# Patient Record
Sex: FEMALE | Race: WHITE | Employment: OTHER | URBAN - METROPOLITAN AREA
[De-identification: names, ages, dates, MRNs, and addresses within clinical notes are randomized per-mention and may not be internally consistent; named-entity substitution may affect disease eponyms.]

---

## 2019-03-09 ENCOUNTER — APPOINTMENT (OUTPATIENT)
Dept: GENERAL RADIOLOGY | Facility: CLINIC | Age: 84
DRG: 544 | End: 2019-03-09
Attending: EMERGENCY MEDICINE
Payer: MEDICARE

## 2019-03-09 ENCOUNTER — HOSPITAL ENCOUNTER (INPATIENT)
Facility: CLINIC | Age: 84
LOS: 4 days | Discharge: NURSING, REHAB OR LONG-TERM CARE | DRG: 544 | End: 2019-03-13
Attending: EMERGENCY MEDICINE | Admitting: INTERNAL MEDICINE
Payer: MEDICARE

## 2019-03-09 DIAGNOSIS — M54.9 INTRACTABLE BACK PAIN: ICD-10-CM

## 2019-03-09 DIAGNOSIS — S32.000A LUMBAR COMPRESSION FRACTURE, CLOSED, INITIAL ENCOUNTER (H): ICD-10-CM

## 2019-03-09 DIAGNOSIS — S32.020D CLOSED COMPRESSION FRACTURE OF L2 LUMBAR VERTEBRA WITH ROUTINE HEALING, SUBSEQUENT ENCOUNTER: Primary | ICD-10-CM

## 2019-03-09 LAB
ALBUMIN UR-MCNC: 10 MG/DL
ANION GAP SERPL CALCULATED.3IONS-SCNC: 7 MMOL/L (ref 3–14)
APPEARANCE UR: CLEAR
BASOPHILS # BLD AUTO: 0 10E9/L (ref 0–0.2)
BASOPHILS NFR BLD AUTO: 0.1 %
BILIRUB UR QL STRIP: NEGATIVE
BUN SERPL-MCNC: 13 MG/DL (ref 7–30)
CALCIUM SERPL-MCNC: 8.4 MG/DL (ref 8.5–10.1)
CHLORIDE SERPL-SCNC: 103 MMOL/L (ref 94–109)
CO2 SERPL-SCNC: 26 MMOL/L (ref 20–32)
COLOR UR AUTO: YELLOW
CREAT SERPL-MCNC: 0.61 MG/DL (ref 0.52–1.04)
DIFFERENTIAL METHOD BLD: ABNORMAL
EOSINOPHIL # BLD AUTO: 0.1 10E9/L (ref 0–0.7)
EOSINOPHIL NFR BLD AUTO: 1.8 %
ERYTHROCYTE [DISTWIDTH] IN BLOOD BY AUTOMATED COUNT: 13.3 % (ref 10–15)
GFR SERPL CREATININE-BSD FRML MDRD: 77 ML/MIN/{1.73_M2}
GLUCOSE SERPL-MCNC: 96 MG/DL (ref 70–99)
GLUCOSE UR STRIP-MCNC: NEGATIVE MG/DL
HCT VFR BLD AUTO: 39 % (ref 35–47)
HGB BLD-MCNC: 13.4 G/DL (ref 11.7–15.7)
HGB UR QL STRIP: NEGATIVE
IMM GRANULOCYTES # BLD: 0 10E9/L (ref 0–0.4)
IMM GRANULOCYTES NFR BLD: 0.3 %
KETONES UR STRIP-MCNC: 5 MG/DL
LEUKOCYTE ESTERASE UR QL STRIP: NEGATIVE
LYMPHOCYTES # BLD AUTO: 0.6 10E9/L (ref 0.8–5.3)
LYMPHOCYTES NFR BLD AUTO: 9.5 %
MCH RBC QN AUTO: 31.8 PG (ref 26.5–33)
MCHC RBC AUTO-ENTMCNC: 34.4 G/DL (ref 31.5–36.5)
MCV RBC AUTO: 92 FL (ref 78–100)
MONOCYTES # BLD AUTO: 0.5 10E9/L (ref 0–1.3)
MONOCYTES NFR BLD AUTO: 7.7 %
MUCOUS THREADS #/AREA URNS LPF: PRESENT /LPF
NEUTROPHILS # BLD AUTO: 5.4 10E9/L (ref 1.6–8.3)
NEUTROPHILS NFR BLD AUTO: 80.6 %
NITRATE UR QL: NEGATIVE
NRBC # BLD AUTO: 0 10*3/UL
NRBC BLD AUTO-RTO: 0 /100
PH UR STRIP: 6.5 PH (ref 5–7)
PLATELET # BLD AUTO: 209 10E9/L (ref 150–450)
POTASSIUM SERPL-SCNC: 3.7 MMOL/L (ref 3.4–5.3)
RBC # BLD AUTO: 4.22 10E12/L (ref 3.8–5.2)
RBC #/AREA URNS AUTO: <1 /HPF (ref 0–2)
SODIUM SERPL-SCNC: 136 MMOL/L (ref 133–144)
SOURCE: ABNORMAL
SP GR UR STRIP: 1.01 (ref 1–1.03)
SQUAMOUS #/AREA URNS AUTO: <1 /HPF (ref 0–1)
UROBILINOGEN UR STRIP-MCNC: NORMAL MG/DL (ref 0–2)
WBC # BLD AUTO: 6.7 10E9/L (ref 4–11)
WBC #/AREA URNS AUTO: 1 /HPF (ref 0–5)

## 2019-03-09 PROCEDURE — 99223 1ST HOSP IP/OBS HIGH 75: CPT | Mod: AI | Performed by: INTERNAL MEDICINE

## 2019-03-09 PROCEDURE — 85025 COMPLETE CBC W/AUTO DIFF WBC: CPT | Performed by: EMERGENCY MEDICINE

## 2019-03-09 PROCEDURE — 25000128 H RX IP 250 OP 636

## 2019-03-09 PROCEDURE — 96374 THER/PROPH/DIAG INJ IV PUSH: CPT

## 2019-03-09 PROCEDURE — 72100 X-RAY EXAM L-S SPINE 2/3 VWS: CPT

## 2019-03-09 PROCEDURE — 81001 URINALYSIS AUTO W/SCOPE: CPT | Performed by: EMERGENCY MEDICINE

## 2019-03-09 PROCEDURE — 80048 BASIC METABOLIC PNL TOTAL CA: CPT | Performed by: EMERGENCY MEDICINE

## 2019-03-09 PROCEDURE — 12000000 ZZH R&B MED SURG/OB

## 2019-03-09 PROCEDURE — 25000128 H RX IP 250 OP 636: Performed by: EMERGENCY MEDICINE

## 2019-03-09 PROCEDURE — 99285 EMERGENCY DEPT VISIT HI MDM: CPT

## 2019-03-09 RX ORDER — CHOLECALCIFEROL (VITAMIN D3) 50 MCG
1 TABLET ORAL DAILY
COMMUNITY

## 2019-03-09 RX ORDER — HYDROMORPHONE HYDROCHLORIDE 1 MG/ML
0.25 INJECTION, SOLUTION INTRAMUSCULAR; INTRAVENOUS; SUBCUTANEOUS EVERY 10 MIN PRN
Status: DISCONTINUED | OUTPATIENT
Start: 2019-03-09 | End: 2019-03-09

## 2019-03-09 RX ORDER — ACETAMINOPHEN 325 MG/1
975 TABLET ORAL 3 TIMES DAILY
Status: DISCONTINUED | OUTPATIENT
Start: 2019-03-10 | End: 2019-03-13 | Stop reason: HOSPADM

## 2019-03-09 RX ORDER — HYDRALAZINE HYDROCHLORIDE 20 MG/ML
10 INJECTION INTRAMUSCULAR; INTRAVENOUS EVERY 4 HOURS PRN
Status: DISCONTINUED | OUTPATIENT
Start: 2019-03-09 | End: 2019-03-09

## 2019-03-09 RX ORDER — LEVOTHYROXINE SODIUM 88 UG/1
88 TABLET ORAL DAILY
Status: DISCONTINUED | OUTPATIENT
Start: 2019-03-10 | End: 2019-03-13 | Stop reason: HOSPADM

## 2019-03-09 RX ORDER — HYDRALAZINE HYDROCHLORIDE 20 MG/ML
10 INJECTION INTRAMUSCULAR; INTRAVENOUS EVERY 4 HOURS PRN
Status: DISCONTINUED | OUTPATIENT
Start: 2019-03-09 | End: 2019-03-13 | Stop reason: HOSPADM

## 2019-03-09 RX ORDER — LIDOCAINE 4 G/G
1-2 PATCH TOPICAL
Status: DISCONTINUED | OUTPATIENT
Start: 2019-03-10 | End: 2019-03-13 | Stop reason: HOSPADM

## 2019-03-09 RX ORDER — NALOXONE HYDROCHLORIDE 0.4 MG/ML
.1-.4 INJECTION, SOLUTION INTRAMUSCULAR; INTRAVENOUS; SUBCUTANEOUS
Status: DISCONTINUED | OUTPATIENT
Start: 2019-03-09 | End: 2019-03-13 | Stop reason: HOSPADM

## 2019-03-09 RX ORDER — LIDOCAINE 40 MG/G
CREAM TOPICAL
Status: DISCONTINUED | OUTPATIENT
Start: 2019-03-09 | End: 2019-03-13 | Stop reason: HOSPADM

## 2019-03-09 RX ORDER — LEVOTHYROXINE SODIUM 88 UG/1
88 TABLET ORAL DAILY
COMMUNITY

## 2019-03-09 RX ORDER — LOSARTAN POTASSIUM 50 MG/1
100 TABLET ORAL DAILY
COMMUNITY

## 2019-03-09 RX ORDER — HYDROMORPHONE HYDROCHLORIDE 1 MG/ML
INJECTION, SOLUTION INTRAMUSCULAR; INTRAVENOUS; SUBCUTANEOUS
Status: COMPLETED
Start: 2019-03-09 | End: 2019-03-09

## 2019-03-09 RX ORDER — AMOXICILLIN 250 MG
2 CAPSULE ORAL 2 TIMES DAILY
Status: DISCONTINUED | OUTPATIENT
Start: 2019-03-09 | End: 2019-03-13 | Stop reason: HOSPADM

## 2019-03-09 RX ORDER — LOSARTAN POTASSIUM 50 MG/1
50 TABLET ORAL DAILY
Status: DISCONTINUED | OUTPATIENT
Start: 2019-03-10 | End: 2019-03-13 | Stop reason: HOSPADM

## 2019-03-09 RX ORDER — ACETAMINOPHEN 650 MG/1
650 SUPPOSITORY RECTAL EVERY 4 HOURS PRN
Status: DISCONTINUED | OUTPATIENT
Start: 2019-03-09 | End: 2019-03-13 | Stop reason: HOSPADM

## 2019-03-09 RX ORDER — OXYCODONE HYDROCHLORIDE 5 MG/1
5-10 TABLET ORAL
Status: DISCONTINUED | OUTPATIENT
Start: 2019-03-09 | End: 2019-03-13 | Stop reason: HOSPADM

## 2019-03-09 RX ORDER — HYDROMORPHONE HYDROCHLORIDE 1 MG/ML
0.2 INJECTION, SOLUTION INTRAMUSCULAR; INTRAVENOUS; SUBCUTANEOUS
Status: DISCONTINUED | OUTPATIENT
Start: 2019-03-09 | End: 2019-03-13 | Stop reason: HOSPADM

## 2019-03-09 RX ORDER — ASPIRIN 81 MG/1
81 TABLET ORAL DAILY
Status: ON HOLD | COMMUNITY
End: 2019-07-29

## 2019-03-09 RX ORDER — AMOXICILLIN 250 MG
1 CAPSULE ORAL 2 TIMES DAILY
Status: DISCONTINUED | OUTPATIENT
Start: 2019-03-09 | End: 2019-03-13 | Stop reason: HOSPADM

## 2019-03-09 RX ADMIN — HYDROMORPHONE HYDROCHLORIDE 0.2 MG: 1 INJECTION, SOLUTION INTRAMUSCULAR; INTRAVENOUS; SUBCUTANEOUS at 23:38

## 2019-03-09 RX ADMIN — Medication 0.2 MG: at 23:38

## 2019-03-09 RX ADMIN — Medication 0.25 MG: at 20:45

## 2019-03-09 SDOH — HEALTH STABILITY: MENTAL HEALTH: HOW OFTEN DO YOU HAVE A DRINK CONTAINING ALCOHOL?: NEVER

## 2019-03-09 ASSESSMENT — ENCOUNTER SYMPTOMS
BACK PAIN: 1
ARTHRALGIAS: 1
MYALGIAS: 1

## 2019-03-09 ASSESSMENT — MIFFLIN-ST. JEOR: SCORE: 729.28

## 2019-03-10 ENCOUNTER — APPOINTMENT (OUTPATIENT)
Dept: PHYSICAL THERAPY | Facility: CLINIC | Age: 84
DRG: 544 | End: 2019-03-10
Attending: INTERNAL MEDICINE
Payer: MEDICARE

## 2019-03-10 ENCOUNTER — APPOINTMENT (OUTPATIENT)
Dept: MRI IMAGING | Facility: CLINIC | Age: 84
DRG: 544 | End: 2019-03-10
Attending: INTERNAL MEDICINE
Payer: MEDICARE

## 2019-03-10 LAB
ERYTHROCYTE [DISTWIDTH] IN BLOOD BY AUTOMATED COUNT: 13.1 % (ref 10–15)
HCT VFR BLD AUTO: 38.1 % (ref 35–47)
HGB BLD-MCNC: 12.9 G/DL (ref 11.7–15.7)
MCH RBC QN AUTO: 31.4 PG (ref 26.5–33)
MCHC RBC AUTO-ENTMCNC: 33.9 G/DL (ref 31.5–36.5)
MCV RBC AUTO: 93 FL (ref 78–100)
PLATELET # BLD AUTO: 180 10E9/L (ref 150–450)
RBC # BLD AUTO: 4.11 10E12/L (ref 3.8–5.2)
WBC # BLD AUTO: 6.2 10E9/L (ref 4–11)

## 2019-03-10 PROCEDURE — 72148 MRI LUMBAR SPINE W/O DYE: CPT

## 2019-03-10 PROCEDURE — 97161 PT EVAL LOW COMPLEX 20 MIN: CPT | Mod: GP | Performed by: PHYSICAL THERAPIST

## 2019-03-10 PROCEDURE — 99232 SBSQ HOSP IP/OBS MODERATE 35: CPT | Performed by: INTERNAL MEDICINE

## 2019-03-10 PROCEDURE — 99207 ZZC CDG-MDM COMPONENT: MEETS LOW - DOWN CODED: CPT | Performed by: INTERNAL MEDICINE

## 2019-03-10 PROCEDURE — 12000000 ZZH R&B MED SURG/OB

## 2019-03-10 PROCEDURE — 36415 COLL VENOUS BLD VENIPUNCTURE: CPT | Performed by: INTERNAL MEDICINE

## 2019-03-10 PROCEDURE — A9270 NON-COVERED ITEM OR SERVICE: HCPCS | Mod: GY | Performed by: INTERNAL MEDICINE

## 2019-03-10 PROCEDURE — 85027 COMPLETE CBC AUTOMATED: CPT | Performed by: INTERNAL MEDICINE

## 2019-03-10 PROCEDURE — 25000132 ZZH RX MED GY IP 250 OP 250 PS 637: Mod: GY | Performed by: INTERNAL MEDICINE

## 2019-03-10 PROCEDURE — 97530 THERAPEUTIC ACTIVITIES: CPT | Mod: GP | Performed by: PHYSICAL THERAPIST

## 2019-03-10 PROCEDURE — 82306 VITAMIN D 25 HYDROXY: CPT | Performed by: INTERNAL MEDICINE

## 2019-03-10 RX ORDER — DIPHENHYDRAMINE HCL 25 MG
25 CAPSULE ORAL EVERY 6 HOURS PRN
Status: DISCONTINUED | OUTPATIENT
Start: 2019-03-10 | End: 2019-03-13 | Stop reason: HOSPADM

## 2019-03-10 RX ADMIN — VITAMIN D, TAB 1000IU (100/BT) 2000 UNITS: 25 TAB at 09:44

## 2019-03-10 RX ADMIN — SENNOSIDES AND DOCUSATE SODIUM 1 TABLET: 8.6; 5 TABLET ORAL at 00:53

## 2019-03-10 RX ADMIN — ACETAMINOPHEN 975 MG: 325 TABLET, FILM COATED ORAL at 20:56

## 2019-03-10 RX ADMIN — LEVOTHYROXINE SODIUM 88 MCG: 88 TABLET ORAL at 09:44

## 2019-03-10 RX ADMIN — ACETAMINOPHEN 975 MG: 325 TABLET, FILM COATED ORAL at 16:45

## 2019-03-10 RX ADMIN — OXYCODONE HYDROCHLORIDE 5 MG: 5 TABLET ORAL at 09:44

## 2019-03-10 RX ADMIN — OXYCODONE HYDROCHLORIDE 10 MG: 5 TABLET ORAL at 20:55

## 2019-03-10 RX ADMIN — SENNOSIDES AND DOCUSATE SODIUM 1 TABLET: 8.6; 5 TABLET ORAL at 09:45

## 2019-03-10 RX ADMIN — LIDOCAINE 1 PATCH: 560 PATCH PERCUTANEOUS; TOPICAL; TRANSDERMAL at 09:46

## 2019-03-10 RX ADMIN — OXYCODONE HYDROCHLORIDE 5 MG: 5 TABLET ORAL at 16:45

## 2019-03-10 RX ADMIN — OXYCODONE HYDROCHLORIDE 5 MG: 5 TABLET ORAL at 04:29

## 2019-03-10 RX ADMIN — SENNOSIDES AND DOCUSATE SODIUM 1 TABLET: 8.6; 5 TABLET ORAL at 20:56

## 2019-03-10 RX ADMIN — LOSARTAN POTASSIUM 50 MG: 50 TABLET ORAL at 09:45

## 2019-03-10 RX ADMIN — ACETAMINOPHEN 975 MG: 325 TABLET, FILM COATED ORAL at 09:44

## 2019-03-10 ASSESSMENT — ACTIVITIES OF DAILY LIVING (ADL)
ADLS_ACUITY_SCORE: 16
ADLS_ACUITY_SCORE: 15
ADLS_ACUITY_SCORE: 14
ADLS_ACUITY_SCORE: 15
ADLS_ACUITY_SCORE: 13
ADLS_ACUITY_SCORE: 14

## 2019-03-10 ASSESSMENT — MIFFLIN-ST. JEOR: SCORE: 789.75

## 2019-03-10 NOTE — CONSULTS
Reviewed films. Stable L2 compression fracture. Old L3, L4 fractures. May mobilize as tolerated.    Will see her in the morning.

## 2019-03-10 NOTE — PLAN OF CARE
Discharge Planner PT   Patient plan for discharge: Home  Current status: Pt is a very pleasant 89-year-old female with past medical history of hypertension, hypothyroidism, osteoporosis and breast cancer who came in for evaluation of intractable back pain. She was found to have stable L2 compression fracture. Old L3, L4 fractures.        PT orders received, initial eval complete, treatment initiated. Pt's mobility is significantly limited by back pain requiring increased time for all movements. Pt required increased time to move bed flat in very small increments. Pt rolled L and R with min A and increased time and was dependent for anushka cares and donning/doffing briefs. Pt attempted supine bridge for repositioning and was unable to lift pelvis, and pt was unable to attempt supine to sit due to pain.   Barriers to return to prior living situation: All mobility requiring assist and limited by pain, confusion  Recommendations for discharge: at this time TCU: will update when able to further assess pt mobility  Rationale for recommendations: Pt will require continued skilled PT to address above limitations before returning home.       Entered by: Tonja Ordonez 03/10/2019 4:45 PM

## 2019-03-10 NOTE — PLAN OF CARE
A&O x 4. [place]. Hearing aides at bedside. VSS on 2 L O2 via nasal canulla. Was given IV Dilaudid and Oxycodone for back pain. Strict bed rest maintained. Slept well. Turned and repositioned. Pure wick in place. IV SL. For MRI this morning. Will continue to monitor

## 2019-03-10 NOTE — PLAN OF CARE
OT: order received, chart reviewed. Pt currently strict bedrest, awaiting ortho consult and PT consult to determine OT needs.

## 2019-03-10 NOTE — ED PROVIDER NOTES
"  History     Chief Complaint:  Back Pain     HPI   Gennaro Walton is a 89 year old female with a history of hypertension and osteoporosis who presents to the emergency department today from [facility] with back pain. The patient reports back pain that started yesterday yet she denies any recent trauma or abnormalities. She states that she would often try to do something and then would be suddenly hit with sharp back pain immediately causing her to have to sit down. Today, she went to [facility] where they found a fractured vertebrae. The patient was given pain meds and discharged although her pain has not improved or been under control.    Allergies:  No Known Drug Allergies    Medications:    The patient is not currently taking any prescribed medications.    Past Medical History:    Hypertension   Osteoporosis     Past Surgical History:    History reviewed. No pertinent past surgical history.      Family History:    History reviewed. No pertinent family history.     Social History:  The patient was accompanied to the ED by her daughter.  Smoking Status: Never smoker   Smokeless Tobacco: Never used   Alcohol Use: No   Drug use: No   Marital Status:       Review of Systems   Musculoskeletal: Positive for arthralgias, back pain and myalgias.   All other systems reviewed and are negative.    Physical Exam   First Vitals:  Patient Vitals for the past 24 hrs:   BP Temp Temp src Pulse Resp SpO2 Height Weight   03/09/19 2030 188/88 -- -- 69 -- 91 % -- --   03/09/19 2008 (!) 194/94 -- -- 71 -- 91 % -- --   03/09/19 1843 (!) 205/87 97.9  F (36.6  C) Oral 79 20 92 % 1.473 m (4' 10\") 44.5 kg (98 lb)       Physical Exam  General: Alert and Interactive.   Head: No signs of trauma.   Mouth/Throat: Oropharynx is clear and moist.   Eyes: Conjunctivae are normal. Pupils are equal, round, and reactive to light.   Neck: Normal range of motion. No nuchal rigidity.   CV: Normal rate and regular rhythm.    Resp: Effort " normal and breath sounds normal. No respiratory distress.   GI: Soft. There is no tenderness or guarding.   MSK: Normal range of motion. no edema. Pain with palpation over the lumbar spine, no deformity   Neuro: The patient is alert and oriented to person, place, and time.  PERRLA, EOMI, strength in upper/lower extremities normal and symmetrical.   Sensation normal. Speech normal.  GCS eye subscore is 4. GCS verbal subscore is 5. GCS motor subscore is 6.   Skin: Skin is warm and dry. No rash noted.   Psych: normal mood and affect. behavior is normal.   Emergency Department Course   Imaging:  Radiology findings were communicated with the patient and family who voiced understanding of the findings.    Lumbar Spine XR:   IMPRESSION: Compression fractures of the L2, L3 and L4 vertebrae are  age indeterminate. MRI could evaluate for marrow edema associated with  recent fracture if there is need for further imaging.  Report per radiology     Laboratory:  Laboratory findings were communicated with the patient and family who voiced understanding of the findings.    UA: yellow clear urine, urineketon 5, protein albumin urine 10, mucous urine present o/w WNL    BMP: Calcium 8.4 (L), creatinine 0.61    CBC: WBC 6.7, HGB 13.4,      Interventions:  2045: Dilaudid 0.25 mg IV     Emergency Department Course:  Nursing notes and vitals reviewed.  The patient provided a urine sample here in the emergency department. This was sent for laboratory testing, findings above.  IV was inserted and blood was drawn for laboratory testing, results above.  2016: I performed an exam of the patient as documented above.     Findings and plan explained to the Patient and daughter who consents to admission. Discussed the patient with Dr. Kohler, who will admit the patient to a medical bed for further monitoring, evaluation, and treatment.    I personally reviewed the laboratory and imaging results with the Patient and daughter and answered all  related questions prior to admission    Impression & Plan    Medical Decision Making:  Gennaro Walton is a 89 year old female who presents for evaluation of back pain that started spontaneously without trauma.  She has no history of similar back pain in the past.  Pain has improved with interventions in the emergency department.     The patient was seen in clinic by [facility] and had an x-ray that reportedly showed a compression fracture of what sounds like the lumbar spine. Due to the results being inaccessible, I performed a Lumbar Spine XR.     Findings show multiple age-indeterminate lumbar compression fractures  The patient will be admitted with intractable pain unresponsive to outpatient medications . She will be placed in a medical bed under the care of Dr. Kohler.     Diagnosis:    ICD-10-CM    1. Intractable back pain M54.9    2. Lumbar compression fracture, closed, initial encounter (H) S32.000A        Disposition:  Admitted to a medical bed.     Angel Mckeon  3/9/2019    EMERGENCY DEPARTMENT  Scribe Disclosure:  I, Angel Mckeon, am serving as a scribe at 7:51 PM on 3/9/2019 to document services personally performed by Olegario Machuca MD based on my observations and the provider's statements to me.        Olegario Machuca MD  03/10/19 0031

## 2019-03-10 NOTE — H&P
Admitted:     03/09/2019      DATE OF SERVICE: 03/09/2019    PRIMARY CARE PHYSICIAN:  Mady Hagen MD      CHIEF COMPLAINT:  Back pain.      HISTORY OF PRESENT ILLNESS:  History obtained from the patient who is a good historian.  I also discussed with the ER attending, Dr. Machuca, and I discussed with her daughter who was present at the time of my examination.      Mrs. Gennaro Walton is a very pleasant 89-year-old female with past medical history of hypertension, hypothyroidism, osteoporosis and breast cancer who was brought in for evaluation of severe back pain.  Despite her advanced age, the patient is very independent.  She is awake, alert, oriented to self, place and time.  She lives in a senior living facility independently.  She is able to walk without any help, but she does keep a cane handy in case she may need it.  She reports that on Friday around noontime while she was sitting, she started having severe back pain that occurred suddenly.  She never had similar back pain before.  Initially, the back pain was located around the waistline, but later on it went up and down her spine.  She reported the pain as being 8/10 in intensity.  Her daughter continued with her on Friday evening.  At that time, patient was still able to ambulate, going to the bathroom.  She did not sleep well on Friday night.  Then on Saturday morning, she continued to have severe back pain.  She states that she took a total of 6 pills of aspirin, which did not help with her back pain.  Her daughter took her to [facility] clinic today.  Apparently, she had an x-ray which showed a compression fracture.  She was prescribed some pain medication and advised to rest.  Her daughter took her back home.  Apparently, patient continued to have severe back pain.  The patient was not able to get out of the car by herself, was not able to walk to the bathroom once she got into her apartment, the reason for which her daughter brought her to the ER.  Upon  further questioning, the patient reported that earlier today she had some nausea but that has resolved.  She denies any vomiting.  She denies any fevers, no chest pain or shortness of breath, no headaches, no abdominal pain, no dysuria, no diarrhea.  She actually reports being constipated in the last couple of weeks.  She denies any leg swelling.      She denies any local trauma.  She denies any recent falls.      In the ER, she was seen by Dr. Machuca.  Her initial vitals showed blood pressure of 205/87.  Later on, blood pressure improved to 188/68.  Her heart rate was 76, respiratory rate 18, oxygen saturation 91-96% on room air and temperature 97.9.  She did have some basic blood work, which showed unremarkable BMP and CBC.  Glucose was 96.  UA was negative.  She had an x-ray of the L-spine, which showed compression fractures of the L2, L3 and L4 vertebrae that are of indeterminate age.  The bones are severely osteopenic.      In the ER, she received 1 dose of Dilaudid 0.25 mg IV, which temporarily relieved her pain.      PAST MEDICAL HISTORY:   1.  Hypertension.   2.  Osteoporosis.   3.  Hypothyroidism.   4.  Breast cancer.      PAST SURGICAL HISTORY:   1.  Left-sided mastectomy.   2.  Cataract surgery.      SOCIAL HISTORY:  The patient never smoked.  She denies alcohol drinking.  She denies illicit drug abuse.      FAMILY HISTORY:  Reviewed and is noncontributory to the current admission.      PRIOR TO ADMISSION MEDICATIONS:   1.  Aspirin 81 mg p.o. daily.   2.  Levothyroxine 88 mcg p.o. daily.   3.  Losartan 50 mg p.o. daily.   4.  Vitamin D 2000 units p.o. daily.      ALLERGIES:  No known drug allergies.      REVIEW OF SYSTEMS:  A 10-point review of systems was conducted and it was negative except for pertinent positives mentioned in history of present illness.      PHYSICAL EXAMINATION:   VITAL SIGNS:  Blood pressure 188/88, heart rate 70, respiratory rate 18, oxygen saturation 91-96% on room air, temperature  97.9.   GENERAL:  The patient is awake, alert, in no acute distress.  She is lying still but she does have significant back pain if she tries to change her position.   HEENT:  Normocephalic, atraumatic.  Pupils are equally round and reactive to light.  Oral mucosa is moist.   NECK:  Supple.  No cervical lymphadenopathy, no thyromegaly.   CHEST:  There is bilateral air entry, no wheezing, no rales, no crackles.   CARDIOVASCULAR:  There is normal S1, S2, regular rate, no murmurs, no rubs.   ABDOMEN:  Soft, nontender, nondistended.  Bowel sounds are present.   EXTREMITIES:  There is no leg swelling, no calf tenderness, 2+ peripheral pulses are palpable.   SKIN:  Intact, no rash, no cyanosis.   NEUROLOGIC:  The patient is awake, alert, oriented to self, place and time.  There are no focal neurological deficits.  Motor strength seems to be somatic both upper and lower extremities.  Sensory is grossly intact.   PSYCHIATRIC:  Normal mood, normal affect.   MUSCULOSKELETAL:  She has severe back pain with any change of position.  No obvious joint deformities.      LABORATORY DATA:  Reviewed.  Her BMP with a sodium 136, potassium 3.7, chloride 103, bicarbonate 26, BUN 13, creatinine 0.61, calcium is 8.4, anion gap of 7, glucose is 96.  White blood cells 6.7, hemoglobin 13.4, hematocrit 39, platelet count 209.  UA was negative.      IMAGING:  X-ray of the L-spine with findings as above.      ASSESSMENT:  Mrs. Gennaro Walton is a very pleasant 89-year-old female with past medical history of hypertension, hypothyroidism, osteoporosis and breast cancer who came in for evaluation of intractable back pain.      PLAN:   1.  Intractable back pain:  Compression fractures of L2, L3, L4 vertebra.  She started having severe back pain on Friday.  She denies any trauma or falls.  The pain became debilitating and constant.  She denies any pain in her legs.  No urinary incontinence.  She did have an x-ray of the L-spine earlier today at [facility] and later on in the ER, which showed the compression fracture of L2, L3 and L4 vertebrae that are age indeterminate and an MRI of the L-spine was recommended.  The patient was in significant distress whenever she was trying to change her position in the bed.  Otherwise, at baseline, patient is a very independent.  She is awake, alert, oriented to self, place and time.  We will try to control her pain tonight with scheduled Tylenol 975 mg p.o. 3 times daily.  Will order a Lidoderm patch, also oxycodone and low-dose Dilaudid IV available p.r.n.  Will order an MRI of the L-spine for better visualization, as well as an ortho consult.  Eventually, she will need to be seen by PT and OT.  Will hold her prior to admission aspirin in case that she will have a vertebroplasty.   2.  Hypertension.  Her blood pressures are extremely elevated, likely due to her uncontrolled pain.  We are going to resume her prior to admission Cozaar 50 mg p.o. daily and hydralazine IV p.r.n. has been ordered for elevated blood pressures.  Will monitor her blood pressure.   3.  Hypothyroidism.  Will continue her prior to admission levothyroxine.   4.  Osteopenia, likely contributing to her compression fractures.  We will check a vitamin D level.  We will continue her vitamin D3 supplementation.   5.  Deep venous thrombosis prophylaxis.  Pneumatic compression device.   6.  Code status was discussed with the patient and her daughter.  The patient was in severe pain at the time of this discussion.  She stated that she would like to live longer and she would like to be resuscitated if there are some reversible conditions that can be fixed quickly.  Will put a full code at this time.  This issue can be readdressed in the morning.   7.  Disposition:  Admit inpatient.  Anticipate at least 2 days of hospitalization given the severity of her pain.         MAK MONTOYA MD             D: 03/10/2019   T: 03/10/2019   MT: TAMERA      Name:      RAMON MEME   MRN:      [id number]        Account:      [id number]   :      1923        Admitted:     2019                   Document: [id number]       cc: Mady Hagen MD

## 2019-03-10 NOTE — ED NOTES
"[facility]  ED Nurse Handoff Report    ED Chief complaint: Back Pain (Pt reports back pain starting yesterday. Pt went to [facility] today, who found a fractured vertebrae. Pt was given pain meds and pain has not improved or been under control. )      ED Diagnosis:   Final diagnoses:   Intractable back pain   Lumbar compression fracture, closed, initial encounter (H)       Code Status: Full Code    Allergies: No Known Allergies    Activity level - Baseline/Home:  Independent with cane (occasionally walker)    Activity Level - Current:   Stand with Assist of 2     Needed?: No    Isolation: No  Infection: Not Applicable  Bariatric?: No    Vital Signs:   Vitals:    03/09/19 1843 03/09/19 2008 03/09/19 2030   BP: (!) 205/87 (!) 194/94 188/88   Pulse: 79 71 69   Resp: 20     Temp: 97.9  F (36.6  C)     TempSrc: Oral     SpO2: 92% 91% 91%   Weight: 44.5 kg (98 lb)     Height: 1.473 m (4' 10\")         Cardiac Rhythm: ,        Pain level: 0-10 Pain Scale: 10    Is this patient confused?: No   Does this patient have a guardian?  No         If yes, is there guardianship documents in the Epic \"Code/ACP\" activity?  N/A         Guardian Notified?  N/A  [place] - Suicide Severity Rating Scale Completed?  Yes  If yes, what color did the patient score?  White    Patient Report: Initial Complaint: Pt with hx of osteoporosis. Sudden onset of low back pain today. Went to [facility] and x-ray showed lumbar compression fx and sent home with [place]. Pt unable to manage pain at home.  Focused Assessment: Pt with low back pain that makes it difficult for pt to breath and for pt to move.   Tests Performed: lumbar x-ray, labs, and UA  Abnormal Results: xray shows compression fx of L2-L4.  Treatments provided: dilaudid 0.25.    Family Comments: Daughter at bedside and very supportive.    OBS brochure/video discussed/provided to patient/family: No              Name of person given brochure if not patient: No   "            Relationship to patient: No    ED Medications:   Medications   HYDROmorphone (PF) (DILAUDID) injection 0.25 mg (0.25 mg Intravenous Given 3/9/19 2045)       Drips infusing?:  No    For the majority of the shift this patient was [place].   Interventions performed were reassurance and information.    Severe Sepsis OR Septic Shock Diagnosis Present: No    To be done/followed up on inpatient unit:  none pending    ED NURSE PHONE NUMBER: [phone]

## 2019-03-10 NOTE — PROGRESS NOTES
[facility]    Hospitalist Progress Note    Assessment & Plan   Mrs. Gennaro Walton is a very pleasant 89-year-old female with past medical history of hypertension, hypothyroidism, osteoporosis and breast cancer who came in for evaluation of intractable back pain.      PLAN:    Intractable back pain:    -Secondary to Compression fractures of L2, L3, L4 vertebra.  -  x-ray of the L-spine earlier today at [facility] and later on in the ER, which showed the compression fracture of L2, L3 and L4 vertebrae .  -patient is a very independent.  -pain controlled with scheduled Tylenol 975 mg p.o. 3 times daily,  Lidoderm patch, also oxycodone and low-dose Dilaudid IV available p.r.n.    -continue  PT and OT.    -Will hold her prior to admission aspirin in case that she will have a vertebroplasty.  -orthopedics  Spine consulted -pending input   -mri done of her lumbar area indicating acute  L2 superior end plate #    Hypertension.   -blood pressure elevated on admission possibly from pain    -continue PTA Cozaar 50 mg p.o. daily and hydralazine IV p.r.n.  -blood pressure continue to be in 150 systolic range   -will adjust meds possibly in am when pain well controlled .   Hypothyroidism.  -   continue her prior to admission levothyroxine.     Osteopenia  -likely contributing to her compression fractures.   -  vitamin D level pending .   continue her vitamin D3 supplementation.     DVT Prophylaxis: Pneumatic Compression Devices  Code Status: Full Code     Disposition: Expected discharge 1-2 days to tcu vs home.    Tiffany Batista MD  Text Page   (7am to 6pm)    Interval History   Visited with patient  When she came  back from [facility], very pleasant, pain controlled.     -Data reviewed today: I reviewed all new labs and imaging results over the last 24 hours.  Physical Exam   Temp: 98  F (36.7  C) Temp src: Oral BP: 157/66 Pulse: 59 Heart Rate: 70 Resp: 18 SpO2: 92 % O2 Device: Nasal cannula Oxygen  Delivery: 2 LPM  Vitals:    03/09/19 1843 03/10/19 0004   Weight: 44.5 kg (98 lb) 50.5 kg (111 lb 5.3 oz)     Vital Signs with Ranges  Temp:  [97.5  F (36.4  C)-98  F (36.7  C)] 98  F (36.7  C)  Pulse:  [59-79] 59  Heart Rate:  [61-70] 70  Resp:  [17-20] 18  BP: (113-205)/(66-94) 157/66  SpO2:  [87 %-96 %] 92 %  No intake/output data recorded.    Constitutional: Awake, alert, cooperative, no apparent distress  Respiratory: Clear to auscultation bilaterally, no crackles or wheezing  Cardiovascular: Regular rate and rhythm, normal S1 and S2, and no murmur noted  GI: Normal bowel sounds, soft, non-distended, non-tender  tenderness noted in lower spine   Skin/Integumen: No rashes, no cyanosis, no edema  Neuro : moving all 4 extremities, no focal deficit noted     Medications       acetaminophen  975 mg Oral TID     levothyroxine  88 mcg Oral Daily     lidocaine  1-2 patch Transdermal Q24H     lidocaine   Transdermal Q8H     lidocaine   Transdermal Q24h     losartan  50 mg Oral Daily     senna-docusate  1 tablet Oral BID    Or     senna-docusate  2 tablet Oral BID     sodium chloride (PF)  3 mL Intracatheter Q8H     vitamin D3  2,000 Units Oral Daily       Data   Recent Labs   Lab 03/10/19  1031 03/09/19  2040   WBC 6.2 6.7   HGB 12.9 13.4   MCV 93 92    209   NA  --  136   POTASSIUM  --  3.7   CHLORIDE  --  103   CO2  --  26   BUN  --  13   CR  --  0.61   ANIONGAP  --  7   LORETA  --  8.4*   GLC  --  96     Recent Labs   Lab 03/09/19  2040   GLC 96       Imaging:   Recent Results (from the past 24 hour(s))   Lumbar spine XR, 2-3 views    Narrative    LUMBAR SPINE TWO - THREE VIEWS 3/9/2019 9:12 PM     COMPARISON: None.    HISTORY: Pain.    FINDINGS: There are moderate compression fractures of the L2, L3 and  L4 vertebrae. These are age-indeterminate. No comparison exam is  available. The bones are severely osteopenic. Alignment is normal.  Disc heights are relatively well maintained. There are osteophytes  and  degenerative endplate changes at L3-4.      Impression    IMPRESSION: Compression fractures of the L2, L3 and L4 vertebrae are  age indeterminate. MRI could evaluate for marrow edema associated with  recent fracture if there is need for further imaging.    HAMLET SWEET MD   MR Lumbar Spine w/o Contrast    Narrative    MRI OF THE LUMBAR SPINE WITHOUT CONTRAST  3/10/2019 9:10 AM     COMPARISON: Lumbar spine plain films 3/9/2019.    HISTORY: Severe back pain, compression fractures.    TECHNIQUE: Multiplanar, multisequence MRI images of the lumbar spine  were acquired without IV contrast.    FINDINGS: There are five lumbar-type vertebrae for the purposes of  this dictation.     10% acute/early subacute L2 superior endplate compression. Chronic L3  compression measuring 60% centrally. Chronic L4 compression measuring  30% centrally. Partially visualized mild lumbar dextroscoliosis.  Slightly straightened lordosis. Edema in the right L5-S1 facet  consistent with degenerative change. The conus tip is identified at  L1. Visualized extraspinal soft tissues are within normal limits.    T12-L1: Normal disc height. Moderate disc T2 signal loss. No  herniation. No facet arthropathy. No spinal canal stenosis. No right  neural foraminal stenosis. No left neural foraminal stenosis.    L1-L2: Normal disc height. Moderate disc T2 signal loss. No  herniation. No facet arthropathy. No spinal canal stenosis. No right  neural foraminal stenosis. No left neural foraminal stenosis.    L2-L3: Exaggerated disc height. Moderate disc T2 signal loss. Minimal  posterior annular bulge. Mild bilateral facet arthropathy. No spinal  canal stenosis. No right neural foraminal stenosis. No left neural  foraminal stenosis.    L3-L4: Exaggerated disc height. Moderate disc T2 signal loss. Mild  circumferential disc osteophyte complexes. Mild bilateral facet  arthropathy. No spinal canal stenosis. Mild right neural foraminal  stenosis. Mild left neural  foraminal stenosis.    L4-L5: Exaggerated disc height. Moderate disc T2 signal loss. Mild  posterior annular bulge. Mild bilateral facet arthropathy. No spinal  canal stenosis. No right neural foraminal stenosis. No left neural  foraminal stenosis.    L5-S1: Mild to moderate disc height loss. Moderate disc T2 signal  loss. Mild right foraminal disc aspect complexes. Moderate to severe  right and mild-to-moderate left facet arthropathy. No spinal canal  stenosis. Mild to moderate right neural foraminal stenosis. Mild left  neural foraminal stenosis.      Impression    IMPRESSION:  1.  10% acute/early subacute L2 superior endplate compression.  2.  Chronic L3 and L4 compressions.  3.  No high-grade spinal canal or neural foraminal stenosis.    CARLA HASSAN MD

## 2019-03-10 NOTE — PROGRESS NOTES
RECEIVING UNIT ED HANDOFF REVIEW    ED Nurse Handoff Report was reviewed by: Sheila Paula on March 9, 2019 at 10:00 PM

## 2019-03-10 NOTE — PROGRESS NOTES
03/10/19 1500   Quick Adds   Type of Visit Initial PT Evaluation   Living Environment   Lives With alone   Living Arrangements independent living facility   Home Accessibility no concerns   Transportation Anticipated family or friend will provide  [facility]   Living Environment Comment All subjective per pt and daughter   Self-Care   Usual Activity Tolerance moderate   Current Activity Tolerance poor   Regular Exercise No   Equipment Currently Used at Home grab bar, toilet;grab bar, tub/shower;cane, straight   Activity/Exercise/Self-Care Comment Pt walks to dining room for meals, and attends activities at [place] apt.   Functional Level Prior   Ambulation 0-->independent   Transferring 0-->independent   Toileting 0-->independent   Bathing 0-->independent  (tub shower)   Fall history within last six months yes  ( reaching to place something in shower when moving into the )   Number of times patient has fallen within last six months 1   Which of the above functional risks had a recent onset or change? ambulation;transferring;toileting;bathing;dressing;eating   Prior Functional Level Comment Pt IND but brings SEC in case she feels like she needs it; 3 meals per day at facility, cleaning 1 per week   General Information   Onset of Illness/Injury or Date of Surgery - Date 03/09/19   Referring Physician Tiffany Batista MD   Patient/Family Goals Statement return home   Pertinent History of Current Problem (include personal factors and/or comorbidities that impact the POC) Pt is a very pleasant 89-year-old female with past medical history of hypertension, hypothyroidism, osteoporosis and breast cancer who came in for evaluation of intractable back pain.  Found to have Stable L2 compression fracture. Old L3, L4 fractures.   Precautions/Limitations spinal precautions   Cognitive Status Examination   Orientation person  (knows [facility] hospital, not oriented to year)   Level of Consciousness  "alert;lethargic/somnolent   Follows Commands and Answers Questions 100% of the time   Personal Safety and Judgment intact   Memory impaired   Cognitive Comment Struggles remembering/ organizing thoughts; confusion   Pain Assessment   Patient Currently in Pain No   Posture    Posture Forward head position;Protracted shoulders;Kyphosis   Range of Motion (ROM)   ROM Comment significantly limited trunk ROM d/t pain   Strength   Strength Comments unable to assess due to pain   Bed Mobility   Bed Mobility Comments min A and increased time for rolling L and R   Transfer Skills   Transfer Comments unable to assess due to pain   Gait   Gait Comments unable to assess due to pain   Sensory Examination   Sensory Perception Comments unable to assess due to pain   General Therapy Interventions   Planned Therapy Interventions ADL retraining;bed mobility training;gait training;strengthening;ROM;transfer training;progressive activity/exercise;home program guidelines   Clinical Impression   Criteria for Skilled Therapeutic Intervention yes, treatment indicated   PT Diagnosis impaired transfers and gait   Influenced by the following impairments significant pain   Functional limitations due to impairments impaired bed mobility, transfers, and gait   Clinical Presentation Stable/Uncomplicated   Therapy Frequency` daily   Predicted Duration of Therapy Intervention (days/wks) 3 days   Anticipated Equipment Needs at Discharge front wheeled walker   Anticipated Discharge Disposition Transitional Care Facility   Risk & Benefits of therapy have been explained Yes   Patient, Family & other staff in agreement with plan of care Yes   [facility] [employer]-PAC TM \"6 Clicks\"   2016, Trustees of [facility], under license to [employer].  All rights reserved.   6 Clicks Short Forms Basic Mobility Inpatient Short Form   [facility] AM-PAC  \"6 Clicks\" V.2 Basic Mobility Inpatient Short Form   1. Turning from your back to your side while " in a flat bed without using bedrails? 3 - A Little   2. Moving from lying on your back to sitting on the side of a flat bed without using bedrails? 2 - A Lot   3. Moving to and from a bed to a chair (including a wheelchair)? 2 - A Lot   4. Standing up from a chair using your arms (e.g., wheelchair, or bedside chair)? 2 - A Lot   5. To walk in hospital room? 2 - A Lot   6. Climbing 3-5 steps with a railing? 1 - Total   Basic Mobility Raw Score (Score out of 24.Lower scores equate to lower levels of function) 12   Total Evaluation Time   Total Evaluation Time (Minutes) 30  (Increased time required due to cognition and pain)

## 2019-03-10 NOTE — PROGRESS NOTES
Admission    Patient arrives to room 604/2 via cart from ED.  Care plan note: Initiated    Inpatient nursing criteria listed below were met:    PCD's Documented: YeS   Skin issues/needs documented NA  Isolation education started/completed NA Patient allergies verified with patient: YES  Verified completion of [facility] Risk Assessment Tool:  YES  Verified completion of Guardianship screening tool: YES  Fall Prevention: Care plan updated, Education given and documented YES  Care Plan initiated: YES   Home medications documented in belongings flowsheet: NA   Patient belongings documented in belongings flowsheet: YES  Reminder note (belongings/ medications) placed in discharge instructions:NA  Admission profile/ required documentation complete: NO

## 2019-03-10 NOTE — PHARMACY-ADMISSION MEDICATION HISTORY
"Admission medication history interview status for the 3/9/2019  admission is complete. See EPIC admission navigator for prior to admission medications     Medication history source reliability:Moderate    Actions taken by pharmacist (provider contacted, etc): called [facility] to verify patient's high blood pressure medication (losartan) and thyroid medication (levothyroxine). Pt's family had trouble remembering the exact name of the medication.    Additional medication history information not noted on PTA med list :None    Medications added: all (initial PTA med list was blank)    Medication reconciliation/reorder completed by provider prior to medication history? No    Time spent in this activity: 20 minutes    Prior to Admission medications    Medication Sig Last Dose Taking? Auth Provider   aspirin 81 MG EC tablet Take 81 mg by mouth daily 3/9/2019 at Unknown time Yes Unknown, Entered By History   levothyroxine (SYNTHROID/LEVOTHROID) 88 MCG tablet Take 88 mcg by mouth daily 3/9/2019 at Unknown time Yes Unknown, Entered By History   losartan (COZAAR) 50 MG tablet Take 50 mg by mouth daily 3/9/2019 at Unknown time Yes Unknown, Entered By History   UNKNOWN TO PATIENT Take 1 tablet by mouth daily \"Vitamin B\" according to patient; they did not know which vitamin B it was. 3/9/2019 at Unknown time Yes Unknown, Entered By History   vitamin D3 (CHOLECALCIFEROL) 2000 units tablet Take 1 tablet by mouth daily 3/9/2019 at Unknown time Yes Unknown, Entered By History       "

## 2019-03-11 ENCOUNTER — APPOINTMENT (OUTPATIENT)
Dept: PHYSICAL THERAPY | Facility: CLINIC | Age: 84
DRG: 544 | End: 2019-03-11
Payer: MEDICARE

## 2019-03-11 LAB — DEPRECATED CALCIDIOL+CALCIFEROL SERPL-MC: 21 UG/L (ref 20–75)

## 2019-03-11 PROCEDURE — 99232 SBSQ HOSP IP/OBS MODERATE 35: CPT | Performed by: INTERNAL MEDICINE

## 2019-03-11 PROCEDURE — A9270 NON-COVERED ITEM OR SERVICE: HCPCS | Mod: GY | Performed by: INTERNAL MEDICINE

## 2019-03-11 PROCEDURE — 12000000 ZZH R&B MED SURG/OB

## 2019-03-11 PROCEDURE — 25000132 ZZH RX MED GY IP 250 OP 250 PS 637: Mod: GY | Performed by: INTERNAL MEDICINE

## 2019-03-11 PROCEDURE — 97530 THERAPEUTIC ACTIVITIES: CPT | Mod: GP | Performed by: PHYSICAL THERAPIST

## 2019-03-11 RX ORDER — ASPIRIN 81 MG/1
81 TABLET ORAL DAILY
Status: DISCONTINUED | OUTPATIENT
Start: 2019-03-11 | End: 2019-03-13 | Stop reason: HOSPADM

## 2019-03-11 RX ADMIN — LOSARTAN POTASSIUM 50 MG: 50 TABLET ORAL at 08:04

## 2019-03-11 RX ADMIN — ACETAMINOPHEN 975 MG: 325 TABLET, FILM COATED ORAL at 15:44

## 2019-03-11 RX ADMIN — SENNOSIDES AND DOCUSATE SODIUM 2 TABLET: 8.6; 5 TABLET ORAL at 20:46

## 2019-03-11 RX ADMIN — LEVOTHYROXINE SODIUM 88 MCG: 88 TABLET ORAL at 08:04

## 2019-03-11 RX ADMIN — OXYCODONE HYDROCHLORIDE 10 MG: 5 TABLET ORAL at 04:16

## 2019-03-11 RX ADMIN — ACETAMINOPHEN 975 MG: 325 TABLET, FILM COATED ORAL at 08:04

## 2019-03-11 RX ADMIN — LIDOCAINE 2 PATCH: 560 PATCH PERCUTANEOUS; TOPICAL; TRANSDERMAL at 08:06

## 2019-03-11 RX ADMIN — VITAMIN D, TAB 1000IU (100/BT) 2000 UNITS: 25 TAB at 08:05

## 2019-03-11 RX ADMIN — ACETAMINOPHEN 975 MG: 325 TABLET, FILM COATED ORAL at 21:43

## 2019-03-11 RX ADMIN — SENNOSIDES AND DOCUSATE SODIUM 2 TABLET: 8.6; 5 TABLET ORAL at 08:05

## 2019-03-11 RX ADMIN — OXYCODONE HYDROCHLORIDE 10 MG: 5 TABLET ORAL at 00:30

## 2019-03-11 RX ADMIN — OXYCODONE HYDROCHLORIDE 10 MG: 5 TABLET ORAL at 16:53

## 2019-03-11 RX ADMIN — ASPIRIN 81 MG: 81 TABLET, COATED ORAL at 17:38

## 2019-03-11 ASSESSMENT — ACTIVITIES OF DAILY LIVING (ADL)
ADLS_ACUITY_SCORE: 14
ADLS_ACUITY_SCORE: 15
ADLS_ACUITY_SCORE: 14
ADLS_ACUITY_SCORE: 20

## 2019-03-11 NOTE — PLAN OF CARE
A/O x4; forgetful. AVSS on 2L NC. Up w/ 2. Pain managed w/ lidocaine patches and PRN oxycodone and scheduled Tylenol.Tolerating regular diet. Purewick catheter in place, skin intact. PT to work on mobility today. Will continue to monitor.

## 2019-03-11 NOTE — PLAN OF CARE
AOx4, forgetful. Up with 2 + walker/GB. C/o 9/10 pain in lower back controlled with scheduled Tylenol, PRN Oxycodone x1 and 2 lidocaine patches on lower back. Tolerating regular diet, fair appetite.  Purewick in place, good amount of UO. LS diminished, denies SOB. 2L NC, unable to wean. Pt saw PT/OT today- recommending TCU. Discharge date pending.

## 2019-03-11 NOTE — PROGRESS NOTES
[facility]    Hospitalist Progress Note    Assessment & Plan   Mrs. Gennaro Walton is a very pleasant 89-year-old female with past medical history of hypertension, hypothyroidism, osteoporosis and breast cancer who came in for evaluation of intractable back pain.      PLAN:    Intractable back pain:    -Secondary to Compression fractures of L2, L3, L4 vertebra.  -  x-ray of the L-spine earlier today at [facility] and later on in the ER, which showed the compression fracture of L2, L3 and L4 vertebrae .  -patient is a very independent.  -pain controlled with scheduled Tylenol 975 mg p.o. 3 times daily,  Lidoderm patch, also oxycodone and low-dose Dilaudid IV available p.r.n.    -continue  PT and OT.    -appreciate orthopedics  Spine input   -mri done of her lumbar area indicating acute  L2 superior end plate #   -continue physical therapy  And pain control, discharge to tcu possibly 3/12  -will restart aspirin    Hypertension.   -blood pressure elevated on admission possibly from pain    -continue PTA Cozaar 50 mg p.o. daily and hydralazine IV p.r.n.  -blood pressure now well controlled    Hypothyroidism.  -   continue her prior to admission levothyroxine.     Osteopenia  -likely contributing to her compression fractures.   -  vitamin D level pending .   continue her vitamin D3 supplementation.     DVT Prophylaxis: Pneumatic Compression Devices  Code Status: Full Code     Disposition: Expected discharge 1-2 days to tcu vs home.    Tiffany Batista MD  Text Page   (7am to 6pm)    Interval History   Patient  Resting, alert oriented x 2 , pain present with activity, she just moved into an assisted living facility recently, discussed possible tcu discharge     -Data reviewed today: I reviewed all new labs and imaging results over the last 24 hours.  Physical Exam   Temp: 97.6  F (36.4  C) Temp src: Oral BP: 137/75 Pulse: 57 Heart Rate: 57 Resp: 16 SpO2: 97 % O2 Device: Nasal cannula Oxygen  Delivery: 2 LPM  Vitals:    03/09/19 1843 03/10/19 0004   Weight: 44.5 kg (98 lb) 50.5 kg (111 lb 5.3 oz)     Vital Signs with Ranges  Temp:  [97.4  F (36.3  C)-97.7  F (36.5  C)] 97.6  F (36.4  C)  Pulse:  [57] 57  Heart Rate:  [52-59] 57  Resp:  [16-18] 16  BP: (130-157)/(68-81) 137/75  SpO2:  [94 %-97 %] 97 %  I/O last 3 completed shifts:  In: 240 [P.O.:240]  Out: 275 [Urine:275]    Constitutional: Awake, alert, cooperative, no apparent distress  Respiratory: Clear to auscultation bilaterally, no crackles or wheezing  Cardiovascular: Regular rate and rhythm, normal S1 and S2, and no murmur noted  GI: Normal bowel sounds, soft, non-distended, non-tender  tenderness noted in lower spine   Skin/Integumen: No rashes, no cyanosis, no edema  Neuro : moving all 4 extremities, no focal deficit noted     Medications       acetaminophen  975 mg Oral TID     levothyroxine  88 mcg Oral Daily     lidocaine  1-2 patch Transdermal Q24H     lidocaine   Transdermal Q8H     lidocaine   Transdermal Q24h     losartan  50 mg Oral Daily     senna-docusate  1 tablet Oral BID    Or     senna-docusate  2 tablet Oral BID     sodium chloride (PF)  3 mL Intracatheter Q8H     vitamin D3  2,000 Units Oral Daily       Data   Recent Labs   Lab 03/10/19  1031 03/09/19 2040   WBC 6.2 6.7   HGB 12.9 13.4   MCV 93 92    209   NA  --  136   POTASSIUM  --  3.7   CHLORIDE  --  103   CO2  --  26   BUN  --  13   CR  --  0.61   ANIONGAP  --  7   LORETA  --  8.4*   GLC  --  96     Recent Labs   Lab 03/09/19 2040   GLC 96       Imaging:   No results found for this or any previous visit (from the past 24 hour(s)).

## 2019-03-11 NOTE — PLAN OF CARE
Pt is A+OX4 but forgetful. Continues on 2L via NC, VSS. Pain fairly controlled on PO oxycodone q3h, PRN Dilaudid available for breakthrough. MRI yesterday showed L2-L4 compression fracture is stable, bedrest discontinued. Has not gotten out of bed due to extreme pain with movement. Regular diet. Purewick in place due to incontinence, changed at 2100 and 0600. Adequate urine output overnight. Ate courtesy meal for dinner. Plan for orthopedic surgery consult today.

## 2019-03-11 NOTE — CONSULTS
Consult Date:  03/11/2019      HISTORY:  Gennaro Walton is a 89-year-old woman who I was asked to see in conslutation by the hospitalist for evluation of back pain.  She was admitted on 03/09/2019 with spontaneous onset of severe back pain.  The pain is quite intense and makes it difficult for her to move.  It is focused in her back and it does not radiate distally into her legs.  She was admitted because of the marked increase in her pain.  Of note, she has just recently moved into a senior living setting.  She denies numbness or weakness distally into her legs.  X-rays did demonstrate fractures of L2-3-4.  She has a past history of breast cancer.  She has undergone an MRI scan which I have had the opportunity to review.        PAST MEDICAL HISTORY:  Significant for left mastectomy and cataracts.  She has a past medical history of hypertension, osteoporosis, hypothyroidism and her breast cancer.        SOCIAL HISTORY:  She is recently  and does not smoke, does not drink alcohol and indicates to me that she has recently moved into a senior living facility.        MEDICATIONS ON ADMISSION:  Include aspirin, levothyroxine, losartan and vitamin D.        ALLERGIES:  SHE HAD NO KNOWN DRUG ALLERGIES.        PHYSICAL EXAMINATION:  She was an elderly woman lying quietly in bed.  I did not have her attempt to get up and about as even a bit of motion caused severe pain in her back.  She does appear to have a diffuse increase in her thoracic kyphosis.  Her sensation was intact to light touch in her lower extremities.  She had no focal motor weakness.        Again, review of the x-rays demonstrate multiple compression fractures.  She has a mild degenerative scoliosis.  The films do suggest that she has advanced osteoporosis.  The MRI scan demonstrates that the L2 fracture involving the superior endplate is probably acute.  The other ones are old.  There is no evidence of metastatic disease on her scan.        It is  likely that Gennaro' symptoms are due to the insufficiency fracture at L2.  It really is a matter of just symptomatic relief as best as possible.  I would try to mobilize her with physical therapy if possible.  I would utilize analgesics sparingly so as to avoid causing increasing confusion and/or constipation.  This fracture is stable and ultimately will heal but likely the pain will continue for at least a couple of months if not longer.         LUPILLO CUENCA MD             D: 2019   T: 2019   MT: FARHAT      Name:     GENNARO NAVARRO   MRN:      [id number]        Account:       [id number]   :      1923           Consult Date:  2019      Document: [id number]       cc: Mady Hagen MD

## 2019-03-11 NOTE — PLAN OF CARE
Discharge Planner PT   Patient plan for discharge: None stated.  Daughter inquiring.  Patient recently moved to senior apartment.  Current status: Patient lying in bed; agreeable to participate.  Patient tolerated very slow and intermitent lowering into flat supine position allowing time between movements for pain to subside.  Patient required Min A of 2 with increased time to log roll to left with use of bedrail.  Left sidelying to sitting with increased time; stopping; and Min A of 2.  Assisted to EOB.  Increased time spent in sitting to get accustomed to upright posture regarding back pain. Sit to stand with patient asking for mild support under her armpits; Min A of 2 to come to standing inside [facility].  Took 3 steps forward and 4 sidesteps inside [facility] with Min A and CGA; moved to HOB.  Stand to sit with patient requesting therapist and aide place their arms under her armpits to assist with lowering; provided with Min A of 2.  Patient instructed in left sidelying and logrolling for returning to bed but prefered to sit back into bed and have therapist assist with lowering.  Mod A plus Min A for this as she also needed assist with L/E's.  Increased time to position comfortably in sidelying. Patient had felt dizzy with standing so BP taken in supine which was 152/63, HR 59-60, O2 sats 94% on RA.  Dizziness resolved when back to bed.  Daughter there throughout session.    Barriers to return to prior living situation: Requiring assist of 2, level of function, pain  Recommendations for discharge: TCU  Rationale for recommendations: To increase strength and functional mobility prior to return to independent living facility.        Entered by: Haley Tan 03/11/2019 12:46 PM

## 2019-03-11 NOTE — PLAN OF CARE
Discharge Planner OT   Patient plan for discharge: TCU  Current status: OT orders received, chart reviewed. PT evaluated patient and is recommending a TCU stay, as patient is requiring assist of 2 for mobility. OT to defer to next level of care, as patient currently needs assist with all ADL and mobility.  Barriers to return to prior living situation: Requires assist of 2 for ADL and mobility, pain  Recommendations for discharge: TCU  Rationale for recommendations: Patient is below baseline and is not able to care for self at her apartment alone at this time.       Entered by: Ryann Zamudio 03/11/2019 2:27 PM

## 2019-03-11 NOTE — PLAN OF CARE
A&OX4, VSS on 2L NC, unable to wean this shift. Off bedrest this shift. MRI of the Spine completed. Ortho will see pt tomorrow. Pain treated with Oxy and scheduled tylenol.  Lidoderm patch on the back. Purewick on. On regular diet. PT recommending TCU at discharge.Continue to monitor.

## 2019-03-12 PROCEDURE — A9270 NON-COVERED ITEM OR SERVICE: HCPCS | Mod: GY | Performed by: INTERNAL MEDICINE

## 2019-03-12 PROCEDURE — 99239 HOSP IP/OBS DSCHRG MGMT >30: CPT | Performed by: INTERNAL MEDICINE

## 2019-03-12 PROCEDURE — 12000000 ZZH R&B MED SURG/OB

## 2019-03-12 PROCEDURE — 25000132 ZZH RX MED GY IP 250 OP 250 PS 637: Mod: GY | Performed by: INTERNAL MEDICINE

## 2019-03-12 RX ORDER — ACETAMINOPHEN 325 MG/1
975 TABLET ORAL 3 TIMES DAILY
DISCHARGE
Start: 2019-03-12 | End: 2019-07-25

## 2019-03-12 RX ORDER — OXYCODONE HYDROCHLORIDE 5 MG/1
5 TABLET ORAL EVERY 6 HOURS PRN
Qty: 20 TABLET | Refills: 0 | Status: SHIPPED | DISCHARGE
Start: 2019-03-12 | End: 2019-07-25

## 2019-03-12 RX ORDER — LIDOCAINE 4 G/G
1-2 PATCH TOPICAL EVERY 24 HOURS
DISCHARGE
Start: 2019-03-12 | End: 2019-07-25

## 2019-03-12 RX ORDER — AMOXICILLIN 250 MG
1 CAPSULE ORAL 2 TIMES DAILY
DISCHARGE
Start: 2019-03-12 | End: 2019-07-25

## 2019-03-12 RX ADMIN — ASPIRIN 81 MG: 81 TABLET, COATED ORAL at 08:43

## 2019-03-12 RX ADMIN — OXYCODONE HYDROCHLORIDE 10 MG: 5 TABLET ORAL at 09:56

## 2019-03-12 RX ADMIN — LEVOTHYROXINE SODIUM 88 MCG: 88 TABLET ORAL at 08:44

## 2019-03-12 RX ADMIN — OXYCODONE HYDROCHLORIDE 10 MG: 5 TABLET ORAL at 03:42

## 2019-03-12 RX ADMIN — SENNOSIDES AND DOCUSATE SODIUM 2 TABLET: 8.6; 5 TABLET ORAL at 21:20

## 2019-03-12 RX ADMIN — SENNOSIDES AND DOCUSATE SODIUM 1 TABLET: 8.6; 5 TABLET ORAL at 08:44

## 2019-03-12 RX ADMIN — LOSARTAN POTASSIUM 50 MG: 50 TABLET ORAL at 08:44

## 2019-03-12 RX ADMIN — ACETAMINOPHEN 975 MG: 325 TABLET, FILM COATED ORAL at 15:10

## 2019-03-12 RX ADMIN — VITAMIN D, TAB 1000IU (100/BT) 2000 UNITS: 25 TAB at 08:44

## 2019-03-12 RX ADMIN — ACETAMINOPHEN 975 MG: 325 TABLET, FILM COATED ORAL at 21:20

## 2019-03-12 RX ADMIN — ACETAMINOPHEN 975 MG: 325 TABLET, FILM COATED ORAL at 08:44

## 2019-03-12 RX ADMIN — LIDOCAINE 1 PATCH: 560 PATCH PERCUTANEOUS; TOPICAL; TRANSDERMAL at 08:44

## 2019-03-12 ASSESSMENT — ACTIVITIES OF DAILY LIVING (ADL)
ADLS_ACUITY_SCORE: 14
ADLS_ACUITY_SCORE: 14
ADLS_ACUITY_SCORE: 18
ADLS_ACUITY_SCORE: 14

## 2019-03-12 ASSESSMENT — MIFFLIN-ST. JEOR: SCORE: 745.15

## 2019-03-12 NOTE — DISCHARGE SUMMARY
[facility]    Discharge Summary  Hospitalist    Date of Admission:  3/9/2019  Date of Discharge:  3/12/2019  Discharging Provider: Tiffany Batista MD    Discharge Diagnoses   Compression fracture of L2 vetebra   History of Present Illness   Mrs. Gennaro Walton is a very pleasant 89-year-old female with past medical history of hypertension, hypothyroidism, osteoporosis and breast cancer who was brought in for evaluation of severe back pain.  Despite her advanced age, the patient is very independent.  She is awake, alert, oriented to self, place and time.  She lives in a senior living facility independently.  She is able to walk without any help, but she does keep a cane handy in case she may need it.  She reports that on Friday around noontime while she was sitting, she started having severe back pain that occurred suddenly.  She never had similar back pain before.  Initially, the back pain was located around the waistline, but later on it went up and down her spine.  She reported the pain as being 8/10 in intensity.  Her daughter continued with her on Friday evening.  Then on Saturday morning, she continued to have severe back pain.  She states that she took a total of 6 pills of aspirin, which did not help with her back pain.  Her daughter took her to TCU clinic today.  Apparently, she had an x-ray which showed a compression fracture.  She was prescribed some pain medication and advised to rest.  Her daughter took her back home.  Apparently, patient continued to have severe back pain.The patient was not able to get out of the car by herself, was not able to walk to the bathroom once she got into her apartment, the reason for which her daughter brought her to the ER.        Hospital Course   Gennaro Walton was admitted on 3/9/2019.  The following problems were addressed during her hospitalization:    Active Problems:    Compression fracture of lumbar vertebra (H)    Intractable back pain:     -Secondary to Compression fractures of L2, L3, L4 vertebra. x-ray of the L-spine earlier on the day of admission  at [facility] and later on in the ER, which showed the compression fracture of L2, L3 and L4 vertebrae .patient was  very independent prior to this admission ,currently pain controlled with scheduled Tylenol 975 mg p.o. 3 times daily,  Lidoderm patch, also oxycodone and low-dose Dilaudid IV available p.r.n. Seen by orthopedics  Spine here  ,mri done of her lumbar area indicating acute  L2 superior end plate #, plan is to continue physical therapy  And pain control, discharge to tcu for further rehab , orthopedics  Spine thought a brace might help but it would be restrictive for the patient as the fracture is stable.  Tiffany Batista MD    Significant Results and Procedures       Pending Results     Unresulted Labs Ordered in the Past 30 Days of this Admission     No orders found from 1/8/2019 to 3/10/2019.          Code Status   Full Code       Primary Care Physician   Mady Hagen    Physical Exam   Temp: 97.9  F (36.6  C) Temp src: Oral BP: 151/74   Heart Rate: 62 Resp: 16 SpO2: 96 % O2 Device: Nasal cannula Oxygen Delivery: 2 LPM  Vitals:    03/09/19 1843 03/10/19 0004 03/12/19 0535   Weight: 44.5 kg (98 lb) 50.5 kg (111 lb 5.3 oz) 46 kg (101 lb 8 oz)     Vital Signs with Ranges  Temp:  [97.5  F (36.4  C)-98  F (36.7  C)] 97.9  F (36.6  C)  Heart Rate:  [55-67] 62  Resp:  [16-18] 16  BP: (134-178)/(67-79) 151/74  SpO2:  [93 %-97 %] 96 %  I/O last 3 completed shifts:  In: 568 [P.O.:565; I.V.:3]  Out: 1200 [Urine:1200]    The patient was examined on the day of discharge.    Discharge Disposition   Discharged to nursing home  Condition at discharge: Stable    Consultations This Hospital Stay   PHYSICAL THERAPY ADULT IP CONSULT  OCCUPATIONAL THERAPY ADULT IP CONSULT  ORTHOPEDICS IP CONSULT  PHYSICAL THERAPY ADULT IP CONSULT  OCCUPATIONAL THERAPY ADULT IP CONSULT  PHYSICAL THERAPY ADULT  IP CONSULT  OCCUPATIONAL THERAPY ADULT IP CONSULT    Time Spent on this Encounter   I, Tiffany Batista, personally saw the patient today and spent greater than 30 minutes discharging this patient.    Discharge Orders      General info for SNF    Length of Stay Estimate: Short Term Care: Estimated # of Days <30  Condition at Discharge: Improving  Level of care:skilled   Rehabilitation Potential: Good  Admission H&P remains valid and up-to-date: Yes  Recent Chemotherapy: N/A  Use Nursing Home Standing Orders: Yes     Mantoux instructions    Give two-step Mantoux (PPD) Per Facility Policy Yes     Reason for your hospital stay    Fall with spine fracture     Intake and output    Every shift     Daily weights    Call Provider for weight gain of more than 2 pounds per day or 5 pounds per week.     Follow Up and recommended labs and tests    Follow up with [facility] physician.  The following labs/tests are recommended: basic metabolic panel in 4-5 days ,cbc in 5 days.     Activity - Up with nursing assistance     Physical Therapy Adult Consult    Evaluate and treat as clinically indicated.    Reason:  Fall, spine fracture     Occupational Therapy Adult Consult    Evaluate and treat as clinically indicated.    Reason:  Fall spine fracture     Fall precautions     Advance Diet as Tolerated    Follow this diet upon discharge: Orders Placed This Encounter      Combination Diet Regular Diet Adult     Discharge Medications   Current Discharge Medication List      START taking these medications    Details   acetaminophen (TYLENOL) 325 MG tablet Take 3 tablets (975 mg) by mouth 3 times daily    Associated Diagnoses: Closed compression fracture of L2 lumbar vertebra with routine healing, subsequent encounter      Lidocaine (LIDOCARE) 4 % Patch Place 1-2 patches onto the skin every 24 hours    Associated Diagnoses: Closed compression fracture of L2 lumbar vertebra with routine healing, subsequent encounter      magnesium hydroxide  (MILK OF MAGNESIA) 400 MG/5ML suspension Take 30 mLs by mouth daily as needed for constipation    Associated Diagnoses: Closed compression fracture of L2 lumbar vertebra with routine healing, subsequent encounter      oxyCODONE (ROXICODONE) 5 MG tablet Take 1 tablet (5 mg) by mouth every 6 hours as needed for moderate to severe pain  Qty: 20 tablet, Refills: 0    Associated Diagnoses: Closed compression fracture of L2 lumbar vertebra with routine healing, subsequent encounter      senna-docusate (SENOKOT-S/PERICOLACE) 8.6-50 MG tablet Take 1 tablet by mouth 2 times daily    Associated Diagnoses: Closed compression fracture of L2 lumbar vertebra with routine healing, subsequent encounter         CONTINUE these medications which have NOT CHANGED    Details   aspirin 81 MG EC tablet Take 81 mg by mouth daily      levothyroxine (SYNTHROID/LEVOTHROID) 88 MCG tablet Take 88 mcg by mouth daily      losartan (COZAAR) 50 MG tablet Take 50 mg by mouth daily      vitamin D3 (CHOLECALCIFEROL) 2000 units tablet Take 1 tablet by mouth daily         STOP taking these medications       UNKNOWN TO PATIENT Comments:   Reason for Stopping:             Allergies   No Known Allergies  Data   Most Recent 3 CBC's:  Recent Labs   Lab Test 03/10/19  1031 03/09/19  2040   WBC 6.2 6.7   HGB 12.9 13.4   MCV 93 92    209      Most Recent 3 BMP's:  Recent Labs   Lab Test 03/09/19  2040      POTASSIUM 3.7   CHLORIDE 103   CO2 26   BUN 13   CR 0.61   ANIONGAP 7   LORETA 8.4*   GLC 96     Most Recent 2 LFT's:No lab results found.  Most Recent INR's and Anticoagulation Dosing History:  Anticoagulation Dose History     There is no flowsheet data to display.        Most Recent 3 Troponin's:No lab results found.  Most Recent Cholesterol Panel:No lab results found.  Most Recent 6 Bacteria Isolates From Any Culture (See EPIC Reports for Culture Details):No lab results found.  Most Recent TSH, T4 and A1c Labs:No lab results found.  Results for  orders placed or performed during the hospital encounter of 03/09/19   Lumbar spine XR, 2-3 views    Narrative    LUMBAR SPINE TWO - THREE VIEWS 3/9/2019 9:12 PM     COMPARISON: None.    HISTORY: Pain.    FINDINGS: There are moderate compression fractures of the L2, L3 and  L4 vertebrae. These are age-indeterminate. No comparison exam is  available. The bones are severely osteopenic. Alignment is normal.  Disc heights are relatively well maintained. There are osteophytes and  degenerative endplate changes at L3-4.      Impression    IMPRESSION: Compression fractures of the L2, L3 and L4 vertebrae are  age indeterminate. MRI could evaluate for marrow edema associated with  recent fracture if there is need for further imaging.    HAMLTE SWEET MD   MR Lumbar Spine w/o Contrast    Narrative    MRI OF THE LUMBAR SPINE WITHOUT CONTRAST  3/10/2019 9:10 AM     COMPARISON: Lumbar spine plain films 3/9/2019.    HISTORY: Severe back pain, compression fractures.    TECHNIQUE: Multiplanar, multisequence MRI images of the lumbar spine  were acquired without IV contrast.    FINDINGS: There are five lumbar-type vertebrae for the purposes of  this dictation.     10% acute/early subacute L2 superior endplate compression. Chronic L3  compression measuring 60% centrally. Chronic L4 compression measuring  30% centrally. Partially visualized mild lumbar dextroscoliosis.  Slightly straightened lordosis. Edema in the right L5-S1 facet  consistent with degenerative change. The conus tip is identified at  L1. Visualized extraspinal soft tissues are within normal limits.    T12-L1: Normal disc height. Moderate disc T2 signal loss. No  herniation. No facet arthropathy. No spinal canal stenosis. No right  neural foraminal stenosis. No left neural foraminal stenosis.    L1-L2: Normal disc height. Moderate disc T2 signal loss. No  herniation. No facet arthropathy. No spinal canal stenosis. No right  neural foraminal stenosis. No left neural  foraminal stenosis.    L2-L3: Exaggerated disc height. Moderate disc T2 signal loss. Minimal  posterior annular bulge. Mild bilateral facet arthropathy. No spinal  canal stenosis. No right neural foraminal stenosis. No left neural  foraminal stenosis.    L3-L4: Exaggerated disc height. Moderate disc T2 signal loss. Mild  circumferential disc osteophyte complexes. Mild bilateral facet  arthropathy. No spinal canal stenosis. Mild right neural foraminal  stenosis. Mild left neural foraminal stenosis.    L4-L5: Exaggerated disc height. Moderate disc T2 signal loss. Mild  posterior annular bulge. Mild bilateral facet arthropathy. No spinal  canal stenosis. No right neural foraminal stenosis. No left neural  foraminal stenosis.    L5-S1: Mild to moderate disc height loss. Moderate disc T2 signal  loss. Mild right foraminal disc aspect complexes. Moderate to severe  right and mild-to-moderate left facet arthropathy. No spinal canal  stenosis. Mild to moderate right neural foraminal stenosis. Mild left  neural foraminal stenosis.      Impression    IMPRESSION:  1.  10% acute/early subacute L2 superior endplate compression.  2.  Chronic L3 and L4 compressions.  3.  No high-grade spinal canal or neural foraminal stenosis.    CARLA HASSAN MD

## 2019-03-12 NOTE — PLAN OF CARE
PT:  Attempted to see patient for PT this AM. Patient sleeping upon arrival of therapist and awakens to therapist voice, pleasantly states she rather rest at this time. RN aware, discussed possible benefit of soft lumbar support brace to add additional support to aid in comfort with movement.

## 2019-03-12 NOTE — PLAN OF CARE
A&Ox4. VSS. RA. IV saline locked. Intermittently confused. Weaned to 1L O2, desat to below 90s on RA. Assistx2 with walker and gait belt. Weight shift assistance provided while in bed. Tolerating regular diet. C/O pain in back managed with scheduled tylenol, scheduled lidocaine patch and prn oxy. Incontinent, Purewick in place. Lungs clear. Bowel sounds active.

## 2019-03-12 NOTE — PROGRESS NOTES
[id number]  AOx4, forgetful. Up with 2 + walker/GB. Pain in lower back controlled with scheduled Tylenol and 2 lidocaine patches on lower back, declined oxy. Tolerating regular diet, fair appetite.  Purewick in place, good amount of UO on days-none this shift. LS diminished, denies SOB. 2L NC, unable to wean. Pt saw PT/OT today- recommending TCU. Discharge date pending.

## 2019-03-12 NOTE — PROGRESS NOTES
[facility]    Hospitalist Progress Note    Assessment & Plan   Mrs. Gennaro Walton is a very pleasant 89-year-old female with past medical history of hypertension, hypothyroidism, osteoporosis and breast cancer who came in for evaluation of intractable back pain.      PLAN:    Intractable back pain:    -Secondary to Compression fractures of L2, L3, L4 vertebra.  -  x-ray of the L-spine earlier today at [facility] and later on in the ER, which showed the compression fracture of L2, L3 and L4 vertebrae .  -patient was  very independent prior to this admission   -pain controlled with scheduled Tylenol 975 mg p.o. 3 times daily,  Lidoderm patch, also oxycodone and low-dose Dilaudid IV available p.r.n.    -continue  PT and OT.    -appreciate orthopedics  Spine input   -mri done of her lumbar area indicating acute  L2 superior end plate #   -continue physical therapy  And pain control, discharge to tcu possibly 3/12  -on  aspirin    Hypertension.   -blood pressure elevated on admission possibly from pain    -continue PTA Cozaar 50 mg p.o. daily and hydralazine IV p.r.n.  -blood pressure now well controlled    Hypothyroidism.  -   continue her prior to admission levothyroxine.     Osteopenia  -likely contributing to her compression fractures.   -  vitamin D level pending .   continue her vitamin D3 supplementation.     DVT Prophylaxis: Pneumatic Compression Devices  Code Status: Full Code     Disposition: Expected discharge 1-2 days to tcu vs home.    Tiffany Batista MD  Text Page   (7am to 6pm)    Interval History   Patient  Resting, pain worse with movement , she is pain free at rest, she [facility] to [facility] back [facility],now plan for tcu patient  Is very forgetful.    -Data reviewed today: I reviewed all new labs and imaging results over the last 24 hours.  Physical Exam   Temp: 97.9  F (36.6  C) Temp src: Oral BP: 147/71   Heart Rate: 61 Resp: 16 SpO2: 93 % O2 Device: Nasal cannula Oxygen Delivery:  1 LPM  Vitals:    03/09/19 1843 03/10/19 0004 03/12/19 0535   Weight: 44.5 kg (98 lb) 50.5 kg (111 lb 5.3 oz) 46 kg (101 lb 8 oz)     Vital Signs with Ranges  Temp:  [97.5  F (36.4  C)-98  F (36.7  C)] 97.9  F (36.6  C)  Heart Rate:  [55-67] 61  Resp:  [16-18] 16  BP: (134-178)/(67-79) 147/71  SpO2:  [93 %-96 %] 93 %  I/O last 3 completed shifts:  In: 568 [P.O.:565; I.V.:3]  Out: 1200 [Urine:1200]    Constitutional: Awake, alert, cooperative, no apparent distress  Respiratory: Clear to auscultation bilaterally, no crackles or wheezing  Cardiovascular: Regular rate and rhythm, normal S1 and S2, and no murmur noted  GI: Normal bowel sounds, soft, non-distended, non-tender  tenderness noted in lower spine   Skin/Integumen: No rashes, no cyanosis, no edema  Neuro : moving all 4 extremities, no focal deficit noted     Medications       acetaminophen  975 mg Oral TID     aspirin  81 mg Oral Daily     levothyroxine  88 mcg Oral Daily     lidocaine  1-2 patch Transdermal Q24H     lidocaine   Transdermal Q8H     lidocaine   Transdermal Q24h     losartan  50 mg Oral Daily     senna-docusate  1 tablet Oral BID    Or     senna-docusate  2 tablet Oral BID     sodium chloride (PF)  3 mL Intracatheter Q8H     vitamin D3  2,000 Units Oral Daily       Data   Recent Labs   Lab 03/10/19  1031 03/09/19 2040   WBC 6.2 6.7   HGB 12.9 13.4   MCV 93 92    209   NA  --  136   POTASSIUM  --  3.7   CHLORIDE  --  103   CO2  --  26   BUN  --  13   CR  --  0.61   ANIONGAP  --  7   LORETA  --  8.4*   GLC  --  96     Recent Labs   Lab 03/09/19 2040   GLC 96       Imaging:   No results found for this or any previous visit (from the past 24 hour(s)).

## 2019-03-12 NOTE — PLAN OF CARE
A&Ox4, forgetful. VSS on 2L NC. C/o back pain managed by PRN oxycodone. Up Ax2 with walker, gait belt. Reg diet. Purewick in place, good amount of UO throughout shift. Lungs diminished. IV SL. SW following for TCU placement. Will continue to monitor.

## 2019-03-12 NOTE — PROGRESS NOTES
SW:  D:  Met initially with patient and then called daughter to discuss TCU arrangements.  MD reports patient is ready for discharge today. Writer unable to arrange this however will arrange for a Wednesday discharge.

## 2019-03-13 ENCOUNTER — APPOINTMENT (OUTPATIENT)
Dept: PHYSICAL THERAPY | Facility: CLINIC | Age: 84
DRG: 544 | End: 2019-03-13
Payer: MEDICARE

## 2019-03-13 VITALS
SYSTOLIC BLOOD PRESSURE: 125 MMHG | RESPIRATION RATE: 16 BRPM | OXYGEN SATURATION: 94 % | WEIGHT: 100.2 LBS | DIASTOLIC BLOOD PRESSURE: 72 MMHG | TEMPERATURE: 97.7 F | BODY MASS INDEX: 21.03 KG/M2 | HEIGHT: 58 IN | HEART RATE: 57 BPM

## 2019-03-13 PROCEDURE — 99232 SBSQ HOSP IP/OBS MODERATE 35: CPT | Performed by: INTERNAL MEDICINE

## 2019-03-13 PROCEDURE — A9270 NON-COVERED ITEM OR SERVICE: HCPCS | Mod: GY | Performed by: INTERNAL MEDICINE

## 2019-03-13 PROCEDURE — 25000132 ZZH RX MED GY IP 250 OP 250 PS 637: Mod: GY | Performed by: INTERNAL MEDICINE

## 2019-03-13 PROCEDURE — 97116 GAIT TRAINING THERAPY: CPT | Mod: GP

## 2019-03-13 PROCEDURE — 97530 THERAPEUTIC ACTIVITIES: CPT | Mod: GP

## 2019-03-13 RX ADMIN — LOSARTAN POTASSIUM 50 MG: 50 TABLET ORAL at 09:02

## 2019-03-13 RX ADMIN — ACETAMINOPHEN 975 MG: 325 TABLET, FILM COATED ORAL at 16:47

## 2019-03-13 RX ADMIN — VITAMIN D, TAB 1000IU (100/BT) 2000 UNITS: 25 TAB at 09:02

## 2019-03-13 RX ADMIN — ASPIRIN 81 MG: 81 TABLET, COATED ORAL at 09:02

## 2019-03-13 RX ADMIN — OXYCODONE HYDROCHLORIDE 5 MG: 5 TABLET ORAL at 17:25

## 2019-03-13 RX ADMIN — ACETAMINOPHEN 975 MG: 325 TABLET, FILM COATED ORAL at 09:02

## 2019-03-13 RX ADMIN — LIDOCAINE 1 PATCH: 560 PATCH PERCUTANEOUS; TOPICAL; TRANSDERMAL at 09:02

## 2019-03-13 RX ADMIN — LEVOTHYROXINE SODIUM 88 MCG: 88 TABLET ORAL at 09:02

## 2019-03-13 ASSESSMENT — ACTIVITIES OF DAILY LIVING (ADL)
ADLS_ACUITY_SCORE: 14
ADLS_ACUITY_SCORE: 18
ADLS_ACUITY_SCORE: 22
ADLS_ACUITY_SCORE: 18
ADLS_ACUITY_SCORE: 22

## 2019-03-13 ASSESSMENT — MIFFLIN-ST. JEOR: SCORE: 739.25

## 2019-03-13 NOTE — PLAN OF CARE
Discharge Planner PT   Patient plan for discharge: none stated  Current status: Supine to sit with cues for log rolling with railing-min A, seated scooting SBA with cues. Sit to stand with cues and FWW min A, stand step transfer with min A and FWW from EOB to chair. Able to initiate amb in room 15ft with min A and FWW, slow pace due to reports of pain, kyphotic, no LOB noted, however reports slight lightheadedness. Amb on RA with vitals post amb: SPO2=95%, HR=73bpm, LN=426/79mmHg. Pt left sitting up in chair with needs in reach and chair alarm on. NA aware. C/o back pain with all movement during session.   Barriers to return to prior living situation: requires assist with all mobility, pain, not tolerating household distance amb currently  Recommendations for discharge: TCU  Rationale for recommendations: to increase strength and functional mobility prior to return to independent living facility.        Entered by: Ana Cruz 03/13/2019 10:25 AM

## 2019-03-13 NOTE — PROGRESS NOTES
"SW:  Patient accepted by [facility].  [facility] will transport at 1730.  Daughter Caron will meet patient there.  Orders are being sent thru In Basket.  Patient updated by daughter.  Writer also spoke with patient.  Patient accepting of plan.  Expresses disappointment that at 89 she is needing help.  \"I've always been able to care for my self\".  PA approved.  Effective date: 3/13/19  PA reference #: [id number]  Pt. notified:   ema Reardon  "

## 2019-03-13 NOTE — PLAN OF CARE
A&Ox4. VSS, 1L NC. Patient pulled IV overnight, it wasn't replaced. Intermittently confused.  Assistx2 with walker and gait belt.  Weight shifts assistance provided while in bed.  Tolerating regular diet.  C/O pain in back managed with scheduled Tylenol, scheduled lidocaine patch.  Incontinent, Purewick in place.  Lungs clear. Bowel sounds active, but hasn't had a BM in 2 days, 2 Senna given.  Discharge to TCU today.

## 2019-03-13 NOTE — PROGRESS NOTES
[facility]    Hospitalist Progress Note    Assessment & Plan   Mrs. Gennaro Walton is a very pleasant 89-year-old female with past medical history of hypertension, hypothyroidism, osteoporosis and breast cancer who came in for evaluation of intractable back pain.      PLAN:    Intractable back pain:    -Secondary to Compression fractures of L2, L3, L4 vertebra.  -  x-ray of the L-spine earlier on the day of admission  at [facility] and later on in the ER, which showed the compression fracture of L2, L3 and L4 vertebrae .  -patient was  very independent prior to this admission   -pain controlled with scheduled Tylenol 975 mg p.o. 3 times daily,  Lidoderm patch, also oxycodone and low-dose Dilaudid IV available p.r.n.    -continue  PT and OT.    -appreciate orthopedics  Spine input   -mri done of her lumbar area indicating acute  L2 superior end plate #   -continue physical therapy  And pain control, discharge to tcu when bed available, medically stable for discharge since 3/12  -on  aspirin    Hypertension.   -blood pressure elevated on admission possibly from pain    -continue PTA Cozaar 50 mg p.o. daily and hydralazine IV p.r.n.  -blood pressure now well controlled    Hypothyroidism.  -   continue her prior to admission levothyroxine.     Osteopenia  -likely contributing to her compression fractures.   -  vitamin D level pending .   continue her vitamin D3 supplementation.     DVT Prophylaxis: Pneumatic Compression Devices  Code Status: Full Code     Disposition: Expected discharge 3/13 to tcu     Tiffany Batista MD  Text Page   (7am to 6pm)    Interval History   Patient  Resting, pain better today with movement , alert and oriented x 3 , no new issues noted, occasionally forgetful.    -Data reviewed today: I reviewed all new labs and imaging results over the last 24 hours.  Physical Exam   Temp: 97.7  F (36.5  C) Temp src: Oral BP: 125/72   Heart Rate: 75 Resp: 16 SpO2: 94 % O2  Device: Nasal cannula Oxygen Delivery: 1 LPM  Vitals:    03/10/19 0004 03/12/19 0535 03/13/19 0551   Weight: 50.5 kg (111 lb 5.3 oz) 46 kg (101 lb 8 oz) 45.5 kg (100 lb 3.2 oz)     Vital Signs with Ranges  Temp:  [97.7  F (36.5  C)-98.5  F (36.9  C)] 97.7  F (36.5  C)  Heart Rate:  [63-75] 75  Resp:  [16] 16  BP: (125-175)/(72-80) 125/72  SpO2:  [88 %-94 %] 94 %  I/O last 3 completed shifts:  In: 243 [P.O.:240; I.V.:3]  Out: -     Constitutional: Awake, alert, cooperative, no apparent distress  Respiratory: Clear to auscultation bilaterally, no crackles or wheezing  Cardiovascular: Regular rate and rhythm, normal S1 and S2, and no murmur noted  GI: Normal bowel sounds, soft, non-distended, non-tender  tenderness noted in lower spine   Skin/Integumen: No rashes, no cyanosis, no edema  Neuro : moving all 4 extremities, no focal deficit noted     Medications       acetaminophen  975 mg Oral TID     aspirin  81 mg Oral Daily     levothyroxine  88 mcg Oral Daily     lidocaine  1-2 patch Transdermal Q24H     lidocaine   Transdermal Q8H     lidocaine   Transdermal Q24h     losartan  50 mg Oral Daily     senna-docusate  1 tablet Oral BID    Or     senna-docusate  2 tablet Oral BID     sodium chloride (PF)  3 mL Intracatheter Q8H     vitamin D3  2,000 Units Oral Daily       Data   Recent Labs   Lab 03/10/19  1031 03/09/19 2040   WBC 6.2 6.7   HGB 12.9 13.4   MCV 93 92    209   NA  --  136   POTASSIUM  --  3.7   CHLORIDE  --  103   CO2  --  26   BUN  --  13   CR  --  0.61   ANIONGAP  --  7   LORETA  --  8.4*   GLC  --  96     Recent Labs   Lab 03/09/19 2040   GLC 96       Imaging:   No results found for this or any previous visit (from the past 24 hour(s)).

## 2019-03-13 NOTE — CONSULTS
Care Transition Initial Assessment - SW     Met with: daughter on phone and with patient  Active Problems:    Compression fracture of lumbar vertebra (H)       DATA  Lives With: alone   Living Arrangements: independent living facility [facility], in an independent apartment.  Goes to the diningroom for meals, otherwise independent.  Moved there recently and her   and per daughter has adjusted well.    Identified issues/concerns regarding health management: Currently before baseline and in need of therapy,  Discussed options with daughter and explained SNF benefits.  Patient does not have a  Medicare supplement.   Of TCU's available, daughter prefers [facility] location.  Referral made thru DOD process.     Transportation Anticipated: medivan to TCU and daughter aware of the cost.  ASSESSMENT  Cognitive Status: patient oriented x4 but had difficulty following TCU discussion.  Allowed writer to make plans thru her daughter.   PLAN  Financial costs for the patient includes transport cost.  Patient given options and choices for discharge yes  Patient/family is agreeable to the plan?  yes  Patient anticipates discharging to:  TCU

## 2019-03-14 NOTE — PLAN OF CARE
Physical Therapy Discharge Summary    Reason for therapy discharge:    Discharged to transitional care facility.    Progress towards therapy goal(s). See goals on Care Plan in [facility] electronic health record for goal details.  Goals partially met.  Barriers to achieving goals:   discharge from facility.    Therapy recommendation(s):    Continued therapy is recommended.  Rationale/Recommendations:  To further increase independence with mobility.

## 2019-03-20 ENCOUNTER — RECORDS - HEALTHEAST (OUTPATIENT)
Dept: LAB | Facility: CLINIC | Age: 84
End: 2019-03-20

## 2019-03-20 LAB
ANION GAP SERPL CALCULATED.3IONS-SCNC: 6 MMOL/L (ref 5–18)
BUN SERPL-MCNC: 10 MG/DL (ref 8–28)
CALCIUM SERPL-MCNC: 8.7 MG/DL (ref 8.5–10.5)
CHLORIDE BLD-SCNC: 101 MMOL/L (ref 98–107)
CO2 SERPL-SCNC: 27 MMOL/L (ref 22–31)
CREAT SERPL-MCNC: 0.65 MG/DL (ref 0.6–1.1)
GFR SERPL CREATININE-BSD FRML MDRD: >60 ML/MIN/1.73M2
GLUCOSE BLD-MCNC: 73 MG/DL (ref 70–125)
POTASSIUM BLD-SCNC: 4.5 MMOL/L (ref 3.5–5)
SODIUM SERPL-SCNC: 134 MMOL/L (ref 136–145)

## 2019-07-25 ENCOUNTER — APPOINTMENT (OUTPATIENT)
Dept: GENERAL RADIOLOGY | Facility: CLINIC | Age: 84
DRG: 956 | End: 2019-07-25
Attending: EMERGENCY MEDICINE
Payer: MEDICARE

## 2019-07-25 ENCOUNTER — APPOINTMENT (OUTPATIENT)
Dept: CT IMAGING | Facility: CLINIC | Age: 84
DRG: 956 | End: 2019-07-25
Attending: EMERGENCY MEDICINE
Payer: MEDICARE

## 2019-07-25 ENCOUNTER — HOSPITAL ENCOUNTER (INPATIENT)
Facility: CLINIC | Age: 84
LOS: 6 days | Discharge: NURSING, REHAB OR LONG-TERM CARE | DRG: 956 | End: 2019-07-31
Attending: EMERGENCY MEDICINE | Admitting: INTERNAL MEDICINE
Payer: MEDICARE

## 2019-07-25 DIAGNOSIS — S42.292A OTHER CLOSED DISPLACED FRACTURE OF PROXIMAL END OF LEFT HUMERUS, INITIAL ENCOUNTER: ICD-10-CM

## 2019-07-25 DIAGNOSIS — I60.9 SUBARACHNOID HEMORRHAGE (H): ICD-10-CM

## 2019-07-25 DIAGNOSIS — S72.002A CLOSED FRACTURE OF NECK OF LEFT FEMUR, INITIAL ENCOUNTER (H): ICD-10-CM

## 2019-07-25 DIAGNOSIS — S72.002A LEFT DISPLACED FEMORAL NECK FRACTURE (H): ICD-10-CM

## 2019-07-25 DIAGNOSIS — R35.0 URINARY FREQUENCY: ICD-10-CM

## 2019-07-25 DIAGNOSIS — S06.5XAA SDH (SUBDURAL HEMATOMA) (H): Primary | ICD-10-CM

## 2019-07-25 PROBLEM — S72.92XA CLOSED LEFT FEMORAL FRACTURE (H): Status: ACTIVE | Noted: 2019-07-25

## 2019-07-25 LAB
ALBUMIN SERPL-MCNC: 3.3 G/DL (ref 3.4–5)
ALP SERPL-CCNC: 95 U/L (ref 40–150)
ALT SERPL W P-5'-P-CCNC: 16 U/L (ref 0–50)
ANION GAP SERPL CALCULATED.3IONS-SCNC: 6 MMOL/L (ref 3–14)
AST SERPL W P-5'-P-CCNC: 19 U/L (ref 0–45)
BASOPHILS # BLD AUTO: 0 10E9/L (ref 0–0.2)
BASOPHILS NFR BLD AUTO: 0.2 %
BILIRUB SERPL-MCNC: 0.3 MG/DL (ref 0.2–1.3)
BLD PROD TYP BPU: NORMAL
BLD UNIT ID BPU: 0
BLOOD PRODUCT CODE: NORMAL
BPU ID: NORMAL
BUN SERPL-MCNC: 16 MG/DL (ref 7–30)
CALCIUM SERPL-MCNC: 8.2 MG/DL (ref 8.5–10.1)
CHLORIDE SERPL-SCNC: 104 MMOL/L (ref 94–109)
CO2 SERPL-SCNC: 27 MMOL/L (ref 20–32)
CREAT SERPL-MCNC: 0.61 MG/DL (ref 0.52–1.04)
DIFFERENTIAL METHOD BLD: NORMAL
EOSINOPHIL # BLD AUTO: 0.2 10E9/L (ref 0–0.7)
EOSINOPHIL NFR BLD AUTO: 2.9 %
ERYTHROCYTE [DISTWIDTH] IN BLOOD BY AUTOMATED COUNT: 12.5 % (ref 10–15)
GFR SERPL CREATININE-BSD FRML MDRD: 77 ML/MIN/{1.73_M2}
GLUCOSE SERPL-MCNC: 136 MG/DL (ref 70–99)
HCT VFR BLD AUTO: 36.6 % (ref 35–47)
HGB BLD-MCNC: 12.5 G/DL (ref 11.7–15.7)
IMM GRANULOCYTES # BLD: 0 10E9/L (ref 0–0.4)
IMM GRANULOCYTES NFR BLD: 0.5 %
INR PPP: 1.02 (ref 0.86–1.14)
LYMPHOCYTES # BLD AUTO: 1.7 10E9/L (ref 0.8–5.3)
LYMPHOCYTES NFR BLD AUTO: 21.2 %
MCH RBC QN AUTO: 32.4 PG (ref 26.5–33)
MCHC RBC AUTO-ENTMCNC: 34.2 G/DL (ref 31.5–36.5)
MCV RBC AUTO: 95 FL (ref 78–100)
MONOCYTES # BLD AUTO: 0.9 10E9/L (ref 0–1.3)
MONOCYTES NFR BLD AUTO: 10.5 %
NEUTROPHILS # BLD AUTO: 5.3 10E9/L (ref 1.6–8.3)
NEUTROPHILS NFR BLD AUTO: 64.7 %
NRBC # BLD AUTO: 0 10*3/UL
NRBC BLD AUTO-RTO: 0 /100
PLATELET # BLD AUTO: 261 10E9/L (ref 150–450)
POTASSIUM SERPL-SCNC: 3.8 MMOL/L (ref 3.4–5.3)
PROT SERPL-MCNC: 6.5 G/DL (ref 6.8–8.8)
RADIOLOGIST FLAGS: ABNORMAL
RBC # BLD AUTO: 3.86 10E12/L (ref 3.8–5.2)
SODIUM SERPL-SCNC: 137 MMOL/L (ref 133–144)
TRANSFUSION STATUS PATIENT QL: NORMAL
TRANSFUSION STATUS PATIENT QL: NORMAL
WBC # BLD AUTO: 8.2 10E9/L (ref 4–11)

## 2019-07-25 PROCEDURE — 86850 RBC ANTIBODY SCREEN: CPT | Performed by: EMERGENCY MEDICINE

## 2019-07-25 PROCEDURE — 73030 X-RAY EXAM OF SHOULDER: CPT | Mod: LT

## 2019-07-25 PROCEDURE — 99285 EMERGENCY DEPT VISIT HI MDM: CPT | Mod: 25

## 2019-07-25 PROCEDURE — 25000128 H RX IP 250 OP 636: Performed by: INTERNAL MEDICINE

## 2019-07-25 PROCEDURE — 96375 TX/PRO/DX INJ NEW DRUG ADDON: CPT

## 2019-07-25 PROCEDURE — 72125 CT NECK SPINE W/O DYE: CPT

## 2019-07-25 PROCEDURE — 12000000 ZZH R&B MED SURG/OB

## 2019-07-25 PROCEDURE — 86901 BLOOD TYPING SEROLOGIC RH(D): CPT | Performed by: EMERGENCY MEDICINE

## 2019-07-25 PROCEDURE — 25000128 H RX IP 250 OP 636: Performed by: EMERGENCY MEDICINE

## 2019-07-25 PROCEDURE — P9037 PLATE PHERES LEUKOREDU IRRAD: HCPCS | Performed by: EMERGENCY MEDICINE

## 2019-07-25 PROCEDURE — 96374 THER/PROPH/DIAG INJ IV PUSH: CPT

## 2019-07-25 PROCEDURE — 36430 TRANSFUSION BLD/BLD COMPNT: CPT

## 2019-07-25 PROCEDURE — 80053 COMPREHEN METABOLIC PANEL: CPT | Performed by: EMERGENCY MEDICINE

## 2019-07-25 PROCEDURE — 73100 X-RAY EXAM OF WRIST: CPT | Mod: LT

## 2019-07-25 PROCEDURE — 73502 X-RAY EXAM HIP UNI 2-3 VIEWS: CPT

## 2019-07-25 PROCEDURE — 71045 X-RAY EXAM CHEST 1 VIEW: CPT

## 2019-07-25 PROCEDURE — 86900 BLOOD TYPING SEROLOGIC ABO: CPT | Performed by: EMERGENCY MEDICINE

## 2019-07-25 PROCEDURE — 85610 PROTHROMBIN TIME: CPT | Performed by: EMERGENCY MEDICINE

## 2019-07-25 PROCEDURE — 25800030 ZZH RX IP 258 OP 636: Performed by: INTERNAL MEDICINE

## 2019-07-25 PROCEDURE — 85025 COMPLETE CBC W/AUTO DIFF WBC: CPT | Performed by: EMERGENCY MEDICINE

## 2019-07-25 PROCEDURE — 70450 CT HEAD/BRAIN W/O DYE: CPT

## 2019-07-25 PROCEDURE — 25000132 ZZH RX MED GY IP 250 OP 250 PS 637: Mod: GY | Performed by: INTERNAL MEDICINE

## 2019-07-25 PROCEDURE — 86923 COMPATIBILITY TEST ELECTRIC: CPT | Performed by: EMERGENCY MEDICINE

## 2019-07-25 PROCEDURE — 96376 TX/PRO/DX INJ SAME DRUG ADON: CPT

## 2019-07-25 PROCEDURE — 99223 1ST HOSP IP/OBS HIGH 75: CPT | Mod: AI | Performed by: INTERNAL MEDICINE

## 2019-07-25 RX ORDER — SODIUM CHLORIDE 9 MG/ML
INJECTION, SOLUTION INTRAVENOUS CONTINUOUS
Status: DISCONTINUED | OUTPATIENT
Start: 2019-07-25 | End: 2019-07-28

## 2019-07-25 RX ORDER — POTASSIUM CHLORIDE 7.45 MG/ML
10 INJECTION INTRAVENOUS
Status: DISCONTINUED | OUTPATIENT
Start: 2019-07-25 | End: 2019-07-29

## 2019-07-25 RX ORDER — POTASSIUM CHLORIDE 29.8 MG/ML
20 INJECTION INTRAVENOUS
Status: DISCONTINUED | OUTPATIENT
Start: 2019-07-25 | End: 2019-07-29

## 2019-07-25 RX ORDER — HYDROMORPHONE HYDROCHLORIDE 1 MG/ML
0.5 INJECTION, SOLUTION INTRAMUSCULAR; INTRAVENOUS; SUBCUTANEOUS
Status: DISCONTINUED | OUTPATIENT
Start: 2019-07-25 | End: 2019-07-26

## 2019-07-25 RX ORDER — ACETAMINOPHEN 325 MG/1
650 TABLET ORAL EVERY 4 HOURS PRN
Status: DISCONTINUED | OUTPATIENT
Start: 2019-07-25 | End: 2019-07-26

## 2019-07-25 RX ORDER — HYDRALAZINE HYDROCHLORIDE 20 MG/ML
5 INJECTION INTRAMUSCULAR; INTRAVENOUS EVERY 4 HOURS PRN
Status: DISCONTINUED | OUTPATIENT
Start: 2019-07-25 | End: 2019-07-31 | Stop reason: HOSPADM

## 2019-07-25 RX ORDER — LIDOCAINE 40 MG/G
CREAM TOPICAL
Status: DISCONTINUED | OUTPATIENT
Start: 2019-07-25 | End: 2019-07-26

## 2019-07-25 RX ORDER — ONDANSETRON 2 MG/ML
4 INJECTION INTRAMUSCULAR; INTRAVENOUS EVERY 6 HOURS PRN
Status: DISCONTINUED | OUTPATIENT
Start: 2019-07-25 | End: 2019-07-26

## 2019-07-25 RX ORDER — MORPHINE SULFATE 2 MG/ML
1 INJECTION, SOLUTION INTRAMUSCULAR; INTRAVENOUS
Status: DISCONTINUED | OUTPATIENT
Start: 2019-07-25 | End: 2019-07-25

## 2019-07-25 RX ORDER — ONDANSETRON 2 MG/ML
4 INJECTION INTRAMUSCULAR; INTRAVENOUS EVERY 30 MIN PRN
Status: DISCONTINUED | OUTPATIENT
Start: 2019-07-25 | End: 2019-07-25

## 2019-07-25 RX ORDER — POTASSIUM CHLORIDE 1.5 G/1.58G
20-40 POWDER, FOR SOLUTION ORAL
Status: DISCONTINUED | OUTPATIENT
Start: 2019-07-25 | End: 2019-07-29

## 2019-07-25 RX ORDER — POTASSIUM CL/LIDO/0.9 % NACL 10MEQ/0.1L
10 INTRAVENOUS SOLUTION, PIGGYBACK (ML) INTRAVENOUS
Status: DISCONTINUED | OUTPATIENT
Start: 2019-07-25 | End: 2019-07-29

## 2019-07-25 RX ORDER — AMLODIPINE BESYLATE 5 MG/1
5 TABLET ORAL DAILY
COMMUNITY

## 2019-07-25 RX ORDER — LABETALOL HYDROCHLORIDE 5 MG/ML
10 INJECTION, SOLUTION INTRAVENOUS ONCE
Status: DISCONTINUED | OUTPATIENT
Start: 2019-07-25 | End: 2019-07-25

## 2019-07-25 RX ORDER — OXYBUTYNIN CHLORIDE 10 MG/1
10 TABLET, EXTENDED RELEASE ORAL AT BEDTIME
Status: ON HOLD | COMMUNITY
Start: 2019-04-09 | End: 2019-07-31

## 2019-07-25 RX ORDER — POLYETHYLENE GLYCOL 3350 17 G/17G
17 POWDER, FOR SOLUTION ORAL DAILY PRN
Status: DISCONTINUED | OUTPATIENT
Start: 2019-07-25 | End: 2019-07-31 | Stop reason: HOSPADM

## 2019-07-25 RX ORDER — ONDANSETRON 4 MG/1
4 TABLET, ORALLY DISINTEGRATING ORAL EVERY 6 HOURS PRN
Status: DISCONTINUED | OUTPATIENT
Start: 2019-07-25 | End: 2019-07-26

## 2019-07-25 RX ORDER — NALOXONE HYDROCHLORIDE 0.4 MG/ML
.1-.4 INJECTION, SOLUTION INTRAMUSCULAR; INTRAVENOUS; SUBCUTANEOUS
Status: DISCONTINUED | OUTPATIENT
Start: 2019-07-25 | End: 2019-07-26

## 2019-07-25 RX ORDER — MORPHINE SULFATE 4 MG/ML
4 INJECTION, SOLUTION INTRAMUSCULAR; INTRAVENOUS
Status: DISCONTINUED | OUTPATIENT
Start: 2019-07-25 | End: 2019-07-25

## 2019-07-25 RX ORDER — AMOXICILLIN 250 MG
2 CAPSULE ORAL 2 TIMES DAILY PRN
Status: DISCONTINUED | OUTPATIENT
Start: 2019-07-25 | End: 2019-07-26

## 2019-07-25 RX ORDER — LOSARTAN POTASSIUM 50 MG/1
50 TABLET ORAL DAILY
Status: DISCONTINUED | OUTPATIENT
Start: 2019-07-26 | End: 2019-07-31 | Stop reason: HOSPADM

## 2019-07-25 RX ORDER — POTASSIUM CHLORIDE 1500 MG/1
20-40 TABLET, EXTENDED RELEASE ORAL
Status: DISCONTINUED | OUTPATIENT
Start: 2019-07-25 | End: 2019-07-29

## 2019-07-25 RX ORDER — ACETAMINOPHEN 325 MG/1
650 TABLET ORAL EVERY 6 HOURS PRN
Status: ON HOLD | COMMUNITY
End: 2019-07-31

## 2019-07-25 RX ORDER — AMOXICILLIN 250 MG
1 CAPSULE ORAL 2 TIMES DAILY PRN
Status: DISCONTINUED | OUTPATIENT
Start: 2019-07-25 | End: 2019-07-26

## 2019-07-25 RX ADMIN — ACETAMINOPHEN 650 MG: 325 TABLET, FILM COATED ORAL at 21:20

## 2019-07-25 RX ADMIN — ONDANSETRON 4 MG: 2 INJECTION INTRAMUSCULAR; INTRAVENOUS at 14:48

## 2019-07-25 RX ADMIN — HYDROMORPHONE HYDROCHLORIDE 0.5 MG: 1 INJECTION, SOLUTION INTRAMUSCULAR; INTRAVENOUS; SUBCUTANEOUS at 23:10

## 2019-07-25 RX ADMIN — MORPHINE SULFATE 1 MG: 2 INJECTION, SOLUTION INTRAMUSCULAR; INTRAVENOUS at 19:13

## 2019-07-25 RX ADMIN — SODIUM CHLORIDE: 9 INJECTION, SOLUTION INTRAVENOUS at 19:13

## 2019-07-25 RX ADMIN — MORPHINE SULFATE 1 MG: 2 INJECTION, SOLUTION INTRAMUSCULAR; INTRAVENOUS at 21:19

## 2019-07-25 RX ADMIN — MORPHINE SULFATE 4 MG: 4 INJECTION INTRAVENOUS at 16:21

## 2019-07-25 RX ADMIN — MORPHINE SULFATE 4 MG: 4 INJECTION INTRAVENOUS at 14:47

## 2019-07-25 ASSESSMENT — ENCOUNTER SYMPTOMS
ARTHRALGIAS: 1
BACK PAIN: 0
MYALGIAS: 1
NECK PAIN: 1
ABDOMINAL PAIN: 0

## 2019-07-25 ASSESSMENT — ACTIVITIES OF DAILY LIVING (ADL): ADLS_ACUITY_SCORE: 24

## 2019-07-25 NOTE — H&P
[facility]    History and Physical - Hospitalist Service       Date of Admission:  7/25/2019    Assessment & Plan   Gennaro Walton is a 89 year old female admitted on 7/25/2019. She is brought to the emergency room after episode of fall which was unwitnessed.  It is not clear when she fell getting out of [employer] bus or outside of the property where she lives.  She does not report any loss of consciousness but she is a poor historian.  Her daughter was not present in the room when I was examining the patient.  1-status post fall: Questionable mechanical fall versus other.  The history is not that clear.  She was walker for ambulation.  She suffered left intratrochanteric femoral fracture and left humerus shaft fracture of undetermined age and small left subarachnoid hemorrhage.  She is on aspirin.  Neurosurgery has been consulted in the emergency room and they will continue to follow the patient while she is in the hospital.  We will continue to do no recheck as per protocol and most likely she will need to have the CT scan or MRI in the morning.  2-left intratrochanteric femoral fracture: I have consulted the orthopedic surgery.  The patient clearly states that she want to be full code and that discussion was done with emergency room staff with her daughter and at this time the patient is full code to get her through possible surgery.  The overall pre-and postoperative risk is high due to advanced age.  The overall quality of her life without the surgery is poor as well.  Apparently the patient use a walker in independent living space.  Tonight he managed the pain with morphine and will await orthopedic surgery input.  3-fracture of left humerus shaft.  Pain control at this time and will await orthopedic consultation.  4-subarachnoid hemorrhage: As mentioned previously neurosurgery has been consulted.  They recommended platelets infusion in the ER which is being started.  We will continue to  monitor her closely.         Diet: N.p.o. as per anesthesia and surgery protocol  DVT Prophylaxis: No DVT prophylaxis because of acute bleeding as well as acute fracture  Cortez Catheter: in place, indication:    Code Status: The patient is full code.  She clearly stated that once every measure to be done to keep her alive.  This was confirmed by emergency room with staff but talking to her daughter who was not present at time of my examination.    Disposition Plan   Expected discharge: 2 - 3 days, recommended to prior living arrangement once adequate pain management/ tolerating PO medications and Has been seen by orthopedic surgery.  Palliative surgery is done.  And neurosurgery feels that the patient is stable without needing any acute procedure.  Entered: Guillermo Hanna MD 07/25/2019, 4:55 PM     The patient's care was discussed with the Patient and Emergency room physician.    Guillermo Hanna MD  [facility] [place]    ______________________________________________________________________    Chief Complaint   Status post fall reviewing the    History is obtained from the patient  Notes and talking to emergency room staff.  Patient daughter was not present in the ER when I saw the patient    History of Present Illness   Gennaro Walton is a 89 year old female who was brought to the emergency room with EMS after suffering a fall.  Is not clear how the patient fell and where she fell.  By EMS note patient fell when trying to get out of [employer] bus but the patient's daughter told the emergency room staff that she fell outside of the property in senior housing..  The patient does not recall when and how she fell.  Surprisingly she knows the time and date and is oriented to place and time.  She is complaining of left leg pain left shoulder pain and pain in the left side of her head.  She suffered significant bruise over superolateral aspect of the left scalp as well as pain of her left shoulder and  pain over left hip and bruise over her left elbow.  She denies having any chest pain, no shortness of breath, no nausea or vomiting.  She is in significant pain and is responding to morphine.  All other review of system are negative.  Review of Systems    The 10 point Review of Systems is negative other than noted in the HPI or here.    Past Medical History    I have reviewed this patient's medical history and updated it with pertinent information if needed.   Past Medical History:   Diagnosis Date     Hypertension      Osteoporosis        Past Surgical History   I have reviewed this patient's surgical history and updated it with pertinent information if needed.  No past surgical history on file.    Social History   I have reviewed this patient's social history and updated it with pertinent information if needed.  Social History     Tobacco Use     Smoking status: Never Smoker     Smokeless tobacco: Never Used   Substance Use Topics     Alcohol use: No     Frequency: Never     Drug use: No       Family History   I have reviewed this patient's family history and updated it with pertinent information if needed.   No family history on file.    Prior to Admission Medications   Prior to Admission Medications   Prescriptions Last Dose Informant Patient Reported? Taking?   acetaminophen (TYLENOL) 325 MG tablet  at prn  Yes Yes   Sig: Take 650 mg by mouth every 6 hours as needed for mild pain   amLODIPine (NORVASC) 5 MG tablet 7/25/2019 at am  Yes Yes   Sig: Take 2.5 mg by mouth daily   aspirin 81 MG EC tablet 7/25/2019 at am Self Yes Yes   Sig: Take 81 mg by mouth daily   levothyroxine (SYNTHROID/LEVOTHROID) 88 MCG tablet 7/25/2019 at am Pharmacy Yes Yes   Sig: Take 88 mcg by mouth daily   losartan (COZAAR) 50 MG tablet 7/25/2019 at am Pharmacy Yes Yes   Sig: Take 50 mg by mouth daily   oxybutynin ER (DITROPAN-XL) 10 MG 24 hr tablet 7/24/2019 at pm  Yes Yes   Sig: Take 10 mg by mouth At Bedtime   vitamin D3  (CHOLECALCIFEROL) 2000 units tablet 7/25/2019 at am Self Yes Yes   Sig: Take 1 tablet by mouth daily      Facility-Administered Medications: None     Allergies   No Known Allergies    Physical Exam   Vital Signs: Temp: 98  F (36.7  C) Temp src: Oral BP: 150/72 Pulse: 80 Heart Rate: 80 Resp: 13 SpO2: 96 % O2 Device: None (Room air)    Weight: 100 lbs 0 oz    Constitutional: She is in pain, open her eyes, follow commands  Eyes: Lids and lashes normal, pupils equal, round and reactive to light, extra ocular muscles intact, sclera clear, conjunctiva normal  ENT: Hard of hearing  Hematologic / Lymphatic: no cervical lymphadenopathy and no supraclavicular lymphadenopathy  Respiratory: No increased work of breathing, good air exchange, clear to auscultation bilaterally, no crackles or wheezing  Cardiovascular: Normal apical impulse, regular rate and rhythm, normal S1 and S2, no S3 or S4, and no murmur noted  GI: No scars, normal bowel sounds, soft, non-distended, non-tender, no masses palpated, no hepatosplenomegally  Genitounirinary: Cortez catheter is in place  Skin: There is a bruise over left elbow as well as lateral aspect of left side of scalp whreji has a significantly bruised  Musculoskeletal: She is not able to move her left leg and has lateral deviation of the left leg and left foot.  She does have tenderness and pain of her left hip and left shoulder.  The range of motion of the left shoulder and left hip is significantly reduced.  Neurologic: She is arousable answer question appropriately she is oriented to time and place.  Her strength is 5/5 in right upper and right lower extremity and 2/5 in the left lower extremity and 2/5 in left upper extremity    Data   Data reviewed today: I reviewed all medications, new labs and imaging results over the last 24 hours. I personally reviewed     Recent Labs   Lab 07/25/19  1414   WBC 8.2   HGB 12.5   MCV 95      INR 1.02      POTASSIUM 3.8   CHLORIDE 104   CO2  27   BUN 16   CR 0.61   ANIONGAP 6   LORETA 8.2*   *   ALBUMIN 3.3*   PROTTOTAL 6.5*   BILITOTAL 0.3   ALKPHOS 95   ALT 16   AST 19     Recent Results (from the past 24 hour(s))   Head CT w/o contrast   Result Value    Radiologist flags Intracranial hemorrhage (AA)    Narrative    CT OF THE HEAD WITHOUT CONTRAST 7/25/2019 3:14 PM     COMPARISON: None.    HISTORY: Fall, head trauma.    TECHNIQUE: 5 mm thick axial CT images of the head were acquired  without IV contrast material.    FINDINGS: There is a small ovoid focus of hyperdensity in the left  sylvian fissure likely representing a small amount of subarachnoid  hemorrhage. There is no other evidence for intracranial hemorrhage.     There is moderate diffuse cerebral volume loss. There are extensive  confluent areas of decreased density in the cerebral white matter  bilaterally that are consistent with sequela of chronic small vessel  ischemic disease. The ventricles and basal cisterns are within normal  limits in configuration given the degree of cerebral volume loss.   There is no midline shift. There are no extra-axial fluid collections.    No intracranial mass or recent infarct.    The visualized paranasal sinuses are well-aerated. There is no  mastoiditis. There are no fractures of the visualized bones. There is  a large left frontal scalp hematoma.      Impression    IMPRESSION:   1. Small focus of presumed subarachnoid hemorrhage in the left sylvian  fissure.  2. Diffuse cerebral volume loss and cerebral white matter changes  consistent with chronic small vessel ischemic disease.     [Critical Result: Intracranial hemorrhage]    Finding was identified on 7/25/2019 3:16 PM.     RODOLFO JETT was contacted by Dr. Sousa on 7/25/2019 3:17 PM  and verbalized understanding of the critical result.     Radiation dose for this scan was reduced using automated exposure  control, adjustment of the mA and/or kV according to patient size, or  iterative  reconstruction technique.    CLARITA ABRAHAM MD   CT Cervical Spine w/o Contrast    Narrative    CT OF THE CERVICAL SPINE WITHOUT CONTRAST   7/25/2019 3:15 PM     COMPARISON: None.    HISTORY: Fall, evaluate fracture.     TECHNIQUE: Axial images of the cervical spine were acquired without  intravenous contrast. Multiplanar reformations were created.      FINDINGS: There is normal alignment of the cervical vertebrae.  Vertebral body heights of the cervical spine are normal.  Craniocervical alignment is normal. There are no fractures of the  cervical spine.  There is no significant degenerative spinal canal  narrowing of the cervical spine. There is no prevertebral soft tissue  swelling.      Impression    IMPRESSION: No evidence for fracture or traumatic malalignment of the  cervical spine.      Radiation dose for this scan was reduced using automated exposure  control, adjustment of the mA and/or kV according to patient size, or  iterative reconstruction technique.    CLARITA ABRAHAM MD   XR Shoulder Left 2 Views    Narrative    Examination:  XR SHOULDER 2 VIEW LEFT    Date:  7/25/2019 3:30 PM     Clinical Information: fall, pain and tenderness     Additional Information: none    Comparison: none    Findings: Impacted fracture deformity of the proximal humerus, with  the shaft of the humerus impacted superiorly into the head.  Technically a clear osseous step off is visible medially. A cortical  step-off is less evident laterally; this would be age indeterminate by  imaging, but given the clinical history and acute pain this appearance  could be due to an acute fracture. No significant displacement of  fracture fragments. Moderate glenohumeral osteoarthrosis. Complete  effacement of the acromiohumeral space consistent with chronic rotator  cuff tearing. Generalized demineralization.      Impression    Impression:  Impacted fracture deformity of the left proximal humerus, technically  age indeterminate but could be  consistent with an acute injury given  the patient's clinical history.    MARLEY LEVINE MD   XR Pelvis w Hip Left 1 View    Narrative    Examination:  XR PELVIS AND HIP LEFT 1 VIEW    Date:  7/25/2019 3:34 PM     Clinical Information: fall, pain and tenderness     Additional Information: none    Comparison: none    Findings: Acute, comminuted, intertrochanteric fracture of the left  proximal femur. No fracture involvement of the femoral head or neck.  Normal left hip joint alignment is maintained. Left hip joint spacing  is maintained. The bones are demineralized. No additional fracture in  the pelvis or right hip.    MARLEY LEVINE MD   XR Chest 1 View    Narrative    XR CHEST 1 VW   7/25/2019 3:37 PM     HISTORY: pre op eval    COMPARISON: None available      Impression    IMPRESSION: Patient is rotated towards the right. Small left sided  pleural effusion. Hypoinflated lungs with scattered patchy airspace  opacities, possibly representing subsegmental atelectasis.  Superimposed pneumonia however cannot be ruled out, correlate with  clinical findings. Cardiomediastinal silhouette is mildly enlarged.  Surgical clips overly the trachea. Degenerative changes seen in the  glenohumeral joints. Dextroscoliosis of the thoracic spine.     VIRGINIA BAH MD

## 2019-07-25 NOTE — PROGRESS NOTES
RECEIVING UNIT ED HANDOFF REVIEW    ED Nurse Handoff Report was reviewed by: Pippa Bianchi on July 25, 2019 at 6:32 PM

## 2019-07-25 NOTE — ED NOTES
Bed: ED21  Expected date: 7/25/19  Expected time: 1:38 PM  Means of arrival: Ambulance  Comments:  423 84f anxiety ETA 1348

## 2019-07-25 NOTE — PROGRESS NOTES
Paged by [facility] ED regarding 89 year old female not on known anticoagulation who fell. Head CT reveals small focus of presumed SAH in the left sylvian fissure.    Recommendations:  -  Agree to admission via hospitalist  -  Keep SBP < 160  -  Elevate HOB 30-60 degrees  -  Discontinue all blood thinners and anti-inflammatories  -  Follow up CT head in am and/or earlier if there is a mental status change or neurological change  -  Full consult to follow

## 2019-07-25 NOTE — ED NOTES
Clarified labetalol with Dr Matos. Patient BP 150s/70s, HR 80s. VORB to hold ordered dose at this time and administer is SBP >185.

## 2019-07-25 NOTE — ED PROVIDER NOTES
History     Chief Complaint:  Fall    HPI   Gennaro Walton is a 89 year old female with history of dementia who presents with a fall incident. EMS reports the patient fell off a [employer] bus, however, the patient's daughter states the patient lives in a senior house and fell outside the property. The patient reports left leg and left shoulder pain as well as slight neck pain. The patient denies chest, back pain, and abdominal pain. The daughter notes last winter the patient had a spinal compression fracture and has severe osteoporosis.    History somewhat limited given the pt's pain/AMS.    Allergies:  No known drug allergies    Medications:    Aspirin 81 mg  Levothyroxine  Lidocaine  Cozaar  Amlodipine  Docusate  Oxybutynin    Past Medical History:    HTN  Osteoporosis  Compression fracture of lumbar vertebra  Hypothyroidism  Mitral regurgitation  Aortic regurgitation  Breast cancer  Varicose veins  Urge incontinence  Pure Hypercholesterolemia  Diverticulum of esophagus  GERD    Past Surgical History:    Thyroidectomy  Appendectomy  Mastectomy - left with reconstruction  Excision of tonsil tags  Breast lumpectomy  Phacoemulsification, cataract w/ intraocular - bilateral    Family History:    Heart disease  HTN  Osteoporosis    Social History:  Smoking status: Never  Alcohol use: No  The patient presents to the emergency department with daughter.  Marital Status:   [5]    Review of Systems   Cardiovascular: Negative for chest pain.   Gastrointestinal: Negative for abdominal pain.   Musculoskeletal: Positive for arthralgias, myalgias and neck pain. Negative for back pain.   All other systems reviewed and are negative.      Physical Exam   Patient Vitals for the past 24 hrs:   BP Temp Temp src Pulse Heart Rate Resp SpO2 Weight   07/25/19 1830 160/75 -- -- 82 -- -- 97 % --   07/25/19 1825 160/77 -- -- 88 -- -- 98 % --   07/25/19 1820 165/81 -- -- 81 -- -- 97 % --   07/25/19 1815 147/75 -- -- 82 -- -- 97  % --   07/25/19 1810 152/78 98.2  F (36.8  C) Oral -- -- -- 97 % --   07/25/19 1804 124/76 97.7  F (36.5  C) Oral 82 -- 14 96 % --   07/25/19 1715 142/78 -- -- 80 80 13 96 % --   07/25/19 1700 144/71 -- -- 80 81 12 97 % --   07/25/19 1645 146/73 -- -- 81 85 14 96 % --   07/25/19 1630 150/72 -- -- 80 80 13 96 % --   07/25/19 1615 158/77 -- -- 82 81 15 97 % --   07/25/19 1600 167/81 -- -- 84 80 17 96 % --   07/25/19 1545 154/77 -- -- 81 82 23 94 % --   07/25/19 1535 150/77 -- -- -- 83 20 93 % --   07/25/19 1445 171/87 -- -- 88 89 28 -- --   07/25/19 1430 185/88 -- -- 90 89 29 -- --   07/25/19 1415 175/90 -- -- 93 97 20 -- --   07/25/19 1408 (!) 198/101 98  F (36.7  C) Oral 94 -- 18 97 % 45.4 kg (100 lb)   07/25/19 1400 (!) 198/101 -- -- 97 92 20 93 % --     Physical Exam  Constituional: Well-developed, Well-nourished.  Non-diaphoretic. Distressed, appears uncomfortable  HENT:  L temporal hematoma, left forehead abrasions.  No pain or instability to palpation of bony orbits, mandible, maxilla.  Good jaw opening.  No malocclusion.  No nasal septal hematoma.  OP clear and moist.    EYES:  PERRL.  EOMI.  NECK:  C collar in place. No Cspine tenderness.  Trachea midline.  Full ROM.  Supple. No stridor.  CARDIAC:  RRR.  Nl s1, s2.  No murmurs.    CHEST:  No external evidence of chest trauma   PULM: Effort  Normal.  Breath sounds clear - no wheezes, rales, rhonchi.    ABD:  Soft, NT/ND, normal BS.  No guarding, no rebound.  No external evidence of abdominal trauma  : No CVA T.    BACK:  No T or L spinous process tenderness.  Pelvis stable.  RECTAL: deferred  EXT:  Limited ROM of CHARMAINE/LE due to pain. Significant tenderness and deformity to proximal humerus and L hip.  No lacerations.    NEURO:  Alert, Oriented.  5/5 UE and LE distal.  Sensation intact to LT.   SKIN :  Warm.  Dry.   No erythema.  No rash  PSYCH.:  Normal judgment.  Normal affect.      Emergency Department Course   Imaging:  Radiographic findings were  communicated with the patient and family who voiced understanding of the findings.    XR Wrist Left 2 Views  Impression:    1.  No acute fracture or joint malalignment.  2.  Severe demineralization, which diminishes sensitivity to  nondisplaced fractures.  3.  No soft tissue swelling.    MARLEY LEVINE MD  As read by Radiology.    Head CT w/o Contrast  IMPRESSION:   1. Small focus of presumed subarachnoid hemorrhage in the left sylvian  fissure.  2. Diffuse cerebral volume loss and cerebral white matter changes  consistent with chronic small vessel ischemic disease.     [Critical Result: Intracranial hemorrhage]    Finding was identified on 7/25/2019 3:16 PM.     RODOLFO JETT was contacted by Dr. Abraham on 7/25/2019 3:17 PM  and verbalized understanding of the critical result.     Radiation dose for this scan was reduced using automated exposure  control, adjustment of the mA and/or kV according to patient size, or  iterative reconstruction technique.    CLARITA ABRAHAM MD  As read by Radiology.    CT Cervical spine w/o Contrast  IMPRESSION: No evidence for fracture or traumatic malalignment of the  cervical spine.    Radiation dose for this scan was reduced using automated exposure  control, adjustment of the mA and/or kV according to patient size, or  iterative reconstruction technique.    CLARITA ABRAHAM MD  As read by Radiology.    XR Pelvis w Hip Left 1 View  Examination:  XR PELVIS AND HIP LEFT 1 VIEW    Date:  7/25/2019 3:34 PM     Clinical Information: fall, pain and tenderness     Additional Information: none    Comparison: none    Findings: Acute, comminuted, intertrochanteric fracture of the left  proximal femur. No fracture involvement of the femoral head or neck.  Normal left hip joint alignment is maintained. Left hip joint spacing  is maintained. The bones are demineralized. No additional fracture in  the pelvis or right hip.    MARLEY LEVINE MD  As read by Radiology.    XR Shoulder  Left 2 Views  Impression:  Impacted fracture deformity of the left proximal humerus, technically  age indeterminate but could be consistent with an acute injury given  the patient's clinical history.    MARLEY LEVINE MD  As read by Radiology.    XR Chest 1 View  IMPRESSION: Patient is rotated towards the right. Small left sided  pleural effusion. Hypoinflated lungs with scattered patchy airspace  opacities, possibly representing subsegmental atelectasis.  Superimposed pneumonia however cannot be ruled out, correlate with  clinical findings. Cardiomediastinal silhouette is mildly enlarged.  Surgical clips overly the trachea. Degenerative changes seen in the  glenohumeral joints. Dextroscoliosis of the thoracic spine.     VIRGINIA BAH MD  As read by Radiology.    Laboratory:  Blood: WNL  CBC: WNL (WBC 8.2, HGB 12.5, )  CMP: Glucose 136 (H), Calcium 8.2 (L), Albumin 3.3 (L), Protein 6.5 (L), o/w (Creatinine 0.61)    PLT pending  ABO: B, neg, neg  INR 1.02    Interventions:  1447 Morphine 4 mg IV  1448 Zofran 4 mg IV  1621 Morphine 4 mg IV    Emergency Department Course:  The patient arrived in the emergency department via EMS.    Past medical records, nursing notes, and vitals reviewed.  1428: I performed an exam of the patient and obtained history, as documented above.    IV inserted and blood drawn.    The patient was sent for left wrist, pelvis w/ hip, left shoulder, and chest x-rays as well as Head and cervical spin CTs while in the emergency department, findings above.    1523 I discussed the patient with radiology.    1530 I discussed the patient with neurosurgery.    Findings and plan explained to the Patient and daughter who consents to admission.     1740: Discussed the patient with Dr. Hanna, who will admit the patient to a neuro bed for further monitoring, evaluation, and treatment.     Impression & Plan    Medical Decision Makin-year-old female presenting status post  fall    Differential diagnosis includes intracranial injury such as hemorrhage or skull fracture, extremity or long bone fracture, dislocation, contusion.  No evidence of open fractures on exam given the lacerations.  Pain medication given as noted above with improvement. Imaging sig for traumatic subarachnoid hemorrhage, proximal humerus fracture, intertrochanteric left femur fracture.  Screening labs ordered for preoperative management.  I discussed the patient with neurosurgery who recommended platelet administration, no surgical intervention at this point.  I discussed the patient with orthopedics who will plan operative management of the patient's femur fracture.  The patient and family were consented for a platelet transfusion.  Patient was subsequently admitted to the hospital service for further evaluation and management.  No evidence of intrathoracic or intra-abdominal traumatic injury at this point.  Patient and her daughter were counseled on the results, diagnosis and disposition.  They are understanding and agreeable to plan.  Patient was subsequently admitted in guarded condition.    Critical care time: 35 minutes    Diagnosis:    ICD-10-CM   1. Other closed displaced fracture of proximal end of left humerus, initial encounter S42.292A   2. Left displaced femoral neck fracture (H) S72.002A   3. Subarachnoid hemorrhage (H) I60.9     Disposition:  Admitted to neuro.    Jigna Abraham  7/25/2019    EMERGENCY DEPARTMENT  Scribe Disclosure:  Jigna JUNIOR, am serving as a scribe at 2:28 PM on 7/25/2019 to document services personally performed by Carmine Matos MD based on my observations and the provider's statements to me.        Carmine Matos MD  07/26/19 0956
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
## 2019-07-25 NOTE — PROGRESS NOTES
Aware of patient in ED    Left intertrochanteric femur fracture ORIF scheduled for tomorrow with Dr. Oseguera as OR time permits    May have diet today  NPO after midnight  Bed rest  Pain medication as needed, minimize narcotics  Hold any anticoagulation  Pre-op optimization per hospitalist    Formal consult tomorrow AM    Tonja Hall PAC

## 2019-07-25 NOTE — PHARMACY-ADMISSION MEDICATION HISTORY
Admission medication history interview status for the 7/25/2019  admission is complete. See EPIC admission navigator for prior to admission medications     Medication history source reliability:Moderate    Actions taken by pharmacist (provider contacted, etc): Interviewed patient and patient's daughter - they were able to help with a lot of the medication history, but a few of the medications they were unsure of - amlodipine, oxybutynin. I called [facility] to verify doses - She has not filled oxybutynin at [facility], but Elisabeth still listed this as an active medication so left it on list because patient reported she takes it at bedtime. She also splits one of her tablets in half but could not recall if it was the amlodipine or the losartan - based on Surescripts and Elisabeth it looks like she was filling the 5 mg amlodipine and taking half.      Additional medication history information not noted on PTA med list : See above    Medication reconciliation/reorder completed by provider prior to medication history? No    Time spent in this activity: 25 minutes    Prior to Admission medications    Medication Sig Last Dose Taking? Auth Provider   acetaminophen (TYLENOL) 325 MG tablet Take 650 mg by mouth every 6 hours as needed for mild pain  at prn Yes Unknown, Entered By History   amLODIPine (NORVASC) 5 MG tablet Take 2.5 mg by mouth daily 7/25/2019 at am Yes Unknown, Entered By History   aspirin 81 MG EC tablet Take 81 mg by mouth daily 7/25/2019 at am Yes Unknown, Entered By History   levothyroxine (SYNTHROID/LEVOTHROID) 88 MCG tablet Take 88 mcg by mouth daily 7/25/2019 at am Yes Unknown, Entered By History   losartan (COZAAR) 50 MG tablet Take 50 mg by mouth daily 7/25/2019 at am Yes Unknown, Entered By History   oxybutynin ER (DITROPAN-XL) 10 MG 24 hr tablet Take 10 mg by mouth At Bedtime 7/24/2019 at pm Yes Unknown, Entered By History   vitamin D3 (CHOLECALCIFEROL) 2000 units tablet Take 1 tablet by mouth daily  7/25/2019 at am Yes Unknown, Entered By History     Kim Azul,  PharmD  [phone]

## 2019-07-26 ENCOUNTER — APPOINTMENT (OUTPATIENT)
Dept: GENERAL RADIOLOGY | Facility: CLINIC | Age: 84
DRG: 956 | End: 2019-07-26
Attending: INTERNAL MEDICINE
Payer: MEDICARE

## 2019-07-26 ENCOUNTER — ANESTHESIA EVENT (OUTPATIENT)
Dept: SURGERY | Facility: CLINIC | Age: 84
DRG: 956 | End: 2019-07-26
Payer: MEDICARE

## 2019-07-26 ENCOUNTER — ANESTHESIA (OUTPATIENT)
Dept: SURGERY | Facility: CLINIC | Age: 84
DRG: 956 | End: 2019-07-26
Payer: MEDICARE

## 2019-07-26 ENCOUNTER — APPOINTMENT (OUTPATIENT)
Dept: CT IMAGING | Facility: CLINIC | Age: 84
DRG: 956 | End: 2019-07-26
Attending: INTERNAL MEDICINE
Payer: MEDICARE

## 2019-07-26 ENCOUNTER — APPOINTMENT (OUTPATIENT)
Dept: CT IMAGING | Facility: CLINIC | Age: 84
DRG: 956 | End: 2019-07-26
Attending: NURSE PRACTITIONER
Payer: MEDICARE

## 2019-07-26 PROBLEM — S42.209A PROXIMAL HUMERUS FRACTURE: Status: ACTIVE | Noted: 2019-07-26

## 2019-07-26 PROBLEM — S06.5XAA SDH (SUBDURAL HEMATOMA) (H): Status: RESOLVED | Noted: 2019-07-26 | Resolved: 2019-07-26

## 2019-07-26 PROBLEM — S06.5XAA SDH (SUBDURAL HEMATOMA) (H): Status: ACTIVE | Noted: 2019-07-26

## 2019-07-26 PROBLEM — I60.9 SAH (SUBARACHNOID HEMORRHAGE) (H): Status: ACTIVE | Noted: 2019-07-26

## 2019-07-26 PROBLEM — I10 BENIGN ESSENTIAL HYPERTENSION: Status: ACTIVE | Noted: 2019-07-26

## 2019-07-26 PROBLEM — M81.0 OSTEOPOROSIS: Status: ACTIVE | Noted: 2019-07-26

## 2019-07-26 LAB
ANION GAP SERPL CALCULATED.3IONS-SCNC: 5 MMOL/L (ref 3–14)
ANION GAP SERPL CALCULATED.3IONS-SCNC: 5 MMOL/L (ref 3–14)
BASE DEFICIT BLDA-SCNC: 1 MMOL/L
BASE DEFICIT BLDV-SCNC: 0.6 MMOL/L
BLD PROD TYP BPU: NORMAL
BLD UNIT ID BPU: 0
BLOOD PRODUCT CODE: NORMAL
BPU ID: NORMAL
BUN SERPL-MCNC: 17 MG/DL (ref 7–30)
BUN SERPL-MCNC: 20 MG/DL (ref 7–30)
CALCIUM SERPL-MCNC: 7.6 MG/DL (ref 8.5–10.1)
CALCIUM SERPL-MCNC: 7.9 MG/DL (ref 8.5–10.1)
CHLORIDE SERPL-SCNC: 105 MMOL/L (ref 94–109)
CHLORIDE SERPL-SCNC: 107 MMOL/L (ref 94–109)
CO2 SERPL-SCNC: 27 MMOL/L (ref 20–32)
CO2 SERPL-SCNC: 29 MMOL/L (ref 20–32)
CREAT SERPL-MCNC: 0.64 MG/DL (ref 0.52–1.04)
CREAT SERPL-MCNC: 0.69 MG/DL (ref 0.52–1.04)
ERYTHROCYTE [DISTWIDTH] IN BLOOD BY AUTOMATED COUNT: 12.9 % (ref 10–15)
ERYTHROCYTE [DISTWIDTH] IN BLOOD BY AUTOMATED COUNT: 12.9 % (ref 10–15)
GFR SERPL CREATININE-BSD FRML MDRD: 74 ML/MIN/{1.73_M2}
GFR SERPL CREATININE-BSD FRML MDRD: 75 ML/MIN/{1.73_M2}
GLUCOSE BLDC GLUCOMTR-MCNC: 166 MG/DL (ref 70–99)
GLUCOSE SERPL-MCNC: 148 MG/DL (ref 70–99)
GLUCOSE SERPL-MCNC: 183 MG/DL (ref 70–99)
HCO3 BLD-SCNC: 25 MMOL/L (ref 21–28)
HCO3 BLDV-SCNC: 27 MMOL/L (ref 21–28)
HCT VFR BLD AUTO: 20.7 % (ref 35–47)
HCT VFR BLD AUTO: 25.6 % (ref 35–47)
HGB BLD-MCNC: 6.9 G/DL (ref 11.7–15.7)
HGB BLD-MCNC: 8.6 G/DL (ref 11.7–15.7)
MAGNESIUM SERPL-MCNC: 2.2 MG/DL (ref 1.6–2.3)
MCH RBC QN AUTO: 31.9 PG (ref 26.5–33)
MCH RBC QN AUTO: 32.2 PG (ref 26.5–33)
MCHC RBC AUTO-ENTMCNC: 33.3 G/DL (ref 31.5–36.5)
MCHC RBC AUTO-ENTMCNC: 33.6 G/DL (ref 31.5–36.5)
MCV RBC AUTO: 95 FL (ref 78–100)
MCV RBC AUTO: 97 FL (ref 78–100)
OXYHGB MFR BLD: 95 % (ref 92–100)
OXYHGB MFR BLDV: 25 %
PCO2 BLD: 46 MM HG (ref 35–45)
PCO2 BLDV: 63 MM HG (ref 40–50)
PH BLD: 7.34 PH (ref 7.35–7.45)
PH BLDV: 7.24 PH (ref 7.32–7.43)
PLATELET # BLD AUTO: 213 10E9/L (ref 150–450)
PLATELET # BLD AUTO: 223 10E9/L (ref 150–450)
PO2 BLD: 85 MM HG (ref 80–105)
PO2 BLDV: 21 MM HG (ref 25–47)
POTASSIUM SERPL-SCNC: 4.1 MMOL/L (ref 3.4–5.3)
POTASSIUM SERPL-SCNC: 4.4 MMOL/L (ref 3.4–5.3)
RBC # BLD AUTO: 2.14 10E12/L (ref 3.8–5.2)
RBC # BLD AUTO: 2.7 10E12/L (ref 3.8–5.2)
SODIUM SERPL-SCNC: 139 MMOL/L (ref 133–144)
SODIUM SERPL-SCNC: 139 MMOL/L (ref 133–144)
TRANSFUSION STATUS PATIENT QL: NORMAL
TRANSFUSION STATUS PATIENT QL: NORMAL
TROPONIN I SERPL-MCNC: 0.04 UG/L (ref 0–0.04)
WBC # BLD AUTO: 13.6 10E9/L (ref 4–11)
WBC # BLD AUTO: 8.9 10E9/L (ref 4–11)

## 2019-07-26 PROCEDURE — 36415 COLL VENOUS BLD VENIPUNCTURE: CPT | Performed by: INTERNAL MEDICINE

## 2019-07-26 PROCEDURE — 25000566 ZZH SEVOFLURANE, EA 15 MIN: Performed by: ORTHOPAEDIC SURGERY

## 2019-07-26 PROCEDURE — 93005 ELECTROCARDIOGRAM TRACING: CPT

## 2019-07-26 PROCEDURE — 85027 COMPLETE CBC AUTOMATED: CPT | Performed by: NURSE PRACTITIONER

## 2019-07-26 PROCEDURE — 99232 SBSQ HOSP IP/OBS MODERATE 35: CPT | Performed by: INTERNAL MEDICINE

## 2019-07-26 PROCEDURE — 25000125 ZZHC RX 250: Performed by: ORTHOPAEDIC SURGERY

## 2019-07-26 PROCEDURE — 0QS706Z REPOSITION LEFT UPPER FEMUR WITH INTRAMEDULLARY INTERNAL FIXATION DEVICE, OPEN APPROACH: ICD-10-PCS | Performed by: ORTHOPAEDIC SURGERY

## 2019-07-26 PROCEDURE — 84484 ASSAY OF TROPONIN QUANT: CPT | Performed by: NURSE PRACTITIONER

## 2019-07-26 PROCEDURE — 25000125 ZZHC RX 250: Performed by: NURSE ANESTHETIST, CERTIFIED REGISTERED

## 2019-07-26 PROCEDURE — 37000008 ZZH ANESTHESIA TECHNICAL FEE, 1ST 30 MIN: Performed by: ORTHOPAEDIC SURGERY

## 2019-07-26 PROCEDURE — 36415 COLL VENOUS BLD VENIPUNCTURE: CPT | Performed by: NURSE PRACTITIONER

## 2019-07-26 PROCEDURE — C1713 ANCHOR/SCREW BN/BN,TIS/BN: HCPCS | Performed by: ORTHOPAEDIC SURGERY

## 2019-07-26 PROCEDURE — 70450 CT HEAD/BRAIN W/O DYE: CPT

## 2019-07-26 PROCEDURE — 12000000 ZZH R&B MED SURG/OB

## 2019-07-26 PROCEDURE — 40000275 ZZH STATISTIC RCP TIME EA 10 MIN

## 2019-07-26 PROCEDURE — 40000277 XR SURGERY CARM FLUORO LESS THAN 5 MIN W STILLS

## 2019-07-26 PROCEDURE — 00000146 ZZHCL STATISTIC GLUCOSE BY METER IP

## 2019-07-26 PROCEDURE — 80048 BASIC METABOLIC PNL TOTAL CA: CPT | Performed by: NURSE PRACTITIONER

## 2019-07-26 PROCEDURE — 25000128 H RX IP 250 OP 636: Performed by: PHYSICIAN ASSISTANT

## 2019-07-26 PROCEDURE — 94660 CPAP INITIATION&MGMT: CPT

## 2019-07-26 PROCEDURE — 85027 COMPLETE CBC AUTOMATED: CPT | Performed by: INTERNAL MEDICINE

## 2019-07-26 PROCEDURE — 71000013 ZZH RECOVERY PHASE 1 LEVEL 1 EA ADDTL HR: Performed by: ORTHOPAEDIC SURGERY

## 2019-07-26 PROCEDURE — P9016 RBC LEUKOCYTES REDUCED: HCPCS | Performed by: EMERGENCY MEDICINE

## 2019-07-26 PROCEDURE — 70450 CT HEAD/BRAIN W/O DYE: CPT | Mod: 77

## 2019-07-26 PROCEDURE — 36000063 ZZH SURGERY LEVEL 4 EA 15 ADDTL MIN: Performed by: ORTHOPAEDIC SURGERY

## 2019-07-26 PROCEDURE — 40000965 ZZH STATISTIC END TITIAL CO2 MONITORING

## 2019-07-26 PROCEDURE — 25800030 ZZH RX IP 258 OP 636: Performed by: SURGERY

## 2019-07-26 PROCEDURE — 25000128 H RX IP 250 OP 636: Performed by: INTERNAL MEDICINE

## 2019-07-26 PROCEDURE — 40000170 ZZH STATISTIC PRE-PROCEDURE ASSESSMENT II: Performed by: ORTHOPAEDIC SURGERY

## 2019-07-26 PROCEDURE — 80048 BASIC METABOLIC PNL TOTAL CA: CPT | Performed by: INTERNAL MEDICINE

## 2019-07-26 PROCEDURE — 27210794 ZZH OR GENERAL SUPPLY STERILE: Performed by: ORTHOPAEDIC SURGERY

## 2019-07-26 PROCEDURE — 25000128 H RX IP 250 OP 636: Performed by: NURSE ANESTHETIST, CERTIFIED REGISTERED

## 2019-07-26 PROCEDURE — 99207 ZZC CDG-MDM COMPONENT: MEETS LOW - DOWN CODED: CPT | Performed by: INTERNAL MEDICINE

## 2019-07-26 PROCEDURE — 93010 ELECTROCARDIOGRAM REPORT: CPT | Mod: 76 | Performed by: INTERNAL MEDICINE

## 2019-07-26 PROCEDURE — 36000065 ZZH SURGERY LEVEL 4 W FLUORO 1ST 30 MIN: Performed by: ORTHOPAEDIC SURGERY

## 2019-07-26 PROCEDURE — 36600 WITHDRAWAL OF ARTERIAL BLOOD: CPT

## 2019-07-26 PROCEDURE — 99291 CRITICAL CARE FIRST HOUR: CPT | Performed by: NURSE PRACTITIONER

## 2019-07-26 PROCEDURE — 25000128 H RX IP 250 OP 636: Performed by: SURGERY

## 2019-07-26 PROCEDURE — 82805 BLOOD GASES W/O2 SATURATION: CPT | Performed by: NURSE PRACTITIONER

## 2019-07-26 PROCEDURE — 37000009 ZZH ANESTHESIA TECHNICAL FEE, EACH ADDTL 15 MIN: Performed by: ORTHOPAEDIC SURGERY

## 2019-07-26 PROCEDURE — 83735 ASSAY OF MAGNESIUM: CPT | Performed by: NURSE PRACTITIONER

## 2019-07-26 PROCEDURE — 25800030 ZZH RX IP 258 OP 636: Performed by: NURSE ANESTHETIST, CERTIFIED REGISTERED

## 2019-07-26 PROCEDURE — 71000012 ZZH RECOVERY PHASE 1 LEVEL 1 FIRST HR: Performed by: ORTHOPAEDIC SURGERY

## 2019-07-26 PROCEDURE — 25000132 ZZH RX MED GY IP 250 OP 250 PS 637: Mod: GY | Performed by: INTERNAL MEDICINE

## 2019-07-26 PROCEDURE — C1769 GUIDE WIRE: HCPCS | Performed by: ORTHOPAEDIC SURGERY

## 2019-07-26 DEVICE — IMP SCR SYN TFNA FENESTRATED LAG 110MM 04.038.210S: Type: IMPLANTABLE DEVICE | Site: FEMUR | Status: FUNCTIONAL

## 2019-07-26 DEVICE — IMP SCR SYN 5.0 TI LOCK T25 STARDRIVE 36MM 04.005.526S: Type: IMPLANTABLE DEVICE | Site: FEMUR | Status: FUNCTIONAL

## 2019-07-26 DEVICE — IMPLANTABLE DEVICE: Type: IMPLANTABLE DEVICE | Site: FEMUR | Status: FUNCTIONAL

## 2019-07-26 RX ORDER — METOPROLOL TARTRATE 1 MG/ML
1-2 INJECTION, SOLUTION INTRAVENOUS EVERY 5 MIN PRN
Status: DISCONTINUED | OUTPATIENT
Start: 2019-07-26 | End: 2019-07-26 | Stop reason: HOSPADM

## 2019-07-26 RX ORDER — HYDROMORPHONE HYDROCHLORIDE 1 MG/ML
.3-.5 INJECTION, SOLUTION INTRAMUSCULAR; INTRAVENOUS; SUBCUTANEOUS EVERY 5 MIN PRN
Status: DISCONTINUED | OUTPATIENT
Start: 2019-07-26 | End: 2019-07-26 | Stop reason: HOSPADM

## 2019-07-26 RX ORDER — FENTANYL CITRATE 50 UG/ML
INJECTION, SOLUTION INTRAMUSCULAR; INTRAVENOUS PRN
Status: DISCONTINUED | OUTPATIENT
Start: 2019-07-26 | End: 2019-07-26

## 2019-07-26 RX ORDER — NALOXONE HYDROCHLORIDE 0.4 MG/ML
.1-.4 INJECTION, SOLUTION INTRAMUSCULAR; INTRAVENOUS; SUBCUTANEOUS
Status: ACTIVE | OUTPATIENT
Start: 2019-07-26 | End: 2019-07-27

## 2019-07-26 RX ORDER — AMOXICILLIN 250 MG
2 CAPSULE ORAL 2 TIMES DAILY
Status: DISCONTINUED | OUTPATIENT
Start: 2019-07-26 | End: 2019-07-31 | Stop reason: HOSPADM

## 2019-07-26 RX ORDER — FENTANYL CITRATE 50 UG/ML
25-50 INJECTION, SOLUTION INTRAMUSCULAR; INTRAVENOUS
Status: DISCONTINUED | OUTPATIENT
Start: 2019-07-26 | End: 2019-07-26 | Stop reason: HOSPADM

## 2019-07-26 RX ORDER — MAGNESIUM HYDROXIDE 1200 MG/15ML
LIQUID ORAL PRN
Status: DISCONTINUED | OUTPATIENT
Start: 2019-07-26 | End: 2019-07-26 | Stop reason: HOSPADM

## 2019-07-26 RX ORDER — ACETAMINOPHEN 325 MG/1
650 TABLET ORAL EVERY 4 HOURS PRN
Status: DISCONTINUED | OUTPATIENT
Start: 2019-07-26 | End: 2019-07-30

## 2019-07-26 RX ORDER — KETOROLAC TROMETHAMINE 15 MG/ML
15 INJECTION, SOLUTION INTRAMUSCULAR; INTRAVENOUS ONCE
Status: DISCONTINUED | OUTPATIENT
Start: 2019-07-26 | End: 2019-07-30

## 2019-07-26 RX ORDER — NEOSTIGMINE METHYLSULFATE 1 MG/ML
VIAL (ML) INJECTION PRN
Status: DISCONTINUED | OUTPATIENT
Start: 2019-07-26 | End: 2019-07-26

## 2019-07-26 RX ORDER — AMOXICILLIN 250 MG
1 CAPSULE ORAL 2 TIMES DAILY
Status: DISCONTINUED | OUTPATIENT
Start: 2019-07-26 | End: 2019-07-31 | Stop reason: HOSPADM

## 2019-07-26 RX ORDER — PROPOFOL 10 MG/ML
INJECTION, EMULSION INTRAVENOUS PRN
Status: DISCONTINUED | OUTPATIENT
Start: 2019-07-26 | End: 2019-07-26

## 2019-07-26 RX ORDER — SODIUM CHLORIDE, SODIUM LACTATE, POTASSIUM CHLORIDE, CALCIUM CHLORIDE 600; 310; 30; 20 MG/100ML; MG/100ML; MG/100ML; MG/100ML
INJECTION, SOLUTION INTRAVENOUS CONTINUOUS
Status: DISCONTINUED | OUTPATIENT
Start: 2019-07-26 | End: 2019-07-26 | Stop reason: HOSPADM

## 2019-07-26 RX ORDER — CEFAZOLIN SODIUM 1 G/3ML
1 INJECTION, POWDER, FOR SOLUTION INTRAMUSCULAR; INTRAVENOUS EVERY 8 HOURS
Status: COMPLETED | OUTPATIENT
Start: 2019-07-27 | End: 2019-07-27

## 2019-07-26 RX ORDER — PROPOFOL 10 MG/ML
INJECTION, EMULSION INTRAVENOUS CONTINUOUS PRN
Status: DISCONTINUED | OUTPATIENT
Start: 2019-07-26 | End: 2019-07-26

## 2019-07-26 RX ORDER — ACETAMINOPHEN 325 MG/1
975 TABLET ORAL EVERY 8 HOURS
Status: DISPENSED | OUTPATIENT
Start: 2019-07-26 | End: 2019-07-29

## 2019-07-26 RX ORDER — ACETAMINOPHEN 650 MG/1
650 SUPPOSITORY RECTAL EVERY 4 HOURS PRN
Status: DISCONTINUED | OUTPATIENT
Start: 2019-07-26 | End: 2019-07-30

## 2019-07-26 RX ORDER — GLYCOPYRROLATE 0.2 MG/ML
INJECTION, SOLUTION INTRAMUSCULAR; INTRAVENOUS PRN
Status: DISCONTINUED | OUTPATIENT
Start: 2019-07-26 | End: 2019-07-26

## 2019-07-26 RX ORDER — ACETAMINOPHEN 325 MG/1
650 TABLET ORAL EVERY 4 HOURS PRN
Status: DISCONTINUED | OUTPATIENT
Start: 2019-07-29 | End: 2019-07-26

## 2019-07-26 RX ORDER — HYDROMORPHONE HYDROCHLORIDE 1 MG/ML
.3-.5 INJECTION, SOLUTION INTRAMUSCULAR; INTRAVENOUS; SUBCUTANEOUS
Status: DISCONTINUED | OUTPATIENT
Start: 2019-07-26 | End: 2019-07-26

## 2019-07-26 RX ORDER — SODIUM CHLORIDE, SODIUM LACTATE, POTASSIUM CHLORIDE, CALCIUM CHLORIDE 600; 310; 30; 20 MG/100ML; MG/100ML; MG/100ML; MG/100ML
INJECTION, SOLUTION INTRAVENOUS CONTINUOUS
Status: DISCONTINUED | OUTPATIENT
Start: 2019-07-26 | End: 2019-07-28

## 2019-07-26 RX ORDER — KETOROLAC TROMETHAMINE 15 MG/ML
15 INJECTION, SOLUTION INTRAMUSCULAR; INTRAVENOUS EVERY 6 HOURS PRN
Status: DISCONTINUED | OUTPATIENT
Start: 2019-07-26 | End: 2019-07-26

## 2019-07-26 RX ORDER — CEFAZOLIN SODIUM 2 G/100ML
2 INJECTION, SOLUTION INTRAVENOUS
Status: COMPLETED | OUTPATIENT
Start: 2019-07-26 | End: 2019-07-26

## 2019-07-26 RX ORDER — TRAMADOL HYDROCHLORIDE 50 MG/1
50 TABLET ORAL EVERY 6 HOURS PRN
Status: DISCONTINUED | OUTPATIENT
Start: 2019-07-26 | End: 2019-07-30

## 2019-07-26 RX ORDER — LIDOCAINE HYDROCHLORIDE 20 MG/ML
INJECTION, SOLUTION INFILTRATION; PERINEURAL PRN
Status: DISCONTINUED | OUTPATIENT
Start: 2019-07-26 | End: 2019-07-26

## 2019-07-26 RX ORDER — FENTANYL CITRATE 50 UG/ML
50 INJECTION, SOLUTION INTRAMUSCULAR; INTRAVENOUS ONCE
Status: COMPLETED | OUTPATIENT
Start: 2019-07-26 | End: 2019-07-26

## 2019-07-26 RX ORDER — ONDANSETRON 2 MG/ML
4 INJECTION INTRAMUSCULAR; INTRAVENOUS EVERY 30 MIN PRN
Status: DISCONTINUED | OUTPATIENT
Start: 2019-07-26 | End: 2019-07-26 | Stop reason: HOSPADM

## 2019-07-26 RX ORDER — DEXAMETHASONE SODIUM PHOSPHATE 4 MG/ML
INJECTION, SOLUTION INTRA-ARTICULAR; INTRALESIONAL; INTRAMUSCULAR; INTRAVENOUS; SOFT TISSUE PRN
Status: DISCONTINUED | OUTPATIENT
Start: 2019-07-26 | End: 2019-07-26

## 2019-07-26 RX ORDER — ONDANSETRON 4 MG/1
4 TABLET, ORALLY DISINTEGRATING ORAL EVERY 6 HOURS PRN
Status: DISCONTINUED | OUTPATIENT
Start: 2019-07-26 | End: 2019-07-31 | Stop reason: HOSPADM

## 2019-07-26 RX ORDER — ONDANSETRON 2 MG/ML
4 INJECTION INTRAMUSCULAR; INTRAVENOUS EVERY 6 HOURS PRN
Status: DISCONTINUED | OUTPATIENT
Start: 2019-07-26 | End: 2019-07-31 | Stop reason: HOSPADM

## 2019-07-26 RX ORDER — ONDANSETRON 2 MG/ML
INJECTION INTRAMUSCULAR; INTRAVENOUS PRN
Status: DISCONTINUED | OUTPATIENT
Start: 2019-07-26 | End: 2019-07-26

## 2019-07-26 RX ORDER — LIDOCAINE 40 MG/G
CREAM TOPICAL
Status: DISCONTINUED | OUTPATIENT
Start: 2019-07-26 | End: 2019-07-31 | Stop reason: HOSPADM

## 2019-07-26 RX ORDER — ONDANSETRON 4 MG/1
4 TABLET, ORALLY DISINTEGRATING ORAL EVERY 30 MIN PRN
Status: DISCONTINUED | OUTPATIENT
Start: 2019-07-26 | End: 2019-07-26 | Stop reason: HOSPADM

## 2019-07-26 RX ORDER — NALOXONE HYDROCHLORIDE 0.4 MG/ML
.1-.4 INJECTION, SOLUTION INTRAMUSCULAR; INTRAVENOUS; SUBCUTANEOUS
Status: DISCONTINUED | OUTPATIENT
Start: 2019-07-26 | End: 2019-07-31 | Stop reason: HOSPADM

## 2019-07-26 RX ORDER — CEFAZOLIN SODIUM 1 G/3ML
1 INJECTION, POWDER, FOR SOLUTION INTRAMUSCULAR; INTRAVENOUS SEE ADMIN INSTRUCTIONS
Status: DISCONTINUED | OUTPATIENT
Start: 2019-07-26 | End: 2019-07-26 | Stop reason: HOSPADM

## 2019-07-26 RX ORDER — HYDROMORPHONE HYDROCHLORIDE 1 MG/ML
0.2 INJECTION, SOLUTION INTRAMUSCULAR; INTRAVENOUS; SUBCUTANEOUS
Status: DISCONTINUED | OUTPATIENT
Start: 2019-07-26 | End: 2019-07-28

## 2019-07-26 RX ADMIN — ONDANSETRON 4 MG: 2 INJECTION INTRAMUSCULAR; INTRAVENOUS at 17:24

## 2019-07-26 RX ADMIN — HYDROMORPHONE HYDROCHLORIDE 0.1 MG: 1 INJECTION, SOLUTION INTRAMUSCULAR; INTRAVENOUS; SUBCUTANEOUS at 18:13

## 2019-07-26 RX ADMIN — HYDROMORPHONE HYDROCHLORIDE 0.1 MG: 1 INJECTION, SOLUTION INTRAMUSCULAR; INTRAVENOUS; SUBCUTANEOUS at 16:49

## 2019-07-26 RX ADMIN — PHENYLEPHRINE HYDROCHLORIDE 50 MCG: 10 INJECTION INTRAVENOUS at 17:10

## 2019-07-26 RX ADMIN — PHENYLEPHRINE HYDROCHLORIDE 100 MCG: 10 INJECTION INTRAVENOUS at 16:14

## 2019-07-26 RX ADMIN — HYDROMORPHONE HYDROCHLORIDE 0.2 MG: 1 INJECTION, SOLUTION INTRAMUSCULAR; INTRAVENOUS; SUBCUTANEOUS at 18:41

## 2019-07-26 RX ADMIN — CEFAZOLIN SODIUM 2 G: 2 INJECTION, SOLUTION INTRAVENOUS at 16:17

## 2019-07-26 RX ADMIN — ACETAMINOPHEN 650 MG: 325 TABLET, FILM COATED ORAL at 09:14

## 2019-07-26 RX ADMIN — DEXAMETHASONE SODIUM PHOSPHATE 4 MG: 4 INJECTION, SOLUTION INTRA-ARTICULAR; INTRALESIONAL; INTRAMUSCULAR; INTRAVENOUS; SOFT TISSUE at 16:39

## 2019-07-26 RX ADMIN — HYDROMORPHONE HYDROCHLORIDE 0.3 MG: 1 INJECTION, SOLUTION INTRAMUSCULAR; INTRAVENOUS; SUBCUTANEOUS at 10:37

## 2019-07-26 RX ADMIN — HYDROMORPHONE HYDROCHLORIDE 0.5 MG: 1 INJECTION, SOLUTION INTRAMUSCULAR; INTRAVENOUS; SUBCUTANEOUS at 06:15

## 2019-07-26 RX ADMIN — FENTANYL CITRATE 25 MCG: 50 INJECTION, SOLUTION INTRAMUSCULAR; INTRAVENOUS at 18:13

## 2019-07-26 RX ADMIN — FENTANYL CITRATE 25 MCG: 50 INJECTION, SOLUTION INTRAMUSCULAR; INTRAVENOUS at 14:55

## 2019-07-26 RX ADMIN — PHENYLEPHRINE HYDROCHLORIDE 100 MCG: 10 INJECTION INTRAVENOUS at 17:18

## 2019-07-26 RX ADMIN — GLYCOPYRROLATE 0.4 MG: 0.2 INJECTION, SOLUTION INTRAMUSCULAR; INTRAVENOUS at 17:45

## 2019-07-26 RX ADMIN — FENTANYL CITRATE 50 MCG: 50 INJECTION, SOLUTION INTRAMUSCULAR; INTRAVENOUS at 16:13

## 2019-07-26 RX ADMIN — HYDROMORPHONE HYDROCHLORIDE 0.2 MG: 1 INJECTION, SOLUTION INTRAMUSCULAR; INTRAVENOUS; SUBCUTANEOUS at 19:40

## 2019-07-26 RX ADMIN — PHENYLEPHRINE HYDROCHLORIDE 50 MCG: 10 INJECTION INTRAVENOUS at 17:08

## 2019-07-26 RX ADMIN — LIDOCAINE HYDROCHLORIDE 80 MG: 20 INJECTION, SOLUTION INFILTRATION; PERINEURAL at 16:13

## 2019-07-26 RX ADMIN — PROPOFOL 70 MG: 10 INJECTION, EMULSION INTRAVENOUS at 16:13

## 2019-07-26 RX ADMIN — HYDROMORPHONE HYDROCHLORIDE 0.2 MG: 1 INJECTION, SOLUTION INTRAMUSCULAR; INTRAVENOUS; SUBCUTANEOUS at 19:21

## 2019-07-26 RX ADMIN — HYDROMORPHONE HYDROCHLORIDE 0.2 MG: 1 INJECTION, SOLUTION INTRAMUSCULAR; INTRAVENOUS; SUBCUTANEOUS at 19:05

## 2019-07-26 RX ADMIN — ROCURONIUM BROMIDE 10 MG: 10 INJECTION INTRAVENOUS at 16:25

## 2019-07-26 RX ADMIN — SUCCINYLCHOLINE CHLORIDE 50 MG: 20 INJECTION, SOLUTION INTRAMUSCULAR; INTRAVENOUS; PARENTERAL at 16:13

## 2019-07-26 RX ADMIN — NEOSTIGMINE METHYLSULFATE 3 MG: 1 INJECTION, SOLUTION INTRAVENOUS at 17:45

## 2019-07-26 RX ADMIN — PROPOFOL 20 MCG/KG/MIN: 10 INJECTION, EMULSION INTRAVENOUS at 16:16

## 2019-07-26 RX ADMIN — HYDROMORPHONE HYDROCHLORIDE 0.2 MG: 1 INJECTION, SOLUTION INTRAMUSCULAR; INTRAVENOUS; SUBCUTANEOUS at 18:52

## 2019-07-26 RX ADMIN — HYDROMORPHONE HYDROCHLORIDE 0.5 MG: 1 INJECTION, SOLUTION INTRAMUSCULAR; INTRAVENOUS; SUBCUTANEOUS at 22:14

## 2019-07-26 RX ADMIN — SODIUM CHLORIDE, POTASSIUM CHLORIDE, SODIUM LACTATE AND CALCIUM CHLORIDE: 600; 310; 30; 20 INJECTION, SOLUTION INTRAVENOUS at 16:07

## 2019-07-26 ASSESSMENT — ACTIVITIES OF DAILY LIVING (ADL)
ADLS_ACUITY_SCORE: 20
ADLS_ACUITY_SCORE: 24
ADLS_ACUITY_SCORE: 20
ADLS_ACUITY_SCORE: 20

## 2019-07-26 NOTE — ANESTHESIA PREPROCEDURE EVALUATION
Anesthesia Pre-Procedure Evaluation    Patient: Gennaro Walton   MRN: [id number] : 1923          Preoperative Diagnosis: LEFT INTROCANTERIC FEMUR FRACTURE    Procedure(s):  OPEN REDUCTION INTERNAL FIXATION LEFT FEMUR (SYNTHES) (IM NAILS, SHORT AND LONG NAILS - SYNTHES, FRACTURE TABLE OR KENDRICK TABLE AND C-ARM)    Past Medical History:   Diagnosis Date     Hypertension      Osteoporosis      History reviewed. No pertinent surgical history.  No Known Allergies  Social History     Tobacco Use     Smoking status: Never Smoker     Smokeless tobacco: Never Used   Substance Use Topics     Alcohol use: No     Frequency: Never     Prior to Admission medications    Medication Sig Start Date End Date Taking? Authorizing Provider   acetaminophen (TYLENOL) 325 MG tablet Take 650 mg by mouth every 6 hours as needed for mild pain   Yes Unknown, Entered By History   amLODIPine (NORVASC) 5 MG tablet Take 2.5 mg by mouth daily   Yes Unknown, Entered By History   aspirin 81 MG EC tablet Take 81 mg by mouth daily   Yes Unknown, Entered By History   levothyroxine (SYNTHROID/LEVOTHROID) 88 MCG tablet Take 88 mcg by mouth daily   Yes Unknown, Entered By History   losartan (COZAAR) 50 MG tablet Take 50 mg by mouth daily   Yes Unknown, Entered By History   oxybutynin ER (DITROPAN-XL) 10 MG 24 hr tablet Take 10 mg by mouth At Bedtime 19  Yes Unknown, Entered By History   vitamin D3 (CHOLECALCIFEROL) 2000 units tablet Take 1 tablet by mouth daily   Yes Unknown, Entered By History     Current Facility-Administered Medications Ordered in Epic   Medication Dose Route Frequency Last Rate Last Dose     [Auto Hold] acetaminophen (TYLENOL) tablet 650 mg  650 mg Oral Q4H PRN   650 mg at 19 0914     ceFAZolin (ANCEF) 1 g vial to attach to  ml bag for ADULT or 50 ml bag for PEDS  1 g Intravenous See Admin Instructions         ceFAZolin (ANCEF) intermittent infusion 2 g in 100 mL dextrose PRE-MIX  2 g Intravenous  Pre-Op/Pre-procedure x 1 dose         Contraindications to both pharmacological and mechanical prophylaxis (must document contraindications for both in this order)   Does not apply Continuous PRN         fentaNYL (PF) (SUBLIMAZE) injection 50 mcg  50 mcg Intravenous Once         [Auto Hold] hydrALAZINE (APRESOLINE) injection 5 mg  5 mg Intravenous Q4H PRN         [Auto Hold] HYDROmorphone (PF) (DILAUDID) injection 0.3-0.5 mg  0.3-0.5 mg Intravenous Q2H PRN   0.3 mg at 07/26/19 1037     [Auto Hold] ketorolac (TORADOL) injection 15 mg  15 mg Intravenous Once         lactated ringers infusion   Intravenous Continuous         [Auto Hold] lidocaine (LMX4) cream   Topical Q1H PRN         lidocaine 1 % 0.1-1 mL  0.1-1 mL Other Q1H PRN         [Auto Hold] lidocaine 1 % 0.1-1 mL  0.1-1 mL Other Q1H PRN         [Auto Hold] losartan (COZAAR) tablet 50 mg  50 mg Oral Daily   Stopped at 07/26/19 0900     [Auto Hold] naloxone (NARCAN) injection 0.1-0.4 mg  0.1-0.4 mg Intravenous Q2 Min PRN         [Auto Hold] ondansetron (ZOFRAN-ODT) ODT tab 4 mg  4 mg Oral Q6H PRN        Or     [Auto Hold] ondansetron (ZOFRAN) injection 4 mg  4 mg Intravenous Q6H PRN         [Auto Hold] polyethylene glycol (MIRALAX/GLYCOLAX) Packet 17 g  17 g Oral Daily PRN         [Auto Hold] potassium chloride (KLOR-CON) Packet 20-40 mEq  20-40 mEq Oral or Feeding Tube Q2H PRN         [Auto Hold] potassium chloride 10 mEq in 100 mL intermittent infusion with 10 mg lidocaine  10 mEq Intravenous Q1H PRN         [Auto Hold] potassium chloride 10 mEq in 100 mL sterile water intermittent infusion (premix)  10 mEq Intravenous Q1H PRN         [Auto Hold] potassium chloride 20 mEq in 50 mL intermittent infusion  20 mEq Intravenous Q1H PRN         [Auto Hold] potassium chloride ER (K-DUR/KLOR-CON M) CR tablet 20-40 mEq  20-40 mEq Oral Q2H PRN         [Auto Hold] senna-docusate (SENOKOT-S/PERICOLACE) 8.6-50 MG per tablet 1 tablet  1 tablet Oral BID PRN        Or      [Auto Hold] senna-docusate (SENOKOT-S/PERICOLACE) 8.6-50 MG per tablet 2 tablet  2 tablet Oral BID PRN         [Auto Hold] sodium chloride (PF) 0.9% PF flush 3 mL  3 mL Intracatheter q1 min prn         [Auto Hold] sodium chloride (PF) 0.9% PF flush 3 mL  3 mL Intracatheter Q8H         sodium chloride 0.9% infusion   Intravenous Continuous 50 mL/hr at 07/25/19 1913       [Auto Hold] traMADol (ULTRAM) half-tab 25-50 mg  25-50 mg Oral Q6H PRN         No current [facility]-ordered outpatient medications on file.       - MEDICATION INSTRUCTIONS -       lactated ringers       sodium chloride 50 mL/hr at 07/25/19 1913     Recent Labs   Lab Test 07/26/19  0730 07/25/19  1414    137   POTASSIUM 4.1 3.8   CHLORIDE 105 104   CO2 29 27   ANIONGAP 5 6   * 136*   BUN 17 16   CR 0.64 0.61   LORETA 7.9* 8.2*     Recent Labs   Lab Test 07/26/19  0730 07/25/19  1414   WBC 8.9 8.2   HGB 8.6* 12.5    261     Recent Labs   Lab Test 07/25/19  1414   ABO B   RH Neg     No results for input(s): TROPI in the last 45109 hours.  No results for input(s): PH, PCO2, PO2, HCO3 in the last 21277 hours.  No results for input(s): HCG in the last 69389 hours.  Recent Results (from the past 744 hour(s))   Head CT w/o contrast   Result Value    Radiologist flags Intracranial hemorrhage (AA)    Narrative    CT OF THE HEAD WITHOUT CONTRAST 7/25/2019 3:14 PM     COMPARISON: None.    HISTORY: Fall, head trauma.    TECHNIQUE: 5 mm thick axial CT images of the head were acquired  without IV contrast material.    FINDINGS: There is a small ovoid focus of hyperdensity in the left  sylvian fissure likely representing a small amount of subarachnoid  hemorrhage. There is no other evidence for intracranial hemorrhage.     There is moderate diffuse cerebral volume loss. There are extensive  confluent areas of decreased density in the cerebral white matter  bilaterally that are consistent with sequela of chronic small vessel  ischemic disease. The  ventricles and basal cisterns are within normal  limits in configuration given the degree of cerebral volume loss.   There is no midline shift. There are no extra-axial fluid collections.    No intracranial mass or recent infarct.    The visualized paranasal sinuses are well-aerated. There is no  mastoiditis. There are no fractures of the visualized bones. There is  a large left frontal scalp hematoma.      Impression    IMPRESSION:   1. Small focus of presumed subarachnoid hemorrhage in the left sylvian  fissure.  2. Diffuse cerebral volume loss and cerebral white matter changes  consistent with chronic small vessel ischemic disease.     [Critical Result: Intracranial hemorrhage]    Finding was identified on 7/25/2019 3:16 PM.     SHEFALIMAMTA LEWIS MALIHA was contacted by Dr. Sousa on 7/25/2019 3:17 PM  and verbalized understanding of the critical result.     Radiation dose for this scan was reduced using automated exposure  control, adjustment of the mA and/or kV according to patient size, or  iterative reconstruction technique.    CLARITA SOUSA MD   CT Cervical Spine w/o Contrast    Narrative    CT OF THE CERVICAL SPINE WITHOUT CONTRAST   7/25/2019 3:15 PM     COMPARISON: None.    HISTORY: Fall, evaluate fracture.     TECHNIQUE: Axial images of the cervical spine were acquired without  intravenous contrast. Multiplanar reformations were created.      FINDINGS: There is normal alignment of the cervical vertebrae.  Vertebral body heights of the cervical spine are normal.  Craniocervical alignment is normal. There are no fractures of the  cervical spine.  There is no significant degenerative spinal canal  narrowing of the cervical spine. There is no prevertebral soft tissue  swelling.      Impression    IMPRESSION: No evidence for fracture or traumatic malalignment of the  cervical spine.      Radiation dose for this scan was reduced using automated exposure  control, adjustment of the mA and/or kV according to patient  size, or  iterative reconstruction technique.    CLARITA ABRAHAM MD   XR Shoulder Left 2 Views    Narrative    Examination:  XR SHOULDER 2 VIEW LEFT    Date:  7/25/2019 3:30 PM     Clinical Information: fall, pain and tenderness     Additional Information: none    Comparison: none    Findings: Impacted fracture deformity of the proximal humerus, with  the shaft of the humerus impacted superiorly into the head.  Technically a clear osseous step off is visible medially. A cortical  step-off is less evident laterally; this would be age indeterminate by  imaging, but given the clinical history and acute pain this appearance  could be due to an acute fracture. No significant displacement of  fracture fragments. Moderate glenohumeral osteoarthrosis. Complete  effacement of the acromiohumeral space consistent with chronic rotator  cuff tearing. Generalized demineralization.      Impression    Impression:  Impacted fracture deformity of the left proximal humerus, technically  age indeterminate but could be consistent with an acute injury given  the patient's clinical history.    MARLEY LEVINE MD   XR Pelvis w Hip Left 1 View    Narrative    Examination:  XR PELVIS AND HIP LEFT 1 VIEW    Date:  7/25/2019 3:34 PM     Clinical Information: fall, pain and tenderness     Additional Information: none    Comparison: none    Findings: Acute, comminuted, intertrochanteric fracture of the left  proximal femur. No fracture involvement of the femoral head or neck.  Normal left hip joint alignment is maintained. Left hip joint spacing  is maintained. The bones are demineralized. No additional fracture in  the pelvis or right hip.    MARLEY LEVINE MD   XR Chest 1 View    Narrative    XR CHEST 1 VW   7/25/2019 3:37 PM     HISTORY: pre op eval    COMPARISON: None available      Impression    IMPRESSION: Patient is rotated towards the right. Small left sided  pleural effusion. Hypoinflated lungs with scattered patchy  airspace  opacities, possibly representing subsegmental atelectasis.  Superimposed pneumonia however cannot be ruled out, correlate with  clinical findings. Cardiomediastinal silhouette is mildly enlarged.  Surgical clips overly the trachea. Degenerative changes seen in the  glenohumeral joints. Dextroscoliosis of the thoracic spine.     VIRGINIA BAH MD   XR Wrist Left 2 Views    Narrative    Examination:  XR WRIST LEFT 2 VIEW    Date:  7/25/2019 5:37 PM     Clinical Information: Left wrist pain after fall.     Additional Information: none    Comparison: none    Findings:     Severe demineralization. No acute fracture identified, though  sensitivity to nondisplaced fractures is decreased due to the  patient's demineralization. No focal soft tissue swelling. Moderate  degenerative changes in the wrist, greatest at the base of the thumb  and the first CMC joint. Normal joint alignment. No significant soft  tissue abnormality.      Impression    Impression:    1.  No acute fracture or joint malalignment.  2.  Severe demineralization, which diminishes sensitivity to  nondisplaced fractures.  3.  No soft tissue swelling.    MARLEY LEVINE MD   CT Head w/o Contrast    Narrative    CT SCAN OF THE HEAD WITHOUT CONTRAST   7/26/2019 11:15 AM     HISTORY: Intracranial hemorrhage, known, follow up.    TECHNIQUE:  Axial images of the head and coronal reformations without  IV contrast material. Radiation dose for this scan was reduced using  automated exposure control, adjustment of the mA and/or kV according  to patient size, or iterative reconstruction technique.    COMPARISON: Head CT 7/25/2019.    FINDINGS:  The exam is severely limited due to motion artifacts. Subarachnoid  hemorrhage layering within the left sylvian fissure and along the left  anterior temporal lobe is similar in volume compared to the prior exam  and slightly redistributed inferiorly. There is probable slight  increase in subarachnoid hemorrhage  along the left inferior parietal  lobule along the supramarginal gyrus (series 3 image 20). Small volume  subarachnoid hemorrhage along the left pars orbicularis is slightly  increased compared to the prior exam. Small volume hemorrhage along  the left middle frontal gyrus is slightly more conspicuous compared to  the prior exam. There is increased intraventricular subarachnoid  hemorrhage layering within the occipital horn of the left lateral  ventricle.    Moderate volume loss is present. Extensive white matter  hypoattenuation likely represents chronic small vessel ischemic  change. The parenchyma is otherwise without appreciable abnormality.    Left frontoparietal scalp contusion with subcutaneous hematoma noted,  slightly decreased in size compared to the prior exam. No gross skull  fractures are appreciated, however evaluation is limited due to motion  artifact. There is opacification of the right tympanic and mastoid  cavities, presumably infectious/inflammatory, unchanged. There is  complete opacification of the right maxillary sinus and near complete  opacification of the right anterior ethmoid sinus and right frontal  sinus, unchanged.      Impression    IMPRESSION:   1. Severely limited exam due to motion artifact.  2. Probable slight increase in volume of subarachnoid hemorrhage along  the left cerebral hemisphere as detailed.  3. Slight increase in volume subarachnoid hemorrhage layering within  the occipital horn of the left lateral ventricle.  4. Continued follow-up is recommended.    MARIAN CLIFTON MD       RECENT LABS:       Anesthesia Evaluation     .             ROS/MED HX    ENT/Pulmonary:       Neurologic: Comment: Mild cognitive decline, currently only A/O x1 to person  SAH in the left sylvian fissure.    (+)dementia, other neuro     Cardiovascular:     (+) hypertension----. : . . . :. valvular problems/murmurs type: AI and MR .      (-) BOWER   METS/Exercise Tolerance:  1 - Eating, dressing  "  Hematologic:         Musculoskeletal:   (+) arthritis,  other musculoskeletal- osteoporosis, vertebral compression fracture      GI/Hepatic:         Renal/Genitourinary:         Endo:     (+) thyroid problem hypothyroidism, .      Psychiatric:         Infectious Disease:         Malignancy:   (+) Malignancy History of Breast          Other:                          Physical Exam  Normal systems: dental    Airway   Mallampati: I  TM distance: >3 FB  Neck ROM: full    Dental     Cardiovascular   Rhythm and rate: regular and normal      Pulmonary (+) decreased breath sounds               Lab Results   Component Value Date    WBC 8.9 07/26/2019    HGB 8.6 (L) 07/26/2019    HCT 25.6 (L) 07/26/2019     07/26/2019     07/26/2019    POTASSIUM 4.1 07/26/2019    CHLORIDE 105 07/26/2019    CO2 29 07/26/2019    BUN 17 07/26/2019    CR 0.64 07/26/2019     (H) 07/26/2019    LORETA 7.9 (L) 07/26/2019    ALBUMIN 3.3 (L) 07/25/2019    PROTTOTAL 6.5 (L) 07/25/2019    ALT 16 07/25/2019    AST 19 07/25/2019    ALKPHOS 95 07/25/2019    BILITOTAL 0.3 07/25/2019    INR 1.02 07/25/2019       Preop Vitals  BP Readings from Last 3 Encounters:   07/26/19 141/66   03/13/19 125/72    Pulse Readings from Last 3 Encounters:   07/26/19 80   03/11/19 57      Resp Readings from Last 3 Encounters:   07/26/19 16   03/13/19 16    SpO2 Readings from Last 3 Encounters:   07/26/19 93%   03/13/19 94%      Temp Readings from Last 1 Encounters:   07/26/19 37.3  C (99.2  F) (Temporal)    Ht Readings from Last 1 Encounters:   07/26/19 1.6 m (5' 3\")      Wt Readings from Last 1 Encounters:   07/26/19 44.5 kg (98 lb)    Estimated body mass index is 17.36 kg/m  as calculated from the following:    Height as of this encounter: 1.6 m (5' 3\").    Weight as of this encounter: 44.5 kg (98 lb).       Anesthesia Plan      History & Physical Review  History and physical reviewed and following examination; no interval change.    ASA Status:  4 .    NPO " Status:  > 8 hours    Plan for General, ETT and RSI with Intravenous and Propofol induction. Maintenance will be Balanced.    PONV prophylaxis:  Ondansetron (or other 5HT-3) and Dexamethasone or Solumedrol  No midazolam  Fentanyl and small hydromorphone bolus ok  Background propofol gtt      Postoperative Care  Postoperative pain management:  IV analgesics.      Consents  Anesthetic plan, risks, benefits and alternatives discussed with:  Patient..                 Diaz Carnes MD

## 2019-07-26 NOTE — BRIEF OP NOTE
[facility]    Brief Operative Note    Pre-operative diagnosis: LEFT INTROCANTERIC FEMUR FRACTURE  Post-operative diagnosis Left comminuted intertrochanteric femur fracture   Procedure: Procedure(s):  LEFT INTERTROCHANTERIC FEMUR FRACTURE OPEN REDUCTION AND INTERNAL FIXATION  Surgeon: Surgeon(s) and Role:     * Chris Oseguera MD - Primary     * Yudi Valdez PA-C - Assisting  Anesthesia: General   Estimated blood loss: 150 mL  Drains: None  Specimens: * No specimens in log *  Findings:   None.  Complications: None.  Implants:    Implant Name Type Inv. Item Serial No.  Lot No. LRB No. Used   IMP 11MM/130 DEG TI AARON TFNA 380MM/LEFT    SYNTHES [id number] Left 1   IMP SCR SYN TFNA FENESTRATED LAG 110MM 04.038.210S [employer] IMP SCR SYN TFNA FENESTRATED LAG 110MM 04.038.210S  Listen Up [id number] Left 1   IMP SCR SYN 5.0 TI LOCK T25 STARDRIVE 40MM 04.005.530S [employer] IMP SCR SYN 5.0 TI LOCK T25 STARDRIVE 40MM 04.005.530S  Listen Up [id number] Left 1   IMP SCR SYN 5.0 TI LOCK T25 STARDRIVE 36MM 04.005.526S [employer] IMP SCR SYN 5.0 TI LOCK T25 STARDRIVE 36MM 04.005.526S  Listen Up [id number] Left 1        Plan:  TTWB to LLE   DVT prophylaxis - per primary medicine team  Ancef x 24 hours  PACU XRs  PT  Keep dressing C/D/I for 2 weeks; okay to shower    F/U at clinic in 2 weeks

## 2019-07-26 NOTE — CONSULTS
[facility]    Neurosurgery Consultation     Date of Admission:  7/25/2019  Date of Consult (When I saw the patient): 07/26/19    Assessment & Plan   Gennaro Walton is a 89 year old woman who was admitted on 7/25/2019. I was asked to see the patient for traumatic SAH.    Principal Problem:    Traumatic SAH (subarachnoid hemorrhage)     Assessment: small focus SAH in the left sylvian fissure    Plan:   -  Keep SBP < 160  -  Elevate HOB 30 degrees  -  Discontinue all anticoagulants and antiplatelet agents.  -  Discussed with Dr. Shell who reviewed the images. Ok to continue with orthopedic surgery today.  -  Repeat head CT in 6 hours.    I have discussed the following assessment and plan with Dr. Shell who is in agreement with initial plan and will follow up with further consultation recommendations.    Smiley Etienne CNP  [facility]  [address] [address]    Tel [phone]  Pager [phone]        Code Status    Full Code During Procedure    Reason for Consult   Reason for consult: I was asked by Dr. Hanna to evaluate this patient for SAH.    Primary Care Physician   Mady Hagen    Chief Complaint   Fall    History is obtained from the patient and EMR.    History of Present Illness   Gennaro Walton is a 89 year old woman not on known anticoagulation with a history of dementia who presents with fall. EMS reports patient fell off a [employer] mobility bus, however the patient's daughter states she fell outside her senior living facility. She reported left leg, shoulder, and neck pain and presented to the ED. Head CT revealed a small focus SAH in the left sylvian fissure. Repeat head CT revealed slight increase in SAH. Today she was seen lying in bed. She reports hip and shoulder pain. She denies headache, vision changes, and dizziness. She is able to move all extremities.     Past Medical History   I have reviewed  this patient's medical history and updated it with pertinent information if needed.   Past Medical History:   Diagnosis Date     Hypertension      Osteoporosis        Past Surgical History   I have reviewed this patient's surgical history and updated it with pertinent information if needed.  No past surgical history on file.    Prior to Admission Medications   Prior to Admission Medications   Prescriptions Last Dose Informant Patient Reported? Taking?   acetaminophen (TYLENOL) 325 MG tablet  at prn  Yes Yes   Sig: Take 650 mg by mouth every 6 hours as needed for mild pain   amLODIPine (NORVASC) 5 MG tablet 7/25/2019 at am  Yes Yes   Sig: Take 2.5 mg by mouth daily   aspirin 81 MG EC tablet 7/25/2019 at am Self Yes Yes   Sig: Take 81 mg by mouth daily   levothyroxine (SYNTHROID/LEVOTHROID) 88 MCG tablet 7/25/2019 at am Pharmacy Yes Yes   Sig: Take 88 mcg by mouth daily   losartan (COZAAR) 50 MG tablet 7/25/2019 at am Pharmacy Yes Yes   Sig: Take 50 mg by mouth daily   oxybutynin ER (DITROPAN-XL) 10 MG 24 hr tablet 7/24/2019 at pm  Yes Yes   Sig: Take 10 mg by mouth At Bedtime   vitamin D3 (CHOLECALCIFEROL) 2000 units tablet 7/25/2019 at am Self Yes Yes   Sig: Take 1 tablet by mouth daily      Facility-Administered Medications: None     Allergies   No Known Allergies    Social History   I have reviewed this patient's social history and updated it with pertinent information if needed. Gennaro Walton  reports that she has never smoked. She has never used smokeless tobacco. She reports that she does not drink alcohol or use drugs.    Family History   I have reviewed this patient's family history and updated it with pertinent information if needed.   No family history on file.    Review of Systems   CONSTITUTIONAL: NEGATIVE for fever, chills, change in weight  INTEGUMENTARY/SKIN: NEGATIVE for worrisome rashes, moles or lesions  EYES: NEGATIVE for vision changes or irritation  ENT/MOUTH: NEGATIVE for ear, mouth and throat  problems  RESP: NEGATIVE for significant cough or SOB  BREAST: NEGATIVE for masses, tenderness or discharge  CV: NEGATIVE for chest pain, palpitations or peripheral edema  GI: NEGATIVE for nausea, abdominal pain, heartburn, or change in bowel habits  : NEGATIVE for frequency, dysuria, or hematuria  MUSCULOSKELETAL: NEGATIVE for significant arthralgias or myalgia  NEURO: NEGATIVE for weakness, dizziness or paresthesias  ENDOCRINE: NEGATIVE for temperature intolerance, skin/hair changes  HEME: NEGATIVE for bleeding problems  PSYCHIATRIC: NEGATIVE for changes in mood or affect    Physical Exam   Temp: 97.9  F (36.6  C) Temp src: Axillary BP: 158/78 Pulse: 90 Heart Rate: 85 Resp: 16 SpO2: 93 % O2 Device: None (Room air) Oxygen Delivery: 2 LPM  Vital Signs with Ranges  Temp:  [97.7  F (36.5  C)-98.4  F (36.9  C)] 97.9  F (36.6  C)  Pulse:  [80-97] 90  Heart Rate:  [74-97] 85  Resp:  [12-29] 16  BP: (114-198)/() 158/78  SpO2:  [85 %-98 %] 93 %  98 lbs 0 oz    Heart Rate: 85, Blood pressure 158/78, pulse 90, temperature 97.9  F (36.6  C), temperature source Axillary, resp. rate 16, weight 98 lb (44.5 kg), SpO2 93 %.  98 lbs 0 oz  HEENT:  Normocephalic, atraumatic.  PERRLA.  EOM s intact.   Neck:  Supple, non-tender, without lymphadenopathy.  Heart:  No peripheral edema  Lungs:  No SOB  Abdomen:  Soft, non-tender, non-distended.  Normal bowel sounds.  Skin:  Warm and dry, good capillary refill.  Extremities:  Good radial and dorsalis pedis pulses bilaterally, no edema, cyanosis or clubbing.    NEUROLOGICAL EXAMINATION:   Mental status:  Alert and Oriented x 1, speech is fluent.  Cranial nerves:  II-XII intact.   Motor:  Strength is 5/5 throughout the upper and lower extremities, LLE not assessed due to known fracture      Data     CBC RESULTS:   Recent Labs   Lab Test 07/26/19  0730   WBC 8.9   RBC 2.70*   HGB 8.6*   HCT 25.6*   MCV 95   MCH 31.9   MCHC 33.6   RDW 12.9        Basic Metabolic Panel:  Lab  Results   Component Value Date     07/26/2019      Lab Results   Component Value Date    POTASSIUM 4.1 07/26/2019     Lab Results   Component Value Date    CHLORIDE 105 07/26/2019     Lab Results   Component Value Date    LORETA 7.9 07/26/2019     Lab Results   Component Value Date    CO2 29 07/26/2019     Lab Results   Component Value Date    BUN 17 07/26/2019     Lab Results   Component Value Date    CR 0.64 07/26/2019     Lab Results   Component Value Date     07/26/2019     INR:  Lab Results   Component Value Date    INR 1.02 07/25/2019       I have reviewed the history above and agree with the above assessment and plan.  ALVIN Shell MD

## 2019-07-26 NOTE — PROVIDER NOTIFICATION
"Web text sent to Dr. Harman, \"FYI per Neurosurg note repeat head CT for this morning but not ordered. Do you want to order?\"    Orders entered by physician  "

## 2019-07-26 NOTE — PROVIDER NOTIFICATION
MD Notification    Notified Person: MD    Notified Person Name:  Lester     Notification Date/Time: 7/25 2240    Notification Interaction: Paged    Purpose of Notification: Pain not resolved with 1mg morphine q2h. Can we get something more?    Orders Received: Morphine discontinued, switched to 0.5 IV Dilaudid q1h.     Comments:

## 2019-07-26 NOTE — PROVIDER NOTIFICATION
MD Notification    Notified Person: MD    Notified Person Name: Dr. Hanna    Notification Date/Time: 7/25 2000    Notification Interaction: Paged    Purpose of Notification: Neurosurgery note says keep blood pressure under 160 systolic. No PRNs ordered. Can we get a PRN added just in case?    Orders Received: prn hydralazine entered     Comments:

## 2019-07-26 NOTE — ANESTHESIA CARE TRANSFER NOTE
Patient: Gennaro Walton    Procedure(s):  LEFT INTERTROCHANTERIC FEMUR FRACTURE OPEN REDUCTION AND INTERNAL FIXATION    Diagnosis: LEFT INTROCANTERIC FEMUR FRACTURE  Diagnosis Additional Information: No value filed.    Anesthesia Type:   General, ETT, RSI     Note:  Airway :Face Mask  Patient transferred to:PACU  Comments: Neuromuscular blockade reversed after TOF 4/4, spontaneous respirations, adequate tidal volumes, followed commands to voice, oropharynx suctioned with soft flexible catheter, extubated atraumatically, extubated with suction, airway patent after extubation.  Oxygen via facemask at 10 liters per minute to PACU. Oxygen tubing connected to wall O2 in PACU, SpO2, NiBP, and EKG monitors and alarms on and functioning, Yonis Hugger warmer connected to patient gown, report on patient's clinical status given to PACU RN, RN questions answered. Handoff Report: Identifed the Patient, Identified the Reponsible Provider, Reviewed the pertinent medical history, Discussed the surgical course, Reviewed Intra-OP anesthesia mangement and issues during anesthesia, Set expectations for post-procedure period and Allowed opportunity for questions and acknowledgement of understanding      Vitals: (Last set prior to Anesthesia Care Transfer)    CRNA VITALS  7/26/2019 1743 - 7/26/2019 1828      7/26/2019             Resp Rate (observed):  16    EKG:  Sinus rhythm                Electronically Signed By: RITA Lott CRNA  July 26, 2019  6:28 PM

## 2019-07-26 NOTE — PLAN OF CARE
Pt here with  SAH after fall with fracture L humerus and L hip. Alert to self, forgetful. Neuro's appear intact, CMS intact. Large bruise to left side of face. VSS on RA. NPO except meds. Cortez patent. Bedrest. Plan for surgery later this afternoon. Pain to L hip and L arm, arm sling, ice, tylenol and IV dilaudid given for pain. Repeat CT at 5:15 (or after ORIF surgery pending time) Daughter present at bedside.     Addendum: Patient sent down to PACU on bed, report given to Zabrina MENDOSA, updated about repeat CT. Caron (daughter) at bedside. Belongings at [facility] station with patient label. Patient will go to 55 after surgery.

## 2019-07-26 NOTE — OR NURSING
Multiple conversations with Dr. Diaz Carnes re patient history and comfort.    Daughter at bedside.  Medicated with 25 mcg of fentanyl at 1455.  States she feels 'so good' now.  Long consultation pre-op with Dr. Chris Oseguera.  All questions answered to patient and daughter's satisfaction.

## 2019-07-26 NOTE — PLAN OF CARE
Patient here with L hip fracture, L humeral fracture, and SAH. Oriented to self only. Inconsistently following commands. Restless, moving around in bed frequently. VSS, blood pressure goal less than 160 systolic, PRN hydralazine available if necessary. Neuros intact. CMS intact ex inability to move L leg (related to hip fx) and L arm (related to arm fx). Bedrest overnight. Pain managed with IV Dilaudid. Plan for repeat head CT this morning as well as L hip ORIF at 1350 with Dr. Oseguera.

## 2019-07-26 NOTE — PROVIDER NOTIFICATION
"Web text to Dr. Harman, \"Can we get a range dose ordered for IV diluadid for patient or look at change dosage? Per overnight nurse 0.5mg made patient very lethargic overnight. \"      Spoke with Surgical PA rounding on patient about pain management and report that 0.5 Diluadid made patient very lethargic overnight. Per PA she will place range dose for dilaudid and update MAR with any other pain meds she can take.   "

## 2019-07-26 NOTE — CONSULTS
[facility]    Orthopedic Consultation    Gennaro Walton MRN# [id number]   Age: 89 year old YOB: 1923     Date of Admission:  7/25/2019    Reason for consult: intertrochanteric femur fracture       Requesting physician: Dr. Hanna       Level of consult: Consult, follow and place orders           Assessment and Plan:   Assessment:   Left intertrochanteric femur fracture  Left proximal humerus fracture, non-displaced      Plan:   Non-operative treatment for the left humerus fracture  Sling  Non-WB left UE  OR today for left intertrochanteric femur fracture for IM nail with Dr. Oseguera  NPO  Bed rest  Pain medication as needed, ranged Dilaudid as well as added one dose of Toradol and Tramadol PRN  Pre-op optimization per hospitalist  Spoke with DaughterCaron at bedside and verbal consent was obtained for surgery            Chief Complaint:   Fall, left humerus and femur fracture         History of Present Illness:   This patient is a 89 year old female who presents with the following condition requiring a hospital admission:    Per chart review and discussion with daughter, patient fell outside of her senior living facility resulting in a left humerus and femur fracture. She is not chronically anticoagulated. Of note she also had lumbar compression fractures last winter.           Past Medical History:     Past Medical History:   Diagnosis Date     Hypertension      Osteoporosis              Past Surgical History:   No past surgical history on file.          Social History:     Social History     Tobacco Use     Smoking status: Never Smoker     Smokeless tobacco: Never Used   Substance Use Topics     Alcohol use: No     Frequency: Never             Family History:   No family history on file.          Immunizations:     VACCINE/DOSE   Diptheria   DPT   DTAP   HBIG   Hepatitis A   Hepatitis B   HIB   Influenza   Measles   Meningococcal   MMR   Mumps   Pneumococcal   Polio   Rubella    Small Pox   TDAP   Varicella   Zoster             Allergies:   No Known Allergies          Medications:     Current Facility-Administered Medications   Medication     acetaminophen (TYLENOL) tablet 650 mg     Contraindications to both pharmacological and mechanical prophylaxis (must document contraindications for both in this order)     hydrALAZINE (APRESOLINE) injection 5 mg     HYDROmorphone (PF) (DILAUDID) injection 0.3-0.5 mg     ketorolac (TORADOL) injection 15 mg     lidocaine (LMX4) cream     lidocaine 1 % 0.1-1 mL     losartan (COZAAR) tablet 50 mg     naloxone (NARCAN) injection 0.1-0.4 mg     ondansetron (ZOFRAN-ODT) ODT tab 4 mg    Or     ondansetron (ZOFRAN) injection 4 mg     polyethylene glycol (MIRALAX/GLYCOLAX) Packet 17 g     potassium chloride (KLOR-CON) Packet 20-40 mEq     potassium chloride 10 mEq in 100 mL intermittent infusion with 10 mg lidocaine     potassium chloride 10 mEq in 100 mL sterile water intermittent infusion (premix)     potassium chloride 20 mEq in 50 mL intermittent infusion     potassium chloride ER (K-DUR/KLOR-CON M) CR tablet 20-40 mEq     senna-docusate (SENOKOT-S/PERICOLACE) 8.6-50 MG per tablet 1 tablet    Or     senna-docusate (SENOKOT-S/PERICOLACE) 8.6-50 MG per tablet 2 tablet     sodium chloride (PF) 0.9% PF flush 3 mL     sodium chloride (PF) 0.9% PF flush 3 mL     sodium chloride 0.9% infusion     traMADol (ULTRAM) half-tab 25-50 mg             Review of Systems:   CV: NEGATIVE for chest pain, palpitations or peripheral edema  C: NEGATIVE for fever, chills, change in weight  E/M: NEGATIVE for ear, mouth and throat problems  R: NEGATIVE for significant cough or SOB          Physical Exam:   All vitals have been reviewed  Patient Vitals for the past 24 hrs:   BP Temp Temp src Pulse Heart Rate Resp SpO2 Weight   07/26/19 0914 -- -- -- -- -- 16 -- --   07/26/19 0757 129/67 98.4  F (36.9  C) Axillary -- 74 16 94 % --   07/26/19 0630 -- -- -- -- -- 16 -- --   07/26/19  0615 -- -- -- -- -- 16 -- --   07/26/19 0500 -- -- -- -- -- -- -- 44.5 kg (98 lb)   07/26/19 0415 129/53 98.3  F (36.8  C) Axillary -- 84 18 96 % --   07/25/19 2330 -- -- -- -- -- 18 -- --   07/25/19 2317 -- -- -- -- -- -- 95 % --   07/25/19 2310 114/53 98  F (36.7  C) Oral -- 86 16 (!) 85 % --   07/25/19 2206 -- -- -- -- -- 16 -- --   07/25/19 2150 -- -- -- -- -- 16 -- --   07/25/19 2135 -- -- -- -- -- 16 -- --   07/25/19 2119 -- -- -- -- -- 16 -- --   07/25/19 2004 123/69 97.8  F (36.6  C) Oral -- 88 16 97 % --   07/25/19 1915 135/62 98.2  F (36.8  C) Oral 90 -- 18 97 % --   07/25/19 1830 160/75 -- -- 82 -- -- 97 % --   07/25/19 1825 160/77 -- -- 88 -- -- 98 % --   07/25/19 1820 165/81 -- -- 81 -- -- 97 % --   07/25/19 1815 147/75 -- -- 82 -- -- 97 % --   07/25/19 1810 152/78 98.2  F (36.8  C) Oral -- -- -- 97 % --   07/25/19 1804 124/76 97.7  F (36.5  C) Oral 82 -- 14 96 % --   07/25/19 1715 142/78 -- -- 80 80 13 96 % --   07/25/19 1700 144/71 -- -- 80 81 12 97 % --   07/25/19 1645 146/73 -- -- 81 85 14 96 % --   07/25/19 1630 150/72 -- -- 80 80 13 96 % --   07/25/19 1615 158/77 -- -- 82 81 15 97 % --   07/25/19 1600 167/81 -- -- 84 80 17 96 % --   07/25/19 1545 154/77 -- -- 81 82 23 94 % --   07/25/19 1535 150/77 -- -- -- 83 20 93 % --   07/25/19 1445 171/87 -- -- 88 89 28 -- --   07/25/19 1430 185/88 -- -- 90 89 29 -- --   07/25/19 1415 175/90 -- -- 93 97 20 -- --   07/25/19 1408 (!) 198/101 98  F (36.7  C) Oral 94 -- 18 97 % 45.4 kg (100 lb)   07/25/19 1400 (!) 198/101 -- -- 97 92 20 93 % --       Intake/Output Summary (Last 24 hours) at 7/26/2019 0945  Last data filed at 7/25/2019 2303  Gross per 24 hour   Intake 0 ml   Output 725 ml   Net -725 ml     Patient laying very uncomfortably in bed  No abrasions, ecchymosis or rashes noted on LE  Minimal erythema of the surrounding skin.   Bilateral calves are soft, non-tender.  Bilateral lower extremity is NVI.  Sensation intact bilateral lower extremities  Active  dorsi and plantar flexion bilaterally  +Dp pulse            Data:   All laboratory data reviewed  Results for orders placed or performed during the hospital encounter of 07/25/19   Head CT w/o contrast   Result Value Ref Range    Radiologist flags Intracranial hemorrhage (AA)     Narrative    CT OF THE HEAD WITHOUT CONTRAST 7/25/2019 3:14 PM     COMPARISON: None.    HISTORY: Fall, head trauma.    TECHNIQUE: 5 mm thick axial CT images of the head were acquired  without IV contrast material.    FINDINGS: There is a small ovoid focus of hyperdensity in the left  sylvian fissure likely representing a small amount of subarachnoid  hemorrhage. There is no other evidence for intracranial hemorrhage.     There is moderate diffuse cerebral volume loss. There are extensive  confluent areas of decreased density in the cerebral white matter  bilaterally that are consistent with sequela of chronic small vessel  ischemic disease. The ventricles and basal cisterns are within normal  limits in configuration given the degree of cerebral volume loss.   There is no midline shift. There are no extra-axial fluid collections.    No intracranial mass or recent infarct.    The visualized paranasal sinuses are well-aerated. There is no  mastoiditis. There are no fractures of the visualized bones. There is  a large left frontal scalp hematoma.      Impression    IMPRESSION:   1. Small focus of presumed subarachnoid hemorrhage in the left sylvian  fissure.  2. Diffuse cerebral volume loss and cerebral white matter changes  consistent with chronic small vessel ischemic disease.     [Critical Result: Intracranial hemorrhage]    Finding was identified on 7/25/2019 3:16 PM.     RODOLFO JETT was contacted by Dr. Sousa on 7/25/2019 3:17 PM  and verbalized understanding of the critical result.     Radiation dose for this scan was reduced using automated exposure  control, adjustment of the mA and/or kV according to patient size,  or  iterative reconstruction technique.    CLARITA ABRAHAM MD   CT Cervical Spine w/o Contrast    Narrative    CT OF THE CERVICAL SPINE WITHOUT CONTRAST   7/25/2019 3:15 PM     COMPARISON: None.    HISTORY: Fall, evaluate fracture.     TECHNIQUE: Axial images of the cervical spine were acquired without  intravenous contrast. Multiplanar reformations were created.      FINDINGS: There is normal alignment of the cervical vertebrae.  Vertebral body heights of the cervical spine are normal.  Craniocervical alignment is normal. There are no fractures of the  cervical spine.  There is no significant degenerative spinal canal  narrowing of the cervical spine. There is no prevertebral soft tissue  swelling.      Impression    IMPRESSION: No evidence for fracture or traumatic malalignment of the  cervical spine.      Radiation dose for this scan was reduced using automated exposure  control, adjustment of the mA and/or kV according to patient size, or  iterative reconstruction technique.    CLARITA ABRAHAM MD   XR Shoulder Left 2 Views    Narrative    Examination:  XR SHOULDER 2 VIEW LEFT    Date:  7/25/2019 3:30 PM     Clinical Information: fall, pain and tenderness     Additional Information: none    Comparison: none    Findings: Impacted fracture deformity of the proximal humerus, with  the shaft of the humerus impacted superiorly into the head.  Technically a clear osseous step off is visible medially. A cortical  step-off is less evident laterally; this would be age indeterminate by  imaging, but given the clinical history and acute pain this appearance  could be due to an acute fracture. No significant displacement of  fracture fragments. Moderate glenohumeral osteoarthrosis. Complete  effacement of the acromiohumeral space consistent with chronic rotator  cuff tearing. Generalized demineralization.      Impression    Impression:  Impacted fracture deformity of the left proximal humerus, technically  age indeterminate  but could be consistent with an acute injury given  the patient's clinical history.    MARLEY LEVINE MD   XR Pelvis w Hip Left 1 View    Narrative    Examination:  XR PELVIS AND HIP LEFT 1 VIEW    Date:  7/25/2019 3:34 PM     Clinical Information: fall, pain and tenderness     Additional Information: none    Comparison: none    Findings: Acute, comminuted, intertrochanteric fracture of the left  proximal femur. No fracture involvement of the femoral head or neck.  Normal left hip joint alignment is maintained. Left hip joint spacing  is maintained. The bones are demineralized. No additional fracture in  the pelvis or right hip.    MARLEY LEVINE MD   XR Chest 1 View    Narrative    XR CHEST 1 VW   7/25/2019 3:37 PM     HISTORY: pre op eval    COMPARISON: None available      Impression    IMPRESSION: Patient is rotated towards the right. Small left sided  pleural effusion. Hypoinflated lungs with scattered patchy airspace  opacities, possibly representing subsegmental atelectasis.  Superimposed pneumonia however cannot be ruled out, correlate with  clinical findings. Cardiomediastinal silhouette is mildly enlarged.  Surgical clips overly the trachea. Degenerative changes seen in the  glenohumeral joints. Dextroscoliosis of the thoracic spine.     VIRGINIA BAH MD   XR Wrist Left 2 Views    Narrative    Examination:  XR WRIST LEFT 2 VIEW    Date:  7/25/2019 5:37 PM     Clinical Information: Left wrist pain after fall.     Additional Information: none    Comparison: none    Findings:     Severe demineralization. No acute fracture identified, though  sensitivity to nondisplaced fractures is decreased due to the  patient's demineralization. No focal soft tissue swelling. Moderate  degenerative changes in the wrist, greatest at the base of the thumb  and the first CMC joint. Normal joint alignment. No significant soft  tissue abnormality.      Impression    Impression:    1.  No acute fracture or joint  malalignment.  2.  Severe demineralization, which diminishes sensitivity to  nondisplaced fractures.  3.  No soft tissue swelling.    MARLEY LEVINE MD   CBC with platelets differential   Result Value Ref Range    WBC 8.2 4.0 - 11.0 10e9/L    RBC Count 3.86 3.8 - 5.2 10e12/L    Hemoglobin 12.5 11.7 - 15.7 g/dL    Hematocrit 36.6 35.0 - 47.0 %    MCV 95 78 - 100 fl    MCH 32.4 26.5 - 33.0 pg    MCHC 34.2 31.5 - 36.5 g/dL    RDW 12.5 10.0 - 15.0 %    Platelet Count 261 150 - 450 10e9/L    Diff Method Automated Method     % Neutrophils 64.7 %    % Lymphocytes 21.2 %    % Monocytes 10.5 %    % Eosinophils 2.9 %    % Basophils 0.2 %    % Immature Granulocytes 0.5 %    Nucleated RBCs 0 0 /100    Absolute Neutrophil 5.3 1.6 - 8.3 10e9/L    Absolute Lymphocytes 1.7 0.8 - 5.3 10e9/L    Absolute Monocytes 0.9 0.0 - 1.3 10e9/L    Absolute Eosinophils 0.2 0.0 - 0.7 10e9/L    Absolute Basophils 0.0 0.0 - 0.2 10e9/L    Abs Immature Granulocytes 0.0 0 - 0.4 10e9/L    Absolute Nucleated RBC 0.0    Comprehensive metabolic panel   Result Value Ref Range    Sodium 137 133 - 144 mmol/L    Potassium 3.8 3.4 - 5.3 mmol/L    Chloride 104 94 - 109 mmol/L    Carbon Dioxide 27 20 - 32 mmol/L    Anion Gap 6 3 - 14 mmol/L    Glucose 136 (H) 70 - 99 mg/dL    Urea Nitrogen 16 7 - 30 mg/dL    Creatinine 0.61 0.52 - 1.04 mg/dL    GFR Estimate 77 >60 mL/min/[1.73_m2]    GFR Estimate If Black 89 >60 mL/min/[1.73_m2]    Calcium 8.2 (L) 8.5 - 10.1 mg/dL    Bilirubin Total 0.3 0.2 - 1.3 mg/dL    Albumin 3.3 (L) 3.4 - 5.0 g/dL    Protein Total 6.5 (L) 6.8 - 8.8 g/dL    Alkaline Phosphatase 95 40 - 150 U/L    ALT 16 0 - 50 U/L    AST 19 0 - 45 U/L   INR   Result Value Ref Range    INR 1.02 0.86 - 1.14   CBC with platelets   Result Value Ref Range    WBC 8.9 4.0 - 11.0 10e9/L    RBC Count 2.70 (L) 3.8 - 5.2 10e12/L    Hemoglobin 8.6 (L) 11.7 - 15.7 g/dL    Hematocrit 25.6 (L) 35.0 - 47.0 %    MCV 95 78 - 100 fl    MCH 31.9 26.5 - 33.0 pg    MCHC  33.6 31.5 - 36.5 g/dL    RDW 12.9 10.0 - 15.0 %    Platelet Count 223 150 - 450 10e9/L   Basic metabolic panel   Result Value Ref Range    Sodium 139 133 - 144 mmol/L    Potassium 4.1 3.4 - 5.3 mmol/L    Chloride 105 94 - 109 mmol/L    Carbon Dioxide 29 20 - 32 mmol/L    Anion Gap 5 3 - 14 mmol/L    Glucose 148 (H) 70 - 99 mg/dL    Urea Nitrogen 17 7 - 30 mg/dL    Creatinine 0.64 0.52 - 1.04 mg/dL    GFR Estimate 75 >60 mL/min/[1.73_m2]    GFR Estimate If Black 87 >60 mL/min/[1.73_m2]    Calcium 7.9 (L) 8.5 - 10.1 mg/dL   ABO/Rh type and screen   Result Value Ref Range    ABO B     RH(D) Neg     Antibody Screen Neg     Test Valid Only At [facility]        Specimen Expires 07/28/2019    Blood component   Result Value Ref Range    Unit Number [id number]     Blood Component Type PlateletPheresis,LeukoRed Irrad (Part 3)     Division Number 00     Status of Unit Released to care unit     Blood Product Code [id number]     Unit Status ISS           Attestation:  I have reviewed today's vital signs, notes, medications, labs and imaging with Dr. Oseguera.  Amount of time performed on this consult: 25 minutes.    Tonja Hall PA-C

## 2019-07-26 NOTE — PROVIDER NOTIFICATION
"[facility] web page to Smiley Etienne, \"D.S. room 703 bed 2  FYI results up for repeat CT. Is she still ok for surgery? Repeat imaging?\"      Addendum:   Update from Smiley (PA) OK for surgery. Repeat head CT 6 hours from prior one (11:15 am) Writer will update Preop  "

## 2019-07-27 LAB
ABO + RH BLD: NORMAL
ABO + RH BLD: NORMAL
BASE EXCESS BLDV CALC-SCNC: 1.8 MMOL/L
BLD GP AB SCN SERPL QL: NORMAL
BLD PROD TYP BPU: NORMAL
BLOOD BANK CMNT PATIENT-IMP: NORMAL
HCO3 BLDV-SCNC: 28 MMOL/L (ref 21–28)
HGB BLD-MCNC: 6.9 G/DL (ref 11.7–15.7)
HGB BLD-MCNC: 7.9 G/DL (ref 11.7–15.7)
HGB BLD-MCNC: NORMAL G/DL (ref 11.7–15.7)
NUM BPU REQUESTED: 2
O2/TOTAL GAS SETTING VFR VENT: ABNORMAL %
OXYHGB MFR BLDV: 72 %
PCO2 BLDV: 54 MM HG (ref 40–50)
PH BLDV: 7.32 PH (ref 7.32–7.43)
PO2 BLDV: 39 MM HG (ref 25–47)
SPECIMEN EXP DATE BLD: NORMAL
TROPONIN I SERPL-MCNC: 0.04 UG/L (ref 0–0.04)

## 2019-07-27 PROCEDURE — 25000128 H RX IP 250 OP 636: Performed by: PHYSICIAN ASSISTANT

## 2019-07-27 PROCEDURE — 99233 SBSQ HOSP IP/OBS HIGH 50: CPT | Performed by: INTERNAL MEDICINE

## 2019-07-27 PROCEDURE — 36415 COLL VENOUS BLD VENIPUNCTURE: CPT | Performed by: PHYSICIAN ASSISTANT

## 2019-07-27 PROCEDURE — P9016 RBC LEUKOCYTES REDUCED: HCPCS | Performed by: EMERGENCY MEDICINE

## 2019-07-27 PROCEDURE — 40000275 ZZH STATISTIC RCP TIME EA 10 MIN

## 2019-07-27 PROCEDURE — 85018 HEMOGLOBIN: CPT | Performed by: PHYSICIAN ASSISTANT

## 2019-07-27 PROCEDURE — 82805 BLOOD GASES W/O2 SATURATION: CPT | Performed by: NURSE PRACTITIONER

## 2019-07-27 PROCEDURE — 94660 CPAP INITIATION&MGMT: CPT

## 2019-07-27 PROCEDURE — 36415 COLL VENOUS BLD VENIPUNCTURE: CPT | Performed by: NURSE PRACTITIONER

## 2019-07-27 PROCEDURE — 36415 COLL VENOUS BLD VENIPUNCTURE: CPT | Performed by: ORTHOPAEDIC SURGERY

## 2019-07-27 PROCEDURE — 25800030 ZZH RX IP 258 OP 636: Performed by: PHYSICIAN ASSISTANT

## 2019-07-27 PROCEDURE — 12000000 ZZH R&B MED SURG/OB

## 2019-07-27 PROCEDURE — 85018 HEMOGLOBIN: CPT | Performed by: ORTHOPAEDIC SURGERY

## 2019-07-27 PROCEDURE — 84484 ASSAY OF TROPONIN QUANT: CPT | Performed by: NURSE PRACTITIONER

## 2019-07-27 RX ORDER — FUROSEMIDE 10 MG/ML
20 INJECTION INTRAMUSCULAR; INTRAVENOUS ONCE
Status: COMPLETED | OUTPATIENT
Start: 2019-07-28 | End: 2019-07-28

## 2019-07-27 RX ADMIN — HYDROMORPHONE HYDROCHLORIDE 0.2 MG: 1 INJECTION, SOLUTION INTRAMUSCULAR; INTRAVENOUS; SUBCUTANEOUS at 16:59

## 2019-07-27 RX ADMIN — SODIUM CHLORIDE, POTASSIUM CHLORIDE, SODIUM LACTATE AND CALCIUM CHLORIDE: 600; 310; 30; 20 INJECTION, SOLUTION INTRAVENOUS at 10:20

## 2019-07-27 RX ADMIN — CEFAZOLIN 1 G: 1 INJECTION, POWDER, FOR SOLUTION INTRAMUSCULAR; INTRAVENOUS at 02:35

## 2019-07-27 RX ADMIN — HYDROMORPHONE HYDROCHLORIDE 0.2 MG: 1 INJECTION, SOLUTION INTRAMUSCULAR; INTRAVENOUS; SUBCUTANEOUS at 14:02

## 2019-07-27 RX ADMIN — HYDROMORPHONE HYDROCHLORIDE 0.2 MG: 1 INJECTION, SOLUTION INTRAMUSCULAR; INTRAVENOUS; SUBCUTANEOUS at 06:36

## 2019-07-27 RX ADMIN — CEFAZOLIN 1 G: 1 INJECTION, POWDER, FOR SOLUTION INTRAMUSCULAR; INTRAVENOUS at 10:20

## 2019-07-27 RX ADMIN — HYDROMORPHONE HYDROCHLORIDE 0.2 MG: 1 INJECTION, SOLUTION INTRAMUSCULAR; INTRAVENOUS; SUBCUTANEOUS at 11:06

## 2019-07-27 RX ADMIN — HYDROMORPHONE HYDROCHLORIDE 0.2 MG: 1 INJECTION, SOLUTION INTRAMUSCULAR; INTRAVENOUS; SUBCUTANEOUS at 19:59

## 2019-07-27 RX ADMIN — HYDROMORPHONE HYDROCHLORIDE 0.2 MG: 1 INJECTION, SOLUTION INTRAMUSCULAR; INTRAVENOUS; SUBCUTANEOUS at 08:34

## 2019-07-27 RX ADMIN — HYDROMORPHONE HYDROCHLORIDE 0.2 MG: 1 INJECTION, SOLUTION INTRAMUSCULAR; INTRAVENOUS; SUBCUTANEOUS at 01:27

## 2019-07-27 ASSESSMENT — ACTIVITIES OF DAILY LIVING (ADL)
ADLS_ACUITY_SCORE: 16
ADLS_ACUITY_SCORE: 16
ADLS_ACUITY_SCORE: 20
ADLS_ACUITY_SCORE: 16
ADLS_ACUITY_SCORE: 20
ADLS_ACUITY_SCORE: 20

## 2019-07-27 NOTE — PROGRESS NOTES
"ORTHOPEDIC LOWER EXTREMITY PROGRESS NOTE    POD#1  Patient is a 89 year old female who underwent Procedure(s):  LEFT INTERTROCHANTERIC FEMUR FRACTURE OPEN REDUCTION AND INTERNAL FIXATION on 7/26/2019. Pain is moderately controlled. Pt moaning and having difficulty communicating. Daughter present and calming pt. Still on 4L of O2. RR for apnea last night. Being cautious with IV dilaudid.     Vitals: /58 (BP Location: Right arm)   Pulse 81   Temp 98.1  F (36.7  C)   Resp 14   Ht 1.6 m (5' 3\")   Wt 44.5 kg (98 lb)   SpO2 95%   BMI 17.36 kg/m        Drains: none    EXAM   The patient is sleepy, restless, responds to her daughter.  Calves are soft and non-tender.   Sensation is intact.  Dorsiflexion and plantar flexion is intact.  Foot warm with nl cap refill.  The incision is covered.     Labs:   Recent Labs   Lab Test 07/27/19  0322 07/26/19  2122 07/26/19  0730 07/25/19  1414   HGB 7.9*  Canceled, Test credited 6.9* 8.6* 12.5   INR  --   --   --  1.02       ASSESSMENT  S/p ORIF left IT fx   PLAN  1. DVT prophylaxis: defer to primary team.   2. Weight Bearing: TTWB LLE  3. Anticipated discharge date pending medical stability  4. Cont Pain Control. Limit narcotic use.     Patito BELLO  [facility]  [phone]      "

## 2019-07-27 NOTE — PROGRESS NOTES
[facility]    Neurosurgery Progress Note    Date of Service (when I saw the patient): 07/27/2019     Assessment & Plan   Gennaro Walton is a 89 year old woman not on known anticoagulation with a history of dementia who presents with fall. EMS reports patient fell off a [employer] mobility bus, however the patient's daughter states she fell outside her senior living facility. She reported left leg, shoulder, and neck pain and presented to the ED. Head CT revealed a small focus SAH in the left sylvian fissure. Repeat head CT revealed slight increase in SAH. Most recent scan with stable SAH. She underwent ORIF of left femur yesterday. Today, she is lying in bed. She remains sleepy. She is moving all extremities.     Principal Problem:    Traumatic SAH (subarachnoid hemorrhage)     Assessment: stable on repeat scan    Plan:   -Follow up in 4-6 weeks with head CT prior  -NSG will sign off; please contact if further needed      I have discussed the following assessment and plan with Dr. Shell who is in agreement with initial plan and will follow up with further consultation recommendations.      Concepcion Hawthorne PA-C  [facility]  [place] [address]    Tel [phone]  Pager [phone]      Interval History   Repeat CT stable. ORIF left femur yesterday.    Physical Exam   Temp: 98.1  F (36.7  C) Temp src: Axillary BP: 123/58 Pulse: 81 Heart Rate: 80 Resp: 14 SpO2: 95 % O2 Device: Nasal cannula Oxygen Delivery: 4 LPM  Vitals:    07/25/19 1408 07/26/19 0500   Weight: 100 lb (45.4 kg) 98 lb (44.5 kg)     Vital Signs with Ranges  Temp:  [96.1  F (35.6  C)-99.2  F (37.3  C)] 98.1  F (36.7  C)  Pulse:  [] 81  Heart Rate:  [76-85] 80  Resp:  [6-25] 14  BP: ()/(42-78) 123/58  FiO2 (%):  [30 %-40 %] 30 %  SpO2:  [92 %-100 %] 95 %  I/O last 3 completed shifts:  In: 1300 [I.V.:1050]  Out: 550 [Urine:400; Blood:150]    Heart Rate: 80,  "Blood pressure 123/58, pulse 81, temperature 98.1  F (36.7  C), resp. rate 14, height 5' 3\" (1.6 m), weight 98 lb (44.5 kg), SpO2 95 %.  98 lbs 0 oz  HEENT:  Normocephalic, atraumatic.  PERRL.  EOM s intact.    Heart:  No peripheral edema  Lungs:  No SOB  Skin:  Warm and dry. Incision covered.  Extremities:  No edema, cyanosis or clubbing.    NEUROLOGICAL EXAMINATION:   Mental status:  Restless, oriented to self.   Cranial nerves:  II-XII intact.   Motor:  Moving all extremities.      Medications     - MEDICATION INSTRUCTIONS -       lactated ringers 75 mL/hr at 07/27/19 1020     - MEDICATION INSTRUCTIONS -       - MEDICATION INSTRUCTIONS -       sodium chloride 50 mL/hr at 07/25/19 1913       acetaminophen  975 mg Oral Q8H     ceFAZolin  1 g Intravenous Q8H     ketorolac  15 mg Intravenous Once     losartan  50 mg Oral Daily     senna-docusate  1 tablet Oral BID    Or     senna-docusate  2 tablet Oral BID     sodium chloride (PF)  3 mL Intracatheter Q8H       Data   CBC RESULTS:   Recent Labs   Lab Test 07/27/19  0322 07/26/19  2122   WBC  --  13.6*   RBC  --  2.14*   HGB 7.9*  Canceled, Test credited 6.9*   HCT  --  20.7*   MCV  --  97   MCH  --  32.2   MCHC  --  33.3   RDW  --  12.9   PLT  --  213     Basic Metabolic Panel:  Lab Results   Component Value Date     07/26/2019      Lab Results   Component Value Date    POTASSIUM 4.4 07/26/2019     Lab Results   Component Value Date    CHLORIDE 107 07/26/2019     Lab Results   Component Value Date    LORETA 7.6 07/26/2019     Lab Results   Component Value Date    CO2 27 07/26/2019     Lab Results   Component Value Date    BUN 20 07/26/2019     Lab Results   Component Value Date    CR 0.69 07/26/2019     Lab Results   Component Value Date     07/26/2019     INR:  Lab Results   Component Value Date    INR 1.02 07/25/2019         "

## 2019-07-27 NOTE — PLAN OF CARE
Pt alert to self only previously, but [facility] orientation due to pts status. VSS on Bipap. Tele NSR. 2 dressings on L thigh with serosanguinous drainage. Pt calm most of the night, except during respositioning and when having pain. Pt becomes very agitated when in pain. Has PRN IV Dilaudid available. [facility] neuros due to pts mental status. Plan to continue Bipap or possibly change to comfort cares. Continue to monitor.

## 2019-07-27 NOTE — PROVIDER NOTIFICATION
Called for ortho for hgb 6.9 VSS, on 4 Loxygen    Dr Oseguera recommends to recheck tomorrow morning but he wants nurse to consult hospitalist.     Paged hospitalist.  Dr Murphy ordered: one unit PRBCs.  20 mg IV lasix one time given after blood transfusion is done

## 2019-07-27 NOTE — OR NURSING
Patient transported from PACU by float nurse, going to CT scanner, then to Station 55, Room 505-1.

## 2019-07-27 NOTE — PLAN OF CARE
PT: Orders received and chart reviewed. Patient is POD # 1 L ORIF of L intertrochanteric femur fracture. Patient's daughter requests to allow patient to rest today and check back tomorrow.

## 2019-07-27 NOTE — OP NOTE
Procedure Date: 07/26/2019      PREOPERATIVE DIAGNOSIS:  Left displaced unstable intertrochanteric fracture.      POSTOPERATIVE DIAGNOSIS:  Left displaced unstable intertrochanteric fracture.      PROCEDURE:  Left hip open reduction and internal fixation and cephalomedullary nailing of intertrochanteric fracture.      SURGEON:  Chris Oseguera MD      FIRST ASSISTANT:  Yudi Valdez PA-C      A skilled first assistant was necessary for patient positioning, prepping and draping, help with soft tissue retraction, help with nailing, help with reduction as well as closing and patient transfer.      ANESTHESIA:  General.      ESTIMATED BLOOD LOSS:  150 mL.      COMPLICATIONS:  None apparent.      IMPLANTS:   1.  Synthes long TFN nail, size 11 x 130 x 380.   2.  Synthes cephalomedullary screw, size 110 mm.   4.  Distal interlocking screw, size 36 mm.      INDICATIONS:  The patient is a 89-year-old female who had a fall outside her senior living facility yesterday onto her left hip as well as her left humerus.  She had immediate pain and inability to bear weight.  Her pain was located in the hip as well as in her shoulder.  EMS was called and she was brought to the ER and diagnosed with a displaced comminuted left intertrochanteric hip fracture as well as a left impacted proximal humerus fracture.  She was admitted to Medicine and after discussion of treatment options with the patient as well as with her daughter, including nonoperative measures and comfort care versus open reduction internal fixation, we decided to proceed with cephalomedullary nailing in order to stabilize her hip and to help with pain control and mobilization.  Of note, the patient also suffered a subarachnoid hemorrhage that was being followed by Neurosurgery.        DESCRIPTION OF PROCEDURE:  On the date of the procedure, the patient was met in the preoperative area by the surgeon and Anesthesia team.  Informed consent was obtained and risks and  benefits of the surgery were discussed with the patient as well as with the daughter including risk of bleeding, infection, damage to neurovascular structures, stiffness, pain, leg deformity, nonunion, malunion, screw cutout and failure, need for future revision surgery, blood clots as well as risks of anesthesia, and even death given the patient's frail status and age.  They both understood the risks and agreed to proceed.  Our Anesthesia colleagues then brought the patient to the operating room.  General anesthesia was then administered.  She was then placed onto the Piru table in supine position and placed into traction.  We utilized the Piru table in order to perform a closed reduction of the left hip and double check it under AP and lateral views.  We used traction as well as internal rotation in order to get a nice reduction of the hip.  When we were satisfied with the reduction, we then prepped and draped the hip in the usual sterile fashion.  Preoperative antibiotics were given and preoperative timeout was performed.  This was a significantly comminuted fracture.  The posteromedial calcar piece was its own piece and was significantly unstable; therefore, we made the decision to use a long nail.        After preoperative timeout was performed, we began the procedure by making a standard incision proximal to the greater trochanter.  Sharp dissection was carried down through the skin and subcutaneous tissue.  The fascia was then split.  We then utilized finger dissection in order to feel the tip of the greater trochanter, which was fractured off.  We could palpate several fractured ends through the intertrochanteric area along the greater trochanter extending into the shaft.  We attempted to use a pointed reduction clamp in order to clamp the 2 pieces together, but were unsuccessful in doing that due to the fracture deformity.  We then attempted to place the guidewire at the tip of the greater trochanter and  pass it into the femoral shaft; unfortunately, it slipped laterally and fell in through the fracture site after we reamed.  We then removed the reamers as well as the pin and repositioned it again a little more medial.  We used an opening reamer once again; unfortunately, this comminuted the fracture even more.  The long guidewire was then placed all the way down to the knee and double checked under fluoroscopy.  The length was measured and a size 380 nail would be appropriate.  We then reamed sequentially up to 12.5 and deemed that 130 x 11 x 380 nail would be appropriate.  This was inserted over the guidewire carefully without impacting and making sure that it did not perforate the anterior cortex distally.  Once we got the nail into adequate position, the guidewire was removed.  We used the jig to place our cephalomedullary screw.  A second incision was made at the level of the second screw.  Sharp dissection was carried down through the skin and subcutaneous tissue.  The trocar was placed onto the bone and a pin was driven into the femoral head.  Unfortunately, the deformity of the fracture was quite severe and we actually had to reposition the jig quite posteriorly in order to get into the center of the femoral head.  After doing that, we were happy with the position of the screw, although it was a slightly more superior than we would like.  After trying to reposition it, it kept sliding superior but given it was centered on the lateral view, we accepted the screw position and the screw was inserted into place.  The jig was then compressed slightly in order to try and help reduce the fracture and then locked proximally.  We then turned our attention distally.  After the jig was removed, I attempted to place a distal interlocking screw through the distal interlock.  Unfortunately, this blew out the distal cortex.  Therefore, that screw was removed and a second screw was placed through the proximal interlock in  the distal part of the nail and this time we were able to get secure fixation for rotational stability.  The final x-rays were taken.  The wounds were copiously irrigated.  The patient did have significantly more blood loss than usual due to the comminution of the fracture and the fracture essentially being quite proximal and palpable and it was bleeding significantly.        The wounds were copiously irrigated.  The fascia was closed and then we closed the skin.  Sterile dressings were applied.  The patient was then awakened from anesthesia and brought to the PACU for recovery.        Postoperatively, we will protect her weightbearing.  Given the significant comminution of the fracture, we will keep her 30% weightbearing for at least 6 weeks.  She will remain nonweightbearing on her left upper extremity given the proximal humerus fracture as well; therefore, I do think that she will be wheelchair bound.  We discussed all of this with her daughter postoperatively and explained to her that she is certainly at an increased risk for postoperative complications due to her age, as well as the severity of her injuries.         KAMILLE GORDON MD             D: 2019   T: 2019   MT: JERSON      Name:     MEME NAVARRO   MRN:      [id number]        Account:        [id number]   :      1923           Procedure Date: 2019      Document: [id number]

## 2019-07-27 NOTE — CODE/RAPID RESPONSE
[facility]    House JOHANNE RRT Note  7/26/2019   Time Called: 2034    RRT called for: Apnea    Assessment & Plan     Apneic periods suspect 2/2 recent anesthesia, SAH.  Acute hypoxic hypercapnic respiratory failure in setting of above.   Alternatively considered PE, fat embolism, aspiration PNA  Acute blood loss anemia in setting of L femur fracture s/p ORIF.  Hypotension in setting of acute blood loss anemia, recent anesthesia.  Alternatively considered perioperative ACS.  - Upon arrival, pt lying in bed, intermittently restless with noted prolonged periods of apnea (45+ sec).  Pt pale appearing, SBP 70s-90s, L femur surgical site with noted sanguinous drainage.  Pt with nonsensical speech, moaning, pinpoint pupils, capnography 1-10 based on apneic periods.  Initially trialed pt on CPAP; however, apneic periods remained.  Pt transitioned to Bipap with noted apneic periods, only triggering Bipap 66%.        Pt's daughter and granddaughter present at pt's bedside.  After several longer conversations with pt's daughter and granddaughter, will transition pt to DNR/DNI without formally making pt comfort care.  Pt's daughter requesting; however, that pt to remain comfortable.  Family expressed understanding that treating pt's pain may further lead to pt's respiratory demise; however, are willing to accept the benefit vs risk.  Clearly discussed with pt's family that she has several factors not working in her best interest at this time including SAH, recent femur fracture requiring surgical repair, and an inoperable L humerus fracture that could likely lead to pt's death within hours to days.  Family expressed that pt has lived an amazing life and she would not want to be bed bound or stationary to one single room.           Continue with restorative, aggressive measures, within line of DNR/DNI, while maintaining pt's comfort.  If pt's comfort unable to be achieved, will revisit transitioning pt to comfort  "measures with family at that time.     INTERVENTIONS:  - Stat VBG/ABG, BMP, Mg, CBC  - Will repeat Hgb and troponin after PRBC completion   - Stat EKG  - CPAP > Bipap  - BG - 166  - CODE STATUS changed to DNR/DNI  - Hgb 6.9, will transfuse pt with 1U PRBC now with hopes of being able to provide further pain medication without worsening hypotension.  Family hopeful for restoration of pt's condition to baseline; however, reasonable with accepting that despite all efforts, pt may still die in the next hours to days.  - No anti-inflammatories in setting of SAH (initially considered toradol for pain management)   - Of note, confirmed with troy José for pt/family to determine pt's CODE STATUS immediately proceeding surgery.  Pt does not have to remain FULL CODE for 24 hours postoperatively.      At the end of the RRT pt remains on Bipap, noted soft blood pressures, PRBC pending to be transfused.      Addendum: 0100: Followed up with pt at bedside.  Noted Bipap triggering at 18%, RR set at 12, pt breathing at 12.  Discussed with daughter and granddaughter present at bedside concerned with pt's overall clinical status.  Concerned that it appears pt's respiratory drive is minimal, with minimal air movement noted with Bipap support.  Will obtain VBG with timed hemoglobin and troponin and re-evaluate pt's present plan of care with family.    Addendum: 0530: Followed up on lab results, VBG not consistent with Bipap numbers as notes pt triggering 37%, continues to \"breathe\" at set rate.  Pt' daughter requests for plan of care to continue as is.  Family would like for Bipap to remain in place until the daughter is able to get further rest prior to making any decisions.    Discussed with and defer further cares to nursing, Dr. Oseguera, orthopedic surgeon, and hospitalist.    Interval History     Gennaro Walton is a 89 year old female who was admitted on 7/25/2019 for fall found to have small SAH, L femur fracture, L humerus " fracture.    Medical history significant for: Dementia, HTN    Code Status: DNR/DNI    Allergies   No Known Allergies    Physical Exam   Vital Signs with Ranges:  Temp:  [96.1  F (35.6  C)-99.2  F (37.3  C)] 98  F (36.7  C)  Pulse:  [76-93] 93  Heart Rate:  [74-86] 80  Resp:  [6-25] 6  BP: ()/(42-78) 76/42  SpO2:  [85 %-100 %] 98 %  I/O last 3 completed shifts:  In: 0   Out: 975 [Urine:975]    Constitutional: Frail, cachetic appearing female lying in bed, pale, restless intermittently  Pulmonary: In no overt respiratory distress; however, noted apneic periods  Cardiovascular: Cool, pale  GI: Not assessed  Skin/Integumen: Cool, pale, L femur surgical site with large sanguinous drainage noted  Neuro: Nonsensical speech  Psych:  Intermittently restless  Extremities: Moves RUE/RLE strongly, LUE in sling, no peripheral edema    Data     EKG:  Interpreted by RITA Arvizu CNP  Time reviewed: 2115  Symptoms at time of EKG: Apnea, hypotension   Rhythm: sinus tachycardia  Rate: 100-110  Axis: Left Axis Deviation  Ectopy: none  Conduction: normal  ST Segments/ T Waves: ST Segment Depression V6 and T wave inversion I and aVL  Q Waves: none  Comparison to prior: Unchanged from preoperative EKG    Clinical Impression: non-specific EKG    Troponin:    Recent Labs   Lab Test 07/26/19 2122   TROPI 0.035     IMAGING: (X-ray/CT/MRI)   Recent Results (from the past 24 hour(s))   CT Head w/o Contrast    Narrative    CT SCAN OF THE HEAD WITHOUT CONTRAST   7/26/2019 11:15 AM     HISTORY: Intracranial hemorrhage, known, follow up.    TECHNIQUE:  Axial images of the head and coronal reformations without  IV contrast material. Radiation dose for this scan was reduced using  automated exposure control, adjustment of the mA and/or kV according  to patient size, or iterative reconstruction technique.    COMPARISON: Head CT 7/25/2019.    FINDINGS:  The exam is severely limited due to motion artifacts. Subarachnoid  hemorrhage  layering within the left sylvian fissure and along the left  anterior temporal lobe is similar in volume compared to the prior exam  and slightly redistributed inferiorly. There is probable slight  increase in subarachnoid hemorrhage along the left inferior parietal  lobule along the supramarginal gyrus (series 3 image 20). Small volume  subarachnoid hemorrhage along the left pars orbicularis is slightly  increased compared to the prior exam. Small volume hemorrhage along  the left middle frontal gyrus is slightly more conspicuous compared to  the prior exam. There is increased intraventricular subarachnoid  hemorrhage layering within the occipital horn of the left lateral  ventricle.    Moderate volume loss is present. Extensive white matter  hypoattenuation likely represents chronic small vessel ischemic  change. The parenchyma is otherwise without appreciable abnormality.    Left frontoparietal scalp contusion with subcutaneous hematoma noted,  slightly decreased in size compared to the prior exam. No gross skull  fractures are appreciated, however evaluation is limited due to motion  artifact. There is opacification of the right tympanic and mastoid  cavities, presumably infectious/inflammatory, unchanged. There is  complete opacification of the right maxillary sinus and near complete  opacification of the right anterior ethmoid sinus and right frontal  sinus, unchanged.      Impression    IMPRESSION:   1. Severely limited exam due to motion artifact.  2. Probable slight increase in volume of subarachnoid hemorrhage along  the left cerebral hemisphere as detailed.  3. Slight increase in volume subarachnoid hemorrhage layering within  the occipital horn of the left lateral ventricle.  4. Continued follow-up is recommended.    MARIAN CLIFTON MD   CT Head w/o Contrast    Narrative    CT OF THE HEAD WITHOUT CONTRAST 7/26/2019 8:17 PM     COMPARISON: Head CT 7/26/2019    HISTORY: Subarachnoid hemorrhage, proven,  followup.    TECHNIQUE: 5 mm thick axial CT images of the head were acquired  without IV contrast material.    FINDINGS: Subarachnoid hemorrhage in the left sylvian fissure is again  noted without change. Scattered foci of subarachnoid hemorrhage along  the posterolateral aspect of the left cerebral hemisphere again noted  without change. There is no evidence for new or increasing  intracranial hemorrhage. There is moderate diffuse cerebral volume  loss. There are extensive confluent areas of decreased density in the  cerebral white matter bilaterally that are consistent with sequela of  chronic small vessel ischemic disease.    The ventricles and basal cisterns are within normal limits in  configuration given the degree of cerebral volume loss. There is no  midline shift.    No intracranial mass or recent infarct.    The visualized paranasal sinuses are well-aerated. There is no  mastoiditis. There are no fractures of the visualized bones. A left  frontal scalp hematoma is again noted.      Impression    IMPRESSION: Stable subarachnoid hemorrhage in the left sylvian fissure  and along the posterolateral aspect of the left cerebral convexity. No  evidence for new or increasing intracranial hemorrhage. Diffuse  cerebral volume loss and cerebral white matter changes consistent with  chronic small vessel ischemic disease.      Radiation dose for this scan was reduced using automated exposure  control, adjustment of the mA and/or kV according to patient size, or  iterative reconstruction technique     CBC with Diff:  Recent Labs   Lab Test 07/26/19 2122 07/25/19  1414   WBC 13.6*   < > 8.2   HGB 6.9*   < > 12.5   MCV 97   < > 95      < > 261   INR  --   --  1.02    < > = values in this interval not displayed.      Comprehensive Metabolic Panel:  Recent Labs   Lab 07/26/19 2122 07/25/19  1414      < > 137   POTASSIUM 4.4   < > 3.8   CHLORIDE 107   < > 104   CO2 27   < > 27   ANIONGAP 5   < > 6   *    < > 136*   BUN 20   < > 16   CR 0.69   < > 0.61   GFRESTIMATED 74   < > 77   GFRESTBLACK 85   < > 89   LORETA 7.6*   < > 8.2*   MAG 2.2  --   --    PROTTOTAL  --   --  6.5*   ALBUMIN  --   --  3.3*   BILITOTAL  --   --  0.3   ALKPHOS  --   --  95   AST  --   --  19   ALT  --   --  16    < > = values in this interval not displayed.     No results for input(s): PHV, PO2V, PCO2V, HCO3V in the last 168 hours.    Time Spent on this Encounter   I spent 90 minutes of critical care time on the unit/floor managing the care of Gennaro Walton. Upon evaluation, this patient had a high probability of imminent or life-threatening deterioration due to respiratory failure, hypotension, acute blood loss anemia, which required my direct attention, intervention, and personal management. 100% of my time was spent at the bedside counseling the patient and/or coordinating care regarding services listed in this note.    RITA Arvizu [facility] JOHANNE

## 2019-07-27 NOTE — ANESTHESIA POSTPROCEDURE EVALUATION
Patient: Gennaro Walton    Procedure(s):  LEFT INTERTROCHANTERIC FEMUR FRACTURE OPEN REDUCTION AND INTERNAL FIXATION    Diagnosis:LEFT INTROCANTERIC FEMUR FRACTURE  Diagnosis Additional Information: No value filed.    Anesthesia Type:  General, ETT, RSI    Note:  Anesthesia Post Evaluation    Patient location during evaluation: PACU  Patient participation: Able to fully participate in evaluation  Level of consciousness: awake, sleepy but conscious and confused (confused prior to surgery/at baseline per report)  Pain management: adequate  Airway patency: patent  Cardiovascular status: acceptable  Respiratory status: acceptable and nasal cannula  Hydration status: acceptable  PONV: none     Anesthetic complications: None    Comments: Pain difficult to control in PACU, patient with apparent muscle spasms. Alternated between pain and sleeping. Some desaturations during this time. Seen again briefly as wheeling out of PACU, with improvement in pain/respiratory status per RN.        Last vitals:  Vitals:    07/26/19 2245 07/26/19 2315 07/26/19 2340   BP:  101/49 106/50   Pulse:  75 73   Resp: 17 18 18   Temp:  36.6  C (97.9  F) 36.6  C (97.8  F)   SpO2:   98%         Electronically Signed By: Apolinar Arnett MD  July 27, 2019  12:39 AM

## 2019-07-27 NOTE — PLAN OF CARE
Alert to self. RRT called for apneic episodes. Patient on bi pap. Family agrees to use of narcotics to keep patient comfortable.One unit blood started.   +2 pedal pulses.

## 2019-07-27 NOTE — PLAN OF CARE
Discontinued Bi-pap at 8 am. Sating 94-96%at 4.5 LNC. Other vitals stable.  agitated and restless durning repositioning and when in pain. IV dilaudid x 3 this shift with some relief. Unable to access neuro due to confusion and not following any commands. Neuro singed off. Cortez patent and draining adequate. Serosanguinous drainage left leg dressing, reinforced and updated Dr. Oseguera.  Family at bed side. Tele NSR.

## 2019-07-28 PROBLEM — R41.0 DELIRIUM: Status: ACTIVE | Noted: 2019-07-28

## 2019-07-28 PROBLEM — D62 ACUTE BLOOD LOSS ANEMIA: Status: ACTIVE | Noted: 2019-07-28

## 2019-07-28 LAB
ANION GAP SERPL CALCULATED.3IONS-SCNC: 4 MMOL/L (ref 3–14)
BLD PROD TYP BPU: NORMAL
BLD UNIT ID BPU: 0
BLOOD PRODUCT CODE: NORMAL
BPU ID: NORMAL
BUN SERPL-MCNC: 20 MG/DL (ref 7–30)
CALCIUM SERPL-MCNC: 8 MG/DL (ref 8.5–10.1)
CHLORIDE SERPL-SCNC: 108 MMOL/L (ref 94–109)
CO2 SERPL-SCNC: 30 MMOL/L (ref 20–32)
CREAT SERPL-MCNC: 0.55 MG/DL (ref 0.52–1.04)
GFR SERPL CREATININE-BSD FRML MDRD: 79 ML/MIN/{1.73_M2}
GLUCOSE SERPL-MCNC: 106 MG/DL (ref 70–99)
HGB BLD-MCNC: 8.6 G/DL (ref 11.7–15.7)
POTASSIUM SERPL-SCNC: 4.1 MMOL/L (ref 3.4–5.3)
SODIUM SERPL-SCNC: 142 MMOL/L (ref 133–144)
TRANSFUSION STATUS PATIENT QL: NORMAL
TRANSFUSION STATUS PATIENT QL: NORMAL

## 2019-07-28 PROCEDURE — 36415 COLL VENOUS BLD VENIPUNCTURE: CPT | Performed by: PHYSICIAN ASSISTANT

## 2019-07-28 PROCEDURE — 25000128 H RX IP 250 OP 636: Performed by: HOSPITALIST

## 2019-07-28 PROCEDURE — 85018 HEMOGLOBIN: CPT | Performed by: PHYSICIAN ASSISTANT

## 2019-07-28 PROCEDURE — 25000128 H RX IP 250 OP 636: Performed by: PHYSICIAN ASSISTANT

## 2019-07-28 PROCEDURE — 25000128 H RX IP 250 OP 636: Performed by: INTERNAL MEDICINE

## 2019-07-28 PROCEDURE — 80048 BASIC METABOLIC PNL TOTAL CA: CPT | Performed by: PHYSICIAN ASSISTANT

## 2019-07-28 PROCEDURE — 12000000 ZZH R&B MED SURG/OB

## 2019-07-28 PROCEDURE — 25800025 ZZH RX 258: Performed by: INTERNAL MEDICINE

## 2019-07-28 PROCEDURE — 99232 SBSQ HOSP IP/OBS MODERATE 35: CPT | Performed by: INTERNAL MEDICINE

## 2019-07-28 RX ORDER — HALOPERIDOL 5 MG/ML
0.5 INJECTION INTRAMUSCULAR
Status: COMPLETED | OUTPATIENT
Start: 2019-07-28 | End: 2019-07-28

## 2019-07-28 RX ORDER — HYDROMORPHONE HYDROCHLORIDE 1 MG/ML
0.2 INJECTION, SOLUTION INTRAMUSCULAR; INTRAVENOUS; SUBCUTANEOUS
Status: DISCONTINUED | OUTPATIENT
Start: 2019-07-28 | End: 2019-07-31 | Stop reason: HOSPADM

## 2019-07-28 RX ORDER — HYDROMORPHONE HYDROCHLORIDE 1 MG/ML
0.2 INJECTION, SOLUTION INTRAMUSCULAR; INTRAVENOUS; SUBCUTANEOUS ONCE
Status: COMPLETED | OUTPATIENT
Start: 2019-07-28 | End: 2019-07-28

## 2019-07-28 RX ADMIN — HYDROMORPHONE HYDROCHLORIDE 0.2 MG: 1 INJECTION, SOLUTION INTRAMUSCULAR; INTRAVENOUS; SUBCUTANEOUS at 05:53

## 2019-07-28 RX ADMIN — DEXTROSE AND SODIUM CHLORIDE: 5; 450 INJECTION, SOLUTION INTRAVENOUS at 14:05

## 2019-07-28 RX ADMIN — HYDROMORPHONE HYDROCHLORIDE 0.2 MG: 1 INJECTION, SOLUTION INTRAMUSCULAR; INTRAVENOUS; SUBCUTANEOUS at 03:45

## 2019-07-28 RX ADMIN — HYDROMORPHONE HYDROCHLORIDE 0.2 MG: 1 INJECTION, SOLUTION INTRAMUSCULAR; INTRAVENOUS; SUBCUTANEOUS at 00:38

## 2019-07-28 RX ADMIN — HYDROMORPHONE HYDROCHLORIDE 0.2 MG: 1 INJECTION, SOLUTION INTRAMUSCULAR; INTRAVENOUS; SUBCUTANEOUS at 17:10

## 2019-07-28 RX ADMIN — HALOPERIDOL LACTATE 0.5 MG: 5 INJECTION, SOLUTION INTRAMUSCULAR at 01:48

## 2019-07-28 RX ADMIN — HYDROMORPHONE HYDROCHLORIDE 0.2 MG: 1 INJECTION, SOLUTION INTRAMUSCULAR; INTRAVENOUS; SUBCUTANEOUS at 01:46

## 2019-07-28 RX ADMIN — DEXTROSE AND SODIUM CHLORIDE: 5; 450 INJECTION, SOLUTION INTRAVENOUS at 21:29

## 2019-07-28 RX ADMIN — HYDROMORPHONE HYDROCHLORIDE 0.2 MG: 1 INJECTION, SOLUTION INTRAMUSCULAR; INTRAVENOUS; SUBCUTANEOUS at 10:15

## 2019-07-28 RX ADMIN — FUROSEMIDE 20 MG: 10 INJECTION, SOLUTION INTRAVENOUS at 01:35

## 2019-07-28 RX ADMIN — HYDROMORPHONE HYDROCHLORIDE 0.2 MG: 1 INJECTION, SOLUTION INTRAMUSCULAR; INTRAVENOUS; SUBCUTANEOUS at 21:19

## 2019-07-28 ASSESSMENT — ACTIVITIES OF DAILY LIVING (ADL)
ADLS_ACUITY_SCORE: 16
ADLS_ACUITY_SCORE: 18
ADLS_ACUITY_SCORE: 18
ADLS_ACUITY_SCORE: 16
ADLS_ACUITY_SCORE: 16
ADLS_ACUITY_SCORE: 18

## 2019-07-28 NOTE — PROVIDER NOTIFICATION
MD Gray notified of pt increases restlessness and agitation. Will give another 0.2 mg IV dilaudid, lasix and PRN haldol

## 2019-07-28 NOTE — PROGRESS NOTES
Paged for agitation, having pulled out IV line just before transfusion was due to finish. IV line has been replaced. Will order an extra dose of 0.2 mg IV Dilaudid and plan to administer IV Lasix now; will also carefully order one time dose 0.5 mg IV Haldol prn agitation not relieved with narcotics.

## 2019-07-28 NOTE — PLAN OF CARE
Alert to self. Unable to do neuro checks, does not follow commands. IV dilaudid for pain. Cortez output 250 ml. +2 pedal pulses. On 4 L oxygen Blood started at 2100. One michaela lasix to be given when blood transfusion is done. Hgb recheck tomorrow am. Daughter at bedside

## 2019-07-28 NOTE — PROGRESS NOTES
[facility]    Hospitalist Progress Note    Assessment & Plan   Gennaro Walton is a 89 year old female who was admitted on 7/25/2019 with a fall and suffered the following injuries.:      Impression:   Principal Problem:    Comminuted left intertroch hip fract -- POD#2   Active Problems:    Fall    Proximal left humerus fracture    Benign essential hypertension    Osteoporosis    Traumatic SAH (subarachnoid hemorrhage)    -- stable on 3rd HCT    Acute blood loss anemia   -- transfused x 2 today     Delirium -- postop    -- IV Haldol PRN (used sparingly 2nd to advanced age)    Hypoxia, probably Atelectasis    -- wean O2 as able   -- IV lasix after blood to avoid fluid overload      Plan:  Discussed with daughter and Ortho -- tenuous postop course given advanced age and multiple injuries.  Will continue with frequent IV pain medication (not taking PO yet).     DVT Prophylaxis: Pneumatic Compression Devices  Code Status: DNR/DNI    Disposition: Expected discharge to TCU Monday, 7/29    Zain Tanner MD  Pager [phone]  Cell Phone [phone]  Text Page (7am to 6pm)  40 min total     Interval History   Restless, agitated, but also in pain, and also sedated easily by pain medications.   Physical Exam   Temp: 98.2  F (36.8  C) Temp src: Axillary BP: 143/66 Pulse: 89 Heart Rate: 90 Resp: 16 SpO2: 94 % O2 Device: Nasal cannula Oxygen Delivery: 2 LPM  Vitals:    07/25/19 1408 07/26/19 0500   Weight: 45.4 kg (100 lb) 44.5 kg (98 lb)     Vital Signs with Ranges  Temp:  [98.2  F (36.8  C)-98.7  F (37.1  C)] 98.2  F (36.8  C)  Pulse:  [60-89] 89  Heart Rate:  [] 90  Resp:  [10-17] 16  BP: (128-164)/(61-88) 143/66  SpO2:  [94 %-98 %] 94 %  I/O last 3 completed shifts:  In: 220   Out: 1950 [Urine:1950]    # Pain Assessment:  Current Pain Score 7/28/2019   Patient currently in pain? sleeping: patient not able to self report   Pain score (0-10) -   - Gennaro is experiencing pain due to hip  fracture. Pain management was discussed and the plan was created in a collaborative fashion.  Gennaro's response to the current recommendations: engaged  - IV dilaudid dose increased    Constitutional:  Slightly groggy, cooperative, no apparent distress  Respiratory: Clear to auscultation bilaterally, no crackles or wheezing  Cardiovascular: Regular rate and rhythm, normal S1 and S2, and no murmur noted  GI: Normal bowel sounds, soft, non-distended, non-tender  Extrem: No calf tenderness, no ankle edema  Neuro: Drowsy (just got 0.5 mg IV Dilaudid), Ox2.5, no focal motor or sensory deficits    Medications     - MEDICATION INSTRUCTIONS -       dextrose 5% and 0.45% NaCl 75 mL/hr at 07/28/19 1713     - MEDICATION INSTRUCTIONS -       - MEDICATION INSTRUCTIONS -         acetaminophen  975 mg Oral Q8H     ketorolac  15 mg Intravenous Once     losartan  50 mg Oral Daily     senna-docusate  1 tablet Oral BID    Or     senna-docusate  2 tablet Oral BID     sodium chloride (PF)  3 mL Intracatheter Q8H       Data   Recent Labs   Lab 07/28/19  0641 07/27/19  1610 07/27/19  0322 07/26/19  2122 07/26/19  0730 07/25/19  1414   WBC  --   --   --  13.6* 8.9 8.2   HGB 8.6* 6.9* 7.9*  Canceled, Test credited 6.9* 8.6* 12.5   MCV  --   --   --  97 95 95   PLT  --   --   --  213 223 261   INR  --   --   --   --   --  1.02     --   --  139 139 137   POTASSIUM 4.1  --   --  4.4 4.1 3.8   CHLORIDE 108  --   --  107 105 104   CO2 30  --   --  27 29 27   BUN 20  --   --  20 17 16   CR 0.55  --   --  0.69 0.64 0.61   ANIONGAP 4  --   --  5 5 6   LORETA 8.0*  --   --  7.6* 7.9* 8.2*   *  --   --  183* 148* 136*   ALBUMIN  --   --   --   --   --  3.3*   PROTTOTAL  --   --   --   --   --  6.5*   BILITOTAL  --   --   --   --   --  0.3   ALKPHOS  --   --   --   --   --  95   ALT  --   --   --   --   --  16   AST  --   --   --   --   --  19   TROPI  --   --  0.037 0.035  --   --        Imaging:   No results found for this or any previous  visit (from the past 24 hour(s)).

## 2019-07-28 NOTE — PROGRESS NOTES
[facility] [place]    Hospitalist Progress Note    Assessment & Plan   Gennaro Walton is a 89 year old female who was admitted on 7/25/2019 with a fall and suffered the following injuries.:      Impression:   Principal Problem:      Comminuted left intertroch hip fract -- related to osteoporosis and fall   -- S/P ORIF 7/26/19, 30% weightbearing for at least 6 weeks     Traumatic SAH (subarachnoid hemorrhage)    -- initial slight increase in bleed but stable on last CT 7/26      Proximal left humerus fracture   -- treating with left arm sling      Acute blood loss anemia -- S/P transfusion x 2 units   -- check hgb in AM, but expect stable at this point (POD#3)      Delirium -- postop   -- improved from yesterday but still too groggy to eat   -- continue IV fluids      Atelectasis    -- postop, improving (O2 down from 4 LPM to 2 LPM)    Active Problems:    Fall      Benign essential hypertension      Osteoporosis      Plan:  Discussed with daughter and with Ortho -- will continue IV fluids until improved PO intake, and will try to back on on IV Dilaudid so more alert.     DVT Prophylaxis: Pneumatic Compression Devices  Code Status: DNR/DNI    Disposition: Expected discharge to TCU Monday, 7/29    Zain Tanner MD  Pager [phone]  Cell Phone [phone]  Text Page (7am to 6pm)  35 min spent total caring for patient   Interval History   More comfortable today, but minimal PO intake, and limited ability to answer questions.   Physical Exam   Temp: 98.2  F (36.8  C) Temp src: Axillary BP: 143/66 Pulse: 89 Heart Rate: 90 Resp: 16 SpO2: 94 % O2 Device: Nasal cannula Oxygen Delivery: 2 LPM  Vitals:    07/25/19 1408 07/26/19 0500   Weight: 45.4 kg (100 lb) 44.5 kg (98 lb)     Vital Signs with Ranges  Temp:  [98.2  F (36.8  C)-98.7  F (37.1  C)] 98.2  F (36.8  C)  Pulse:  [60-89] 89  Heart Rate:  [] 90  Resp:  [10-17] 16  BP: (128-164)/(61-88) 143/66  SpO2:  [94 %-98 %] 94 %  I/O last 3 completed  shifts:  In: 220   Out: 1950 [Urine:1950]    # Pain Assessment:  Current Pain Score 7/28/2019   Patient currently in pain? sleeping: patient not able to self report   Pain score (0-10) -   - Gennaro is experiencing pain due to hip fracture. Pain management was discussed and the plan was created in a collaborative fashion.  Gennaro's response to the current recommendations: engaged  - IV dilaudid dose increased    Constitutional:  Slightly groggy, cooperative, no apparent distress  Respiratory: Clear to auscultation bilaterally, no crackles or wheezing  Cardiovascular: Regular rate and rhythm, normal S1 and S2, and no murmur noted  GI: Normal bowel sounds, soft, non-distended, non-tender  Extrem: No calf tenderness, no ankle edema  Neuro: Awake, smiling, can say some words but no meaningful answers to questions.     Medications     - MEDICATION INSTRUCTIONS -       dextrose 5% and 0.45% NaCl 75 mL/hr at 07/28/19 1713     - MEDICATION INSTRUCTIONS -       - MEDICATION INSTRUCTIONS -         acetaminophen  975 mg Oral Q8H     ketorolac  15 mg Intravenous Once     losartan  50 mg Oral Daily     senna-docusate  1 tablet Oral BID    Or     senna-docusate  2 tablet Oral BID     sodium chloride (PF)  3 mL Intracatheter Q8H       Data   Recent Labs   Lab 07/28/19  0641 07/27/19  1610 07/27/19  0322 07/26/19  2122 07/26/19  0730 07/25/19  1414   WBC  --   --   --  13.6* 8.9 8.2   HGB 8.6* 6.9* 7.9*  Canceled, Test credited 6.9* 8.6* 12.5   MCV  --   --   --  97 95 95   PLT  --   --   --  213 223 261   INR  --   --   --   --   --  1.02     --   --  139 139 137   POTASSIUM 4.1  --   --  4.4 4.1 3.8   CHLORIDE 108  --   --  107 105 104   CO2 30  --   --  27 29 27   BUN 20  --   --  20 17 16   CR 0.55  --   --  0.69 0.64 0.61   ANIONGAP 4  --   --  5 5 6   LORETA 8.0*  --   --  7.6* 7.9* 8.2*   *  --   --  183* 148* 136*   ALBUMIN  --   --   --   --   --  3.3*   PROTTOTAL  --   --   --   --   --  6.5*   BILITOTAL  --    --   --   --   --  0.3   ALKPHOS  --   --   --   --   --  95   ALT  --   --   --   --   --  16   AST  --   --   --   --   --  19   TROPI  --   --  0.037 0.035  --   --        Imaging:   No results found for this or any previous visit (from the past 24 hour(s)).

## 2019-07-28 NOTE — PLAN OF CARE
Disoriented, forgetful, confused. VSS ex unable to obtain tem and BP due to pt restlessness. CMS intact. Neuros unable to obtain due to pt inability to follow commands. PRN IV dilaudid for pain. PRN haldol one time given to restlessness. Dressings moist. Pt pulling at lines, new IV in right side. Blood administration stopped due to pt pulling out IV, not all blood infused. 220ml infused. Pt pulled out galindo, new galindo inserted. Clear liquid diet. Not alert enough for oral medications.

## 2019-07-28 NOTE — PROVIDER NOTIFICATION
Called for /88. Per Dr Shell's note on 7/26,  SBP is to be<160  Blood transfusion is still infusing. Called to ask if transfusion should be finish. Blood transfusion stopped while waiting for call    Dr Gray said to finish the blood transfusion, then give the IV lasix. Give hydralazine PRN if needed. Call back if needed

## 2019-07-28 NOTE — PROGRESS NOTES
"ORTHOPEDIC LOWER EXTREMITY PROGRESS NOTE    POD#2  Patient is a 89 year old female who underwent Procedure(s):  LEFT INTERTROCHANTERIC FEMUR FRACTURE OPEN REDUCTION AND INTERNAL FIXATION on 7/26/2019. Pain appears controlled. Pt resting soundly. Reportedly difficult overnight. Daughter present.     Vitals: /85   Pulse 60   Temp 98.6  F (37  C) (Axillary)   Resp 16   Ht 1.6 m (5' 3\")   Wt 44.5 kg (98 lb)   SpO2 94%   BMI 17.36 kg/m        Drains: none    EXAM   The patient is very sleepy. Limited exam secondary to pt factors.   Calves are soft.   Pt moves toes in response to touch.  Foot warm with nl cap refill.  The incision is covered. Dressing c/d/i    Labs:   Recent Labs   Lab Test 07/28/19  0641 07/27/19  1610 07/27/19  0322  07/25/19  1414   HGB 8.6* 6.9* 7.9*  Canceled, Test credited   < > 12.5   INR  --   --   --   --  1.02    < > = values in this interval not displayed.       ASSESSMENT  S/p ORIF left IT fx   PLAN  1. DVT prophylaxis: defer to primary team.   2. Weight Bearing: TTWB LLE  3. Anticipated discharge date pending medical stability  4. Cont Pain Control.     Patito BELLO  [facility]  [phone]      "

## 2019-07-28 NOTE — PLAN OF CARE
Pt lethargic all day. Unable to complete neuro assessments, repo'd for comfort. Cortez patent, IVF restarted at 75. IV dilaudid given for pain PRN. Family at bedside. Call light within reach, pt rounded on frequently, and bed in lowest/locked position. Will continue to monitor.

## 2019-07-28 NOTE — PLAN OF CARE
Alert to self. Agitated and restless when in pain and during repositioning.  On 4 L oxygen. IV dilaudid for pain. Cortez output 250 ml Turned and repositioned. Unable to do neuro checks, patient does not follow commands. Tele NSR. Daughter at bedside.

## 2019-07-28 NOTE — PLAN OF CARE
PT: Pt sleeping soundly upon arrival of therapist. Per discussion with RN, pt remains confused and agitated at times. Will hold PT evaluation.

## 2019-07-29 PROCEDURE — 99232 SBSQ HOSP IP/OBS MODERATE 35: CPT | Performed by: INTERNAL MEDICINE

## 2019-07-29 PROCEDURE — 25000128 H RX IP 250 OP 636: Performed by: INTERNAL MEDICINE

## 2019-07-29 PROCEDURE — 25000132 ZZH RX MED GY IP 250 OP 250 PS 637: Mod: GY | Performed by: PHYSICIAN ASSISTANT

## 2019-07-29 PROCEDURE — 12000000 ZZH R&B MED SURG/OB

## 2019-07-29 PROCEDURE — 25800025 ZZH RX 258: Performed by: INTERNAL MEDICINE

## 2019-07-29 RX ORDER — POLYETHYLENE GLYCOL 3350 17 G/17G
17 POWDER, FOR SOLUTION ORAL DAILY PRN
DISCHARGE
Start: 2019-07-29 | End: 2019-08-05

## 2019-07-29 RX ORDER — TRAMADOL HYDROCHLORIDE 50 MG/1
50 TABLET ORAL EVERY 6 HOURS PRN
Qty: 30 TABLET | Refills: 0 | Status: SHIPPED | OUTPATIENT
Start: 2019-07-29 | End: 2019-07-31

## 2019-07-29 RX ORDER — ASPIRIN 325 MG
325 TABLET, DELAYED RELEASE (ENTERIC COATED) ORAL DAILY
Qty: 30 TABLET | Refills: 0 | DISCHARGE
Start: 2019-07-29 | End: 2019-07-31

## 2019-07-29 RX ADMIN — DEXTROSE AND SODIUM CHLORIDE: 5; 450 INJECTION, SOLUTION INTRAVENOUS at 11:45

## 2019-07-29 RX ADMIN — TRAMADOL HYDROCHLORIDE 50 MG: 50 TABLET ORAL at 10:28

## 2019-07-29 RX ADMIN — HYDROMORPHONE HYDROCHLORIDE 0.2 MG: 1 INJECTION, SOLUTION INTRAMUSCULAR; INTRAVENOUS; SUBCUTANEOUS at 04:41

## 2019-07-29 RX ADMIN — TRAMADOL HYDROCHLORIDE 50 MG: 50 TABLET ORAL at 22:36

## 2019-07-29 RX ADMIN — HYDROMORPHONE HYDROCHLORIDE 0.2 MG: 1 INJECTION, SOLUTION INTRAMUSCULAR; INTRAVENOUS; SUBCUTANEOUS at 01:23

## 2019-07-29 RX ADMIN — HYDROMORPHONE HYDROCHLORIDE 0.2 MG: 1 INJECTION, SOLUTION INTRAMUSCULAR; INTRAVENOUS; SUBCUTANEOUS at 08:35

## 2019-07-29 RX ADMIN — ACETAMINOPHEN 975 MG: 325 TABLET, FILM COATED ORAL at 12:49

## 2019-07-29 RX ADMIN — TRAMADOL HYDROCHLORIDE 50 MG: 50 TABLET ORAL at 16:16

## 2019-07-29 ASSESSMENT — ACTIVITIES OF DAILY LIVING (ADL)
ADLS_ACUITY_SCORE: 18
ADLS_ACUITY_SCORE: 17
ADLS_ACUITY_SCORE: 18
ADLS_ACUITY_SCORE: 18

## 2019-07-29 NOTE — CONSULTS
Care Transition Initial Assessment - SW     Met with: Patient, dtr-Caron     Principal Problem:    Comminuted left intertroch hip fract  Active Problems:    Fall    Proximal left humerus fracture    Benign essential hypertension    Osteoporosis    Traumatic SAH (subarachnoid hemorrhage)     Acute blood loss anemia    Delirium       DATA  Lives With: alone      Quality of Family Relationships: involved, supportive  Description of Support System: Involved, Supportive  Who is your support system?: Children  Identified issues/concerns regarding health management: discharge planning    Quality of Family Relationships: involved, supportive    ASSESSMENT  Cognitive Status:  awake and alert  Concerns to be addressed:      Per SW consult for discharge planning. Pt admitted 7/25 with left hip fracture. Pt will tentatively discharge 7/30. Reviewed medical record. Met with pt and Caron to discuss discharge plan. Pt was previously living at [facility] without any services. Discussed recommendation of TCU. Pt and Caron are in agreement with it. Caron would like referrals sent to: [facility], and [facility]. Discussed private/shared room. Pt would prefer a private room and is ok with additional cost. SW will send out referral via DOD and then let pt/Caron know.      PLAN  Financial costs for the patient includes: discussed private room charge-pt and Caron are in agreement   Patient given options and choices for discharge: reviewed TCU choices list    Patient/family is agreeable to the plan?  Yes  Transportation/person: TBD   Patient Goals and Preferences: TCU   Patient anticipates discharging to: TCU    Will continue to follow for discharge planning    Leisa COOPER, LGSW

## 2019-07-29 NOTE — PLAN OF CARE
Oriented x 1. Intermittently restless. IV dilaudid for pain. VSS, on 2 L oxygen. Hearing aids and glasses in room. Repositioned q2h.  Tele NSR.  Sling on left shoulder.

## 2019-07-29 NOTE — PROGRESS NOTES
Orthopedic Surgery  Gennaro Walton  2019  Admit Date:  2019  POD: 3 Days Post-Op   Procedure(s):  LEFT INTERTROCHANTERIC FEMUR FRACTURE OPEN REDUCTION AND INTERNAL FIXATION    Confused at time of exam.  Complaining of pain about chapped lips.    Tolerating oral intake.      Vital Sign Ranges  Temperature Temp  Av.1  F (36.7  C)  Min: 97.9  F (36.6  C)  Max: 98.2  F (36.8  C)   Blood pressure Systolic (24hrs), Av , Min:130 , Max:148        Diastolic (24hrs), Av, Min:59, Max:82      Pulse Pulse  Av.5  Min: 86  Max: 89   Respirations Resp  Avg: 15.5  Min: 15  Max: 16   Pulse oximetry SpO2  Av.5 %  Min: 94 %  Max: 96 %       Dressing is clean, dry, and slight distal peeling.   Minimal erythema of the surrounding skin.   Bilateral calves are soft, non-tender.  Left lower extremity is NVI.  Sensation intact bilateral lower extremities  Patient able to resist dorsi and plantar flexion bilaterally  +Dp pulse    Labs:  Recent Labs   Lab Test 19  0641 19  2122 19  0730   POTASSIUM 4.1 4.4 4.1     Recent Labs   Lab Test 19  0641 19  1610 19  0322   HGB 8.6* 6.9* 7.9*  Canceled, Test credited     Recent Labs   Lab Test 19  1414   INR 1.02     Recent Labs   Lab Test 19  2122 19  0730 19  1414    223 261       1. PLAN:   Continue DVT prophylaxis per medical team.     Mobilize with PT/OT    TTWB LLE.     Continue current pain regiment.   Dressings: Keep intact.  Change if >60% saturated   Follow-up: 2 weeks Dr Oseguera/Jo Ann    2. Disposition   Anticipate d/c to TCU when medically cleared and progressing in PT.    Kelly Dunn PA-C

## 2019-07-29 NOTE — PLAN OF CARE
PT: Attempted PT evaluation, pt yelling out upon arrival of therapist. Unable to initiate OOB mobility due to patient unable to follow cues. Assisted nursing to reposition patient in bed. Pt's daughter requested we allow patient to rest rather than participate in PT session today.

## 2019-07-29 NOTE — PROVIDER NOTIFICATION
MD paged regarding pts -140s, sustained ~5min. Hospitalist came to assess patient in room, HR then 70s. No new orders at this time, continuing to monitor.

## 2019-07-29 NOTE — PROGRESS NOTES
[facility]    Hospitalist Progress Note    Assessment & Plan   Gennaro Walton is a 89 year old female who was admitted on 7/25/2019 with a fall and suffered the following injuries.:      Impression:   Principal Problem:      Comminuted left intertroch hip fract -- related to osteoporosis and fall   -- S/P ORIF 7/26/19, 30% weightbearing for at least 6 weeks     Traumatic SAH (subarachnoid hemorrhage)    -- initial slight increase in bleed but stable on last CT 7/26      Proximal left humerus fracture   -- treating with left arm sling      Acute blood loss anemia -- S/P transfusion x 2 units   -- check hgb in AM, but expect stable at this point (POD#4)      Delirium -- postop   -- improved from yesterday but still too groggy to eat   -- continue IV fluids until improve PO intake      Atelectasis    -- postop, improving, on room air today    Active Problems:    Fall      Benign essential hypertension      Osteoporosis      Plan:  Discussed with daughter and with Ortho -- will continue IV fluids until improved PO intake, and will try to back on on IV Dilaudid so more alert.     DVT Prophylaxis: Pneumatic Compression Devices  Code Status: DNR/DNI    Disposition: Expected discharge to TCU Tuesday, 7/30    Zain Tanner MD  Pager [phone]  Cell Phone [phone]  Text Page (7am to 6pm)  25 min spent total caring for patient   Interval History   Taking some oral intake today.     Physical Exam   Temp: 97.9  F (36.6  C) Temp src: Axillary BP: (!) 141/63 Pulse: 64 Heart Rate: 65 Resp: 15 SpO2: 99 % O2 Device: Nasal cannula Oxygen Delivery: 2 LPM  Vitals:    07/25/19 1408 07/26/19 0500   Weight: 45.4 kg (100 lb) 44.5 kg (98 lb)     Vital Signs with Ranges  Temp:  [97.9  F (36.6  C)-98.2  F (36.8  C)] 97.9  F (36.6  C)  Pulse:  [64-89] 64  Heart Rate:  [] 65  Resp:  [14-18] 15  BP: (130-148)/(59-88) 141/63  SpO2:  [91 %-99 %] 99 %  I/O last 3 completed shifts:  In: 773 [I.V.:773]  Out:  200 [Urine:200]    # Pain Assessment:  Current Pain Score 7/29/2019   Patient currently in pain? sleeping: patient not able to self report   Pain score (0-10) -   - Gennaro is experiencing pain due to hip fracture. Pain management was discussed and the plan was created in a collaborative fashion.  Gennaro's response to the current recommendations: engaged  - IV dilaudid dose increased    Constitutional:  Slightly groggy, cooperative, no apparent distress  Respiratory: Clear to auscultation bilaterally, no crackles or wheezing  Cardiovascular: Regular rate and rhythm, normal S1 and S2, and no murmur noted  GI: Normal bowel sounds, soft, non-distended, non-tender  Extrem: No calf tenderness, no ankle edema  Neuro: Awake, able to answer questions, still confused but improving    Medications     - MEDICATION INSTRUCTIONS -       dextrose 5% and 0.45% NaCl 75 mL/hr at 07/29/19 1145     - MEDICATION INSTRUCTIONS -       - MEDICATION INSTRUCTIONS -         acetaminophen  975 mg Oral Q8H     ketorolac  15 mg Intravenous Once     losartan  50 mg Oral Daily     senna-docusate  1 tablet Oral BID    Or     senna-docusate  2 tablet Oral BID     sodium chloride (PF)  3 mL Intracatheter Q8H       Data   Recent Labs   Lab 07/28/19  0641 07/27/19  1610 07/27/19  0322 07/26/19  2122 07/26/19  0730 07/25/19  1414   WBC  --   --   --  13.6* 8.9 8.2   HGB 8.6* 6.9* 7.9*  Canceled, Test credited 6.9* 8.6* 12.5   MCV  --   --   --  97 95 95   PLT  --   --   --  213 223 261   INR  --   --   --   --   --  1.02     --   --  139 139 137   POTASSIUM 4.1  --   --  4.4 4.1 3.8   CHLORIDE 108  --   --  107 105 104   CO2 30  --   --  27 29 27   BUN 20  --   --  20 17 16   CR 0.55  --   --  0.69 0.64 0.61   ANIONGAP 4  --   --  5 5 6   LORETA 8.0*  --   --  7.6* 7.9* 8.2*   *  --   --  183* 148* 136*   ALBUMIN  --   --   --   --   --  3.3*   PROTTOTAL  --   --   --   --   --  6.5*   BILITOTAL  --   --   --   --   --  0.3   ALKPHOS  --   --    --   --   --  95   ALT  --   --   --   --   --  16   AST  --   --   --   --   --  19   TROPI  --   --  0.037 0.035  --   --        Imaging:   No results found for this or any previous visit (from the past 24 hour(s)).

## 2019-07-29 NOTE — PLAN OF CARE
Patient alert to self only, able to converse more with daughter present. Bedrest today, repositioned frequently in bed. Tolerating diet, poor appetite, continuing to encourage PO. Pain managed with PO meds at this time.

## 2019-07-29 NOTE — PLAN OF CARE
Pt A/Ox1, VSS on 2Lo2, IV dilaudid, neuro q4,Galindo pulled out by patient at 6:10 Am, Bruising around labia, no active bleeding seen, bladder scanned for 100ML, galindo placed with no return yet, Possible discharge to TCU, will continue to monitor.

## 2019-07-30 ENCOUNTER — APPOINTMENT (OUTPATIENT)
Dept: PHYSICAL THERAPY | Facility: CLINIC | Age: 84
DRG: 956 | End: 2019-07-30
Payer: MEDICARE

## 2019-07-30 LAB
ANION GAP SERPL CALCULATED.3IONS-SCNC: 2 MMOL/L (ref 3–14)
BUN SERPL-MCNC: 16 MG/DL (ref 7–30)
CALCIUM SERPL-MCNC: 7.5 MG/DL (ref 8.5–10.1)
CHLORIDE SERPL-SCNC: 105 MMOL/L (ref 94–109)
CO2 SERPL-SCNC: 30 MMOL/L (ref 20–32)
CREAT SERPL-MCNC: 0.47 MG/DL (ref 0.52–1.04)
GFR SERPL CREATININE-BSD FRML MDRD: 83 ML/MIN/{1.73_M2}
GLUCOSE SERPL-MCNC: 106 MG/DL (ref 70–99)
HGB BLD-MCNC: 7.3 G/DL (ref 11.7–15.7)
HGB BLD-MCNC: 8.1 G/DL (ref 11.7–15.7)
POTASSIUM SERPL-SCNC: 3.4 MMOL/L (ref 3.4–5.3)
SODIUM SERPL-SCNC: 137 MMOL/L (ref 133–144)

## 2019-07-30 PROCEDURE — 99232 SBSQ HOSP IP/OBS MODERATE 35: CPT | Performed by: INTERNAL MEDICINE

## 2019-07-30 PROCEDURE — 97530 THERAPEUTIC ACTIVITIES: CPT | Mod: GP | Performed by: PHYSICAL THERAPIST

## 2019-07-30 PROCEDURE — 97161 PT EVAL LOW COMPLEX 20 MIN: CPT | Mod: GP | Performed by: PHYSICAL THERAPIST

## 2019-07-30 PROCEDURE — 85018 HEMOGLOBIN: CPT | Performed by: PHYSICIAN ASSISTANT

## 2019-07-30 PROCEDURE — 25000132 ZZH RX MED GY IP 250 OP 250 PS 637: Mod: GY | Performed by: INTERNAL MEDICINE

## 2019-07-30 PROCEDURE — 25800025 ZZH RX 258: Performed by: INTERNAL MEDICINE

## 2019-07-30 PROCEDURE — 12000000 ZZH R&B MED SURG/OB

## 2019-07-30 PROCEDURE — 25000132 ZZH RX MED GY IP 250 OP 250 PS 637: Mod: GY | Performed by: NURSE PRACTITIONER

## 2019-07-30 PROCEDURE — 36415 COLL VENOUS BLD VENIPUNCTURE: CPT | Performed by: INTERNAL MEDICINE

## 2019-07-30 PROCEDURE — G0463 HOSPITAL OUTPT CLINIC VISIT: HCPCS

## 2019-07-30 PROCEDURE — 80048 BASIC METABOLIC PNL TOTAL CA: CPT | Performed by: INTERNAL MEDICINE

## 2019-07-30 PROCEDURE — 85018 HEMOGLOBIN: CPT | Performed by: INTERNAL MEDICINE

## 2019-07-30 PROCEDURE — 40000894 ZZH STATISTIC OT IP EVAL DEFER: Performed by: OCCUPATIONAL THERAPIST

## 2019-07-30 PROCEDURE — 36415 COLL VENOUS BLD VENIPUNCTURE: CPT | Performed by: PHYSICIAN ASSISTANT

## 2019-07-30 PROCEDURE — 99207 ZZC CDG-MDM COMPONENT: MEETS LOW - DOWN CODED: CPT | Performed by: INTERNAL MEDICINE

## 2019-07-30 PROCEDURE — 25000132 ZZH RX MED GY IP 250 OP 250 PS 637: Mod: GY | Performed by: PHYSICIAN ASSISTANT

## 2019-07-30 RX ORDER — ACETAMINOPHEN 325 MG/1
650 TABLET ORAL 4 TIMES DAILY
Status: DISCONTINUED | OUTPATIENT
Start: 2019-07-30 | End: 2019-07-31 | Stop reason: HOSPADM

## 2019-07-30 RX ADMIN — ACETAMINOPHEN 650 MG: 325 TABLET, FILM COATED ORAL at 18:00

## 2019-07-30 RX ADMIN — DEXTROSE AND SODIUM CHLORIDE: 5; 450 INJECTION, SOLUTION INTRAVENOUS at 00:38

## 2019-07-30 RX ADMIN — ACETAMINOPHEN 650 MG: 325 TABLET, FILM COATED ORAL at 09:45

## 2019-07-30 RX ADMIN — SENNOSIDES AND DOCUSATE SODIUM 2 TABLET: 8.6; 5 TABLET ORAL at 21:25

## 2019-07-30 RX ADMIN — ACETAMINOPHEN 650 MG: 325 TABLET, FILM COATED ORAL at 21:26

## 2019-07-30 RX ADMIN — SENNOSIDES AND DOCUSATE SODIUM 2 TABLET: 8.6; 5 TABLET ORAL at 09:45

## 2019-07-30 RX ADMIN — DEXTROSE AND SODIUM CHLORIDE: 5; 450 INJECTION, SOLUTION INTRAVENOUS at 16:45

## 2019-07-30 RX ADMIN — TRAMADOL HYDROCHLORIDE 50 MG: 50 TABLET ORAL at 05:37

## 2019-07-30 RX ADMIN — ACETAMINOPHEN 650 MG: 325 TABLET, FILM COATED ORAL at 13:58

## 2019-07-30 RX ADMIN — LOSARTAN POTASSIUM 50 MG: 50 TABLET ORAL at 09:45

## 2019-07-30 ASSESSMENT — ACTIVITIES OF DAILY LIVING (ADL)
ADLS_ACUITY_SCORE: 20
ADLS_ACUITY_SCORE: 17
ADLS_ACUITY_SCORE: 20
ADLS_ACUITY_SCORE: 19

## 2019-07-30 ASSESSMENT — MIFFLIN-ST. JEOR: SCORE: 803.71

## 2019-07-30 NOTE — PLAN OF CARE
Alert to self. VSS. Dressing on hip has dried drainage. Other dressings CDI. Pain  managed with tramadol. Urinary retention noted. Bladder scanned for 435, 375 straight cathed out. Continue to monitor.

## 2019-07-30 NOTE — PLAN OF CARE
Discharge Planner OT   Patient plan for discharge: TCU  Current status: order received and chart reviewed.  Per chart notes from MD and PRITESH, discharge recommendation is for TCU.  Per PT notes, patient is using a assist of 2, or lift for transfers. She lives alone.  Barriers to return to prior living situation: defer to PT  Recommendations for discharge: defer to PT  Rationale for recommendations: discharge recommendation is for TCU.  Will defer skilled OT evaluation to next level of care.  Will complete orders.       Entered by: AMARILIS FELDMAN 07/30/2019 1:15 PM

## 2019-07-30 NOTE — PROGRESS NOTES
[facility]    Hospitalist Progress Note    Assessment & Plan   Gennaro Walton is a 89 year old female who was admitted on 7/25/2019 with a fall and suffered the following injuries.:      Impression:   Principal Problem:      Comminuted left intertroch hip fract -- related to osteoporosis and fall   -- S/P ORIF 7/26/19, 30% weightbearing for at least 6 weeks   --follow up per ortho     Acute toxic encephalopathy   --suspect from medications and trauma   -schedule Tylenol and decrease tramadol     Traumatic SAH (subarachnoid hemorrhage)    -- initial slight increase in bleed but stable on last CT 7/26      Proximal left humerus fracture   -- treating with left arm sling      Acute blood loss anemia due to trauma-- S/P transfusion x 2 units, now 7.3 today   -- check hgb in AM, but expect stable at this point       Delirium -- postop   -- improved from yesterday but still too groggy to eat   -- continue IV fluids until improve PO intake, decrease rate with hope to stop later today or tomorrow.      Atelectasis    -- postop, improving, on room air today   --wean oxygen off as able.    Active Problems:    Fall      Benign essential hypertension      Osteoporosis    DVT Prophylaxis: Pneumatic Compression Devices  Code Status: DNR/DNI    Disposition: Expected discharge to TCU later this afternoon or tomorrow.  May need [place] if has further retention.  Wean oxygen.  Decreasing tramadol due to lethargy and confusion.      Fei Marshall MD  Pager [phone]  Cell Phone [phone]  Text Page (7am to 6pm)  25 min spent total caring for patient   Interval History   Taking some oral intake today.     Physical Exam   Temp: 98.2  F (36.8  C) Temp src: Oral BP: 128/56 Pulse: 66 Heart Rate: 51 Resp: 16 SpO2: 99 % O2 Device: Nasal cannula Oxygen Delivery: 3 LPM  Vitals:    07/25/19 1408 07/26/19 0500 07/30/19 0510   Weight: 45.4 kg (100 lb) 44.5 kg (98 lb) 44 kg (96 lb 14.6 oz)     Vital Signs with  Ranges  Temp:  [97.9  F (36.6  C)-98.2  F (36.8  C)] 98.2  F (36.8  C)  Pulse:  [64-69] 66  Heart Rate:  [] 51  Resp:  [14-18] 16  BP: (128-146)/(56-88) 128/56  SpO2:  [91 %-99 %] 99 %  I/O last 3 completed shifts:  In: 1520 [P.O.:620; I.V.:900]  Out: 375 [Urine:375]      Constitutional:  , cooperative, no apparent distress, confused and doesn't remember falling.  Speaking about  although he  over ine year ago  Respiratory: Clear to auscultation bilaterally, no crackles or wheezing  Cardiovascular: Regular rate and rhythm, normal S1 and S2, and no murmur noted  GI: Normal bowel sounds, soft, non-distended, non-tender  Extrem: No calf tenderness, no ankle edema  Neuro: Awake, able to answer questions    Medications     - MEDICATION INSTRUCTIONS -       dextrose 5% and 0.45% NaCl 75 mL/hr at 19 0038     - MEDICATION INSTRUCTIONS -       - MEDICATION INSTRUCTIONS -         acetaminophen  650 mg Oral 4x Daily     losartan  50 mg Oral Daily     senna-docusate  1 tablet Oral BID    Or     senna-docusate  2 tablet Oral BID     sodium chloride (PF)  3 mL Intracatheter Q8H       Data   Recent Labs   Lab 19  0836 19  0641 19  1610 19  0322 19  2122 19  0730 19  1414   WBC  --   --   --   --  13.6* 8.9 8.2   HGB 7.3* 8.6* 6.9* 7.9*  Canceled, Test credited 6.9* 8.6* 12.5   MCV  --   --   --   --  97 95 95   PLT  --   --   --   --  213 223 261   INR  --   --   --   --   --   --  1.02    142  --   --  139 139 137   POTASSIUM 3.4 4.1  --   --  4.4 4.1 3.8   CHLORIDE 105 108  --   --  107 105 104   CO2 30 30  --   --  27 29 27   BUN 16 20  --   --  20 17 16   CR 0.47* 0.55  --   --  0.69 0.64 0.61   ANIONGAP 2* 4  --   --  5 5 6   LORETA 7.5* 8.0*  --   --  7.6* 7.9* 8.2*   * 106*  --   --  183* 148* 136*   ALBUMIN  --   --   --   --   --   --  3.3*   PROTTOTAL  --   --   --   --   --   --  6.5*   BILITOTAL  --   --   --   --   --   --  0.3   ALKPHOS  --    --   --   --   --   --  95   ALT  --   --   --   --   --   --  16   AST  --   --   --   --   --   --  19   TROPI  --   --   --  0.037 0.035  --   --        Imaging:   No results found for this or any previous visit (from the past 24 hour(s)).

## 2019-07-30 NOTE — PLAN OF CARE
Patient oriented to self only, though more alert today. Tolerating diet. Voiding in bedpan/BSC. Up with ceiling lift. Pain managed with tylenol this shift. Repositioned q2hrs.

## 2019-07-30 NOTE — PLAN OF CARE
Discharge Planner PT   Patient plan for discharge: None stated. Per SW note, pt's family plans for TCU.  Current status: Orders received, evaluation completed and treatment initiated. Patient is a 89 y/o female admitted after a fall sustaining L humerus fracture, traumatic SAH, and POD # 4 ORIF of L intertrochanteric femur fracture. Pt lives alone in an apartment with elevator access. Pt is independent at baseline with use of a SEC. Pt performed supine-sit with modA of 2. Pt tolerated sitting EOB with min-modA for balance. Pt transferred to bedside chair with use of universal sling and ceiling lift. Pt up in chair at end of session, RN updated.    Barriers to return to prior living situation: Level of assist, Pain, Fall risk, Decreased activity tolerance  Recommendations for discharge: TCU  Rationale for recommendations: Pt is below baseline in regards to functional mobility and will benefit from continued skilled PT intervention in order to improve independence and safety with functional mobility.        Entered by: Barb Mary 07/30/2019 12:15 PM

## 2019-07-30 NOTE — PROGRESS NOTES
07/30/19 1111   Quick Adds   Type of Visit Initial PT Evaluation   Living Environment   Lives With alone   Living Arrangements independent living facility   Home Accessibility no concerns   Transportation Anticipated family or friend will provide   Living Environment Comment Pt unable to provide subjective history.    Self-Care   Usual Activity Tolerance moderate   Current Activity Tolerance fair   Regular Exercise No   Equipment Currently Used at Home cane, straight   Activity/Exercise/Self-Care Comment Pt owns a SEC and FWW   Functional Level Prior   Ambulation 1-->assistive equipment   Transferring 1-->assistive equipment   Fall history within last six months yes   Number of times patient has fallen within last six months 1   General Information   Onset of Illness/Injury or Date of Surgery - Date 07/25/19   Referring Physician Yuid Valdez PA-C   Patient/Family Goals Statement None stated.    Pertinent History of Current Problem (include personal factors and/or comorbidities that impact the POC) 89 y/o female admitted after a fall sustaining L humerus fracture, traumatic SAH, POD # 4 ORIF of L interthrocanteric femur fracture.    Precautions/Limitations fall precautions   Weight-Bearing Status - LUE nonweight-bearing   Weight-Bearing Status - LLE toe touch weight-bearing   General Observations Pt in supine upon arrival of therapist.    Cognitive Status Examination   Orientation person   Level of Consciousness confused   Follows Commands and Answers Questions 50% of the time   Personal Safety and Judgment impaired   Pain Assessment   Patient Currently in Pain   (Pt denied pain at rest, reported L hip discomfort with move)   Integumentary/Edema   Integumentary/Edema Comments L hip incision covered with dressing.    Posture    Posture Comments Noted forward head and shoulder posture upon sitting EOB.    Range of Motion (ROM)   ROM Comment Limited L LE ROM secondary to pain. Unable to assess L UE.   "  Strength   Strength Comments Pt demonstrates B LE weakness with mobility requiring assist of 2.    Bed Mobility   Bed Mobility Comments Supine-sit with modA of 2.    Transfer Skills   Transfer Comments NT.   Gait   Gait Comments NT.   Balance   Balance Comments Noted fair sitting balance EOB requiring assist    Sensory Examination   Sensory Perception Comments NT.   Modality Interventions   Planned Modality Interventions Cryotherapy   Planned Modality Interventions Comments PRN.   General Therapy Interventions   Planned Therapy Interventions bed mobility training;gait training;ROM;strengthening;transfer training   Clinical Impression   Criteria for Skilled Therapeutic Intervention yes, treatment indicated   PT Diagnosis Difficulty with functional mobility.    Influenced by the following impairments Pain, Generalized weakness, Decreased activity tolerance, Impaired balance   Functional limitations due to impairments Limited functional mobility requiring AD and assist.    Clinical Presentation Stable/Uncomplicated   Clinical Presentation Rationale Based on PMH, current presentation, and social support.    Clinical Decision Making (Complexity) Low complexity   Therapy Frequency Daily   Predicted Duration of Therapy Intervention (days/wks) 3 days   Anticipated Discharge Disposition Transitional Care Facility   Risk & Benefits of therapy have been explained Yes   Patient, Family & other staff in agreement with plan of care Yes   [facility] Next Step Living[employer] TM \"6 Clicks\"   2016, Trustees of [facility], under license to [employer].  All rights reserved.   6 Clicks Short Forms Basic Mobility Inpatient Short Form   [facility] AM-PAC  \"6 Clicks\" V.2 Basic Mobility Inpatient Short Form   1. Turning from your back to your side while in a flat bed without using bedrails? 2 - A Lot   2. Moving from lying on your back to sitting on the side of a flat bed without using bedrails? 2 - A Lot   3. Moving to and from a " bed to a chair (including a wheelchair)? 1 - Total   4. Standing up from a chair using your arms (e.g., wheelchair, or bedside chair)? 1 - Total   5. To walk in hospital room? 1 - Total   6. Climbing 3-5 steps with a railing? 1 - Total   Basic Mobility Raw Score (Score out of 24.Lower scores equate to lower levels of function) 8   Total Evaluation Time   Total Evaluation Time (Minutes) 5

## 2019-07-30 NOTE — PROGRESS NOTES
[facility] Nurse Inpatient Adult Pressure Injury (PI) Assessment     Initial Assessment of PI(s) on pt's:   Just right of lateral upper thoracic spine    Data:   Patient History:      per MD note(s): s/p surgery July 26, 2019 for LEFT INTERTROCHANTERIC FEMUR FRACTURE OPEN REDUCTION AND INTERNAL FIXATION   Proximal left humerus fracture     Cem Risk Assessment  Sensory Perception: 3-->slightly limited    Moisture: 4-->rarely moist   Activity: 1-->bedfast     Mobility: 2-->very limited   Nutrition: 1-->very poor   Cem Score: 12        Positioning: Mepilex dressing and Pillows,     Mattress:  Standard , Atmos Air mattress    Moisture Management:  Diaper    Catheter secured? Not applicable    Current Diet / Nutrition:       Orders Placed This Encounter        Advance Diet as Tolerated        Advance Diet as Tolerated: Regular Diet Adult          Labs:   Recent Labs   Lab Test 07/30/19  0836  07/26/19  2122  07/25/19  1414   ALBUMIN  --   --   --   --  3.3*   HGB 7.3*   < > 6.9*   < > 12.5   INR  --   --   --   --  1.02   WBC  --   --  13.6*   < > 8.2    < > = values in this interval not displayed.                                                                                                                          Pressure Injury Assessment  (location):   Just right of lateral upper thoracic spine    Wound History:   Pt normally mobile but now with arm fracture needs support to reposition  Severe thoracic spine kyphosis  07/30/19 thoracic spine          Wound Base:  Center or wound bed is moist pink dermal tissue, periwound tissue  Is loose devitalized epidermis.     Specific Dimensions (length x width x depth, in cm) :   1.2cm x 1.3cm x 0.2cm    Tunneling:  N/A    Undermining: N/A    Palpation of the wound bed:  normal    Slough appearance:  none    Eschar appearance:  none    Periwound Skin: loose epidermis, ring of blanchable pink erythema,  On the left side of the spine are two other  "wounds, superior wound is 0.5cm x 0.3cm x 0.2cm and a hard, dry blackish Mcgrath is about 0.3cm x 0.3cm, red blanchable erythema to periwound tissue    Color: pink    Temperature  normal     Drainage:  Small serous         Odor: none    Pain:  minimal , sharp         Intervention:     Patient's chart evaluated.      Cem Interventions:  Current Cem Interventions and Care Plan reviewed and updated, appropriate at this time.    Assessed wound with assistance of RN, daughter also present    Orders  Written    Supplies  reviewed, gathered, placed at the bedside, discussed with RN, discussed with family and discussed with patient    Discussed plan of care with Patient, Family and Nurse    All patient / family questions answered:  YES           Assessment:     Pressure Injury (PI) located on Just right of lateral upper thoracic spine: 2, clean without s/s of infection.  Plan is for every other day Mepilex Dressings and vigilant PIP measures.   Pt has a very protuberant kyphosis to the thoracic spine, needs to be carefully positioned to properly off load    On the left side of the spine are two other areas of injury .  These look like they are older and likely due to the fall vs a pressure injury.   Can keep these open to the air or try to incorporate in the Mepilex Dressing       Plan:     Nursing to notify the Provider(s) and re-consult the [facility] Nurse if wound(s) deteriorate(s)or if the wound care plan needs reevaluation.  If pt is refusing to turn or reposition they must be educated on the  potential injury from not off loading pressure.  Then this \"educated refusal\" needs to be documented as an \"educated refusal to turn/ reposition\" and document if alert, etc.  Plan for wound care to wound located on just right of thoracic spine: every other day, even days  1. Clean wound with saline or MicroKlenz Spray, pat dry  2. Paint periwound tissue with No Sting Skin Prep (#368700) and allow to dry thoroughly  3. Press a " "Mepilex  Border Dressing (#038749) to the area, making sure to conform nicely to skin curvatures tape top and bottom of dressing with Medipore tape to help keep tacked down  4. Time and date dressing change and gerardo with a \"T\" for treatment of a wound  5. Reposition pt every 1 to 2 hours when in bed and hourly when up to the chair to relieve pressure and promote perfusion to tissue    NOTE- continue to assess under dressing and document findings BID, per protocol  Chair positioning  When pt is up to the chair place a pillow on each side of the spine for support, pillows meeting over the spine, this will help off load spine when up the the chair  Pt should sit on a chair cushion when up to the chair, and reposition every 1-2 hours  Bed positioning  Use a positioning sheet for positioning so minimize shearing to the skin  Reposition side to side only when in bed, every 2 hours and prn  Heels elevated at all times    WOC Nurse will return: weekly and prn    "

## 2019-07-31 ENCOUNTER — APPOINTMENT (OUTPATIENT)
Dept: PHYSICAL THERAPY | Facility: CLINIC | Age: 84
DRG: 956 | End: 2019-07-31
Payer: MEDICARE

## 2019-07-31 VITALS
WEIGHT: 100 LBS | TEMPERATURE: 98.6 F | HEART RATE: 89 BPM | SYSTOLIC BLOOD PRESSURE: 161 MMHG | OXYGEN SATURATION: 93 % | HEIGHT: 63 IN | DIASTOLIC BLOOD PRESSURE: 90 MMHG | RESPIRATION RATE: 18 BRPM | BODY MASS INDEX: 17.72 KG/M2

## 2019-07-31 LAB
HGB BLD-MCNC: 8.2 G/DL (ref 11.7–15.7)
INTERPRETATION ECG - MUSE: NORMAL
INTERPRETATION ECG - MUSE: NORMAL

## 2019-07-31 PROCEDURE — 25000132 ZZH RX MED GY IP 250 OP 250 PS 637: Mod: GY | Performed by: INTERNAL MEDICINE

## 2019-07-31 PROCEDURE — 99239 HOSP IP/OBS DSCHRG MGMT >30: CPT | Performed by: INTERNAL MEDICINE

## 2019-07-31 PROCEDURE — 36415 COLL VENOUS BLD VENIPUNCTURE: CPT | Performed by: PHYSICIAN ASSISTANT

## 2019-07-31 PROCEDURE — 25000132 ZZH RX MED GY IP 250 OP 250 PS 637: Mod: GY | Performed by: PHYSICIAN ASSISTANT

## 2019-07-31 PROCEDURE — 97530 THERAPEUTIC ACTIVITIES: CPT | Mod: GP

## 2019-07-31 PROCEDURE — 85018 HEMOGLOBIN: CPT | Performed by: PHYSICIAN ASSISTANT

## 2019-07-31 RX ORDER — TRAMADOL HYDROCHLORIDE 50 MG/1
25 TABLET ORAL EVERY 6 HOURS PRN
Qty: 15 TABLET | Status: SHIPPED | DISCHARGE
Start: 2019-07-31 | End: 2019-08-02

## 2019-07-31 RX ORDER — OXYBUTYNIN CHLORIDE 10 MG/1
10 TABLET, EXTENDED RELEASE ORAL AT BEDTIME
DISCHARGE
Start: 2019-07-31

## 2019-07-31 RX ORDER — ACETAMINOPHEN 325 MG/1
650 TABLET ORAL 4 TIMES DAILY
DISCHARGE
Start: 2019-07-31 | End: 2019-08-05

## 2019-07-31 RX ADMIN — SENNOSIDES AND DOCUSATE SODIUM 1 TABLET: 8.6; 5 TABLET ORAL at 08:24

## 2019-07-31 RX ADMIN — LOSARTAN POTASSIUM 50 MG: 50 TABLET ORAL at 08:24

## 2019-07-31 RX ADMIN — ACETAMINOPHEN 650 MG: 325 TABLET, FILM COATED ORAL at 08:24

## 2019-07-31 RX ADMIN — ACETAMINOPHEN 650 MG: 325 TABLET, FILM COATED ORAL at 12:43

## 2019-07-31 ASSESSMENT — ACTIVITIES OF DAILY LIVING (ADL)
ADLS_ACUITY_SCORE: 20
ADLS_ACUITY_SCORE: 20
ADLS_ACUITY_SCORE: 24
ADLS_ACUITY_SCORE: 24

## 2019-07-31 ASSESSMENT — MIFFLIN-ST. JEOR: SCORE: 817.73

## 2019-07-31 NOTE — PLAN OF CARE
Pt more alert today, forgetful and confused at times. Up with lift. Pain controlled with PO tylenol. Tolerating more of diet today, slightly better appetite. Dressing changed with ortho PA, site leaking. Discharged to rehab. Discharge instructions reviewed with both pt and daughter, Caron.

## 2019-07-31 NOTE — PROGRESS NOTES
PRITESH  D: Received discharge orders for pt. Faxed orders, scripts to [place]. Met with pt and wilrCaron to give update. They were in agreement to go to [place] and are ok with pt being wheeled through the skyway by transport aide.     Leisa COOPER, [facility]     ADDENDUM @1416:     PAS-RR    D: Per DHS regulation, SW completed and submitted PAS-RR to [place] [employer] Direct Connect via the Senior LinkAge Line.  PAS-RR confirmation # is : [id number]    I: PRITESH spoke with pt and they are aware a PAS-RR has been submitted.  PRITESH reviewed with pt that they may be contacted for a follow up appointment within 10 days of hospital discharge if their SNF stay is < 30 days.  Contact information for Senior LinkAge Line was also provided.    A: Pt verbalized understanding.    P: Further questions may be directed to Senior LinkAge Line at #[phone], option #4 for PAS-RR staff.

## 2019-07-31 NOTE — PLAN OF CARE
Pt is alert and confused. VSS. Room air. IV fluids infusing. Incontinent throughout shift, large void output x2. Up with 2. Sling in place on left arm. Repositioned every 2 hours. Dressings changed x2 during shift due to large amount of drainage, saturating dressings and tape. Continue to monitor.

## 2019-07-31 NOTE — PROGRESS NOTES
Orthopedic Surgery  Gennaro Walton  2019  Admit Date:  2019  POD: 5 Days Post-Op   Procedure(s):  LEFT INTERTROCHANTERIC FEMUR FRACTURE OPEN REDUCTION AND INTERNAL FIXATION  Left proximal humerus fracture (? Acute vs chronic)    Patient continues to have pain left hip and left shoulder  Tolerating oral intake.    Denies nausea or vomiting  Denies chest pain or shortness of breath  Dressings continue to saturate    Vital Sign Ranges  Temperature Temp  Av.5  F (36.9  C)  Min: 98.3  F (36.8  C)  Max: 98.6  F (37  C)   Blood pressure Systolic (24hrs), Av , Min:119 , Max:161        Diastolic (24hrs), Av, Min:64, Max:90      Pulse Pulse  Av.8  Min: 72  Max: 89   Respirations Resp  Av.4  Min: 16  Max: 18   Pulse oximetry SpO2  Av.5 %  Min: 93 %  Max: 97 %       Dressings continue to saturate through.  These are changed at bedside.  No opening of incision.  Staples intact.   Minimal erythema of the surrounding skin.   Bilateral calves are soft, non-tender.  Left lower extremity is NVI.  Sensation intact bilateral lower extremities  Patient able to resist dorsi and plantar flexion bilaterally  +Dp pulse  Sling in place LUE, NVI.  Able to flex/extend wrist.     Labs:  Recent Labs   Lab Test 19  0836 19  0641 19  2122   POTASSIUM 3.4 4.1 4.4     Recent Labs   Lab Test 19  1026 19  1721 19  0836   HGB 8.2* 8.1* 7.3*     Recent Labs   Lab Test 19  1414   INR 1.02     Recent Labs   Lab Test 19  2122 19  0730 19  1414    223 261     Hgb improving    1. PLAN:   Defer DVT prophylaxis to medicine d/t subarachnoid hemorrhage   Mobilize with PT/OT    TTWB LLE with walker.     Continue current pain regiment.   Dressings: Continue daily dressings changes or as needed if saturated.    Follow-up: 2 weeks Dr Oseguera    2. Disposition   Anticipate d/c to TCU today If medically cleared and progressing in PT.    Kelly Dunn PA-C

## 2019-07-31 NOTE — DISCHARGE SUMMARY
[facility]    Discharge Summary  Hospitalist    Date of Admission:  7/25/2019  Date of Discharge:  7/31/2019  Discharging Provider: Fei Marshall MD  Date of Service (when I saw the patient): 07/31/19    Discharge Diagnoses   Comminuted left intertrochanteric hip fracture   acute toxic encephalopathy   Traumatic fall with above  Traumatic subarachnoid hemorrhage  Proximal left humerus fracture  Acute blood loss anemia due to above trauma  Atelectasis    History of Present Illness  &Hospital Course   Gennaro Walton is an 89 year old female who presented after falling and suffering a number of traumatic injuries.  Evaluation through x-ray and CT revealed a comminuted left intertrochanteric hip fracture, subarachnoid hemorrhage, left humerus fracture and acute blood loss anemia.  Patient was seen in consultation by orthopedic surgery and underwent open reduction internal fixation on July 26.  She is now undergoing therapy and is at a 30% weightbearing for at least 6 weeks.  The traumatic subarachnoid hemorrhage was stable and repeat CT had no change.  Will avoid aspirin or other nonsteroidals to avoid bleeding risk.  Patient was treated medically with a left arm sling for the humerus fracture.  Patient did require 2 units of packed red cells and follow-up hemoglobins have remained stable with last hemoglobin of 8.2.  Patient did experience some delirium and confusion likely related to the trauma, lack of sleep and the medications.  Patient has improved with scheduled Tylenol and changing to lower dose tramadol.  Patient was seen by therapies and is transitioning to rehab facility for ongoing therapies.      Fei Marshall MD    Significant Results and Procedures   See above and below  Left OPEN REDUCTION INTERNAL FIXATION    Pending Results   These results will be followed up by Mady Hagen   Unresulted Labs Ordered in the Past 30 Days of this Admission     Date and Time Order Name Status  Description    7/25/2019 1607 Platelets prepare order unit In process           Code Status   DNR / DNI       Primary Care Physician   Mady Hagen    Physical Exam   Temp: 98.6  F (37  C) Temp src: Oral BP: (!) 161/90 Pulse: 89 Heart Rate: 89 Resp: 18 SpO2: 93 % O2 Device: None (Room air)    Vitals:    07/26/19 0500 07/30/19 0510 07/31/19 0500   Weight: 44.5 kg (98 lb) 44 kg (96 lb 14.6 oz) 45.4 kg (100 lb)     Vital Signs with Ranges  Temp:  [98.5  F (36.9  C)-98.6  F (37  C)] 98.6  F (37  C)  Pulse:  [75-89] 89  Heart Rate:  [76-89] 89  Resp:  [16-18] 18  BP: (130-161)/(70-90) 161/90  SpO2:  [93 %-97 %] 93 %  I/O last 3 completed shifts:  In: 700 [P.O.:700]  Out: 375 [Urine:375]       Constitutional:  , cooperative, no apparent distress, confused and doesn't remember falling.  sitting in chair today  Respiratory: Clear to auscultation bilaterally, no crackles or wheezing  Cardiovascular: Regular rate and rhythm, normal S1 and S2, and no murmur noted  GI: Normal bowel sounds, soft, non-distended, non-tender  Extrem: No calf tenderness, no ankle edema.  Left arm in sling  Neuro: Awake, able to answer questions.  But more alert today still having some confusion      Discharge Disposition   Discharged to rehabilitation facility  Condition at discharge: Stable    Consultations This Hospital Stay   ORTHOPEDIC SURGERY IP CONSULT  NEUROSURGERY IP CONSULT  OCCUPATIONAL THERAPY ADULT IP CONSULT  PHYSICAL THERAPY ADULT IP CONSULT  CARE TRANSITION RN/SW IP CONSULT  PHYSICAL THERAPY ADULT IP CONSULT  OCCUPATIONAL THERAPY ADULT IP CONSULT  WOUND OSTOMY CONTINENCE NURSE  IP CONSULT    Time Spent on this Encounter   I, Fei Marshall, personally saw the patient today and spent greater than 30 minutes discharging this patient.    Discharge Orders      CT Head w/o Contrast     Follow-up and recommended labs and tests     [facility] 4-6 weeks with head CT prior.  Please call the clinic at  [phone] to schedule your appointment.     General info for SNF    Length of Stay Estimate: Short Term Care: Estimated # of Days <30  Condition at Discharge: Improving  Level of care:skilled   Rehabilitation Potential: Good  Admission H&P remains valid and up-to-date: Yes  Recent Chemotherapy: N/A  Use Nursing Home Standing Orders: Yes     Mantoux instructions    Give two-step Mantoux (PPD) Per Facility Policy Yes     Reason for your hospital stay    Left intertrochanteric femur fracture open reduction internal fixation     Weight bearing status    TTWB on the left     Activity - Up with assistive device     Activity - Up with nursing assistance     Follow Up and recommended labs and tests    Follow up with Dr. Oseguera , at [facility] city[facility] [id number] days  to evaluate after surgery. No follow up labs or test are needed prior to visit. At this visit x-rays will be taken, sutures/staples removed, and questions to be answered.  Bring any needed forms with to appointment.  Call for appointment ( - Patient Coordinator): [phone]  After hours: [phone]     Wound care    Site:   Left hip  Instructions:  Leave dressing intact if Aquacel and remove at POD #7-10, remove staples POD #14  Daily dressing changes if gauze and tape     Physical Therapy Adult Consult    Evaluate and treat as clinically indicated.    Reason:  Left intertrochanteric femur fracture open reduction internal fixation     Occupational Therapy Adult Consult    Evaluate and treat as clinically indicated.    Reason:  Left intertrochanteric femur fracture open reduction internal fixation     Fall precautions     Advance Diet as Tolerated    Follow this diet upon discharge: Orders Placed This Encounter      Advance Diet as Tolerated: Full Liquid Diet     Discharge Medications   Current Discharge Medication List      START taking these medications    Details   polyethylene glycol  (MIRALAX/GLYCOLAX) packet Take 17 g by mouth daily as needed for constipation    Associated Diagnoses: Left displaced femoral neck fracture (H)      traMADol (ULTRAM) 50 MG tablet Take 0.5 tablets (25 mg) by mouth every 6 hours as needed for moderate pain  Qty: 15 tablet    Associated Diagnoses: Closed fracture of neck of left femur, initial encounter (H)         CONTINUE these medications which have CHANGED    Details   acetaminophen (TYLENOL) 325 MG tablet Take 2 tablets (650 mg) by mouth 4 times daily    Associated Diagnoses: Closed fracture of neck of left femur, initial encounter (H)      oxybutynin ER (DITROPAN-XL) 10 MG 24 hr tablet Take 1 tablet (10 mg) by mouth At Bedtime    Comments: If urinating without difficulty then start August 2nd.  Associated Diagnoses: Urinary frequency         CONTINUE these medications which have NOT CHANGED    Details   amLODIPine (NORVASC) 5 MG tablet Take 2.5 mg by mouth daily      levothyroxine (SYNTHROID/LEVOTHROID) 88 MCG tablet Take 88 mcg by mouth daily      losartan (COZAAR) 50 MG tablet Take 50 mg by mouth daily      vitamin D3 (CHOLECALCIFEROL) 2000 units tablet Take 1 tablet by mouth daily         STOP taking these medications       aspirin 81 MG EC tablet Comments:   Reason for Stopping:             Allergies   No Known Allergies  Data   Most Recent 3 CBC's:  Recent Labs   Lab Test 07/31/19  1026 07/30/19  1721 07/30/19  0836  07/26/19 2122 07/26/19  0730 07/25/19  1414   WBC  --   --   --   --  13.6* 8.9 8.2   HGB 8.2* 8.1* 7.3*   < > 6.9* 8.6* 12.5   MCV  --   --   --   --  97 95 95   PLT  --   --   --   --  213 223 261    < > = values in this interval not displayed.      Most Recent 3 BMP's:  Recent Labs   Lab Test 07/30/19  0836 07/28/19  0641 07/26/19 2122    142 139   POTASSIUM 3.4 4.1 4.4   CHLORIDE 105 108 107   CO2 30 30 27   BUN 16 20 20   CR 0.47* 0.55 0.69   ANIONGAP 2* 4 5   LORETA 7.5* 8.0* 7.6*   * 106* 183*     Most Recent 2 LFT's:  Recent  Labs   Lab Test 07/25/19  1414   AST 19   ALT 16   ALKPHOS 95   BILITOTAL 0.3     Most Recent INR's and Anticoagulation Dosing History:  Anticoagulation Dose History     Recent Dosing and Labs Latest Ref Rng & Units 7/25/2019    INR 0.86 - 1.14 1.02        Most Recent 3 Troponin's:  Recent Labs   Lab Test 07/27/19  0322 07/26/19  2122   TROPI 0.037 0.035     Most Recent Cholesterol Panel:No lab results found.  Most Recent 6 Bacteria Isolates From Any Culture (See EPIC Reports for Culture Details):No lab results found.  Most Recent TSH, T4 and A1c Labs:No lab results found.  Results for orders placed or performed during the hospital encounter of 07/25/19   Head CT w/o contrast     Value    Radiologist flags Intracranial hemorrhage (AA)    Narrative    CT OF THE HEAD WITHOUT CONTRAST 7/25/2019 3:14 PM     COMPARISON: None.    HISTORY: Fall, head trauma.    TECHNIQUE: 5 mm thick axial CT images of the head were acquired  without IV contrast material.    FINDINGS: There is a small ovoid focus of hyperdensity in the left  sylvian fissure likely representing a small amount of subarachnoid  hemorrhage. There is no other evidence for intracranial hemorrhage.     There is moderate diffuse cerebral volume loss. There are extensive  confluent areas of decreased density in the cerebral white matter  bilaterally that are consistent with sequela of chronic small vessel  ischemic disease. The ventricles and basal cisterns are within normal  limits in configuration given the degree of cerebral volume loss.   There is no midline shift. There are no extra-axial fluid collections.    No intracranial mass or recent infarct.    The visualized paranasal sinuses are well-aerated. There is no  mastoiditis. There are no fractures of the visualized bones. There is  a large left frontal scalp hematoma.      Impression    IMPRESSION:   1. Small focus of presumed subarachnoid hemorrhage in the left sylvian  fissure.  2. Diffuse cerebral volume  loss and cerebral white matter changes  consistent with chronic small vessel ischemic disease.     [Critical Result: Intracranial hemorrhage]    Finding was identified on 7/25/2019 3:16 PM.     RODOLFO JETT was contacted by Dr. Sousa on 7/25/2019 3:17 PM  and verbalized understanding of the critical result.     Radiation dose for this scan was reduced using automated exposure  control, adjustment of the mA and/or kV according to patient size, or  iterative reconstruction technique.    CLARITA SOUSA MD   CT Cervical Spine w/o Contrast    Narrative    CT OF THE CERVICAL SPINE WITHOUT CONTRAST   7/25/2019 3:15 PM     COMPARISON: None.    HISTORY: Fall, evaluate fracture.     TECHNIQUE: Axial images of the cervical spine were acquired without  intravenous contrast. Multiplanar reformations were created.      FINDINGS: There is normal alignment of the cervical vertebrae.  Vertebral body heights of the cervical spine are normal.  Craniocervical alignment is normal. There are no fractures of the  cervical spine.  There is no significant degenerative spinal canal  narrowing of the cervical spine. There is no prevertebral soft tissue  swelling.      Impression    IMPRESSION: No evidence for fracture or traumatic malalignment of the  cervical spine.      Radiation dose for this scan was reduced using automated exposure  control, adjustment of the mA and/or kV according to patient size, or  iterative reconstruction technique.    CLARITA SOUSA MD   XR Shoulder Left 2 Views    Narrative    Examination:  XR SHOULDER 2 VIEW LEFT    Date:  7/25/2019 3:30 PM     Clinical Information: fall, pain and tenderness     Additional Information: none    Comparison: none    Findings: Impacted fracture deformity of the proximal humerus, with  the shaft of the humerus impacted superiorly into the head.  Technically a clear osseous step off is visible medially. A cortical  step-off is less evident laterally; this would be age  indeterminate by  imaging, but given the clinical history and acute pain this appearance  could be due to an acute fracture. No significant displacement of  fracture fragments. Moderate glenohumeral osteoarthrosis. Complete  effacement of the acromiohumeral space consistent with chronic rotator  cuff tearing. Generalized demineralization.      Impression    Impression:  Impacted fracture deformity of the left proximal humerus, technically  age indeterminate but could be consistent with an acute injury given  the patient's clinical history.    MARLEY LEVINE MD   XR Pelvis w Hip Left 1 View    Narrative    Examination:  XR PELVIS AND HIP LEFT 1 VIEW    Date:  7/25/2019 3:34 PM     Clinical Information: fall, pain and tenderness     Additional Information: none    Comparison: none    Findings: Acute, comminuted, intertrochanteric fracture of the left  proximal femur. No fracture involvement of the femoral head or neck.  Normal left hip joint alignment is maintained. Left hip joint spacing  is maintained. The bones are demineralized. No additional fracture in  the pelvis or right hip.    MARLEY LEVINE MD   XR Chest 1 View    Narrative    XR CHEST 1 VW   7/25/2019 3:37 PM     HISTORY: pre op eval    COMPARISON: None available      Impression    IMPRESSION: Patient is rotated towards the right. Small left sided  pleural effusion. Hypoinflated lungs with scattered patchy airspace  opacities, possibly representing subsegmental atelectasis.  Superimposed pneumonia however cannot be ruled out, correlate with  clinical findings. Cardiomediastinal silhouette is mildly enlarged.  Surgical clips overly the trachea. Degenerative changes seen in the  glenohumeral joints. Dextroscoliosis of the thoracic spine.     VIRGINIA BAH MD   XR Wrist Left 2 Views    Narrative    Examination:  XR WRIST LEFT 2 VIEW    Date:  7/25/2019 5:37 PM     Clinical Information: Left wrist pain after fall.     Additional Information:  none    Comparison: none    Findings:     Severe demineralization. No acute fracture identified, though  sensitivity to nondisplaced fractures is decreased due to the  patient's demineralization. No focal soft tissue swelling. Moderate  degenerative changes in the wrist, greatest at the base of the thumb  and the first CMC joint. Normal joint alignment. No significant soft  tissue abnormality.      Impression    Impression:    1.  No acute fracture or joint malalignment.  2.  Severe demineralization, which diminishes sensitivity to  nondisplaced fractures.  3.  No soft tissue swelling.    MARLEY LEVINE MD   CT Head w/o Contrast    Narrative    CT SCAN OF THE HEAD WITHOUT CONTRAST   7/26/2019 11:15 AM     HISTORY: Intracranial hemorrhage, known, follow up.    TECHNIQUE:  Axial images of the head and coronal reformations without  IV contrast material. Radiation dose for this scan was reduced using  automated exposure control, adjustment of the mA and/or kV according  to patient size, or iterative reconstruction technique.    COMPARISON: Head CT 7/25/2019.    FINDINGS:  The exam is severely limited due to motion artifacts. Subarachnoid  hemorrhage layering within the left sylvian fissure and along the left  anterior temporal lobe is similar in volume compared to the prior exam  and slightly redistributed inferiorly. There is probable slight  increase in subarachnoid hemorrhage along the left inferior parietal  lobule along the supramarginal gyrus (series 3 image 20). Small volume  subarachnoid hemorrhage along the left pars orbicularis is slightly  increased compared to the prior exam. Small volume hemorrhage along  the left middle frontal gyrus is slightly more conspicuous compared to  the prior exam. There is increased intraventricular subarachnoid  hemorrhage layering within the occipital horn of the left lateral  ventricle.    Moderate volume loss is present. Extensive white matter  hypoattenuation likely  represents chronic small vessel ischemic  change. The parenchyma is otherwise without appreciable abnormality.    Left frontoparietal scalp contusion with subcutaneous hematoma noted,  slightly decreased in size compared to the prior exam. No gross skull  fractures are appreciated, however evaluation is limited due to motion  artifact. There is opacification of the right tympanic and mastoid  cavities, presumably infectious/inflammatory, unchanged. There is  complete opacification of the right maxillary sinus and near complete  opacification of the right anterior ethmoid sinus and right frontal  sinus, unchanged.      Impression    IMPRESSION:   1. Severely limited exam due to motion artifact.  2. Probable slight increase in volume of subarachnoid hemorrhage along  the left cerebral hemisphere as detailed.  3. Slight increase in volume subarachnoid hemorrhage layering within  the occipital horn of the left lateral ventricle.  4. Continued follow-up is recommended.    MARIAN CLIFTON MD   CT Head w/o Contrast    Narrative    CT OF THE HEAD WITHOUT CONTRAST 7/26/2019 8:17 PM     COMPARISON: Head CT 7/26/2019    HISTORY: Subarachnoid hemorrhage, proven, followup.    TECHNIQUE: 5 mm thick axial CT images of the head were acquired  without IV contrast material.    FINDINGS: Subarachnoid hemorrhage in the left sylvian fissure is again  noted without change. Scattered foci of subarachnoid hemorrhage along  the posterolateral aspect of the left cerebral hemisphere again noted  without change. There is no evidence for new or increasing  intracranial hemorrhage. There is moderate diffuse cerebral volume  loss. There are extensive confluent areas of decreased density in the  cerebral white matter bilaterally that are consistent with sequela of  chronic small vessel ischemic disease.    The ventricles and basal cisterns are within normal limits in  configuration given the degree of cerebral volume loss. There is no  midline  shift.    No intracranial mass or recent infarct.    The visualized paranasal sinuses are well-aerated. There is no  mastoiditis. There are no fractures of the visualized bones. A left  frontal scalp hematoma is again noted.      Impression    IMPRESSION: Stable subarachnoid hemorrhage in the left sylvian fissure  and along the posterolateral aspect of the left cerebral convexity. No  evidence for new or increasing intracranial hemorrhage. Diffuse  cerebral volume loss and cerebral white matter changes consistent with  chronic small vessel ischemic disease.      Radiation dose for this scan was reduced using automated exposure  control, adjustment of the mA and/or kV according to patient size, or  iterative reconstruction technique    CLARITA ABRAHAM MD   XR Surgery JUAN Fluoro L/T 5 Min w Stills    Narrative    SURGERY C-ARM FLUOROSCOPY LESS THAN FIVE MINUTES WITH STILLS 7/26/2019  6:17 PM     HISTORY: ORIF left femur, fluoro time 3 minutes 8 seconds, 4 images,  c-arm 1.    NUMBER OF IMAGES ACQUIRED: 4.    VIEWS: 2.    FLUOROSCOPY TIME: 3.2.      Impression    IMPRESSION: A cephalomedullary screw, an intramedullary nail, and a  distal interlocking screw affix an intertrochanteric fracture. There  is no evidence of hardware complication. There is about 1 cm of  displacement of fracture fragments. No significant angulation.  Otherwise unremarkable.    PAUL ELIAS MD

## 2019-07-31 NOTE — PLAN OF CARE
Discharge Planner PT   Patient plan for discharge: Not stated  Current status: Patient supine in bed upon arrival of therapist, agreeable to working with PT. RN in room to assist with mobility during session. Supine>sit with mod A x 1 + min A x 1. Patient able to scoot to EOB with mod A x 1. Patient sat at EOB for approximately 4-5 minutes with CGA for balance support. Donned universal sling in sitting and utilized lift to transfer from bed to chair. Patient positioned with pillows for comfort and legs elevated on recliner. All needs in reach and chair alarm on upon therapist exit.   Barriers to return to prior living situation: WB restrictions, current level of A, decreased tolerance to activity, falls risk   Recommendations for discharge: TCU  Rationale for recommendations: Patient continues to require A x 2 + lift for all mobility/ambulation, limited 2/2 to WB restrictions. Patient would benefit from continued skilled therapy to further improve strength, balance, and independence with mobility and ambulation to address functional limitations and decrease falls risk.         Entered by: Anika Coon 07/31/2019 12:10 PM

## 2019-08-01 ENCOUNTER — NURSING HOME VISIT (OUTPATIENT)
Dept: GERIATRICS | Facility: CLINIC | Age: 84
End: 2019-08-01
Payer: MEDICARE

## 2019-08-01 VITALS
WEIGHT: 105 LBS | RESPIRATION RATE: 18 BRPM | TEMPERATURE: 98.1 F | OXYGEN SATURATION: 93 % | DIASTOLIC BLOOD PRESSURE: 56 MMHG | HEART RATE: 71 BPM | BODY MASS INDEX: 18.61 KG/M2 | SYSTOLIC BLOOD PRESSURE: 158 MMHG | HEIGHT: 63 IN

## 2019-08-01 VITALS
RESPIRATION RATE: 16 BRPM | OXYGEN SATURATION: 94 % | TEMPERATURE: 99.3 F | SYSTOLIC BLOOD PRESSURE: 157 MMHG | WEIGHT: 105 LBS | HEIGHT: 63 IN | HEART RATE: 68 BPM | DIASTOLIC BLOOD PRESSURE: 68 MMHG | BODY MASS INDEX: 18.61 KG/M2

## 2019-08-01 DIAGNOSIS — I60.9 SAH (SUBARACHNOID HEMORRHAGE) (H): ICD-10-CM

## 2019-08-01 DIAGNOSIS — R41.0 DELIRIUM: ICD-10-CM

## 2019-08-01 DIAGNOSIS — M80.00XD AGE-RELATED OSTEOPOROSIS WITH CURRENT PATHOLOGICAL FRACTURE WITH ROUTINE HEALING, SUBSEQUENT ENCOUNTER: ICD-10-CM

## 2019-08-01 DIAGNOSIS — J98.11 ATELECTASIS: ICD-10-CM

## 2019-08-01 DIAGNOSIS — S42.202D CLOSED FRACTURE OF PROXIMAL END OF LEFT HUMERUS WITH ROUTINE HEALING, UNSPECIFIED FRACTURE MORPHOLOGY, SUBSEQUENT ENCOUNTER: ICD-10-CM
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
## 2019-08-01 DIAGNOSIS — S72.145D CLOSED NONDISPLACED INTERTROCHANTERIC FRACTURE OF LEFT FEMUR WITH ROUTINE HEALING, SUBSEQUENT ENCOUNTER: Primary | ICD-10-CM

## 2019-08-01 DIAGNOSIS — R53.81 PHYSICAL DECONDITIONING: ICD-10-CM

## 2019-08-01 DIAGNOSIS — D62 ACUTE BLOOD LOSS ANEMIA: ICD-10-CM

## 2019-08-01 DIAGNOSIS — Z71.89 ADVANCED DIRECTIVES, COUNSELING/DISCUSSION: ICD-10-CM

## 2019-08-01 PROCEDURE — 99310 SBSQ NF CARE HIGH MDM 45: CPT | Performed by: NURSE PRACTITIONER

## 2019-08-01 ASSESSMENT — MIFFLIN-ST. JEOR
SCORE: 840.41
SCORE: 840.41

## 2019-08-01 NOTE — PROGRESS NOTES
[facility]  PRIMARY CARE PROVIDER AND CLINIC:  Mady Hagen MD, [address] / [facility] [address]  Chief Complaint   Patient presents with     Hospital F/U     [place] Medical Record Number:  [id number]  Place of Service where encounter took place:  [facility] [[id number]]    Gennaro Walton  is a 89 year old  (9/8/1923), admitted to the above facility from  [facility]. Hospital stay 7/25/2019 through 7/31/2019..  Admitted to this facility for  rehab, medical management and nursing care.    HPI:    HPI information obtained from: facility chart records, facility staff, patient report and [facility] chart review.   Brief Summary of Hospital Course:   89 year old female hospitalized after a fall with traumatic injuries including comminuted left intertrochanteric hip fracture, subarachnoid hemorrhage, left humerus fracture and acute blood loss anemia.  Now s/p ORIF hip 7/26 and is at a 30% weightbearing for at least 6 weeks.  Subarachnoid hemorrhage stable on repeat CT, avoid ASA/NSAIDs. Uses left arm sling for the humerus fracture.  Transfused 2 units PRBCs and last hemoglobin of 8.2. Developed delirium and confusion likely related to the trauma, lack of sleep and the medications.  Patient has improved with scheduled Tylenol and changing to lower dose tramadol.  Sent to TCU for rehab and further care.      Updates on Status Since Skilled nursing Admission:   Seen at TCU for initial hospital follow up. She is oriented to self and place but not date. Pleasant, able to provide history. Reports fell outside while waiting for a bus. Lives in [place], recently became  and moved to [place] from [place] to be closer to her only daughter. Denies headaches, dizziness, chest pain. Has some SOB with activity. No cough but had a temp of 99.3 yesterday. She has had a stool several days ago, urinating without issues. Left hip incision with moderate  amount serous drainage. Since admission note -161, sats 93% on room air. Has scheduled tylenol and PRN tramadol. Due to previous significant confusion/somnolence with pain meds will use them cautiously. She is to f/u ortho in two weeks (may arrange to have f/u at TCU with ortho ACE Alejandre). To see spine and brain clinic 4-6 weeks with repeat CT of head.     CODE STATUS/ADVANCE DIRECTIVES DISCUSSION:   DNR / DNI  Patient's living condition: lives alone  ALLERGIES: Patient has no known allergies.  PAST MEDICAL HISTORY:  has a past medical history of Hypertension and Osteoporosis.  PAST SURGICAL HISTORY:   has a past surgical history that includes Open Reduction Internal Fixation Femur Midshaft (Left, 7/26/2019).  FAMILY HISTORY: family history is not on file.  SOCIAL HISTORY:   reports that she has never smoked. She has never used smokeless tobacco. She reports that she does not drink alcohol or use drugs.    Post Discharge Medication Reconciliation Status: discharge medications reconciled and changed, per note/orders (see AVS)    Current Outpatient Medications   Medication Sig Dispense Refill     acetaminophen (TYLENOL) 325 MG tablet Take 2 tablets (650 mg) by mouth 4 times daily       amLODIPine (NORVASC) 5 MG tablet Take 2.5 mg by mouth daily       levothyroxine (SYNTHROID/LEVOTHROID) 88 MCG tablet Take 88 mcg by mouth daily       losartan (COZAAR) 50 MG tablet Take 50 mg by mouth daily       oxybutynin ER (DITROPAN-XL) 10 MG 24 hr tablet Take 1 tablet (10 mg) by mouth At Bedtime       polyethylene glycol (MIRALAX/GLYCOLAX) packet Take 17 g by mouth daily as needed for constipation       traMADol (ULTRAM) 50 MG tablet Take 0.5 tablets (25 mg) by mouth every 6 hours as needed for moderate pain 15 tablet      vitamin D3 (CHOLECALCIFEROL) 2000 units tablet Take 1 tablet by mouth daily         ROS:  10 point ROS of systems including Constitutional, Eyes, Respiratory, Cardiovascular, Gastroenterology,  "Genitourinary, Integumentary, Musculoskeletal, Psychiatric were all negative except for pertinent positives noted in my HPI.    Vitals:  BP (!) 158/56   Pulse 71   Temp 98.1  F (36.7  C)   Resp 18   Ht 1.6 m (5' 3\")   Wt 47.6 kg (105 lb)   SpO2 93%   BMI 18.60 kg/m    Exam:  GENERAL APPEARANCE:  Alert, in no distress, pleasant, cooperative, oriented x self. Petite, frail.   EYES:  EOM, lids, pupils and irises normal, sclera clear and conjunctiva normal, no discharge or mattering on lids or lashes noted  ENT:  Mouth normal, moist mucous membranes, nose normal without drainage or crusting, external ears without lesions, hearing acuity intact  NECK: supple, symmetrical, trachea midline  RESP:  respiratory effort normal, no chest wall tenderness, no respiratory distress, Lung sounds crackles at bases, patient is on room air no cough  CV:  Auscultation of heart done, rate and rhythm controlled and regular today, no murmur, no rub or gallop. Edema none bilateral lower extremities.   ABDOMEN:  normal bowel sounds, soft, nontender, no palpable masses.  M/S:   Gait and station bedbound and unsafe without assistance, no tenderness or swelling of the joints; left arm in sling, digits normal  SKIN:  Inspection and palpation of skin and subcutaneous tissue: generalized bruising left side of body. Left hip incision with moderate serous drainage.   NEURO: cranial nerves 2-12 grossly intact, no facial asymmetry, no speech deficits and able to follow directions, moves all extremities symmetrically  PSYCH:  insight and judgement and memory appear somewhat impaired, affect and mood normal     Lab/Diagnostic data:    Most Recent 3 CBC's:  Recent Labs   Lab Test 07/31/19  1026 07/30/19  1721 07/30/19  0836  07/26/19  2122 07/26/19  0730 07/25/19  1414   WBC  --   --   --   --  13.6* 8.9 8.2   HGB 8.2* 8.1* 7.3*   < > 6.9* 8.6* 12.5   MCV  --   --   --   --  97 95 95   PLT  --   --   --   --  213 223 261    < > = values in this " interval not displayed.     Most Recent 3 BMP's:  Recent Labs   Lab Test 07/30/19  0836 07/28/19  0641 07/26/19  2122    142 139   POTASSIUM 3.4 4.1 4.4   CHLORIDE 105 108 107   CO2 30 30 27   BUN 16 20 20   CR 0.47* 0.55 0.69   ANIONGAP 2* 4 5   LORETA 7.5* 8.0* 7.6*   * 106* 183*       ASSESSMENT/PLAN:  Closed nondisplaced intertrochanteric fracture of left femur with routine healing, subsequent encounter  Fall, subsequent encounter  Age-related osteoporosis with current pathological fracture with routine healing, subsequent encounter  Proximal left humerus fracture  Acute onset, now s/p hip ORIF and sling for left arm.   --Tylenol scheduled and Tramadol PRN as ordered  --ortho f/u 2 weeks - will see if possible at TCU  --WB at 30% left leg.   --change left hip dressing BID and PRN drainage until resolved  --CBC, BMP on 8/2 diagnosis anemia, HTN    Traumatic SAH (subarachnoid hemorrhage)   Delirium  Physical deconditioning  Acute onset with injury; CT stable last check and neuro's stable at TCU  --monitor, therapies - f/u with progress next week.   --spine and brain clinic with CT repeat as ordered.     Acute blood loss anemia  Acute onset, stable last check. CBC on 8/2    Atelectasis  Noted at hospital, some fevers since admit.   --Incentive spirometry    Advanced directives, counseling/discussion  Reviewed and completed POLST - DNR/DNI     Orders written by provider at facility  1. DNR/DNI  2. Advance diet to regular  3. CBC, BMP on 8/2  4. Incentive spirometer 10 reps QID diagnosis dyspnea  5. Schedule Miralax 17 gm PO daily, stop PRN dose. Staff to update provider if not effective.   6. Left hip: dressing change with ABD BID and PRN until drainage resolves.     Total time spent with patient visit at the skilled nursing facility was 40 min' including patient visit and review of past records. Greater than 50% of total time spent with counseling and coordinating care due to coordinating care with nurse  on admission orders, coordinating care with follow up labs and appointments, counseling patient on: the reason for their hospitalization and what the treatment is, the plan of SNF stay and projected length of stay, options of code status and their current code status order, current medication reconciled from the hospital, recent past lab and imaging results and subsequent treatment plan and current pain control plan and controlled substances ordered and taper plan of care.    Electronically signed by:  RITA Castellanos CNP

## 2019-08-01 NOTE — PLAN OF CARE
Physical Therapy Discharge Summary    Reason for therapy discharge:    Discharged to transitional care facility.    Progress towards therapy goal(s). See goals on Care Plan in [facility] electronic health record for goal details.  Goals not met.  Barriers to achieving goals:   discharge from facility.    Therapy recommendation(s):    Continued therapy is recommended.  Rationale/Recommendations:  Patient continues to require A x 2 + lift for all mobility/ambulation, limited 2/2 to WB restrictions. Patient would benefit from continued skilled therapy to further improve strength, balance, and independence with mobility and ambulation to address functional limitations and decrease falls risk.  .

## 2019-08-01 NOTE — LETTER
8/1/2019        RE: Gennaro Walton  [address]  [place] [address]        [facility]  PRIMARY CARE PROVIDER AND CLINIC:  Mady Hagen MD, [address] / [place] [address]  Chief Complaint   Patient presents with     Hospital F/U     [place] Medical Record Number:  [id number]  Place of Service where encounter took place:  [facility] [[id number]]    Gennaro Walton  is a 89 year old  (9/8/1923), admitted to the above facility from  [facility]. Hospital stay 7/25/2019 through 7/31/2019..  Admitted to this facility for  rehab, medical management and nursing care.    HPI:    HPI information obtained from: facility chart records, facility staff, patient report and [facility] chart review.   Brief Summary of Hospital Course:   89 year old female  hospitalized after a fall with traumatic injuries including comminuted left intertrochanteric hip fracture, subarachnoid hemorrhage, left humerus fracture and acute blood loss anemia.   Now s/p ORIF hip 7/26 and is at a 30% weightbearing for at least 6 weeks.   Subarachnoid hemorrhage stable on repeat CT, avoid ASA/NSAIDs. Uses left arm sling for the humerus fracture.   Transfused 2 units PRBCs and last hemoglobin of 8.2. Developed delirium and confusion likely related to the trauma, lack of sleep and the medications.  Patient has improved with scheduled Tylenol and changing to lower dose tramadol.   Sent to TCU for rehab and further care.      Updates on Status Since Skilled nursing Admission:   Seen at TCU for initial hospital follow up. She is oriented to self and place but not date. Pleasant, able to provide history. Reports fell outside while waiting for a bus. [place], recently became  and moved to [place] from [place] to be closer to her only daughter. Denies headaches, dizziness, chest pain. Has some SOB with activity. No cough but had a temp of 99.3 yesterday.  She has had a stool several days ago, urinating without issues. Left hip incision with moderate amount serous drainage. Since admission note -161, sats 93% on room air. Has scheduled tylenol and PRN tramadol. Due to previous significant confusion/somnolence with pain meds will use them cautiously. She is to f/u ortho in two weeks (may arrange to have f/u at TCU with ortho ACE Alejandre). To see spine and brain clinic 4-6 weeks with repeat CT of head.     CODE STATUS/ADVANCE DIRECTIVES DISCUSSION:   DNR / DNI  Patient's living condition: lives alone  ALLERGIES: Patient has no known allergies.  PAST MEDICAL HISTORY:  has a past medical history of Hypertension and Osteoporosis.  PAST SURGICAL HISTORY:   has a past surgical history that includes Open Reduction Internal Fixation Femur Midshaft (Left, 7/26/2019).  FAMILY HISTORY: family history is not on file.  SOCIAL HISTORY:   reports that she has never smoked. She has never used smokeless tobacco. She reports that she does not drink alcohol or use drugs.    Post Discharge Medication Reconciliation Status: discharge medications reconciled and changed, per note/orders (see AVS)    Current Outpatient Medications   Medication Sig Dispense Refill     acetaminophen (TYLENOL) 325 MG tablet Take 2 tablets (650 mg) by mouth 4 times daily       amLODIPine (NORVASC) 5 MG tablet Take 2.5 mg by mouth daily       levothyroxine (SYNTHROID/LEVOTHROID) 88 MCG tablet Take 88 mcg by mouth daily       losartan (COZAAR) 50 MG tablet Take 50 mg by mouth daily       oxybutynin ER (DITROPAN-XL) 10 MG 24 hr tablet Take 1 tablet (10 mg) by mouth At Bedtime       polyethylene glycol (MIRALAX/GLYCOLAX) packet Take 17 g by mouth daily as needed for constipation       traMADol (ULTRAM) 50 MG tablet Take 0.5 tablets (25 mg) by mouth every 6 hours as needed for moderate pain 15 tablet      vitamin D3 (CHOLECALCIFEROL) 2000 units tablet Take 1 tablet by mouth daily         ROS:  10 point ROS of  "systems including Constitutional, Eyes, Respiratory, Cardiovascular, Gastroenterology, Genitourinary, Integumentary, Musculoskeletal, Psychiatric were all negative except for pertinent positives noted in my HPI.    Vitals:  BP (!) 158/56   Pulse 71   Temp 98.1  F (36.7  C)   Resp 18   Ht 1.6 m (5' 3\")   Wt 47.6 kg (105 lb)   SpO2 93%   BMI 18.60 kg/m     Exam:  GENERAL APPEARANCE:  Alert, in no distress, pleasant, cooperative, oriented x self. Petite, frail.   EYES:  EOM, lids, pupils and irises normal, sclera clear and conjunctiva normal, no discharge or mattering on lids or lashes noted  ENT:  Mouth normal, moist mucous membranes, nose normal without drainage or crusting, external ears without lesions, hearing acuity intact  NECK: supple, symmetrical, trachea midline  RESP:  respiratory effort normal, no chest wall tenderness, no respiratory distress, Lung sounds crackles at bases, patient is on room air no cough  CV:  Auscultation of heart done, rate and rhythm controlled and regular today, no murmur, no rub or gallop. Edema none bilateral lower extremities.   ABDOMEN:  normal bowel sounds, soft, nontender, no palpable masses.  M/S:   Gait and station bedbound and unsafe without assistance, no tenderness or swelling of the joints; left arm in sling, digits normal  SKIN:  Inspection and palpation of skin and subcutaneous tissue: generalized bruising left side of body. Left hip incision with moderate serous drainage.   NEURO: cranial nerves 2-12 grossly intact, no facial asymmetry, no speech deficits and able to follow directions, moves all extremities symmetrically  PSYCH:  insight and judgement and memory appear somewhat impaired, affect and mood normal     Lab/Diagnostic data:    Most Recent 3 CBC's:  Recent Labs   Lab Test 07/31/19  1026 07/30/19  1721 07/30/19  0836  07/26/19  2122 07/26/19  0730 07/25/19  1414   WBC  --   --   --   --  13.6* 8.9 8.2   HGB 8.2* 8.1* 7.3*   < > 6.9* 8.6* 12.5   MCV  --   " --   --   --  97 95 95   PLT  --   --   --   --  213 223 261    < > = values in this interval not displayed.     Most Recent 3 BMP's:  Recent Labs   Lab Test 07/30/19  0836 07/28/19  0641 07/26/19  2122    142 139   POTASSIUM 3.4 4.1 4.4   CHLORIDE 105 108 107   CO2 30 30 27   BUN 16 20 20   CR 0.47* 0.55 0.69   ANIONGAP 2* 4 5   LORETA 7.5* 8.0* 7.6*   * 106* 183*       ASSESSMENT/PLAN:  Closed nondisplaced intertrochanteric fracture of left femur with routine healing, subsequent encounter  Fall, subsequent encounter  Age-related osteoporosis with current pathological fracture with routine healing, subsequent encounter  Proximal left humerus fracture  Acute onset, now s/p hip ORIF and sling for left arm.   --Tylenol scheduled and Tramadol PRN as ordered  --ortho f/u 2 weeks - will see if possible at TCU  --WB at 30% left leg.   --change left hip dressing BID and PRN drainage until resolved  --CBC, BMP on 8/2 diagnosis anemia, HTN    Traumatic SAH (subarachnoid hemorrhage)   Delirium  Physical deconditioning  Acute onset with injury; CT stable last check and neuro's stable at TCU  --monitor, therapies - f/u with progress next week.   --spine and brain clinic with CT repeat as ordered.     Acute blood loss anemia  Acute onset, stable last check. CBC on 8/2    Atelectasis  Noted at hospital, some fevers since admit.   --Incentive spirometry    Advanced directives, counseling/discussion  Reviewed and completed POLST - DNR/DNI     Orders written by provider at facility  1. DNR/DNI  2. Advance diet to regular  3. CBC, BMP on 8/2  4. Incentive spirometer 10 reps QID diagnosis dyspnea  5. Schedule Miralax 17 gm PO daily, stop PRN dose. Staff to update provider if not effective.   6. Left hip: dressing change with ABD BID and PRN until drainage resolves.     Total time spent with patient visit at the skilled nursing facility was 40 min' including patient visit and review of past records. Greater than 50% of total  time spent with counseling and coordinating care due to coordinating care with nurse on admission orders, coordinating care with follow up labs and appointments, counseling patient on: the reason for their hospitalization and what the treatment is, the plan of SNF stay and projected length of stay, options of code status and their current code status order, current medication reconciled from the hospital, recent past lab and imaging results and subsequent treatment plan and current pain control plan and controlled substances ordered and taper plan of care.    Electronically signed by:  RITA Castellanos CNP                             Sincerely,        RITA Castellanos CNP

## 2019-08-02 ENCOUNTER — NURSING HOME VISIT (OUTPATIENT)
Dept: GERIATRICS | Facility: CLINIC | Age: 84
End: 2019-08-02
Payer: MEDICARE

## 2019-08-02 ENCOUNTER — HOSPITAL LABORATORY (OUTPATIENT)
Dept: OTHER | Facility: CLINIC | Age: 84
End: 2019-08-02

## 2019-08-02 DIAGNOSIS — R53.81 PHYSICAL DECONDITIONING: Primary | ICD-10-CM

## 2019-08-02 DIAGNOSIS — S42.202D CLOSED FRACTURE OF PROXIMAL END OF LEFT HUMERUS WITH ROUTINE HEALING, UNSPECIFIED FRACTURE MORPHOLOGY, SUBSEQUENT ENCOUNTER: ICD-10-CM

## 2019-08-02 DIAGNOSIS — I60.9 SAH (SUBARACHNOID HEMORRHAGE) (H): ICD-10-CM
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
## 2019-08-02 DIAGNOSIS — S72.002A CLOSED FRACTURE OF NECK OF LEFT FEMUR, INITIAL ENCOUNTER (H): ICD-10-CM

## 2019-08-02 DIAGNOSIS — E83.51 HYPOCALCEMIA: ICD-10-CM

## 2019-08-02 DIAGNOSIS — M80.00XD AGE-RELATED OSTEOPOROSIS WITH CURRENT PATHOLOGICAL FRACTURE WITH ROUTINE HEALING, SUBSEQUENT ENCOUNTER: ICD-10-CM

## 2019-08-02 DIAGNOSIS — D62 ACUTE BLOOD LOSS ANEMIA: ICD-10-CM

## 2019-08-02 DIAGNOSIS — S72.145D CLOSED NONDISPLACED INTERTROCHANTERIC FRACTURE OF LEFT FEMUR WITH ROUTINE HEALING, SUBSEQUENT ENCOUNTER: ICD-10-CM

## 2019-08-02 LAB
ANION GAP SERPL CALCULATED.3IONS-SCNC: 9 MMOL/L (ref 3–14)
BUN SERPL-MCNC: 11 MG/DL (ref 7–30)
CALCIUM SERPL-MCNC: 8.2 MG/DL (ref 8.5–10.1)
CHLORIDE SERPL-SCNC: 102 MMOL/L (ref 94–109)
CO2 SERPL-SCNC: 24 MMOL/L (ref 20–32)
CREAT SERPL-MCNC: 0.6 MG/DL (ref 0.52–1.04)
ERYTHROCYTE [DISTWIDTH] IN BLOOD BY AUTOMATED COUNT: 15.2 % (ref 10–15)
GFR SERPL CREATININE-BSD FRML MDRD: 77 ML/MIN/{1.73_M2}
GLUCOSE SERPL-MCNC: 93 MG/DL (ref 70–99)
HCT VFR BLD AUTO: 26.5 % (ref 35–47)
HGB BLD-MCNC: 8.7 G/DL (ref 11.7–15.7)
MCH RBC QN AUTO: 31.1 PG (ref 26.5–33)
MCHC RBC AUTO-ENTMCNC: 32.8 G/DL (ref 31.5–36.5)
MCV RBC AUTO: 95 FL (ref 78–100)
PLATELET # BLD AUTO: 368 10E9/L (ref 150–450)
POTASSIUM SERPL-SCNC: 3.5 MMOL/L (ref 3.4–5.3)
RBC # BLD AUTO: 2.8 10E12/L (ref 3.8–5.2)
SODIUM SERPL-SCNC: 135 MMOL/L (ref 133–144)
WBC # BLD AUTO: 12 10E9/L (ref 4–11)

## 2019-08-02 PROCEDURE — 99305 1ST NF CARE MODERATE MDM 35: CPT | Performed by: FAMILY MEDICINE

## 2019-08-02 RX ORDER — TRAMADOL HYDROCHLORIDE 50 MG/1
25 TABLET ORAL EVERY 6 HOURS PRN
Qty: 60 TABLET | Refills: 0 | Status: SHIPPED | OUTPATIENT
Start: 2019-08-02 | End: 2019-08-05

## 2019-08-02 NOTE — LETTER
8/2/2019        RE: Gennaro Walton  [address]  [place] [address]        [facility]  PRIMARY CARE PROVIDER AND CLINIC:  Mady Hagen MD, [place] [address] [address] / [place] [address]  Chief Complaint   Patient presents with     Hospital F/U     [facility] Medical Record Number:  [id number]  Place of Service where encounter took place:  [facility] [[id number]]    Gennaro Walton  is a 89 year old  (9/8/1923), admitted to the above facility from  [facility]. Hospital stay 7/25/2019 through 7/31/2019..  Admitted to this facility for  rehab, medical management and nursing care.     HPI:    HPI information obtained from: facility staff, patient report and [facility] chart review.   Brief Summary of Hospital Course:   - Pt with PMH had a traumatic fall resulted in SDH, L comminuted intertrochanteric femur fx s/p ORIF,  L humerus fx managed conservatively.   - hospitalization c/w acute blood loss anemia required 2 units of PRBCS transfusion, and delirium felt to be 2/2 opioids hence stopped.     Updates on Status Since Skilled nursing Admission:   - noted to have low grade fever, CXR from hospital reveals atelectasis, incentive spirometry started.   - today, Pt seen and examined. Reports localized aching pain with movement 6-8/10 left upper extremity aggravated with movements, better with rest and medication.   Report severe aching pain left lower extremity,, aggravated with movement, better with medication.  -Reports dry cough but no wheezing.     CODE STATUS/ADVANCE DIRECTIVES DISCUSSION:   DNR / DNI  Patient's living condition: lives alone  ALLERGIES: Patient has no known allergies.  PAST MEDICAL HISTORY:  has a past medical history of Hypertension and Osteoporosis.  PAST SURGICAL HISTORY:   has a past surgical history that includes Open Reduction Internal Fixation Femur Midshaft (Left, 7/26/2019).  Family History  Medical History  "Relation Name Comments   Coronary artery disease Brother       Vaginal cancer Daughter       Coronary artery disease Father   MI   Hypertension Father       Brain cancer Mother   possibly parotid gland tumor   Coronary artery disease Sister         Relation Name Status Comments   Brother        Daughter   Alive     Daughter        Father        Mother        Sister        SOCIAL HISTORY:   reports that she has never smoked. She has never used smokeless tobacco. She reports that she does not drink alcohol or use drugs.    Post Discharge Medication Reconciliation Status: discharge medications reconciled and changed, per note/orders (see AVS)  Current Outpatient Medications   Medication Sig Dispense Refill     acetaminophen (TYLENOL) 325 MG tablet Take 2 tablets (650 mg) by mouth 4 times daily       amLODIPine (NORVASC) 5 MG tablet Take 2.5 mg by mouth daily       levothyroxine (SYNTHROID/LEVOTHROID) 88 MCG tablet Take 88 mcg by mouth daily       losartan (COZAAR) 50 MG tablet Take 50 mg by mouth daily       oxybutynin ER (DITROPAN-XL) 10 MG 24 hr tablet Take 1 tablet (10 mg) by mouth At Bedtime       polyethylene glycol (MIRALAX/GLYCOLAX) packet Take 17 g by mouth daily as needed for constipation (Patient taking differently: Take 17 g by mouth daily )       traMADol (ULTRAM) 50 MG tablet Take 0.5 tablets (25 mg) by mouth every 6 hours as needed for moderate pain 15 tablet      vitamin D3 (CHOLECALCIFEROL) 2000 units tablet Take 1 tablet by mouth daily       ROS:  10 point ROS of systems including Constitutional, Eyes, Respiratory, Cardiovascular, Gastroenterology, Genitourinary, Integumentary, Musculoskeletal, Psychiatric were all negative except for pertinent positives noted in my HPI.    Vitals:  BP (!) 157/68   Pulse 68   Temp 99.3  F (37.4  C)   Resp 16   Ht 1.6 m (5' 3\")   Wt 47.6 kg (105 lb)   SpO2 94%   BMI 18.60 kg/m     Exam:  GENERAL APPEARANCE:  in no distress, " cooperative  ENT:  Mouth and posterior oropharynx normal, moist mucous membranes, oral mucosa moist, no lesion noted.   EYES:  EOMI, Pupil rounded and equal.  RESP: Crackles over the bases along with coarse sounds but no wheezing.  Good air entry otherwise  CV:  S1S2 audible, regular HR, no murmur appreciated.   ABDOMEN:  soft, NT/ND, BS audible. no mass appreciated on palpation.   M/S:   Left upper extremity  In sling.  SKIN: Surgical site over left hip covered with Aquacel which is soaked, stable over mid and lower left thigh in place wound healing, extensive ecchymosis over left lower extremity,.  Hematoma over left forearm and genitalia,  hematoma over left temporal area  NEURO:   No NFD appreciated on observation. Hand  5/5 b/l  PSYCH:  AAOx person only, affect and mood normal    Lab/Diagnostic data: Reviewed in the chart and EHR.    =====================================    ASSESSMENT/PLAN:  # Physical deconditioning  # Fall, subsequent encounter  # Traumatic SAH (subarachnoid hemorrhage)   # Closed nondisplaced intertrochanteric fracture of left femur with routine healing, subsequent encounter, S/P ORIF  # Closed fracture of proximal end of left humerus with routine healing, unspecified fracture morphology, subsequent encounter  # Age-related osteoporosis with current pathological fracture with routine healing, subsequent encounter  # Mild hypocalcemia:  - started rehab program, making a progress, continue until desired goal is achieved.   - analgesia optimal  - wound healing properly.   - continue to follow on Ortho's recommendations.   - no APT or OAC for DVT's prophylaxis given SDH. Repeat CT in 4-6 weeks to follow on resolution.   - mild hypocalcemia, corrected Ca 8.1, on Vit D 2000 IU- Vit d level 21 (March 2019), not on Ca supplement, will add TUMS 500 mg bid.     Acute blood loss anemia: HH trending up, not on iron supplement. Stable.    Leukocytosis: with mild fever, no differential was performed,  felt to be 2/2 atelectasis, IS initiated. continue to monitor, and may repeat CBC with differential.     Cognitive Impairment: AAOx person, delirium at the hospital, felt to be 2/2 opioids.      Order: See above, otherwise, continue the rest of the current POC.       Electronically signed by:  Nathan Rowley MD               Sincerely,        Nathan Rowley MD

## 2019-08-02 NOTE — PROGRESS NOTES
[facility]  PRIMARY CARE PROVIDER AND CLINIC:  Mady Hagen MD, [place] [address] [address] / [place] [address]  Chief Complaint   Patient presents with     Hospital F/U     [facility] Medical Record Number:  [id number]  Place of Service where encounter took place:  [facility] [[id number]]    Gennaro Walton  is a 89 year old  (9/8/1923), admitted to the above facility from  [facility]. Hospital stay 7/25/2019 through 7/31/2019..  Admitted to this facility for  rehab, medical management and nursing care.     HPI:    HPI information obtained from: facility staff, patient report and [facility] chart review.   Brief Summary of Hospital Course:   - Pt with PMH had a traumatic fall resulted in SDH, L comminuted intertrochanteric femur fx s/p ORIF,  L humerus fx managed conservatively.   - hospitalization c/w acute blood loss anemia required 2 units of PRBCS transfusion, and delirium felt to be 2/2 opioids hence stopped.     Updates on Status Since Skilled nursing Admission:   - noted to have low grade fever, CXR from hospital reveals atelectasis, incentive spirometry started.   - today, Pt seen and examined. Reports localized aching pain with movement 6-8/10 left upper extremity aggravated with movements, better with rest and medication.   Report severe aching pain left lower extremity,, aggravated with movement, better with medication.  -Reports dry cough but no wheezing.     CODE STATUS/ADVANCE DIRECTIVES DISCUSSION:   DNR / DNI  Patient's living condition: lives alone  ALLERGIES: Patient has no known allergies.  PAST MEDICAL HISTORY:  has a past medical history of Hypertension and Osteoporosis.  PAST SURGICAL HISTORY:   has a past surgical history that includes Open Reduction Internal Fixation Femur Midshaft (Left, 7/26/2019).  Family History  Medical History Relation Name Comments   Coronary artery disease Brother       Vaginal cancer Daughter      "  Coronary artery disease Father   MI   Hypertension Father       Brain cancer Mother   possibly parotid gland tumor   Coronary artery disease Sister         Relation Name Status Comments   Brother        Daughter   Alive     Daughter        Father        Mother        Sister        SOCIAL HISTORY:   reports that she has never smoked. She has never used smokeless tobacco. She reports that she does not drink alcohol or use drugs.    Post Discharge Medication Reconciliation Status: discharge medications reconciled and changed, per note/orders (see AVS)  Current Outpatient Medications   Medication Sig Dispense Refill     acetaminophen (TYLENOL) 325 MG tablet Take 2 tablets (650 mg) by mouth 4 times daily       amLODIPine (NORVASC) 5 MG tablet Take 2.5 mg by mouth daily       levothyroxine (SYNTHROID/LEVOTHROID) 88 MCG tablet Take 88 mcg by mouth daily       losartan (COZAAR) 50 MG tablet Take 50 mg by mouth daily       oxybutynin ER (DITROPAN-XL) 10 MG 24 hr tablet Take 1 tablet (10 mg) by mouth At Bedtime       polyethylene glycol (MIRALAX/GLYCOLAX) packet Take 17 g by mouth daily as needed for constipation (Patient taking differently: Take 17 g by mouth daily )       traMADol (ULTRAM) 50 MG tablet Take 0.5 tablets (25 mg) by mouth every 6 hours as needed for moderate pain 15 tablet      vitamin D3 (CHOLECALCIFEROL) 2000 units tablet Take 1 tablet by mouth daily       ROS:  10 point ROS of systems including Constitutional, Eyes, Respiratory, Cardiovascular, Gastroenterology, Genitourinary, Integumentary, Musculoskeletal, Psychiatric were all negative except for pertinent positives noted in my HPI.    Vitals:  BP (!) 157/68   Pulse 68   Temp 99.3  F (37.4  C)   Resp 16   Ht 1.6 m (5' 3\")   Wt 47.6 kg (105 lb)   SpO2 94%   BMI 18.60 kg/m    Exam:  GENERAL APPEARANCE:  in no distress, cooperative  ENT:  Mouth and posterior oropharynx normal, moist mucous membranes, oral " mucosa moist, no lesion noted.   EYES:  EOMI, Pupil rounded and equal.  RESP: Crackles over the bases along with coarse sounds but no wheezing.  Good air entry otherwise  CV:  S1S2 audible, regular HR, no murmur appreciated.   ABDOMEN:  soft, NT/ND, BS audible. no mass appreciated on palpation.   M/S:   Left upper extremity  In sling.  SKIN: Surgical site over left hip covered with Aquacel which is soaked, stable over mid and lower left thigh in place wound healing, extensive ecchymosis over left lower extremity,.  Hematoma over left forearm and genitalia,  hematoma over left temporal area  NEURO:   No NFD appreciated on observation. Hand  5/5 b/l  PSYCH:  AAOx person only, affect and mood normal    Lab/Diagnostic data: Reviewed in the chart and EHR.    =====================================    ASSESSMENT/PLAN:  # Physical deconditioning  # Fall, subsequent encounter  # Traumatic SAH (subarachnoid hemorrhage)   # Closed nondisplaced intertrochanteric fracture of left femur with routine healing, subsequent encounter, S/P ORIF  # Closed fracture of proximal end of left humerus with routine healing, unspecified fracture morphology, subsequent encounter  # Age-related osteoporosis with current pathological fracture with routine healing, subsequent encounter  # Mild hypocalcemia:  - started rehab program, making a progress, continue until desired goal is achieved.   - analgesia optimal  - wound healing properly.   - continue to follow on Ortho's recommendations.   - no APT or OAC for DVT's prophylaxis given SDH. Repeat CT in 4-6 weeks to follow on resolution.   - mild hypocalcemia, corrected Ca 8.1, on Vit D 2000 IU- Vit d level 21 (March 2019), not on Ca supplement, will add TUMS 500 mg bid.     Acute blood loss anemia: HH trending up, not on iron supplement. Stable.    Leukocytosis: with mild fever, no differential was performed, felt to be 2/2 atelectasis, IS initiated. continue to monitor, and may repeat CBC with  differential.     Cognitive Impairment: AAOx person, delirium at the hospital, felt to be 2/2 opioids.      Order: See above, otherwise, continue the rest of the current POC.       Electronically signed by:  Nathan Rowley MD

## 2019-08-05 ENCOUNTER — NURSING HOME VISIT (OUTPATIENT)
Dept: GERIATRICS | Facility: CLINIC | Age: 84
End: 2019-08-05
Payer: MEDICARE

## 2019-08-05 ENCOUNTER — HOSPITAL LABORATORY (OUTPATIENT)
Dept: OTHER | Facility: CLINIC | Age: 84
End: 2019-08-05

## 2019-08-05 VITALS
RESPIRATION RATE: 16 BRPM | HEART RATE: 62 BPM | TEMPERATURE: 98.2 F | WEIGHT: 102 LBS | SYSTOLIC BLOOD PRESSURE: 171 MMHG | DIASTOLIC BLOOD PRESSURE: 60 MMHG | BODY MASS INDEX: 18.07 KG/M2 | OXYGEN SATURATION: 93 % | HEIGHT: 63 IN

## 2019-08-05 DIAGNOSIS — D62 ACUTE BLOOD LOSS ANEMIA: ICD-10-CM

## 2019-08-05 DIAGNOSIS — R41.0 DELIRIUM: ICD-10-CM

## 2019-08-05 DIAGNOSIS — S72.145D CLOSED NONDISPLACED INTERTROCHANTERIC FRACTURE OF LEFT FEMUR WITH ROUTINE HEALING, SUBSEQUENT ENCOUNTER: ICD-10-CM

## 2019-08-05 DIAGNOSIS — S42.202D CLOSED FRACTURE OF PROXIMAL END OF LEFT HUMERUS WITH ROUTINE HEALING, UNSPECIFIED FRACTURE MORPHOLOGY, SUBSEQUENT ENCOUNTER: ICD-10-CM

## 2019-08-05 DIAGNOSIS — S72.002A CLOSED FRACTURE OF NECK OF LEFT FEMUR, INITIAL ENCOUNTER (H): ICD-10-CM

## 2019-08-05 DIAGNOSIS — R52 ACUTE PAIN: ICD-10-CM

## 2019-08-05 DIAGNOSIS — N18.30 CKD (CHRONIC KIDNEY DISEASE) STAGE 3, GFR 30-59 ML/MIN (H): ICD-10-CM

## 2019-08-05 DIAGNOSIS — Z98.890 S/P ORIF (OPEN REDUCTION INTERNAL FIXATION) FRACTURE: ICD-10-CM
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
## 2019-08-05 DIAGNOSIS — R41.89 COGNITIVE IMPAIRMENT: ICD-10-CM

## 2019-08-05 DIAGNOSIS — S72.002A LEFT DISPLACED FEMORAL NECK FRACTURE (H): ICD-10-CM

## 2019-08-05 DIAGNOSIS — I60.9 SAH (SUBARACHNOID HEMORRHAGE) (H): ICD-10-CM

## 2019-08-05 DIAGNOSIS — N39.41 URGE INCONTINENCE OF URINE: ICD-10-CM

## 2019-08-05 DIAGNOSIS — R53.81 DEBILITY: ICD-10-CM

## 2019-08-05 DIAGNOSIS — I10 ESSENTIAL HYPERTENSION: ICD-10-CM

## 2019-08-05 DIAGNOSIS — Z87.81 S/P ORIF (OPEN REDUCTION INTERNAL FIXATION) FRACTURE: ICD-10-CM

## 2019-08-05 LAB
BASOPHILS # BLD AUTO: 0 10E9/L (ref 0–0.2)
BASOPHILS NFR BLD AUTO: 0.1 %
DIFFERENTIAL METHOD BLD: ABNORMAL
EOSINOPHIL # BLD AUTO: 0.3 10E9/L (ref 0–0.7)
EOSINOPHIL NFR BLD AUTO: 2.7 %
ERYTHROCYTE [DISTWIDTH] IN BLOOD BY AUTOMATED COUNT: 16.9 % (ref 10–15)
HCT VFR BLD AUTO: 27.6 % (ref 35–47)
HGB BLD-MCNC: 8.7 G/DL (ref 11.7–15.7)
IMM GRANULOCYTES # BLD: 0.1 10E9/L (ref 0–0.4)
IMM GRANULOCYTES NFR BLD: 0.8 %
LYMPHOCYTES # BLD AUTO: 0.9 10E9/L (ref 0.8–5.3)
LYMPHOCYTES NFR BLD AUTO: 9.6 %
MCH RBC QN AUTO: 31.2 PG (ref 26.5–33)
MCHC RBC AUTO-ENTMCNC: 31.5 G/DL (ref 31.5–36.5)
MCV RBC AUTO: 99 FL (ref 78–100)
MONOCYTES # BLD AUTO: 0.9 10E9/L (ref 0–1.3)
MONOCYTES NFR BLD AUTO: 9.2 %
NEUTROPHILS # BLD AUTO: 7.6 10E9/L (ref 1.6–8.3)
NEUTROPHILS NFR BLD AUTO: 77.6 %
PLATELET # BLD AUTO: 416 10E9/L (ref 150–450)
RBC # BLD AUTO: 2.79 10E12/L (ref 3.8–5.2)
WBC # BLD AUTO: 9.8 10E9/L (ref 4–11)

## 2019-08-05 PROCEDURE — 99309 SBSQ NF CARE MODERATE MDM 30: CPT | Performed by: NURSE PRACTITIONER

## 2019-08-05 RX ORDER — TRAMADOL HYDROCHLORIDE 50 MG/1
25 TABLET ORAL SEE ADMIN INSTRUCTIONS
Qty: 60 TABLET | Refills: 0 | Status: SHIPPED | OUTPATIENT
Start: 2019-08-05 | End: 2019-08-09

## 2019-08-05 RX ORDER — ACETAMINOPHEN 500 MG
1000 TABLET ORAL 4 TIMES DAILY
Status: ON HOLD
Start: 2019-08-05 | End: 2020-01-08

## 2019-08-05 RX ORDER — POLYETHYLENE GLYCOL 3350 17 G/17G
17 POWDER, FOR SOLUTION ORAL DAILY
Start: 2019-08-05 | End: 2020-12-23 | Stop reason: DRUGHIGH

## 2019-08-05 RX ORDER — CALCIUM CARBONATE 500 MG/1
1 TABLET, CHEWABLE ORAL DAILY
COMMUNITY

## 2019-08-05 ASSESSMENT — MIFFLIN-ST. JEOR: SCORE: 826.8

## 2019-08-05 NOTE — LETTER
8/5/2019        RE: Gennaro Walton  [address]  [place] [address]        [facility]  [facility] Record Number:  [id number]  [facility] of Service where encounter took place:  [facility] [[id number]]  Chief Complaint   Patient presents with     RECHECK       HPI:    Gennaro Walton  is a 89 year old (9/8/1923), who is being seen today for an episodic care visit.  HPI information obtained from: facility chart records, facility staff, patient report and [facility] chart review.     Met with Mrs. Walton today for follow up and also spent 15+ minutes on phone with POA/daughter Caron.  Mrs. Walton has a clear degree of cognitive/memory impairment.  She is alert to self, but not place, situation.  She reports that her stomach is upset, her leg, arm, back, body all hurts.  She is moaning frequently, but does not appear in distress.  Besides ongoing vague pain complaints, she does endorse mild nausea.  No other complaints.     Past Medical and Surgical History reviewed in Epic today.    MEDICATIONS:  Current Outpatient Medications   Medication Sig Dispense Refill     acetaminophen (TYLENOL) 500 MG tablet Take 2 tablets (1,000 mg) by mouth 4 times daily       amLODIPine (NORVASC) 5 MG tablet Take 2.5 mg by mouth daily       calcium carbonate (TUMS) 500 MG chewable tablet Take 1 chew tab by mouth 2 times daily       levothyroxine (SYNTHROID/LEVOTHROID) 88 MCG tablet Take 88 mcg by mouth daily       losartan (COZAAR) 50 MG tablet Take 50 mg by mouth daily       oxybutynin ER (DITROPAN-XL) 10 MG 24 hr tablet Take 1 tablet (10 mg) by mouth At Bedtime       polyethylene glycol (MIRALAX/GLYCOLAX) packet Take 17 g by mouth daily       traMADol (ULTRAM) 50 MG tablet Take 0.5 tablets (25 mg) by mouth See Admin Instructions 25 mg's at bedtime scheduled for pain; also can have 25 mg's q6h PRN for pain. 60 tablet 0     vitamin D3 (CHOLECALCIFEROL) 2000 units tablet Take  "1 tablet by mouth daily         REVIEW OF SYSTEMS:  Limited secondary to cognitive impairment but today 7 point ROS done including, light headedness/dizziness, fever/chills, pain, Resp, CV, GI, and  and is negative other than noted in HPI.     Objective:  BP (!) 171/60   Pulse 62   Temp 98.2  F (36.8  C)   Resp 16   Ht 1.6 m (5' 3\")   Wt 46.3 kg (102 lb)   SpO2 93%   BMI 18.07 kg/m        Exam:  GENERAL APPEARANCE:  Elderly frail female sitting up in chair, moaning frequently, appears in mild discomfort, no other distress.   ENT:  Mouth and oropharynx normal, moist mucous membranes.   RESP:  Lungs CTA.  Regular relaxed breathing effort.  No cough.   CV: S1/S2 no murmur or rubs.  Regular rhythm and rate.  No generalized edema.   ABDOMEN:   Protuberant but, soft, non-tender with active bowel sounds.  No guarding, rigidity, or rebound tenderness.  EXTREMITIES:  No lower extremity edema, no calf tenderness.   PSYCH: Alert to self only, not place, date, situation.  Suspect some degree of cognitive/memory impairment.   WOUND: Left hip incision (3 of them) are approximated with staples present, no drainage, no s/s of infection.   LUE was without sling, we placed it back.     Labs:   I have personally reviewed labs, which are in facility or EMR chart.     ASSESSMENT/PLAN:  Fall, subsequent encounter  Closed nondisplaced intertrochanteric fracture of left femur with routine healing, subsequent encounter  S/P ORIF (open reduction internal fixation) fracture 26 July  Closed fracture of proximal end of left humerus with routine healing, unspecified fracture morphology, subsequent encounter  Acute pain  Mrs. Walton is s/p traumatic fall with SAH and multiple fractures.  Today she is sitting up in chair, moaning and reports abdominal, leg, arm, back, and general all over pain.  Not very detailed, just \"I hurt\".  She does admit it's mild, does not appear in distress, but fixated on pain and moans frequently.    RN's " left a note about Mrs. Walton being in pain most of the weekend, but also report Daughter has been present and not allowing them to give her Tramadol due to worried about ongoing delirium/confusion.  I called the daughter and spent 15+ minutes discussing overall situation.  She is 89, multiple fractures, s/p ORIF, SAH, and intermittent chronic abdominal pain/discomfort.  We also mention, pain, not sleeping and surgery alone are causes of delirium, not just Tramadol.  We note Mrs. Walton is in a tough position, I can appreciate family wanting to avoid opiates, but on the other hand, she appears miserable.  Daughter admits if we just leave her in bed and not move her, pain is better; Daughter only notes pain with movement.  After risks/benefits we concurred with:  -Increase Acetaminophen to 1000 mg's QID for now.    Of note: We also note Mrs. Walton continues to take off her sling, unsure if this is delirium or not, but she reports it makes the pain worse, we do note it's over sized.    -Change Tramadol to 25 mg's at bedtime, continue q6h PRN.   -Continue BM regimen.   --Continue to closely monitor.    --Will have PT/OT attempt to get a better/more comfortable LUE sling for her.     Traumatic SAH (subarachnoid hemorrhage)   As noted above has ongoing delirium/cognitive impairment, but no other complaints.    Continues with clinical and serial imaging monitoring.   -Spine/brain f/u in 4-6 weeks ~ 25 Aug.     Acute blood loss anemia  No overt blood loss, hemodynamically stable.  Hgb recheck today stable at 8.7.   -No changes, continue to clinically monitor.     Essential hypertension  SBP erratic 108-170's but mostly 150's with WNL HR's, in setting of pain.   No current s/s of clinical CHF.   -No changes, continue to clinically monitor.     CKD (chronic kidney disease) stage 3, GFR 30-59 ml/min (H)  Creatine baseline at 0.6 when last checked 2 Aug.   Consider Cystine C if nephrotoxins are required to verify GFR.    -No changes, continue to clinically monitor.     Urge incontinence of urine  -Continues on PTA Oxybutynin, but could be contributing to delirium.     Delirium  Cognitive impairment  CT imaging shows volume loss and small vessel disease.  Suspect there is a degree of cognitive/memory impairment and also family/daughter notes some delirium.  Delirium / somnolence was noted in hospital.  Suspect opiates are contributing but there are other risk factors as well, pain, surgery, age, facility changes.....  -Will attempt to limit opiates but will be difficult balancing pain/delirium.      Debility  -Continues with PT/OT.   --With LUE fracture, debility, age, suspect progress will be slow.     Orders written by provider at facility  -As noted above.     Electronically signed by:  RITA Adhikari CNP           Sincerely,        RITA Adhikari CNP

## 2019-08-05 NOTE — PROGRESS NOTES
[facility]  [facility] Record Number:  [id number]  Place of Service where encounter took place:  [facility] ([facility] [[id number]]  Chief Complaint   Patient presents with     RECHECK       HPI:    Gennaro Walton  is a 89 year old (9/8/1923), who is being seen today for an episodic care visit.  HPI information obtained from: facility chart records, facility staff, patient report and [facility] chart review.     Met with Mrs. Walton today for follow up and also spent 15+ minutes on phone with POA/daughter Caron.  Mrs. Walton has a clear degree of cognitive/memory impairment.  She is alert to self, but not place, situation.  She reports that her stomach is upset, her leg, arm, back, body all hurts.  She is moaning frequently, but does not appear in distress.  Besides ongoing vague pain complaints, she does endorse mild nausea.  No other complaints.     Past Medical and Surgical History reviewed in Epic today.    MEDICATIONS:  Current Outpatient Medications   Medication Sig Dispense Refill     acetaminophen (TYLENOL) 500 MG tablet Take 2 tablets (1,000 mg) by mouth 4 times daily       amLODIPine (NORVASC) 5 MG tablet Take 2.5 mg by mouth daily       calcium carbonate (TUMS) 500 MG chewable tablet Take 1 chew tab by mouth 2 times daily       levothyroxine (SYNTHROID/LEVOTHROID) 88 MCG tablet Take 88 mcg by mouth daily       losartan (COZAAR) 50 MG tablet Take 50 mg by mouth daily       oxybutynin ER (DITROPAN-XL) 10 MG 24 hr tablet Take 1 tablet (10 mg) by mouth At Bedtime       polyethylene glycol (MIRALAX/GLYCOLAX) packet Take 17 g by mouth daily       traMADol (ULTRAM) 50 MG tablet Take 0.5 tablets (25 mg) by mouth See Admin Instructions 25 mg's at bedtime scheduled for pain; also can have 25 mg's q6h PRN for pain. 60 tablet 0     vitamin D3 (CHOLECALCIFEROL) 2000 units tablet Take 1 tablet by mouth daily         REVIEW OF SYSTEMS:  Limited secondary to cognitive impairment  "but today 7 point ROS done including, light headedness/dizziness, fever/chills, pain, Resp, CV, GI, and  and is negative other than noted in HPI.     Objective:  BP (!) 171/60   Pulse 62   Temp 98.2  F (36.8  C)   Resp 16   Ht 1.6 m (5' 3\")   Wt 46.3 kg (102 lb)   SpO2 93%   BMI 18.07 kg/m       Exam:  GENERAL APPEARANCE:  Elderly frail female sitting up in chair, moaning frequently, appears in mild discomfort, no other distress.   ENT:  Mouth and oropharynx normal, moist mucous membranes.   RESP:  Lungs CTA.  Regular relaxed breathing effort.  No cough.   CV: S1/S2 no murmur or rubs.  Regular rhythm and rate.  No generalized edema.   ABDOMEN:   Protuberant but, soft, non-tender with active bowel sounds.  No guarding, rigidity, or rebound tenderness.  EXTREMITIES:  No lower extremity edema, no calf tenderness.   PSYCH: Alert to self only, not place, date, situation.  Suspect some degree of cognitive/memory impairment.   WOUND: Left hip incision (3 of them) are approximated with staples present, no drainage, no s/s of infection.   LUE was without sling, we placed it back.     Labs:   I have personally reviewed labs, which are in facility or EMR chart.     ASSESSMENT/PLAN:  Fall, subsequent encounter  Closed nondisplaced intertrochanteric fracture of left femur with routine healing, subsequent encounter  S/P ORIF (open reduction internal fixation) fracture 26 July  Closed fracture of proximal end of left humerus with routine healing, unspecified fracture morphology, subsequent encounter  Acute pain  Mrs. Walton is s/p traumatic fall with SAH and multiple fractures.  Today she is sitting up in chair, moaning and reports abdominal, leg, arm, back, and general all over pain.  Not very detailed, just \"I hurt\".  She does admit it's mild, does not appear in distress, but fixated on pain and moans frequently.    RN's left a note about Mrs. Walton being in pain most of the weekend, but also report Daughter has " been present and not allowing them to give her Tramadol due to worried about ongoing delirium/confusion.  I called the daughter and spent 15+ minutes discussing overall situation.  She is 89, multiple fractures, s/p ORIF, SAH, and intermittent chronic abdominal pain/discomfort.  We also mention, pain, not sleeping and surgery alone are causes of delirium, not just Tramadol.  We note Mrs. Walton is in a tough position, I can appreciate family wanting to avoid opiates, but on the other hand, she appears miserable.  Daughter admits if we just leave her in bed and not move her, pain is better; Daughter only notes pain with movement.  After risks/benefits we concurred with:  -Increase Acetaminophen to 1000 mg's QID for now.    Of note: We also note Mrs. Walton continues to take off her sling, unsure if this is delirium or not, but she reports it makes the pain worse, we do note it's over sized.    -Change Tramadol to 25 mg's at bedtime, continue q6h PRN.   -Continue BM regimen.   --Continue to closely monitor.    --Will have PT/OT attempt to get a better/more comfortable LUE sling for her.     Traumatic SAH (subarachnoid hemorrhage)   As noted above has ongoing delirium/cognitive impairment, but no other complaints.    Continues with clinical and serial imaging monitoring.   -Spine/brain f/u in 4-6 weeks ~ 25 Aug.     Acute blood loss anemia  No overt blood loss, hemodynamically stable.  Hgb recheck today stable at 8.7.   -No changes, continue to clinically monitor.     Essential hypertension  SBP erratic 108-170's but mostly 150's with WNL HR's, in setting of pain.   No current s/s of clinical CHF.   -No changes, continue to clinically monitor.     CKD (chronic kidney disease) stage 3, GFR 30-59 ml/min (H)  Creatine baseline at 0.6 when last checked 2 Aug.   Consider Cystine C if nephrotoxins are required to verify GFR.   -No changes, continue to clinically monitor.     Urge incontinence of urine  -Continues on PTA  Oxybutynin, but could be contributing to delirium.     Delirium  Cognitive impairment  CT imaging shows volume loss and small vessel disease.  Suspect there is a degree of cognitive/memory impairment and also family/daughter notes some delirium.  Delirium / somnolence was noted in hospital.  Suspect opiates are contributing but there are other risk factors as well, pain, surgery, age, facility changes.....  -Will attempt to limit opiates but will be difficult balancing pain/delirium.      Debility  -Continues with PT/OT.   --With LUE fracture, debility, age, suspect progress will be slow.     Orders written by provider at facility  -As noted above.     Electronically signed by:  RITA Adhikari CNP

## 2019-08-06 ENCOUNTER — NURSING HOME VISIT (OUTPATIENT)
Dept: GERIATRICS | Facility: CLINIC | Age: 84
End: 2019-08-06
Payer: MEDICARE

## 2019-08-06 VITALS
HEART RATE: 62 BPM | RESPIRATION RATE: 16 BRPM | SYSTOLIC BLOOD PRESSURE: 171 MMHG | DIASTOLIC BLOOD PRESSURE: 60 MMHG | HEIGHT: 63 IN | TEMPERATURE: 98.2 F | BODY MASS INDEX: 18.07 KG/M2 | OXYGEN SATURATION: 93 % | WEIGHT: 102 LBS

## 2019-08-06 DIAGNOSIS — R52 ACUTE PAIN: ICD-10-CM

## 2019-08-06 DIAGNOSIS — S42.202D CLOSED FRACTURE OF PROXIMAL END OF LEFT HUMERUS WITH ROUTINE HEALING, UNSPECIFIED FRACTURE MORPHOLOGY, SUBSEQUENT ENCOUNTER: ICD-10-CM

## 2019-08-06 DIAGNOSIS — R41.89 COGNITIVE IMPAIRMENT: ICD-10-CM

## 2019-08-06 DIAGNOSIS — R35.0 URINE FREQUENCY: ICD-10-CM

## 2019-08-06 DIAGNOSIS — Z87.81 S/P ORIF (OPEN REDUCTION INTERNAL FIXATION) FRACTURE: ICD-10-CM

## 2019-08-06 DIAGNOSIS — R53.81 DEBILITY: ICD-10-CM

## 2019-08-06 DIAGNOSIS — R41.0 DELIRIUM: ICD-10-CM

## 2019-08-06 DIAGNOSIS — Z98.890 S/P ORIF (OPEN REDUCTION INTERNAL FIXATION) FRACTURE: ICD-10-CM

## 2019-08-06 DIAGNOSIS — S72.145D CLOSED NONDISPLACED INTERTROCHANTERIC FRACTURE OF LEFT FEMUR WITH ROUTINE HEALING, SUBSEQUENT ENCOUNTER: Primary | ICD-10-CM

## 2019-08-06 PROCEDURE — 99309 SBSQ NF CARE MODERATE MDM 30: CPT | Performed by: NURSE PRACTITIONER

## 2019-08-06 ASSESSMENT — MIFFLIN-ST. JEOR: SCORE: 826.8

## 2019-08-06 NOTE — PROGRESS NOTES
[facility]  [place] [facility] Record Number:  [id number]  Place of Service where encounter took place:  [facility] [[id number]]  Chief Complaint   Patient presents with     RECHECK       HPI:    Gennaro Walton  is a 89 year old (9/8/1923), who is being seen today for an episodic care visit.  HPI information obtained from: facility chart records, facility staff, patient report and [facility] chart review.     Was called to room by RN due to RN had concerns about her LLE being internally rotated.  Upon arrival I noted both legs as internally rotated, but she was able to move them independently with no issues.  She continues to have delirium and cognitive impairment, continues to have a number of pain complaints, Leg, hip, abdomen, neck, shoulder, and not able to give any details.  RN's also note last night frequent small voids, patient denies dysuria.     Past Medical and Surgical History reviewed in Epic today.    MEDICATIONS:  Current Outpatient Medications   Medication Sig Dispense Refill     acetaminophen (TYLENOL) 500 MG tablet Take 2 tablets (1,000 mg) by mouth 4 times daily       amLODIPine (NORVASC) 5 MG tablet Take 2.5 mg by mouth daily       calcium carbonate (TUMS) 500 MG chewable tablet Take 1 chew tab by mouth 2 times daily       levothyroxine (SYNTHROID/LEVOTHROID) 88 MCG tablet Take 88 mcg by mouth daily       losartan (COZAAR) 50 MG tablet Take 50 mg by mouth daily       oxybutynin ER (DITROPAN-XL) 10 MG 24 hr tablet Take 1 tablet (10 mg) by mouth At Bedtime       polyethylene glycol (MIRALAX/GLYCOLAX) packet Take 17 g by mouth daily       traMADol (ULTRAM) 50 MG tablet Take 0.5 tablets (25 mg) by mouth See Admin Instructions 25 mg's at bedtime scheduled for pain; also can have 25 mg's q6h PRN for pain. 60 tablet 0     vitamin D3 (CHOLECALCIFEROL) 2000 units tablet Take 1 tablet by mouth daily         REVIEW OF SYSTEMS:  Limited secondary to cognitive  "impairment but today 7 point ROS done including, light headedness/dizziness, fever/chills, pain, Resp, CV, GI, and  and is negative other than noted in HPI.      Objective:  BP (!) 171/60   Pulse 62   Temp 98.2  F (36.8  C)   Resp 16   Ht 1.6 m (5' 3\")   Wt 46.3 kg (102 lb)   SpO2 93%   BMI 18.07 kg/m       Exam:  GENERAL APPEARANCE:  Elderly frail female sitting up in chair, moaning frequently, appears in mild discomfort, no other distress.   ENT:  Mouth and oropharynx normal, moist mucous membranes.   RESP:  Regular relaxed breathing effort.  No cough.   EXTREMITIES:  No lower extremity edema, no calf tenderness.   PSYCH: Alert to self only, not place, date, situation.  Suspect some degree of cognitive/memory impairment.   WOUND: Left hip incision (3 of them) are approximated with staples present, no drainage, no s/s of infection.   LUE in sling.   SKIN: Large ecchymosis present on medial upper Left thigh.   JOINTS: Able to ROM LLE at hip, foot, knee with no resistance, but endorses pain with all minimal movement and moans.  Was able to bear weight with heavy assist of 2 on pivot transfer back to bed.      Labs:   I have personally reviewed labs, which are in facility or EMR chart.     ASSESSMENT/PLAN:  Closed nondisplaced intertrochanteric fracture of left femur with routine healing, subsequent encounter  S/P ORIF (open reduction internal fixation) fracture 26 July  Closed fracture of proximal end of left humerus with routine healing, unspecified fracture morphology, subsequent encounter  Acute pain  Called into room by RN whom was concerned her LLE was internally rotated.  Upon arrival it was but so is her non affected RLE.  Both are internally rotated in at rest, but she can externally rotate them and ROM LE's independently, thus it is likely DJD chronic hip issue and not an acute LLE surgery issue.  However, due to cognitive impairment, poor pain control, and unclear ROS, we did have our Ortho ACE PETERSEN " [place] review and he concurred, nothing concerning on exam.  He, like us, feels her progression will be long and slow.  While Mrs. Walton is comfortable, minimal pain at rest, she does not tolerate any minimal or more movement, is heavy assist of 2 to pivot to chair.    -No changes, continue to clinically monitor.   -Sent note to SW to set up care conference with family/daughter, to again address pain issues and poor prognosis/progress, as she is still wanting minimal pain medications used.      Urine frequency  RN's report multiple small voids last night and possible night before.  Mrs. Walton did have leukocytosis last week which is improving, otherwise afebrile, and denies dysuria.  But she also endorses vague low abdomen/groin pain at times, and difficult to pin point pain/ROS is poor.   -Will get UA/UC for completeness, will NOT start empiric treatment at this time.     Delirium  Cognitive impairment  Debility  -Continues with PT/OT.     Orders written by provider at facility  -As noted above.     Electronically signed by:  RITA Adhikari CNP

## 2019-08-06 NOTE — PROGRESS NOTES
"Ortho Nursing home visit    Gennaro Walton is a 89 year old female who resides at [facility]    Patient is seen today for Left proximal humerus fracture, treated closed in sling, and Left proximal femur fracture, treated with LONG IM NAIL< now 2 weeks, S/P ORIF'       Past Medical History:   Diagnosis Date     Hypertension      Osteoporosis       Past Surgical History:   Procedure Laterality Date     OPEN REDUCTION INTERNAL FIXATION FEMUR MIDSHAFT Left 7/26/2019    Procedure: LEFT INTERTROCHANTERIC FEMUR FRACTURE OPEN REDUCTION AND INTERNAL FIXATION;  Surgeon: Chris Oseguera MD;  Location:  OR        No Known Allergies   BP (!) 171/60   Pulse 62   Temp 98.2  F (36.8  C)   Resp 16   Ht 1.6 m (5' 3\")   Wt 46.3 kg (102 lb)   SpO2 93%   BMI 18.07 kg/m       Exam: Frail elderly female, in sling, non ambulating given NWB to Left UE, and 30 % restriction with W/B Left LE, allows, PROM to left elbow, wrist, pain with moving left leg, incision CDI. Will continue to follow, per Dr OSEGUERA post op noted indicate, patient will likely be W/C bound, and will be increased risk for post -op complication given age, and comminution of fracture.      X-rays show : severe osteoporosis, left shoulder, spine, hip, with IM nail in place left LE;    ASSESSMENT / PLAN:  Will continue to follow while here at [place], would anticipate, discussion regarding care plan.           [facility]  [phone] cell,  [phone]    "

## 2019-08-06 NOTE — LETTER
8/6/2019        RE: Gennaro Walton  [address]  [place] [address]        [facility]  [place] [facility] Record Number:  [id number]  [facility] where [facility] took [facility] [[id number]]  Chief Complaint   Patient presents with     RECHECK       HPI:    Gennaro Walton  is a 89 year old (9/8/1923), who is being seen today for an episodic care visit.  HPI information obtained from: facility chart records, facility staff, patient report and [facility] chart review.     Was called to room by RN due to RN had concerns about her LLE being internally rotated.  Upon arrival I noted both legs as internally rotated, but she was able to move them independently with no issues.  She continues to have delirium and cognitive impairment, continues to have a number of pain complaints, Leg, hip, abdomen, neck, shoulder, and not able to give any details.  RN's also note last night frequent small voids, patient denies dysuria.     Past Medical and Surgical History reviewed in Epic today.    MEDICATIONS:  Current Outpatient Medications   Medication Sig Dispense Refill     acetaminophen (TYLENOL) 500 MG tablet Take 2 tablets (1,000 mg) by mouth 4 times daily       amLODIPine (NORVASC) 5 MG tablet Take 2.5 mg by mouth daily       calcium carbonate (TUMS) 500 MG chewable tablet Take 1 chew tab by mouth 2 times daily       levothyroxine (SYNTHROID/LEVOTHROID) 88 MCG tablet Take 88 mcg by mouth daily       losartan (COZAAR) 50 MG tablet Take 50 mg by mouth daily       oxybutynin ER (DITROPAN-XL) 10 MG 24 hr tablet Take 1 tablet (10 mg) by mouth At Bedtime       polyethylene glycol (MIRALAX/GLYCOLAX) packet Take 17 g by mouth daily       traMADol (ULTRAM) 50 MG tablet Take 0.5 tablets (25 mg) by mouth See Admin Instructions 25 mg's at bedtime scheduled for pain; also can have 25 mg's q6h PRN for pain. 60 tablet 0     vitamin D3 (CHOLECALCIFEROL) 2000 units tablet  "Take 1 tablet by mouth daily         REVIEW OF SYSTEMS:  Limited secondary to cognitive impairment but today 7 point ROS done including, light headedness/dizziness, fever/chills, pain, Resp, CV, GI, and  and is negative other than noted in HPI.      Objective:  BP (!) 171/60   Pulse 62   Temp 98.2  F (36.8  C)   Resp 16   Ht 1.6 m (5' 3\")   Wt 46.3 kg (102 lb)   SpO2 93%   BMI 18.07 kg/m        Exam:  GENERAL APPEARANCE:  Elderly frail female sitting up in chair, moaning frequently, appears in mild discomfort, no other distress.   ENT:  Mouth and oropharynx normal, moist mucous membranes.   RESP:  Regular relaxed breathing effort.  No cough.   EXTREMITIES:  No lower extremity edema, no calf tenderness.   PSYCH: Alert to self only, not place, date, situation.  Suspect some degree of cognitive/memory impairment.   WOUND: Left hip incision (3 of them) are approximated with staples present, no drainage, no s/s of infection.   LUE in sling.   SKIN: Large ecchymosis present on medial upper Left thigh.   JOINTS: Able to ROM LLE at hip, foot, knee with no resistance, but endorses pain with all minimal movement and moans.  Was able to bear weight with heavy assist of 2 on pivot transfer back to bed.      Labs:   I have personally reviewed labs, which are in facility or EMR chart.     ASSESSMENT/PLAN:  Closed nondisplaced intertrochanteric fracture of left femur with routine healing, subsequent encounter  S/P ORIF (open reduction internal fixation) fracture 26 July  Closed fracture of proximal end of left humerus with routine healing, unspecified fracture morphology, subsequent encounter  Acute pain  Called into room by RN whom was concerned her LLE was internally rotated.  Upon arrival it was but so is her non affected RLE.  Both are internally rotated in at rest, but she can externally rotate them and ROM LE's independently, thus it is likely DJD chronic hip issue and not an acute LLE surgery issue.  However, due " "to cognitive impairment, poor pain control, and unclear ROS, we did have our Ortho PA NICOLA Alejandre review and he concurred, nothing concerning on exam.  He, like us, feels her progression will be long and slow.  While Mrs. Walton is comfortable, minimal pain at rest, she does not tolerate any minimal or more movement, is heavy assist of 2 to pivot to chair.    -No changes, continue to clinically monitor.   -Sent note to  to set up care conference with family/daughter, to again address pain issues and poor prognosis/progress, as she is still wanting minimal pain medications used.      Urine frequency  RN's report multiple small voids last night and possible night before.  Mrs. Walton did have leukocytosis last week which is improving, otherwise afebrile, and denies dysuria.  But she also endorses vague low abdomen/groin pain at times, and difficult to pin point pain/ROS is poor.   -Will get UA/UC for completeness, will NOT start empiric treatment at this time.     Delirium  Cognitive impairment  Debility  -Continues with PT/OT.     Orders written by provider at facility  -As noted above.     Electronically signed by:  RITA Adhikari CNP       Ortho Nursing home visit    Gennaro Walton is a 89 year old female who resides at [facility]    Patient is seen today for Left proximal humerus fracture, treated closed in sling, and Left proximal femur fracture, treated with LONG IM NAIL< now 2 weeks, S/P ORIF'       Past Medical History:   Diagnosis Date     Hypertension      Osteoporosis       Past Surgical History:   Procedure Laterality Date     OPEN REDUCTION INTERNAL FIXATION FEMUR MIDSHAFT Left 7/26/2019    Procedure: LEFT INTERTROCHANTERIC FEMUR FRACTURE OPEN REDUCTION AND INTERNAL FIXATION;  Surgeon: Chris Oseguera MD;  Location:  OR        No Known Allergies   BP (!) 171/60   Pulse 62   Temp 98.2  F (36.8  C)   Resp 16   Ht 1.6 m (5' 3\")   Wt 46.3 kg (102 lb)   SpO2 93%   BMI 18.07 kg/m        Exam: " Frail elderly female, in sling, non ambulating given NWB to Left UE, and 30 % restriction with W/B Left LE, allows, PROM to left elbow, wrist, pain with moving left leg, incision CDI. Will continue to follow, per Dr GORDON post op noted indicate, patient will likely be W/C bound, and will be increased risk for post -op complication given age, and comminution of fracture.      X-rays show : severe osteoporosis, left shoulder, spine, hip, with IM nail in place left LE;    ASSESSMENT / PLAN:  Will continue to follow while here at [place], would anticipate, discussion regarding care plan.           [facility]  [phone] cell,  [phone]        Sincerely,        RITA Adhikari CNP

## 2019-08-07 ENCOUNTER — HOSPITAL LABORATORY (OUTPATIENT)
Dept: OTHER | Facility: CLINIC | Age: 84
End: 2019-08-07

## 2019-08-07 LAB
ALBUMIN UR-MCNC: 10 MG/DL
APPEARANCE UR: ABNORMAL
BILIRUB UR QL STRIP: NEGATIVE
COLOR UR AUTO: YELLOW
GLUCOSE UR STRIP-MCNC: NEGATIVE MG/DL
HGB UR QL STRIP: ABNORMAL
KETONES UR STRIP-MCNC: NEGATIVE MG/DL
LEUKOCYTE ESTERASE UR QL STRIP: ABNORMAL
NITRATE UR QL: NEGATIVE
PH UR STRIP: 5.5 PH (ref 5–7)
RBC #/AREA URNS AUTO: 0 /HPF (ref 0–2)
SOURCE: ABNORMAL
SP GR UR STRIP: 1.01 (ref 1–1.03)
SQUAMOUS #/AREA URNS AUTO: 1 /HPF (ref 0–1)
UROBILINOGEN UR STRIP-MCNC: 2 MG/DL (ref 0–2)
WBC #/AREA URNS AUTO: >182 /HPF (ref 0–5)
WBC CLUMPS #/AREA URNS HPF: PRESENT /HPF

## 2019-08-09 ENCOUNTER — NURSING HOME VISIT (OUTPATIENT)
Dept: GERIATRICS | Facility: CLINIC | Age: 84
End: 2019-08-09
Payer: MEDICARE

## 2019-08-09 VITALS
TEMPERATURE: 98.2 F | HEART RATE: 72 BPM | HEIGHT: 63 IN | OXYGEN SATURATION: 95 % | WEIGHT: 102 LBS | RESPIRATION RATE: 19 BRPM | DIASTOLIC BLOOD PRESSURE: 68 MMHG | BODY MASS INDEX: 18.07 KG/M2 | SYSTOLIC BLOOD PRESSURE: 148 MMHG

## 2019-08-09 DIAGNOSIS — Z98.890 S/P ORIF (OPEN REDUCTION INTERNAL FIXATION) FRACTURE: ICD-10-CM

## 2019-08-09 DIAGNOSIS — R35.0 URINE FREQUENCY: ICD-10-CM

## 2019-08-09 DIAGNOSIS — S42.202D CLOSED FRACTURE OF PROXIMAL END OF LEFT HUMERUS WITH ROUTINE HEALING, UNSPECIFIED FRACTURE MORPHOLOGY, SUBSEQUENT ENCOUNTER: ICD-10-CM

## 2019-08-09 DIAGNOSIS — R41.0 DELIRIUM: ICD-10-CM

## 2019-08-09 DIAGNOSIS — S72.145D CLOSED NONDISPLACED INTERTROCHANTERIC FRACTURE OF LEFT FEMUR WITH ROUTINE HEALING, SUBSEQUENT ENCOUNTER: Primary | ICD-10-CM

## 2019-08-09 DIAGNOSIS — I60.9 SAH (SUBARACHNOID HEMORRHAGE) (H): ICD-10-CM

## 2019-08-09 DIAGNOSIS — D62 ACUTE BLOOD LOSS ANEMIA: ICD-10-CM

## 2019-08-09 DIAGNOSIS — R52 ACUTE PAIN: ICD-10-CM

## 2019-08-09 DIAGNOSIS — N39.0 URINARY TRACT INFECTION WITHOUT HEMATURIA, SITE UNSPECIFIED: ICD-10-CM

## 2019-08-09 DIAGNOSIS — S72.002A CLOSED FRACTURE OF NECK OF LEFT FEMUR, INITIAL ENCOUNTER (H): ICD-10-CM

## 2019-08-09 DIAGNOSIS — R41.89 COGNITIVE IMPAIRMENT: ICD-10-CM

## 2019-08-09 DIAGNOSIS — Z87.81 S/P ORIF (OPEN REDUCTION INTERNAL FIXATION) FRACTURE: ICD-10-CM

## 2019-08-09 DIAGNOSIS — Z71.89 ADVANCED DIRECTIVES, COUNSELING/DISCUSSION: ICD-10-CM

## 2019-08-09 LAB
BACTERIA SPEC CULT: ABNORMAL
Lab: ABNORMAL
SPECIMEN SOURCE: ABNORMAL

## 2019-08-09 PROCEDURE — 99310 SBSQ NF CARE HIGH MDM 45: CPT | Performed by: NURSE PRACTITIONER

## 2019-08-09 RX ORDER — LIDOCAINE 4 G/G
2 PATCH TOPICAL EVERY 24 HOURS
Start: 2019-08-09 | End: 2020-01-31

## 2019-08-09 RX ORDER — TRAMADOL HYDROCHLORIDE 50 MG/1
25 TABLET ORAL AT BEDTIME
Qty: 60 TABLET | Refills: 0
Start: 2019-08-09 | End: 2019-08-13

## 2019-08-09 RX ORDER — CEFDINIR 300 MG/1
300 CAPSULE ORAL 2 TIMES DAILY
Qty: 10 CAPSULE | Refills: 0
Start: 2019-08-09 | End: 2019-08-19

## 2019-08-09 ASSESSMENT — MIFFLIN-ST. JEOR: SCORE: 826.8

## 2019-08-09 NOTE — PROGRESS NOTES
[facility]  [facility] Record Number:  [id number]  Place of Service where encounter took place:  [facility] [[id number]]  Chief Complaint   Patient presents with     RECHECK       HPI:    Gennaro Walton  is a 89 year old (9/8/1923), who is being seen today for an episodic care visit.  HPI information obtained from: facility chart records, facility staff, patient report, [facility] chart review and family/first contact Daughter/Caron report.    Met with Mrs. Walton and her daughter Caron today.  Spent 40+ minutes in care conference with both.  Mrs. Walton continues to endorse pain in her Left arm, Left hip, sometimes abdomen, some times, neck, some times other areas.  Reports the same, pain only with movement, none at rest.  She continues to endorse some urine frequency, but no dysuria.  Continues to have clear degree of cognitive impairment, no other complaints.     Past Medical and Surgical History reviewed in Epic today.    MEDICATIONS:  Current Outpatient Medications   Medication Sig Dispense Refill     acetaminophen (TYLENOL) 500 MG tablet Take 2 tablets (1,000 mg) by mouth 4 times daily       amLODIPine (NORVASC) 5 MG tablet Take 2.5 mg by mouth daily       calcium carbonate (TUMS) 500 MG chewable tablet Take 1 chew tab by mouth 2 times daily       cefdinir (OMNICEF) 300 MG capsule Take 1 capsule (300 mg) by mouth 2 times daily for 10 doses 10 capsule 0     levothyroxine (SYNTHROID/LEVOTHROID) 88 MCG tablet Take 88 mcg by mouth daily       Lidocaine (LIDOCARE) 4 % Patch Place 2 patches onto the skin every 24 hours To prevent lidocaine toxicity, patient should be patch free for 12 hrs daily.  1 on Left hip, 1 on Left shoulder.       losartan (COZAAR) 50 MG tablet Take 50 mg by mouth daily       Menthol, Topical Analgesic, (BIOFREEZE) 4 % GEL Apply 1 Application topically 3 times daily as needed (Pain) Apply to painful areas, all except face, TID PRN.        "oxybutynin ER (DITROPAN-XL) 10 MG 24 hr tablet Take 1 tablet (10 mg) by mouth At Bedtime       polyethylene glycol (MIRALAX/GLYCOLAX) packet Take 17 g by mouth daily       traMADol (ULTRAM) 50 MG tablet Take 0.5 tablets (25 mg) by mouth At Bedtime 60 tablet 0     vitamin D3 (CHOLECALCIFEROL) 2000 units tablet Take 1 tablet by mouth daily       REVIEW OF SYSTEMS:  Limited secondary to cognitive impairment but today 7 point ROS done including, light headedness/dizziness, fever/chills, pain, Resp, CV, GI, and  and is negative other than noted in HPI.     Objective:  BP (!) 148/68   Pulse 72   Temp 98.2  F (36.8  C)   Resp 19   Ht 1.6 m (5' 3\")   Wt 46.3 kg (102 lb)   SpO2 95%   BMI 18.07 kg/m       Exam:  GENERAL APPEARANCE:  Elderly frail female sitting up in WC, mildly somnolent but arousable.   ENT:  Mouth and oropharynx normal, moist mucous membranes.   RESP:  Lungs are CTA. Regular relaxed breathing effort.  No cough.   CV: S1/S2 no murmur or rubs.  Regular rhythm and rate.  No generalized edema.   ABDOMEN:   Protuberant but, soft, non-tender with active bowel sounds.  No guarding, rigidity, or rebound tenderness.  EXTREMITIES:  No lower extremity edema, no calf tenderness.   PSYCH: Alert to self only, not place, date, situation.  Suspect some degree of cognitive/memory impairment.   WOUND: Left hip incision not assessed today.   SKIN: Large ecchymosis present on medial upper Left thigh not assessed today.     Labs:   I have personally reviewed labs, which are in facility or EMR chart.     ASSESSMENT/PLAN:  Closed nondisplaced intertrochanteric fracture of left femur with routine healing, subsequent encounter  S/P ORIF (open reduction internal fixation) fracture 26 July  Closed fracture of proximal end of left humerus with routine healing, unspecified fracture morphology, subsequent encounter  Acute pain  Advanced directives, counseling/discussion  Mrs. Walton continues to intermittently endorse pain " "with movement, does not take much movement to be yelling \"Ouch\" and moans frequently.  Admits pain only with movement, pain location seems to vary frequently.  RN's and others have reported at times she is yelling out.    She still has NWB LUE and only 30% WB on LLE, but still is a heavy assist of 2+ to pivot transfer and she is not progressing well with PT/OT.   Nutrition has noted ongoing weight loss and minimal PO intake.      Thus she is not progressing well.  Thus we had a care conference with Mrs. Walton and her daughter Caron today, I spent 40+ minutes discussing situation.  Caron/SAQIBALIYAH is more concerned about her ongoing delirium than pain, as she feels the delirium is reason for slow progress.  We shared with her we do not have any thing milder for pain as she is not a candidate for NSAIDS due to SDH, and she is on max Acetaminophen.  We did review Lidocaine patches and BioFreeze and Caron would like to try those options and cut back on the Tramadol.    -Thus lowered Tramadol down to 25 mg's at bedtime only, scheduled.   -Continues Acetaminophen 1000 mg's QID.   -Started Lidocaine patches; 1 on Left shoulder, 1 on Left hip.   -Started Max biofreeze 4%, TID PRN on all other pain areas, except face.   --RN manager/PT will attempt to get her a smaller LUE sling which may help with more support, thus less pain.   --Has Ortho f/u planned 12 Aug.     Urinary tract infection without hematuria, site unspecified  Urine frequency  Mrs. Walton has chronic urine frequency per report, but last couple nights RN's reported it was increased.  Thus UA/UC done showing E-coli UTI.  She denies any dysuria, but unclear how much this may/may not be contributing to delirium, thus will treat to verify no other barriers to PT/OT progress.   -Started Cefdinir BID x 5 days.     Traumatic SAH (subarachnoid hemorrhage)   Acute blood loss anemia  Continues t have delirium, but no other significant clinical changes.   -Continue to " avoid NSAIDS.     Delirium  Cognitive impairment  Advanced directives, counseling/discussion  As noted above, 40+ minutes spent discussing Mrs. Walton's slow progress, shared our concerns with ongoing delirium, weight loss, poor PO intake and pain issues.  As noted Caron is most concerned about the delirium, but also does not want Mrs. Walton to be in pain, but wants us to cut all the opiates possible, did concur with the at bedtime Tramadol at night.  Her hope is with loweringTramadol, treating UTI, her mentation improves thus she will improve.  I'm not sure we are that optimistic, and we did share our concern for under treated pain, but in the end she is the POA and we will attempt the above plan and closely monitor.    Caron did mention her goal is still for Mrs. Walton to improve to where she was prior, independent enough to return to senior living.     Orders written by provider at facility  -As noted above.     Total time spent with patient visit at the skilled nursing facility was 50 minutes including patient visit, review of past records and attending extended care conference discussing plan/goals. Greater than 50% of total time spent with counseling and coordinating care due to care conference, complex medical case, review of HPI, development of POC, patient education and review of POC with pt and POA.      Electronically signed by:  RITA Adhikari CNP

## 2019-08-09 NOTE — LETTER
8/9/2019        RE: Gennaro Walton  [address]  [facility] [address]        [facility]  [place] [facility] Record Number:  [id number]  [facility] [[id number]]  Chief Complaint   Patient presents with     RECHECK       HPI:    Gennaro Walton  is a 89 year old (9/8/1923), who is being seen today for an episodic care visit.  HPI information obtained from: facility chart records, facility staff, patient report, [facility] chart review and family/first contact Daughter/Caron report.    Met with Mrs. Walton and her daughter Caron today.  Spent 40+ minutes in care conference with both.  Mrs. Walton continues to endorse pain in her Left arm, Left hip, sometimes abdomen, some times, neck, some times other areas.  Reports the same, pain only with movement, none at rest.  She continues to endorse some urine frequency, but no dysuria.  Continues to have clear degree of cognitive impairment, no other complaints.     Past Medical and Surgical History reviewed in Epic today.    MEDICATIONS:  Current Outpatient Medications   Medication Sig Dispense Refill     acetaminophen (TYLENOL) 500 MG tablet Take 2 tablets (1,000 mg) by mouth 4 times daily       amLODIPine (NORVASC) 5 MG tablet Take 2.5 mg by mouth daily       calcium carbonate (TUMS) 500 MG chewable tablet Take 1 chew tab by mouth 2 times daily       cefdinir (OMNICEF) 300 MG capsule Take 1 capsule (300 mg) by mouth 2 times daily for 10 doses 10 capsule 0     levothyroxine (SYNTHROID/LEVOTHROID) 88 MCG tablet Take 88 mcg by mouth daily       Lidocaine (LIDOCARE) 4 % Patch Place 2 patches onto the skin every 24 hours To prevent lidocaine toxicity, patient should be patch free for 12 hrs daily.  1 on Left hip, 1 on Left shoulder.       losartan (COZAAR) 50 MG tablet Take 50 mg by mouth daily       Menthol, Topical Analgesic, (BIOFREEZE) 4 % GEL Apply 1 Application  "topically 3 times daily as needed (Pain) Apply to painful areas, all except face, TID PRN.       oxybutynin ER (DITROPAN-XL) 10 MG 24 hr tablet Take 1 tablet (10 mg) by mouth At Bedtime       polyethylene glycol (MIRALAX/GLYCOLAX) packet Take 17 g by mouth daily       traMADol (ULTRAM) 50 MG tablet Take 0.5 tablets (25 mg) by mouth At Bedtime 60 tablet 0     vitamin D3 (CHOLECALCIFEROL) 2000 units tablet Take 1 tablet by mouth daily       REVIEW OF SYSTEMS:  Limited secondary to cognitive impairment but today 7 point ROS done including, light headedness/dizziness, fever/chills, pain, Resp, CV, GI, and  and is negative other than noted in HPI.     Objective:  BP (!) 148/68   Pulse 72   Temp 98.2  F (36.8  C)   Resp 19   Ht 1.6 m (5' 3\")   Wt 46.3 kg (102 lb)   SpO2 95%   BMI 18.07 kg/m        Exam:  GENERAL APPEARANCE:  Elderly frail female sitting up in WC, mildly somnolent but arousable.   ENT:  Mouth and oropharynx normal, moist mucous membranes.   RESP:   Lungs are CTA. Regular relaxed breathing effort.  No cough.   CV: S1/S2 no murmur or rubs.  Regular rhythm and rate.  No generalized edema.   ABDOMEN:   Protuberant but, soft, non-tender with active bowel sounds.  No guarding, rigidity, or rebound tenderness.  EXTREMITIES:  No lower extremity edema, no calf tenderness.   PSYCH: Alert to self only, not place, date, situation.  Suspect some degree of cognitive/memory impairment.   WOUND: Left hip incision not assessed today.   SKIN: Large ecchymosis present on medial upper Left thigh not assessed today.     Labs:   I have personally reviewed labs, which are in facility or EMR chart.     ASSESSMENT/PLAN:  Closed nondisplaced intertrochanteric fracture of left femur with routine healing, subsequent encounter  S/P ORIF (open reduction internal fixation) fracture 26 July  Closed fracture of proximal end of left humerus with routine healing, unspecified fracture morphology, subsequent encounter  Acute " "pain  Advanced directives, counseling/discussion  Mrs. Walton continues to intermittently endorse pain with movement, does not take much movement to be yelling \"Ouch\" and moans frequently.  Admits pain only with movement, pain location seems to vary frequently.  RN's and others have reported at times she is yelling out.    She still has NWB LUE and only 30% WB on LLE, but still is a heavy assist of 2+ to pivot transfer and she is not progressing well with PT/OT.   Nutrition has noted ongoing weight loss and minimal PO intake.      Thus she is not progressing well.  Thus we had a care conference with Mrs. Walton and her daughter Caron today, I spent 40+ minutes discussing situation.  Caron/NATI is more concerned about her ongoing delirium than pain, as she feels the delirium is reason for slow progress.  We shared with her we do not have any thing milder for pain as she is not a candidate for NSAIDS due to SDH, and she is on max Acetaminophen.  We did review Lidocaine patches and BioFreeze and Caron would like to try those options and cut back on the Tramadol.    -Thus lowered Tramadol down to 25 mg's at bedtime only, scheduled.   -Continues Acetaminophen 1000 mg's QID.   -Started Lidocaine patches; 1 on Left shoulder, 1 on Left hip.   -Started Max biofreeze 4%, TID PRN on all other pain areas, except face.   --RN manager/PT will attempt to get her a smaller LUE sling which may help with more support, thus less pain.   --Has Ortho f/u planned 12 Aug.     Urinary tract infection without hematuria, site unspecified  Urine frequency  Mrs. Walton has chronic urine frequency per report, but last couple nights RN's reported it was increased.  Thus UA/UC done showing E-coli UTI.  She denies any dysuria, but unclear how much this may/may not be contributing to delirium, thus will treat to verify no other barriers to PT/OT progress.   -Started Cefdinir BID x 5 days.     Traumatic SAH (subarachnoid hemorrhage)   Acute " blood loss anemia  Continues t have delirium, but no other significant clinical changes.   -Continue to avoid NSAIDS.     Delirium  Cognitive impairment  Advanced directives, counseling/discussion  As noted above, 40+ minutes spent discussing Mrs. Walton's slow progress, shared our concerns with ongoing delirium, weight loss, poor PO intake and pain issues.  As noted Caron is most concerned about the delirium, but also does not want Mrs. Walton to be in pain, but wants us to cut all the opiates possible, did concur with the at bedtime Tramadol at night.  Her hope is with loweringTramadol, treating UTI, her mentation improves thus she will improve.  I'm not sure we are that optimistic, and we did share our concern for under treated pain, but in the end she is the POA and we will attempt the above plan and closely monitor.    Caron did mention her goal is still for Mrs. Walton to improve to where she was prior, independent enough to return to senior living.     Orders written by provider at facility  -As noted above.     Total time spent with patient visit at the skilled nursing facility was 50 minutes including patient visit, review of past records and attending extended care conference discussing plan/goals. Greater than 50% of total time spent with counseling and coordinating care due to care conference, complex medical case, review of HPI, development of POC, patient education and review of POC with pt and POA.      Electronically signed by:  RITA Adhikari CNP           Sincerely,        RITA Adhikari CNP

## 2019-08-10 ENCOUNTER — TELEPHONE (OUTPATIENT)
Dept: GERIATRICS | Facility: CLINIC | Age: 84
End: 2019-08-10

## 2019-08-10 NOTE — TELEPHONE ENCOUNTER
"Staff called today requesting increased pain medication, potentially low-dose narcotic. Patient currently on Tylenol and low-dose Tramadol at bed time (this makes patient loopy, sleepy & \"silly\"). Informed staff this is most mild narcotic available, any other options would cause more side effects. Will trial topical cream    Orders:  Voltarn 1% gel - 2g to painful area TID PRN pain    Dr. Alisha Kelly, RITA, [facility]  Phone: [phone]  Fax: [phone]    "

## 2019-08-13 ENCOUNTER — NURSING HOME VISIT (OUTPATIENT)
Dept: GERIATRICS | Facility: CLINIC | Age: 84
End: 2019-08-13
Payer: MEDICARE

## 2019-08-13 VITALS
DIASTOLIC BLOOD PRESSURE: 63 MMHG | HEIGHT: 63 IN | BODY MASS INDEX: 18.07 KG/M2 | RESPIRATION RATE: 16 BRPM | WEIGHT: 102 LBS | TEMPERATURE: 98.7 F | HEART RATE: 71 BPM | SYSTOLIC BLOOD PRESSURE: 143 MMHG | OXYGEN SATURATION: 95 %

## 2019-08-13 DIAGNOSIS — Z87.81 S/P ORIF (OPEN REDUCTION INTERNAL FIXATION) FRACTURE: ICD-10-CM

## 2019-08-13 DIAGNOSIS — R35.0 URINE FREQUENCY: ICD-10-CM

## 2019-08-13 DIAGNOSIS — N39.0 URINARY TRACT INFECTION WITHOUT HEMATURIA, SITE UNSPECIFIED: ICD-10-CM

## 2019-08-13 DIAGNOSIS — R41.0 DELIRIUM: ICD-10-CM

## 2019-08-13 DIAGNOSIS — S72.145D CLOSED NONDISPLACED INTERTROCHANTERIC FRACTURE OF LEFT FEMUR WITH ROUTINE HEALING, SUBSEQUENT ENCOUNTER: Primary | ICD-10-CM

## 2019-08-13 DIAGNOSIS — S72.002A CLOSED FRACTURE OF NECK OF LEFT FEMUR, INITIAL ENCOUNTER (H): ICD-10-CM

## 2019-08-13 DIAGNOSIS — I60.9 SAH (SUBARACHNOID HEMORRHAGE) (H): ICD-10-CM

## 2019-08-13 DIAGNOSIS — R41.89 COGNITIVE IMPAIRMENT: ICD-10-CM

## 2019-08-13 DIAGNOSIS — Z98.890 S/P ORIF (OPEN REDUCTION INTERNAL FIXATION) FRACTURE: ICD-10-CM

## 2019-08-13 DIAGNOSIS — R53.81 DEBILITY: ICD-10-CM

## 2019-08-13 DIAGNOSIS — R52 ACUTE PAIN: ICD-10-CM

## 2019-08-13 DIAGNOSIS — S42.202D CLOSED FRACTURE OF PROXIMAL END OF LEFT HUMERUS WITH ROUTINE HEALING, UNSPECIFIED FRACTURE MORPHOLOGY, SUBSEQUENT ENCOUNTER: ICD-10-CM

## 2019-08-13 PROCEDURE — 99309 SBSQ NF CARE MODERATE MDM 30: CPT | Performed by: NURSE PRACTITIONER

## 2019-08-13 RX ORDER — TRAMADOL HYDROCHLORIDE 50 MG/1
TABLET ORAL
Qty: 30 TABLET | Refills: 0 | Status: SHIPPED | OUTPATIENT
Start: 2019-08-13 | End: 2019-08-27

## 2019-08-13 ASSESSMENT — MIFFLIN-ST. JEOR: SCORE: 826.8

## 2019-08-13 NOTE — PROGRESS NOTES
"[facility]  [place] [facility] Record Number:  [id number]  [facility] where encounter took place[facility] [[id number]]  Chief Complaint   Patient presents with     RECHECK       HPI:    Gennaro Walton  is a 89 year old (9/8/1923), who is being seen today for an episodic care visit.  HPI information obtained from: facility chart records, facility staff, patient report and [facility] chart review.     Met with Mrs. Walton today for follow up and due to Daughter left a note requesting we stop at bedtime Tramadol.  In speaking with Mrs. Walton, she continues to have mod-advanced cognitive impairment.  She reports she feels tired today, does moan and reports she \"Hurts\" at times, but no clear on where she hurts.  She has no other complaints.      We called daughter/Caron, whom endorses she knows mom is having pain, but also reports mom is more confused after taking the at bedtime Tramadol.  We again spent time discussing that Mrs. Walton is 89, two fractures, s/p surgery, there is no option to be pain free and avoid delirium at the same time.  Daughter wants her delirium to resolve thus requests we lower the at bedtime Tramadol.  We did share with her, we know she has pain at times, daughter is aware.      Past Medical and Surgical History reviewed in Epic today.    MEDICATIONS:  Current Outpatient Medications   Medication Sig Dispense Refill     acetaminophen (TYLENOL) 500 MG tablet Take 2 tablets (1,000 mg) by mouth 4 times daily       amLODIPine (NORVASC) 5 MG tablet Take 2.5 mg by mouth daily       calcium carbonate (TUMS) 500 MG chewable tablet Take 1 chew tab by mouth 2 times daily       cefdinir (OMNICEF) 300 MG capsule Take 1 capsule (300 mg) by mouth 2 times daily for 10 doses 10 capsule 0     levothyroxine (SYNTHROID/LEVOTHROID) 88 MCG tablet Take 88 mcg by mouth daily       Lidocaine (LIDOCARE) 4 % Patch Place 2 patches onto the skin every 24 hours To " "prevent lidocaine toxicity, patient should be patch free for 12 hrs daily.  1 on Left hip, 1 on Left shoulder.       losartan (COZAAR) 50 MG tablet Take 50 mg by mouth daily       Menthol, Topical Analgesic, (BIOFREEZE) 4 % GEL Apply 1 Application topically 3 times daily as needed (Pain) Apply to painful areas, all except face, TID PRN.       oxybutynin ER (DITROPAN-XL) 10 MG 24 hr tablet Take 1 tablet (10 mg) by mouth At Bedtime       polyethylene glycol (MIRALAX/GLYCOLAX) packet Take 17 g by mouth daily       traMADol (ULTRAM) 50 MG tablet Tramadol 12.5 mg's at bedtime scheduled for pain. -Can take an additional 12.5 mg's 2 hrs after if still awake and pain. 30 tablet 0     vitamin D3 (CHOLECALCIFEROL) 2000 units tablet Take 1 tablet by mouth daily       REVIEW OF SYSTEMS:  Limited secondary to cognitive impairment but today 7 point ROS done including, light headedness/dizziness, fever/chills, pain, Resp, CV, GI, and  and is negative other than noted in HPI.     Objective:  BP (!) 143/63   Pulse 71   Temp 98.7  F (37.1  C)   Resp 16   Ht 1.6 m (5' 3\")   Wt 46.3 kg (102 lb)   SpO2 95%   BMI 18.07 kg/m       Exam:  GENERAL APPEARANCE:  Elderly frail female resting in bed, mildly somnolent but arousable.   ENT:  Mouth and oropharynx normal, moist mucous membranes.   RESP:  Lungs are CTA. Regular relaxed breathing effort.  No cough.   CV: S1/S2 no murmur or rubs.  Regular rhythm and rate.  No generalized edema.   ABDOMEN:   Protuberant but, soft, non-tender with active bowel sounds.  No guarding, rigidity, or rebound tenderness.  EXTREMITIES:  No lower extremity edema, no calf tenderness.   PSYCH: Alert to self only, not place, date, situation.  Suspect some degree of cognitive/memory impairment.  Stable/unchanged.   WOUND: Left hip has 3 surgical incisions, all approximated, no drainage, no s/s of infection.  Staples have been removed.   SKIN: Large ecchymosis present on medial upper Left thigh not assessed " today.     Labs:   I have personally reviewed labs, which are in facility or EMR chart.     ASSESSMENT/PLAN:  Closed nondisplaced intertrochanteric fracture of left femur with routine healing, subsequent encounter  S/P ORIF (open reduction internal fixation) fracture 26 July  Closed fracture of proximal end of left humerus with routine healing, unspecified fracture morphology, subsequent encounter  Acute pain  We had a long care conference last week with Mrs. Walton and POA/daughter Caron.  Caron is very concerned about the delirium and she reports it's much worse after Tramadol.  We had discussion about pain and delirium and noting she is on minimal opiates and still having pain, we do not have many options.  Caron still voiced she wanted to reduce Tramadol, thus we did.    Today in speaking with Caron she still admits Mrs. Walton does have pain at times, but is still confused, but she thinks it's better since lowering the Tramadol, wants to lower it some more.  We did inform her Mrs. Walton is still having pain issues.    -Continue Acetaminophen QID.   -Continue PRN Biofreeze.   -Continues with Lidocaine patch on Left shoulder and leg.   -Lowered at bedtime Tramadol down to 12.5 mg's, with an additional 12.5 mg's if still awake/pain.   --Seen by Ortho the other day, no changes, f/u again in 4 weeks.     Traumatic SAH (subarachnoid hemorrhage)   The other day On-call ordered Voltaren gel due to pain issues.  We quickly DC'd this.  Due to an SAH, she is not to get any NSAIDS or NSAIDS products.    -f/u Planned on/around 25 Aug with repeat imaging.     Urinary tract infection without hematuria, site unspecified  Urine frequency  Has a h/o frequency, was started on Oxybutynin several months ago.  Caron reports no delirium noted with that, as we query if it could be part of the ongoing delirium now.  Mrs. Walton did have increased frequency a few nights back, UA/UC noted UTI, which we started treatment.    -Continues on Cefdinir, total of 5 days planned.   -Continues PTA Oxybutynin.     Delirium  Cognitive impairment  Continues to have moderate-advanced cognitive impairment.  Daughter reports it has been improved with reduced Tramadol.    -Plan as noted above.     Debility  Per Ortho update: No change.  NWB LUE.   30% weight bearing LLE.   -Continues with PT/OT.     Orders written by provider at facility  -As noted above.     Electronically signed by:  RITA Adhikari CNP

## 2019-08-13 NOTE — LETTER
"    8/13/2019        RE: Gennaro Walton  [address]  [facility] [address]        [facility]  [place] [facility] Record Number:  [id number]  [facility] where [facility] took place[facility] [[id number]]  Chief Complaint   Patient presents with     RECHECK       HPI:    Gennaro Walton  is a 89 year old (9/8/1923), who is being seen today for an episodic care visit.  HPI information obtained from: facility chart records, facility staff, patient report and [facility] chart review.     Met with Mrs. Walton today for follow up and due to Daughter left a note requesting we stop at bedtime Tramadol.  In speaking with Mrs. Walton, she continues to have mod-advanced cognitive impairment.  She reports she feels tired today, does moan and reports she \"Hurts\" at times, but no clear on where she hurts.  She has no other complaints.      We called daughter/Caron, whom endorses she knows mom is having pain, but also reports mom is more confused after taking the at bedtime Tramadol.  We again spent time discussing that Mrs. Walton is 89, two fractures, s/p surgery, there is no option to be pain free and avoid delirium at the same time.  Daughter wants her delirium to resolve thus requests we lower the at bedtime Tramadol.  We did share with her, we know she has pain at times, daughter is aware.      Past Medical and Surgical History reviewed in Epic today.    MEDICATIONS:  Current Outpatient Medications   Medication Sig Dispense Refill     acetaminophen (TYLENOL) 500 MG tablet Take 2 tablets (1,000 mg) by mouth 4 times daily       amLODIPine (NORVASC) 5 MG tablet Take 2.5 mg by mouth daily       calcium carbonate (TUMS) 500 MG chewable tablet Take 1 chew tab by mouth 2 times daily       cefdinir (OMNICEF) 300 MG capsule Take 1 capsule (300 mg) by mouth 2 times daily for 10 doses 10 capsule 0     levothyroxine (SYNTHROID/LEVOTHROID) 88 MCG tablet Take 88 mcg by " "mouth daily       Lidocaine (LIDOCARE) 4 % Patch Place 2 patches onto the skin every 24 hours To prevent lidocaine toxicity, patient should be patch free for 12 hrs daily.  1 on Left hip, 1 on Left shoulder.       losartan (COZAAR) 50 MG tablet Take 50 mg by mouth daily       Menthol, Topical Analgesic, (BIOFREEZE) 4 % GEL Apply 1 Application topically 3 times daily as needed (Pain) Apply to painful areas, all except face, TID PRN.       oxybutynin ER (DITROPAN-XL) 10 MG 24 hr tablet Take 1 tablet (10 mg) by mouth At Bedtime       polyethylene glycol (MIRALAX/GLYCOLAX) packet Take 17 g by mouth daily       traMADol (ULTRAM) 50 MG tablet Tramadol 12.5 mg's at bedtime scheduled for pain. -Can take an additional 12.5 mg's 2 hrs after if still awake and pain. 30 tablet 0     vitamin D3 (CHOLECALCIFEROL) 2000 units tablet Take 1 tablet by mouth daily       REVIEW OF SYSTEMS:  Limited secondary to cognitive impairment but today 7 point ROS done including, light headedness/dizziness, fever/chills, pain, Resp, CV, GI, and  and is negative other than noted in HPI.     Objective:  BP (!) 143/63   Pulse 71   Temp 98.7  F (37.1  C)   Resp 16   Ht 1.6 m (5' 3\")   Wt 46.3 kg (102 lb)   SpO2 95%   BMI 18.07 kg/m        Exam:  GENERAL APPEARANCE:  Elderly frail female resting in bed, mildly somnolent but arousable.   ENT:  Mouth and oropharynx normal, moist mucous membranes.   RESP:  Lungs are CTA. Regular relaxed breathing effort.  No cough.   CV: S1/S2 no murmur or rubs.  Regular rhythm and rate.  No generalized edema.   ABDOMEN:   Protuberant but, soft, non-tender with active bowel sounds.  No guarding, rigidity, or rebound tenderness.  EXTREMITIES:  No lower extremity edema, no calf tenderness.   PSYCH: Alert to self only, not place, date, situation.  Suspect some degree of cognitive/memory impairment.  Stable/unchanged.   WOUND: Left hip has 3 surgical incisions, all approximated, no drainage, no s/s of infection.  " Staples have been removed.   SKIN: Large ecchymosis present on medial upper Left thigh not assessed today.     Labs:   I have personally reviewed labs, which are in facility or EMR chart.     ASSESSMENT/PLAN:  Closed nondisplaced intertrochanteric fracture of left femur with routine healing, subsequent encounter  S/P ORIF (open reduction internal fixation) fracture 26 July  Closed fracture of proximal end of left humerus with routine healing, unspecified fracture morphology, subsequent encounter  Acute pain  We had a long care conference last week with Mrs. Walton and POA/daughter Caron.  Caron is very concerned about the delirium and she reports it's much worse after Tramadol.  We had discussion about pain and delirium and noting she is on minimal opiates and still having pain, we do not have many options.  Caron still voiced she wanted to reduce Tramadol, thus we did.    Today in speaking with Caron she still admits Mrs. Walton does have pain at times, but is still confused, but she thinks it's better since lowering the Tramadol, wants to lower it some more.  We did inform her Mrs. Walton is still having pain issues.    -Continue Acetaminophen QID.   -Continue PRN Biofreeze.   -Continues with Lidocaine patch on Left shoulder and leg.   -Lowered at bedtime Tramadol down to 12.5 mg's, with an additional 12.5 mg's if still awake/pain.   --Seen by Ortho the other day, no changes, f/u again in 4 weeks.     Traumatic SAH (subarachnoid hemorrhage)   The other day On-call ordered Voltaren gel due to pain issues.  We quickly DC'd this.  Due to an SAH, she is not to get any NSAIDS or NSAIDS products.    -f/u Planned on/around 25 Aug with repeat imaging.     Urinary tract infection without hematuria, site unspecified  Urine frequency  Has a h/o frequency, was started on Oxybutynin several months ago.  Caron reports no delirium noted with that, as we query if it could be part of the ongoing delirium now.  Mrs. Walton  did have increased frequency a few nights back, UA/UC noted UTI, which we started treatment.   -Continues on Cefdinir, total of 5 days planned.   -Continues PTA Oxybutynin.     Delirium  Cognitive impairment  Continues to have moderate-advanced cognitive impairment.  Daughter reports it has been improved with reduced Tramadol.    -Plan as noted above.     Debility  Per Ortho update: No change.  NWB LUE.   30% weight bearing LLE.   -Continues with PT/OT.     Orders written by provider at facility  -As noted above.     Electronically signed by:  RITA Adhikari CNP         Sincerely,        RITA Adhikari CNP

## 2019-08-19 ENCOUNTER — NURSING HOME VISIT (OUTPATIENT)
Dept: GERIATRICS | Facility: CLINIC | Age: 84
End: 2019-08-19
Payer: MEDICARE

## 2019-08-19 VITALS
OXYGEN SATURATION: 97 % | DIASTOLIC BLOOD PRESSURE: 66 MMHG | HEART RATE: 60 BPM | BODY MASS INDEX: 18.07 KG/M2 | HEIGHT: 63 IN | TEMPERATURE: 98.5 F | WEIGHT: 102 LBS | RESPIRATION RATE: 18 BRPM | SYSTOLIC BLOOD PRESSURE: 178 MMHG

## 2019-08-19 DIAGNOSIS — R53.81 DEBILITY: ICD-10-CM

## 2019-08-19 DIAGNOSIS — R41.0 DELIRIUM: ICD-10-CM

## 2019-08-19 DIAGNOSIS — Z98.890 S/P ORIF (OPEN REDUCTION INTERNAL FIXATION) FRACTURE: ICD-10-CM

## 2019-08-19 DIAGNOSIS — R52 ACUTE PAIN: ICD-10-CM

## 2019-08-19 DIAGNOSIS — S42.202D CLOSED FRACTURE OF PROXIMAL END OF LEFT HUMERUS WITH ROUTINE HEALING, UNSPECIFIED FRACTURE MORPHOLOGY, SUBSEQUENT ENCOUNTER: ICD-10-CM

## 2019-08-19 DIAGNOSIS — R41.89 COGNITIVE IMPAIRMENT: ICD-10-CM

## 2019-08-19 DIAGNOSIS — Z87.81 S/P ORIF (OPEN REDUCTION INTERNAL FIXATION) FRACTURE: ICD-10-CM

## 2019-08-19 DIAGNOSIS — S72.145D CLOSED NONDISPLACED INTERTROCHANTERIC FRACTURE OF LEFT FEMUR WITH ROUTINE HEALING, SUBSEQUENT ENCOUNTER: Primary | ICD-10-CM

## 2019-08-19 DIAGNOSIS — I60.9 SAH (SUBARACHNOID HEMORRHAGE) (H): ICD-10-CM

## 2019-08-19 PROCEDURE — 99309 SBSQ NF CARE MODERATE MDM 30: CPT | Performed by: NURSE PRACTITIONER

## 2019-08-19 ASSESSMENT — MIFFLIN-ST. JEOR: SCORE: 826.8

## 2019-08-19 NOTE — PROGRESS NOTES
[facility]  [place] Medical Record Number:  [id number]  Place of Service where encounter took place:  [facility] [[id number]]  Chief Complaint   Patient presents with     RECHECK       HPI:    Gennaro Walton  is a 89 year old (9/8/1923), who is being seen today for an episodic care visit.  HPI information obtained from: facility chart records, facility staff, patient report and [facility] chart review.     Met with Mrs. Walton today for follow up.  Mrs. Walton has mod-advanced cognitive impairment, but does appear more awake/alert today.  She continues to endorse pain, reports worse in RUE, but vague about report.  She otherwise has no complaints.      Past Medical and Surgical History reviewed in Epic today.    MEDICATIONS:  Current Outpatient Medications   Medication Sig Dispense Refill     acetaminophen (TYLENOL) 500 MG tablet Take 2 tablets (1,000 mg) by mouth 4 times daily       amLODIPine (NORVASC) 5 MG tablet Take 2.5 mg by mouth daily       calcium carbonate (TUMS) 500 MG chewable tablet Take 1 chew tab by mouth 2 times daily       levothyroxine (SYNTHROID/LEVOTHROID) 88 MCG tablet Take 88 mcg by mouth daily       Lidocaine (LIDOCARE) 4 % Patch Place 2 patches onto the skin every 24 hours To prevent lidocaine toxicity, patient should be patch free for 12 hrs daily.  1 on Left hip, 1 on Left shoulder.       losartan (COZAAR) 50 MG tablet Take 50 mg by mouth daily       Menthol, Topical Analgesic, (BIOFREEZE) 4 % GEL Apply 1 Application topically 3 times daily as needed (Pain) Apply to painful areas, all except face, TID PRN.       oxybutynin ER (DITROPAN-XL) 10 MG 24 hr tablet Take 1 tablet (10 mg) by mouth At Bedtime       polyethylene glycol (MIRALAX/GLYCOLAX) packet Take 17 g by mouth daily       traMADol (ULTRAM) 50 MG tablet Tramadol 12.5 mg's at bedtime scheduled for pain. -Can take an additional 12.5 mg's 2 hrs after if still awake and pain. 30 tablet 0  "    vitamin D3 (CHOLECALCIFEROL) 2000 units tablet Take 1 tablet by mouth daily       REVIEW OF SYSTEMS:  Limited secondary to cognitive impairment but today 7 point ROS done including, light headedness/dizziness, fever/chills, pain, Resp, CV, GI, and  and is negative other than noted in HPI.    Objective:  BP (!) 178/66   Pulse 60   Temp 98.5  F (36.9  C)   Resp 18   Ht 1.6 m (5' 3\")   Wt 46.3 kg (102 lb)   SpO2 97%   BMI 18.07 kg/m       Exam:  GENERAL APPEARANCE:  Elderly frail female resting in bed, NAD, non-toxic.   ENT:  Mouth and oropharynx normal, moist mucous membranes.   RESP:  Lungs are CTA. Regular relaxed breathing effort.  No cough.   CV: S1/S2 no murmur or rubs.  Regular rhythm and rate.  No generalized edema.   ABDOMEN:   Protuberant but, soft, non-tender with active bowel sounds.  No guarding, rigidity, or rebound tenderness.  EXTREMITIES:  No lower extremity edema, no calf tenderness.   PSYCH: Alert to self only, not place, date, situation.  Suspect some degree of cognitive/memory impairment.  Stable/unchanged.   WOUND: Left hip has 3 surgical incisions, not assessed today.   SKIN: Large ecchymosis present on medial upper Left thigh not assessed today.     Labs:   I have personally reviewed labs, which are in facility or EMR chart.     ASSESSMENT/PLAN:  Closed nondisplaced intertrochanteric fracture of left femur with routine healing, subsequent encounter  S/P ORIF (open reduction internal fixation) Left femur fracture, 26 July  Closed fracture of proximal end of left humerus with routine healing, unspecified fracture morphology, subsequent encounter  Acute pain  Mrs. Walton reports pain in her LUE, but vague and does not rate, but does not appear in distress right now.  RN's report she will yell \"Ow\" at times and c/o pain at times, but only with movement.  Also RN's report pain reports move around, sometimes LLE, sometimes LUE, sometimes stomach.    Daughter/POA made it clear she want's " minimal analgesics and we have done that, and we have noted an increase in awake/alert, and she does not appear in distress.  -Continues with NWB LUE with use of sling.   -Continues with 30% weight bearing on LLE.   -Continues QID Acetaminophen and at bedtime Tramadol.   --Ortho f/u in Sept.     Traumatic SAH (subarachnoid hemorrhage)   Monitoring clinically, as noted above, ongoing mod-adv cognitive/memory impairment, but more awake/alert with decrease in analgesics.   -Repeat imaging and f/u around Aug 25th.   -Avoid all NSAIDS for now.     Delirium  Cognitive impairment  As noted above.   -No changes, continue to clinically monitor.     Debility  High degree of debility, and right now is heavy assist of two to get out of bed, is not ambulating.  With significant cognitive impairment, she can not 30% weight bear, and she intermittently forget's about her LUE being broken, thus with LUE and LLE as NWB she has significant debility and will likely remain that way for months if not life long.   -Continues with PT/OT.   -Would benefit from WC, thus filled out F2F for one.     Orders written by provider at facility  -As noted above.     Electronically signed by:  RITA Adhikari CNP       MEDICAL NECESSITY STATEMENT FOR DME    Demographic Information on [facility]:    [facility]  Gender: female  : 1923  [address]  [place] [address]  [phone] (home) none (work)    Medical Record: [id number]  Social Security Number: [id number]  Primary Care Provider: Mady Hagen  Insurance: Payor: MEDICARE / Plan: MEDICARE / Product Type: Medicare /       Closed nondisplaced intertrochanteric fracture of left femur with routine healing, subsequent encounter [S72.145D]  S/P ORIF Left femur fracture,  [Z96.7]  Closed fracture of proximal end of left humerus with routine healing, unspecified fracture morphology, subsequent encounter [S42.202D]  Debility [R53.81]    DME:  WHEELCHAIR:  18x18 manual with seat/back cushion and extended foot rests bilaterally.    Standard foot rest: No, extended bilateral foot rests.    Elevating leg rest: No   Dose patient use oxygen: No   Able to propel w/c: No, NWB/use of LUE, but yes some one can propel for her.    Mobility related ADL that are affected in the home; dressing, toileting, grooming, hygiene.    The wheelchair is suitable and necessary for use in the patient's home and the  Home/rooms can accommodate the wheelchair.     Reason why a cane or walker will not meet the patient's needs. Can not ambulate due to LLE 30% weight bearing only, and with LUE NWB status can not use a walker and with cognitive impairment and debility does not have endurance for use of cane.    The patient has expressed willingness to use the wheelchair in the home and    Does have the physical and cognitive ability to maneuver the equipment or has a    Caregiver who is available, willing, and able to provide assistance in the home    With the wheelchair: Yes.     VITAL SIGNS:  ASSESSMENT/PLAN:  -See above.     MEDICAL NECESSITY STATEMENT: Pt would benefit from wheelchair to assist with ADL's noted above, along with increase in quality of life, mobility and safety due to ongoing 30% weight bearing LLE, NWB LUE, and cognitive impairment make her mostly bed bound.  That combined with debility, patient is unsafe to ambulate with walker or cane and is a high fall risk.  Due to extensive nature of limitations, there is a possibility this will be life long.     Closed nondisplaced intertrochanteric fracture of left femur with routine healing, subsequent encounter [S72.145D]  S/P ORIF Left femur fracture, 26 July [Z96.7]  Closed fracture of proximal end of left humerus with routine healing, unspecified fracture morphology, subsequent encounter [S42.202D]  Debility [R53.81]    ELECTRONICALLY SIGNED BY KELSEA CERTIFIED PROVIDER:  RITA Adhikari CNP   NPI: [id number]  [facility]  [address], [address]  [place] [address]

## 2019-08-22 ENCOUNTER — HOSPITAL LABORATORY (OUTPATIENT)
Dept: OTHER | Facility: CLINIC | Age: 84
End: 2019-08-22

## 2019-08-22 LAB
ANION GAP SERPL CALCULATED.3IONS-SCNC: 5 MMOL/L (ref 3–14)
BUN SERPL-MCNC: 11 MG/DL (ref 7–30)
CALCIUM SERPL-MCNC: 8.4 MG/DL (ref 8.5–10.1)
CHLORIDE SERPL-SCNC: 105 MMOL/L (ref 94–109)
CO2 SERPL-SCNC: 27 MMOL/L (ref 20–32)
CREAT SERPL-MCNC: 0.57 MG/DL (ref 0.52–1.04)
ERYTHROCYTE [DISTWIDTH] IN BLOOD BY AUTOMATED COUNT: 18 % (ref 10–15)
GFR SERPL CREATININE-BSD FRML MDRD: 78 ML/MIN/{1.73_M2}
GLUCOSE SERPL-MCNC: 72 MG/DL (ref 70–99)
HCT VFR BLD AUTO: 31.4 % (ref 35–47)
HGB BLD-MCNC: 10.3 G/DL (ref 11.7–15.7)
MCH RBC QN AUTO: 33.4 PG (ref 26.5–33)
MCHC RBC AUTO-ENTMCNC: 32.8 G/DL (ref 31.5–36.5)
MCV RBC AUTO: 102 FL (ref 78–100)
PLATELET # BLD AUTO: 211 10E9/L (ref 150–450)
POTASSIUM SERPL-SCNC: 4.1 MMOL/L (ref 3.4–5.3)
RBC # BLD AUTO: 3.08 10E12/L (ref 3.8–5.2)
SODIUM SERPL-SCNC: 137 MMOL/L (ref 133–144)
WBC # BLD AUTO: 3.5 10E9/L (ref 4–11)

## 2019-08-27 ENCOUNTER — DISCHARGE SUMMARY NURSING HOME (OUTPATIENT)
Dept: GERIATRICS | Facility: CLINIC | Age: 84
End: 2019-08-27
Payer: MEDICARE

## 2019-08-27 VITALS
RESPIRATION RATE: 16 BRPM | OXYGEN SATURATION: 94 % | HEIGHT: 63 IN | SYSTOLIC BLOOD PRESSURE: 120 MMHG | WEIGHT: 102 LBS | HEART RATE: 69 BPM | TEMPERATURE: 99.8 F | BODY MASS INDEX: 18.07 KG/M2 | DIASTOLIC BLOOD PRESSURE: 64 MMHG

## 2019-08-27 DIAGNOSIS — N39.0 URINARY TRACT INFECTION WITHOUT HEMATURIA, SITE UNSPECIFIED: ICD-10-CM

## 2019-08-27 DIAGNOSIS — D62 ACUTE BLOOD LOSS ANEMIA: ICD-10-CM

## 2019-08-27 DIAGNOSIS — R41.0 DELIRIUM: ICD-10-CM
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
## 2019-08-27 DIAGNOSIS — I10 ESSENTIAL HYPERTENSION: ICD-10-CM

## 2019-08-27 DIAGNOSIS — R53.81 DEBILITY: ICD-10-CM

## 2019-08-27 DIAGNOSIS — Z98.890 S/P ORIF (OPEN REDUCTION INTERNAL FIXATION) FRACTURE: ICD-10-CM

## 2019-08-27 DIAGNOSIS — R52 ACUTE PAIN: ICD-10-CM

## 2019-08-27 DIAGNOSIS — I60.9 SAH (SUBARACHNOID HEMORRHAGE) (H): ICD-10-CM

## 2019-08-27 DIAGNOSIS — R41.89 COGNITIVE IMPAIRMENT: ICD-10-CM

## 2019-08-27 DIAGNOSIS — N18.30 CKD (CHRONIC KIDNEY DISEASE) STAGE 3, GFR 30-59 ML/MIN (H): ICD-10-CM

## 2019-08-27 DIAGNOSIS — S72.145D CLOSED NONDISPLACED INTERTROCHANTERIC FRACTURE OF LEFT FEMUR WITH ROUTINE HEALING, SUBSEQUENT ENCOUNTER: Primary | ICD-10-CM

## 2019-08-27 DIAGNOSIS — S42.202D CLOSED FRACTURE OF PROXIMAL END OF LEFT HUMERUS WITH ROUTINE HEALING, UNSPECIFIED FRACTURE MORPHOLOGY, SUBSEQUENT ENCOUNTER: ICD-10-CM

## 2019-08-27 DIAGNOSIS — Z87.81 S/P ORIF (OPEN REDUCTION INTERNAL FIXATION) FRACTURE: ICD-10-CM

## 2019-08-27 PROCEDURE — 99316 NF DSCHRG MGMT 30 MIN+: CPT | Performed by: NURSE PRACTITIONER

## 2019-08-27 RX ORDER — TRAMADOL HYDROCHLORIDE 50 MG/1
12.5 TABLET ORAL
Status: ON HOLD | COMMUNITY
End: 2020-01-08

## 2019-08-27 ASSESSMENT — MIFFLIN-ST. JEOR: SCORE: 826.8

## 2019-08-27 NOTE — PROGRESS NOTES
[facility] DISCHARGE SUMMARY  PATIENT'S NAME: Gennaro Walton  YOB: 1923  MEDICAL RECORD NUMBER:  [id number]  Place of Service where encounter took place:  [facility] [[id number]]    PRIMARY CARE PROVIDER AND CLINIC RESPONSIBLE AFTER TRANSFER:   Mady Hagen MD, [place] [address] / [place] [address]    Assisted Living: [facility]     Transferring providers: RITA Adhikari CNP, Nathan Rowley MD  Recent Hospitalization/ED:  [facility] stay 7/25/2019 to 7/31/2019.  Date of SNF Admission: July / 31 / 2019  Date of SNF (anticipated) Discharge: August / 28 / 2019  Discharged to: new assisted living for patient [facility]  Cognitive Scores: SLUMS: 11/30, CPT: 4.2/5.6 and MOCA: 13/30  Physical Function: Unable to test, can only stand on parallel bars max 20 sec, is full/max assist with all ADL's and transfers.  DME: Wheelchair    CODE STATUS/ADVANCE DIRECTIVES DISCUSSION:  DNR / DNI     ALLERGIES: Patient has no known allergies.    DISCHARGE DIAGNOSIS/NURSING FACILITY COURSE:   Mrs. Walton is a 89 y/o with a PMH of cognitive impairment, HTN, CKD III whom presented to hospital after a fall and suffered a comminuted Left intertrochanteric hip fracture, a Left humerus fracture, found to have a subarachnoid hemorrhage and acute anemia.  She was seen by Ortho and is s/p ORIF on 7/26 of the Left hip fracture, and they reported the Left humerus fracture will be non-operative.  For the subarachnoid hemorrhage, serial imaging was recommended with repeat imaging in 4-6 weeks, avoid all NSAIDS.  She was tranfused 2 unit(s) of  RBC's.  Due to improvement she then transitioned to the TCU/Rehab for ongoing cares and PT/OT.     Upon arrival to the TCU we did note ongoing delirium and pain issues.  After several discussions with the daughter/POALIYAH Reardon, she was adamant we limit all opiates, and favored  treating delirium over pain, thus we only proceeded with Tramadol, and quickly reduced it to minimal amounts.  That did help in resolution of the delirium.  Mrs. Walton still calls out at times and reports 7/10 pain at times, but it has been improved, and now pain is mostly with movement only, does appear comfortable most of the time.  She was seen by Ortho whom recommended ongoing sling for her LUE, but she refuses most of the time.  They reported a 30% WBAT restriction on the LLE, but she is not cognitive enough to follow that.  She is highly debilitated and max assist for transfers and ADL's, uses wheel chair most of the time.      In meeting Mrs. Walton today for discharge, she is aware she is discharging to a new facility.  She still reports occasional pain 7/10 at times but sporadic, and she reports tolerable.  She reports everyone tells her she is more alert/orientated, but she herself does not know.  She has no other complaints.     Closed nondisplaced intertrochanteric fracture of left femur with routine healing, subsequent encounter  S/P ORIF (open reduction internal fixation) Left femur fracture, 26 July  Two Left hip incisions are approximated and healing nicely.   -She is officially supposed to be 30% WBAT on LLE, but not able to follow that instruction.   -Ortho f/u needed on/around 12 Sept.   --Not on DVT prophylaxis due to SAH.     Closed fracture of proximal end of left humerus with routine healing, unspecified fracture morphology, subsequent encounter  Upon arrival to the TCU she came with a sling but it was way to big.  We were able to get one that was her size, but she continues to remove it most of the time.    -Continue LUE sling for comfort, encourage ongoing use.   -Ortho f/u as noted above.     Acute pain  At times she will yell out and moan with pain but this has been less and less.  Pain is usually only with movement, but pain reports also vary, some times LUE, sometimes LLE, some  times neck, some times abdomen, thus reports are not consistent.   --Attempting to avoid opiates per family request.   -Scheduled Acetaminophen QID.   -Lidocaine patches, LUE, LLE.   -PRN Biofreeze.   --PRN Tramadol 12.5 mg's at bedtime.     Traumatic SAH (subarachnoid hemorrhage)   Secondary to fall.  Mentation has been improving.   -Avoid all NSAIDS.   --Needs [facility] with repeat imaging ([phone]), was recommended in 4-6 weeks, which currently were at week 4 of the Fall, at time of discharge.     Fall, subsequent encounter  -See debility below.     Acute blood loss anemia  Hgb was down in hospital, etiology unclear, is s/p 2 units of RBC's.  Hgb was 10.3 and trending up when last checked 8/22.   -Continue to clinically monitor.     Urinary tract infection without hematuria, site unspecified  Had increased urgency and frequency thus checked UA which showed E Coli UTI, she has finished treatment with Cefdinir.   -Continue to clinically monitor.     CKD (chronic kidney disease) stage 3, GFR 30-59 ml/min (H)  Creatine baseline around 0.6, and last creatine was 0.57 when last checked 8/22.  Est CrCl around 41.  -Continue to clinically monitor.     Essential hypertension  SBP's are erratic, 120-170's at times, but asymptomatic.  HR's 60-70's.    No current s/s of clinical CHF.   -Continues Norvasc and Losartan, did not increase to avoid over treatment in advanced age.     Delirium  Cognitive impairment  Mod-advanced cognitive/memory impairment, but has been improving with treatment of UTI and lowering of opiates.  No behavior issues while at TCU.   -Continue to clinically monitor.     Debility  -Will DC with Home care.   -See below for F2F.     --We note on her last day at the TCU, she did choke on some of her pills.  This appears to be an isolated incident, no issues prior, but for safety concern and due to her discharging tomorrow.  We did add ST to home care so they can eval and treat to  "r/o aspiration concerns.      Past Medical History:  has a past medical history of Hypertension and Osteoporosis.    Discharge Medications:  Current Outpatient Medications   Medication Sig Dispense Refill     acetaminophen (TYLENOL) 500 MG tablet Take 2 tablets (1,000 mg) by mouth 4 times daily       amLODIPine (NORVASC) 5 MG tablet Take 2.5 mg by mouth daily       calcium carbonate (TUMS) 500 MG chewable tablet Take 1 chew tab by mouth 2 times daily       levothyroxine (SYNTHROID/LEVOTHROID) 88 MCG tablet Take 88 mcg by mouth daily       Lidocaine (LIDOCARE) 4 % Patch Place 2 patches onto the skin every 24 hours To prevent lidocaine toxicity, patient should be patch free for 12 hrs daily.  1 on Left hip, 1 on Left shoulder.       losartan (COZAAR) 50 MG tablet Take 50 mg by mouth daily       Menthol, Topical Analgesic, (BIOFREEZE) 4 % GEL Apply 1 Application topically 3 times daily as needed (Pain) Apply to painful areas, all except face, TID PRN.       oxybutynin ER (DITROPAN-XL) 10 MG 24 hr tablet Take 1 tablet (10 mg) by mouth At Bedtime       polyethylene glycol (MIRALAX/GLYCOLAX) packet Take 17 g by mouth daily       traMADol (ULTRAM) 50 MG tablet Take 12.5 mg by mouth nightly as needed for pain       vitamin D3 (CHOLECALCIFEROL) 2000 units tablet Take 1 tablet by mouth daily       Medication Changes/Rationale:   -As noted above.     Controlled medications sent with patient:   Medication: PRN Tramadol , ~16 tabs given to patient at the time of discharge to take home     ROS:   Limited secondary to cognitive impairment but today 7 point ROS done including, light headedness/dizziness, fever/chills, pain, Resp, CV, GI, and  and is negative other than noted in HPI.     Physical Exam:   Vitals: /64   Pulse 69   Temp 99.8  F (37.7  C)   Resp 16   Ht 1.6 m (5' 3\")   Wt 46.3 kg (102 lb)   SpO2 94%   BMI 18.07 kg/m    BMI= Body mass index is 18.07 kg/m .     GENERAL APPEARANCE:  Elderly frail female " resting in bed, NAD, non-toxic.   ENT:  Mouth and oropharynx normal, moist mucous membranes.   RESP:  Lungs are CTA. Regular relaxed breathing effort.  No cough.   CV: S1/S2 no murmur or rubs.  Regular rhythm and rate.  No generalized edema.   ABDOMEN:   Protuberant but, soft, non-tender with active bowel sounds.  No guarding, rigidity, or rebound tenderness.  EXTREMITIES:  No lower extremity edema, no calf tenderness.   PSYCH: Alert to self only, not place, date, somewhat to situation.  Suspect some degree of cognitive/memory impairment, and has been slowly improving.    WOUND: Left hip has 3 surgical incisions, all approximated and healing nicely, no s/s of infection.     SNF labs: Labs done in SNF are in [facility]. Please refer to them using [facility] Everywhere.    DISCHARGE PLAN:    Follow up labs: No labs orders/due    Medical Follow Up:      Follow up with primary care provider or new facility provider in 1-2 weeks   Follow up with Ortho on/around Sept 12th.    Follow up with [facility] in 2 weeks, appt needs to be made.       MTM referral needed and placed by this provider: No    Current [place] scheduled appointments:  Next 5 appointments (look out 90 days)    Sep 18, 2019 11:00 AM CDT  Return Visit with Smiley Etienne NP  [facility] ([facility]) [address] [place] [id number]  [phone]           Discharge Services: Home Care:  Occupational Therapy, Physical Therapy, Speech Therapy , Registered Nurse, Home Health Aide and From:  [facility].     Discharge Instructions Verbalized to Patient at Discharge:     Instructed patient to arrange/attend above appointments.    TOTAL DISCHARGE TIME:   Greater than 30 minutes  Electronically signed by:  RITA Adhikari CNP     Documentation of Face to Face and Certification for Home Health Services    I certify that patient: eGnnaro Walton is under my care and  that I, or a nurse practitioner or physician's assistant working with me, had a face-to-face encounter that meets the physician face-to-face encounter requirements with this patient on: 27 Aug 19.    This encounter with the patient was in whole, or in part, for the following medical condition, which is the primary reason for home health care: Fall with injury, LUE and LLE Weight bearing restrictions, significant debility, cognitive impairment, possible concern for dysphagia.    I certify that, based on my findings, the following services are medically necessary home health services: Nursing, Occupational Therapy, Physical Therapy, Speech Language Therapy and HHA. .    My clinical findings support the need for the above services because: Nurse is needed: To provide assessment and oversight required in the home to assure adherence to the medical plan due to: Fall with injury, LUE and LLE Weight bearing restrictions, significant debility, cognitive impairment, possible concern for dysphagia..., Occupational Therapy Services are needed to assess and treat cognitive ability and address ADL safety due to impairment in Fall with injury, LUE and LLE Weight bearing restrictions, significant debility, cognitive impairment, possible concern for dysphagia.., Physical Therapy Services are needed to assess and treat the following functional impairments: Fall with injury, LUE and LLE Weight bearing restrictions, significant debility, cognitive impairment, possible concern for dysphagia.. and Speech Therapy Services are needed to assess and treat impairments in language and/or swallow functions due to Fall with injury, LUE and LLE Weight bearing restrictions, significant debility, cognitive impairment, possible concern for dysphagia..    Further, I certify that my clinical findings support that this patient is homebound (i.e. absences from home require considerable and taxing effort and are for medical reasons or [facility] services  or infrequently or of short duration when for other reasons) because: Requires assistance of another person or specialized equipment to access medical services because patient: Requires supervision of another for safe transfer...    Based on the above findings. I certify that this patient is confined to the home and needs intermittent skilled nursing care, physical therapy and/or speech therapy.  The patient is under my care, and I have initiated the establishment of the plan of care.  This patient will be followed by a physician who will periodically review the plan of care.  Physician/Provider to provide follow up care: Mady Hagen    Attending hospital physician (the Medicare certified [facility] provider):   Electronically signed by  RITA Kahn, CNP  [facility]    Physician Signature: See electronic signature associated with these discharge orders.  Date: 8/27/2019

## 2019-08-27 NOTE — LETTER
8/27/2019        RE: Gennaro Walton  [address]  [place] [address]        [facility] DISCHARGE SUMMARY  PATIENT'S NAME: Gennaro Walton  YOB: 1923  MEDICAL RECORD NUMBER:  [id number]  Place of Service where encounter took place:  [facility] [[id number]]    PRIMARY CARE PROVIDER AND CLINIC RESPONSIBLE AFTER TRANSFER:   Mady Hagen MD, [address] / [place] [address]    Assisted Living: [facility]     Transferring providers: RITA Adhikari CNP, Nathan Rowley MD  Recent Hospitalization/ED:  [facility] stay 7/25/2019 to 7/31/2019.  Date of SNF Admission: July / 31 / 2019  Date of SNF (anticipated) Discharge: August / 28 / 2019  Discharged to: new assisted living for patient [facility]  Cognitive Scores: SLUMS: 11/30, CPT: 4.2/5.6 and MOCA: 13/30  Physical Function: Unable to test, can only stand on parallel bars max 20 sec, is full/max assist with all ADL's and transfers.  DME: Wheelchair    CODE STATUS/ADVANCE DIRECTIVES DISCUSSION:  DNR / DNI     ALLERGIES: Patient has no known allergies.    DISCHARGE DIAGNOSIS/NURSING FACILITY COURSE:   Mrs. Walton is a 89 y/o with a PMH of cognitive impairment, HTN, CKD III whom presented to hospital after a fall and suffered a comminuted Left intertrochanteric hip fracture, a Left humerus fracture, found to have a subarachnoid hemorrhage and acute anemia.  She was seen by Ortho and is s/p ORIF on 7/26 of the Left hip fracture, and they reported the Left humerus fracture will be non-operative.  For the subarachnoid hemorrhage, serial imaging was recommended with repeat imaging in 4-6 weeks, avoid all NSAIDS.  She was tranfused 2 unit(s) of  RBC's.  Due to improvement she then transitioned to the TCU/Rehab for ongoing cares and PT/OT.     Upon arrival to the TCU we did note ongoing delirium and pain issues.  After several  discussions with the daughter/NATI Reardon, she was adamant we limit all opiates, and favored treating delirium over pain, thus we only proceeded with Tramadol, and quickly reduced it to minimal amounts.  That did help in resolution of the delirium.  Mrs. Walton still calls out at times and reports 7/10 pain at times, but it has been improved, and now pain is mostly with movement only, does appear comfortable most of the time.  She was seen by Ortho whom recommended ongoing sling for her LUE, but she refuses most of the time.  They reported a 30% WBAT restriction on the LLE, but she is not cognitive enough to follow that.  She is highly debilitated and max assist for transfers and ADL's, uses wheel chair most of the time.      In meeting Mrs. Walton today for discharge, she is aware she is discharging to a new facility.  She still reports occasional pain 7/10 at times but sporadic, and she reports tolerable.  She reports everyone tells her she is more alert/orientated, but she herself does not know.  She has no other complaints.     Closed nondisplaced intertrochanteric fracture of left femur with routine healing, subsequent encounter  S/P ORIF (open reduction internal fixation) Left femur fracture, 26 July  Two Left hip incisions are approximated and healing nicely.   -She is officially supposed to be 30% WBAT on LLE, but not able to follow that instruction.   -Ortho f/u needed on/around 12 Sept.   --Not on DVT prophylaxis due to SAH.     Closed fracture of proximal end of left humerus with routine healing, unspecified fracture morphology, subsequent encounter  Upon arrival to the TCU she came with a sling but it was way to big.  We were able to get one that was her size, but she continues to remove it most of the time.    -Continue LUE sling for comfort, encourage ongoing use.   -Ortho f/u as noted above.     Acute pain  At times she will yell out and moan with pain but this has been less and less.  Pain is  usually only with movement, but pain reports also vary, some times LUE, sometimes LLE, some times neck, some times abdomen, thus reports are not consistent.   --Attempting to avoid opiates per family request.   -Scheduled Acetaminophen QID.   -Lidocaine patches, LUE, LLE.   -PRN Biofreeze.   --PRN Tramadol 12.5 mg's at bedtime.     Traumatic SAH (subarachnoid hemorrhage)   Secondary to fall.  Mentation has been improving.   -Avoid all NSAIDS.   --Needs f/u with [facility] with repeat imaging ([phone]), was recommended in 4-6 weeks, which currently were at week 4 of the Fall.     Fall, subsequent encounter  -See debility below.     Acute blood loss anemia  Hgb was down in hospital, etiology unclear, is s/p 2 units of RBC's.  Hgb was 10.3 and trending up when last checked 8/22.   -Continue to clinically monitor.     Urinary tract infection without hematuria, site unspecified  Had increased urgency and frequency thus checked UA which showed E Coli UTI, she has finished treatment with Cefdinir.   -Continue to clinically monitor.     CKD (chronic kidney disease) stage 3, GFR 30-59 ml/min (H)  Creatine baseline around 0.6, and last creatine was 0.57 when last checked 8/22.  Est CrCl around 41.  -Continue to clinically monitor.     Essential hypertension  SBP's are erratic, 120-170's at times, but asymptomatic.  HR's 60-70's.    No current s/s of clinical CHF.   -Continues Norvasc and Losartan, did not increase to avoid over treatment in advanced age.     Delirium  Cognitive impairment  Mod-advanced cognitive/memory impairment, but has been improving with treatment of UTI and lowering of opiates.  No behavior issues while at TCU.   -Continue to clinically monitor.     Debility  -Will DC with Home care.   -See below for F2F.     --We note on her last day at the TCU, she did choke on some of her pills.  This appears to be an isolated incident, no issues prior, but for safety concern and due to her  "discharging tomorrow.  We did add ST to home care so they can eval and treat to r/o aspiration concerns.      Past Medical History:  has a past medical history of Hypertension and Osteoporosis.    Discharge Medications:  Current Outpatient Medications   Medication Sig Dispense Refill     acetaminophen (TYLENOL) 500 MG tablet Take 2 tablets (1,000 mg) by mouth 4 times daily       amLODIPine (NORVASC) 5 MG tablet Take 2.5 mg by mouth daily       calcium carbonate (TUMS) 500 MG chewable tablet Take 1 chew tab by mouth 2 times daily       levothyroxine (SYNTHROID/LEVOTHROID) 88 MCG tablet Take 88 mcg by mouth daily       Lidocaine (LIDOCARE) 4 % Patch Place 2 patches onto the skin every 24 hours To prevent lidocaine toxicity, patient should be patch free for 12 hrs daily.  1 on Left hip, 1 on Left shoulder.       losartan (COZAAR) 50 MG tablet Take 50 mg by mouth daily       Menthol, Topical Analgesic, (BIOFREEZE) 4 % GEL Apply 1 Application topically 3 times daily as needed (Pain) Apply to painful areas, all except face, TID PRN.       oxybutynin ER (DITROPAN-XL) 10 MG 24 hr tablet Take 1 tablet (10 mg) by mouth At Bedtime       polyethylene glycol (MIRALAX/GLYCOLAX) packet Take 17 g by mouth daily       traMADol (ULTRAM) 50 MG tablet Take 12.5 mg by mouth nightly as needed for pain       vitamin D3 (CHOLECALCIFEROL) 2000 units tablet Take 1 tablet by mouth daily       Medication Changes/Rationale:   -As noted above.     Controlled medications sent with patient:   Medication: PRN Tramadol , ~16 tabs given to patient at the time of discharge to take home     ROS:   Limited secondary to cognitive impairment but today 7 point ROS done including, light headedness/dizziness, fever/chills, pain, Resp, CV, GI, and  and is negative other than noted in HPI.     Physical Exam:   Vitals: /64   Pulse 69   Temp 99.8  F (37.7  C)   Resp 16   Ht 1.6 m (5' 3\")   Wt 46.3 kg (102 lb)   SpO2 94%   BMI 18.07 kg/m     BMI= " Body mass index is 18.07 kg/m .     GENERAL APPEARANCE:  Elderly frail female resting in bed, NAD, non-toxic.   ENT:  Mouth and oropharynx normal, moist mucous membranes.   RESP:  Lungs are CTA. Regular relaxed breathing effort.  No cough.   CV: S1/S2 no murmur or rubs.  Regular rhythm and rate.  No generalized edema.   ABDOMEN:   Protuberant but, soft, non-tender with active bowel sounds.  No guarding, rigidity, or rebound tenderness.  EXTREMITIES:  No lower extremity edema, no calf tenderness.   PSYCH: Alert to self only, not place, date, somewhat to situation.  Suspect some degree of cognitive/memory impairment, and has been slowly improving.    WOUND: Left hip has 3 surgical incisions,  all approximated and healing nicely, no s/s of infection.     SNF labs: Labs done in SNF are in [facility] EPIC. Please refer to them using [facility]Care Everywhere.    DISCHARGE PLAN:    Follow up labs: No labs orders/due    Medical Follow Up:      Follow up with primary care provider or new facility provider in 1-2 weeks   Follow up with Ortho on/around Sept 12th.    Follow up with [facility] in 2 weeks, appt needs to be made.       MTM referral needed and placed by this provider: No    Current [facility] scheduled appointments:  Next 5 appointments (look out 90 days)    Sep 18, 2019 11:00 AM CDT  Return Visit with Smiley Etienne NP  [facility] ([facility]) [address] [place] [id number]  [phone]           Discharge Services: Home Care:  Occupational Therapy, Physical Therapy, Speech Therapy , Registered Nurse, Home Health Aide and From:  [facility].     Discharge Instructions Verbalized to Patient at Discharge:     Instructed patient to arrange/attend above appointments.    TOTAL DISCHARGE TIME:   Greater than 30 minutes  Electronically signed by:  RITA Adhikari CNP     Documentation of Face to Face and Certification for Home  Health Services    I certify that patient: Gennaro Walton is under my care and that I, or a nurse practitioner or physician's assistant working with me, had a face-to-face encounter that meets the physician face-to-face encounter requirements with this patient on: 27 Aug 19.    This encounter with the patient was in whole, or in part, for the following medical condition, which is the primary reason for home health care: Fall with injury, LUE and LLE Weight bearing restrictions, significant debility, cognitive impairment, possible concern for dysphagia.    I certify that, based on my findings, the following services are medically necessary home health services: Nursing, Occupational Therapy, Physical Therapy, Speech Language Therapy and HHA. .    My clinical findings support the need for the above services because: Nurse is needed: To provide assessment and oversight required in the home to assure adherence to the medical plan due to: Fall with injury, LUE and LLE Weight bearing restrictions, significant debility, cognitive impairment, possible concern for dysphagia..., Occupational Therapy Services are needed to assess and treat cognitive ability and address ADL safety due to impairment in Fall with injury, LUE and LLE Weight bearing restrictions, significant debility, cognitive impairment, possible concern for dysphagia.., Physical Therapy Services are needed to assess and treat the following functional impairments: Fall with injury, LUE and LLE Weight bearing restrictions, significant debility, cognitive impairment, possible concern for dysphagia.. and Speech Therapy Services are needed to assess and treat impairments in language and/or swallow functions due to Fall with injury, LUE and LLE Weight bearing restrictions, significant debility, cognitive impairment, possible concern for dysphagia..    Further, I certify that my clinical findings support that this patient is homebound (i.e. absences from home require  considerable and taxing effort and are for medical reasons or [employer] services or infrequently or of short duration when for other reasons) because: Requires assistance of another person or specialized equipment to access medical services because patient: Requires supervision of another for safe transfer...    Based on the above findings. I certify that this patient is confined to the home and needs intermittent skilled nursing care, physical therapy and/or speech therapy.  The patient is under my care, and I have initiated the establishment of the plan of care.  This patient will be followed by a physician who will periodically review the plan of care.  Physician/Provider to provide follow up care: Mady Hagen    Attending hospital physician (the Medicare certified [facility] provider):   Electronically signed by  RITA Kahn, CNP  [facility]    Physician Signature: See electronic signature associated with these discharge orders.  Date: 8/27/2019                Sincerely,        RITA Adhikari CNP

## 2019-10-11 DIAGNOSIS — S06.5XAA SDH (SUBDURAL HEMATOMA) (H): Primary | ICD-10-CM

## 2019-10-18 ENCOUNTER — MEDICAL CORRESPONDENCE (OUTPATIENT)
Dept: HEALTH INFORMATION MANAGEMENT | Facility: CLINIC | Age: 84
End: 2019-10-18

## 2019-11-06 ENCOUNTER — OFFICE VISIT (OUTPATIENT)
Dept: NEUROSURGERY | Facility: CLINIC | Age: 84
End: 2019-11-06
Attending: PHYSICIAN ASSISTANT
Payer: MEDICARE

## 2019-11-06 ENCOUNTER — HOSPITAL ENCOUNTER (OUTPATIENT)
Dept: CT IMAGING | Facility: CLINIC | Age: 84
Discharge: HOME | End: 2019-11-06
Attending: PHYSICIAN ASSISTANT | Admitting: PHYSICIAN ASSISTANT
Payer: MEDICARE

## 2019-11-06 VITALS
HEART RATE: 57 BPM | WEIGHT: 98 LBS | SYSTOLIC BLOOD PRESSURE: 147 MMHG | BODY MASS INDEX: 19.24 KG/M2 | HEIGHT: 60 IN | TEMPERATURE: 97.9 F | DIASTOLIC BLOOD PRESSURE: 71 MMHG | OXYGEN SATURATION: 96 %

## 2019-11-06 DIAGNOSIS — S06.5XAA SDH (SUBDURAL HEMATOMA) (H): ICD-10-CM

## 2019-11-06 DIAGNOSIS — S06.5XAA SDH (SUBDURAL HEMATOMA) (H): Primary | ICD-10-CM

## 2019-11-06 PROCEDURE — 99213 OFFICE O/P EST LOW 20 MIN: CPT | Performed by: PHYSICIAN ASSISTANT

## 2019-11-06 PROCEDURE — 70450 CT HEAD/BRAIN W/O DYE: CPT

## 2019-11-06 PROCEDURE — G0463 HOSPITAL OUTPT CLINIC VISIT: HCPCS

## 2019-11-06 ASSESSMENT — MIFFLIN-ST. JEOR: SCORE: 756.03

## 2019-11-06 ASSESSMENT — PAIN SCALES - GENERAL: PAINLEVEL: NO PAIN (0)

## 2019-11-06 NOTE — LETTER
11/6/2019         RE: Gennaro Walton  [address]  [place] [address]        Dear Colleague,    Thank you for referring your patient, Gennaro Walton, to the [facility]. Please see a copy of my visit note below.    [facility]  Neurosurgery followup:    HPI: ~4 month follow up of small focus SAH in the left sylvian fissure. She fell outside her senior living facility. Presents today with daughter. Doing well without complaints. Denies headache, vision changes, or other neurologic changes.    Exam:  Constitutional:  Alert, well nourished, NAD.  HEENT: Normocephalic, atraumatic.   Pulm:  Without shortness of breath or audible adventitious respiratory sounds.  CV:  No pitting edema of BLE.  Brisk capillary refill, CMS intact.    Neurological:  Awake  Alert  Oriented x 3  Speech clear  Cranial nerves II - XII intact  PERRL  EOMI  Face symmetric  Tongue midline  Motor exam: 5/5 strength in all four extremities.   Sensation: intact  Finger to Nose: smooth  Pronator drift:  negative  Imaging:   CT OF THE HEAD WITHOUT CONTRAST 11/6/2019 8:38 AM   COMPARISON: Head CT 7/26/2019.  HISTORY: Intracranial hemorrhage, known, follow-up. SDH (subdural hematoma) (H).  TECHNIQUE: 5 mm thick axial CT images of the head were acquired without IV contrast material.  FINDINGS: There has been interval resolution of scattered subarachnoid hemorrhage along the left cerebral convexity and intraventricular hemorrhage within the left lateral ventricle since the comparison  study. There is no intracranial hemorrhage on the current exam. There is moderate diffuse cerebral volume loss. There are extensive confluent areas of decreased density in the cerebral white matter  bilaterally that are consistent with sequela of chronic small vessel ischemic disease.   The ventricles and basal cisterns are within normal limits in configuration given the degree of cerebral volume loss.  There is no  midline shift. There are no extra-axial fluid collections.   No intracranial mass or recent infarct.  The visualized paranasal sinuses are well-aerated. There is no mastoiditis. There are no fractures of the visualized bones.                                                                 IMPRESSION:  Diffuse cerebral volume loss and cerebral white matter changes consistent with chronic small vessel ischemic disease. No evidence for acute intracranial pathology.     A/P: ~4 month follow up of small focus SAH in the left sylvian fissure. She fell outside her senior living facility. Presents today with daughter and states she is doing well without complaints. Head CT without evidence of acute intracranial pathology. Neuro intact on exam. Follow up as needed. Patient voiced understanding and agreement.      -followup was needed  - Call the clinic at [phone] for increased pain or any other questions and concerns.  - Go to ER with any seizure activity, mental status change (increasing confusion), difficulty with speech or increasing or acute weakness       Concepcion Hawthorne PA-C  [facility]  [address] [address]    Tel [phone]  Pager [phone]        Again, thank you for allowing me to participate in the care of your patient.        Sincerely,        Concepcion Hawthorne PA-C

## 2019-11-06 NOTE — PATIENT INSTRUCTIONS
Gennaro to follow up with Primary Care provider regarding elevated blood pressure.      -followup was needed  - Call the clinic at [phone] for increased pain or any other questions and concerns.  - Go to ER with any seizure activity, mental status change (increasing confusion), difficulty with speech or increasing or acute weakness

## 2019-11-06 NOTE — NURSING NOTE
Gennaro Walton is a 89 year old female who presents for:  Chief Complaint   Patient presents with     Follow Up     Hospital follow up for SAH.         Initial Vitals:  BP (!) 147/71 (BP Location: Right arm, Patient Position: Sitting, Cuff Size: Adult Large)   Pulse 57   Temp 97.9  F (36.6  C) (Oral)   Ht 5' (1.524 m)   Wt 98 lb (44.5 kg)   SpO2 96%   BMI 19.14 kg/m   Estimated body mass index is 19.14 kg/m  as calculated from the following:    Height as of this encounter: 5' (1.524 m).    Weight as of this encounter: 98 lb (44.5 kg).. Body surface area is 1.37 meters squared. BP completed using cuff size: large  No Pain (0)        Nursing Comments: Patient states that she has no pain today.         Leisa Hernandez, [facility]

## 2019-11-06 NOTE — PROGRESS NOTES
[facility]  Neurosurgery followup:    HPI: ~4 month follow up of small focus SAH in the left sylvian fissure. She fell outside her senior living facility. Presents today with daughter. Doing well without complaints. Denies headache, vision changes, or other neurologic changes.    Exam:  Constitutional:  Alert, well nourished, NAD.  HEENT: Normocephalic, atraumatic.   Pulm:  Without shortness of breath or audible adventitious respiratory sounds.  CV:  No pitting edema of BLE.  Brisk capillary refill, CMS intact.    Neurological:  Awake  Alert  Oriented x 3  Speech clear  Cranial nerves II - XII intact  PERRL  EOMI  Face symmetric  Tongue midline  Motor exam: 5/5 strength in all four extremities.   Sensation: intact  Finger to Nose: smooth  Pronator drift:  negative  Imaging:   CT OF THE HEAD WITHOUT CONTRAST 11/6/2019 8:38 AM   COMPARISON: Head CT 7/26/2019.  HISTORY: Intracranial hemorrhage, known, follow-up. SDH (subdural hematoma) (H).  TECHNIQUE: 5 mm thick axial CT images of the head were acquired without IV contrast material.  FINDINGS: There has been interval resolution of scattered subarachnoid hemorrhage along the left cerebral convexity and intraventricular hemorrhage within the left lateral ventricle since the comparison  study. There is no intracranial hemorrhage on the current exam. There is moderate diffuse cerebral volume loss. There are extensive confluent areas of decreased density in the cerebral white matter  bilaterally that are consistent with sequela of chronic small vessel ischemic disease.   The ventricles and basal cisterns are within normal limits in configuration given the degree of cerebral volume loss.  There is no midline shift. There are no extra-axial fluid collections.   No intracranial mass or recent infarct.  The visualized paranasal sinuses are well-aerated. There is no mastoiditis. There are no fractures of the visualized bones.                                                                  IMPRESSION:  Diffuse cerebral volume loss and cerebral white matter changes consistent with chronic small vessel ischemic disease. No evidence for acute intracranial pathology.     A/P: ~4 month follow up of small focus SAH in the left sylvian fissure. She fell outside her senior living facility. Presents today with daughter and states she is doing well without complaints. Head CT without evidence of acute intracranial pathology. Neuro intact on exam. Follow up as needed. Patient voiced understanding and agreement.      -followup was needed  - Call the clinic at [phone] for increased pain or any other questions and concerns.  - Go to ER with any seizure activity, mental status change (increasing confusion), difficulty with speech or increasing or acute weakness       Concepcion Hawthorne PA-C  [facility]  [address] [address]    Tel [phone]  Pager [phone]

## 2020-01-07 ENCOUNTER — APPOINTMENT (OUTPATIENT)
Dept: GENERAL RADIOLOGY | Facility: CLINIC | Age: 85
End: 2020-01-07
Attending: EMERGENCY MEDICINE
Payer: MEDICARE

## 2020-01-07 ENCOUNTER — HOSPITAL ENCOUNTER (OUTPATIENT)
Facility: CLINIC | Age: 85
Setting detail: OBSERVATION
Discharge: HOME | End: 2020-01-09
Attending: EMERGENCY MEDICINE | Admitting: HOSPITALIST
Payer: MEDICARE

## 2020-01-07 ENCOUNTER — APPOINTMENT (OUTPATIENT)
Dept: CT IMAGING | Facility: CLINIC | Age: 85
End: 2020-01-07
Attending: EMERGENCY MEDICINE
Payer: MEDICARE

## 2020-01-07 DIAGNOSIS — R29.6 RECURRENT FALLS: Primary | ICD-10-CM

## 2020-01-07 DIAGNOSIS — M79.621 PAIN OF RIGHT UPPER ARM: ICD-10-CM

## 2020-01-07 DIAGNOSIS — I10 BENIGN ESSENTIAL HYPERTENSION: ICD-10-CM

## 2020-01-07 LAB
ANION GAP SERPL CALCULATED.3IONS-SCNC: 5 MMOL/L (ref 3–14)
BASOPHILS # BLD AUTO: 0 10E9/L (ref 0–0.2)
BASOPHILS NFR BLD AUTO: 0.1 %
BUN SERPL-MCNC: 10 MG/DL (ref 7–30)
CALCIUM SERPL-MCNC: 8.7 MG/DL (ref 8.5–10.1)
CHLORIDE SERPL-SCNC: 97 MMOL/L (ref 94–109)
CO2 SERPL-SCNC: 30 MMOL/L (ref 20–32)
CREAT SERPL-MCNC: 0.53 MG/DL (ref 0.52–1.04)
DIFFERENTIAL METHOD BLD: ABNORMAL
EOSINOPHIL # BLD AUTO: 0 10E9/L (ref 0–0.7)
EOSINOPHIL NFR BLD AUTO: 0.3 %
ERYTHROCYTE [DISTWIDTH] IN BLOOD BY AUTOMATED COUNT: 12.9 % (ref 10–15)
GFR SERPL CREATININE-BSD FRML MDRD: 80 ML/MIN/{1.73_M2}
GLUCOSE SERPL-MCNC: 148 MG/DL (ref 70–99)
HCT VFR BLD AUTO: 35.7 % (ref 35–47)
HGB BLD-MCNC: 12.2 G/DL (ref 11.7–15.7)
IMM GRANULOCYTES # BLD: 0 10E9/L (ref 0–0.4)
IMM GRANULOCYTES NFR BLD: 0.2 %
INR PPP: 1.03 (ref 0.86–1.14)
INTERPRETATION ECG - MUSE: NORMAL
LYMPHOCYTES # BLD AUTO: 0.8 10E9/L (ref 0.8–5.3)
LYMPHOCYTES NFR BLD AUTO: 8.8 %
MCH RBC QN AUTO: 34 PG (ref 26.5–33)
MCHC RBC AUTO-ENTMCNC: 34.2 G/DL (ref 31.5–36.5)
MCV RBC AUTO: 99 FL (ref 78–100)
MONOCYTES # BLD AUTO: 0.5 10E9/L (ref 0–1.3)
MONOCYTES NFR BLD AUTO: 6 %
NEUTROPHILS # BLD AUTO: 7.5 10E9/L (ref 1.6–8.3)
NEUTROPHILS NFR BLD AUTO: 84.6 %
NRBC # BLD AUTO: 0 10*3/UL
NRBC BLD AUTO-RTO: 0 /100
PLATELET # BLD AUTO: 236 10E9/L (ref 150–450)
POTASSIUM SERPL-SCNC: 3.7 MMOL/L (ref 3.4–5.3)
RBC # BLD AUTO: 3.59 10E12/L (ref 3.8–5.2)
SODIUM SERPL-SCNC: 132 MMOL/L (ref 133–144)
TROPONIN I SERPL-MCNC: <0.015 UG/L (ref 0–0.04)
WBC # BLD AUTO: 8.9 10E9/L (ref 4–11)

## 2020-01-07 PROCEDURE — 25000128 H RX IP 250 OP 636: Performed by: EMERGENCY MEDICINE

## 2020-01-07 PROCEDURE — 73060 X-RAY EXAM OF HUMERUS: CPT | Mod: RT

## 2020-01-07 PROCEDURE — 99285 EMERGENCY DEPT VISIT HI MDM: CPT | Mod: 25

## 2020-01-07 PROCEDURE — 74177 CT ABD & PELVIS W/CONTRAST: CPT

## 2020-01-07 PROCEDURE — 85610 PROTHROMBIN TIME: CPT | Performed by: EMERGENCY MEDICINE

## 2020-01-07 PROCEDURE — 73030 X-RAY EXAM OF SHOULDER: CPT | Mod: RT

## 2020-01-07 PROCEDURE — 93005 ELECTROCARDIOGRAM TRACING: CPT

## 2020-01-07 PROCEDURE — 25000125 ZZHC RX 250: Performed by: EMERGENCY MEDICINE

## 2020-01-07 PROCEDURE — 85025 COMPLETE CBC W/AUTO DIFF WBC: CPT | Performed by: EMERGENCY MEDICINE

## 2020-01-07 PROCEDURE — 70450 CT HEAD/BRAIN W/O DYE: CPT

## 2020-01-07 PROCEDURE — 84484 ASSAY OF TROPONIN QUANT: CPT | Performed by: EMERGENCY MEDICINE

## 2020-01-07 PROCEDURE — 96376 TX/PRO/DX INJ SAME DRUG ADON: CPT | Mod: 59

## 2020-01-07 PROCEDURE — 96374 THER/PROPH/DIAG INJ IV PUSH: CPT | Mod: 59

## 2020-01-07 PROCEDURE — 96375 TX/PRO/DX INJ NEW DRUG ADDON: CPT | Mod: 59

## 2020-01-07 PROCEDURE — 80048 BASIC METABOLIC PNL TOTAL CA: CPT | Performed by: EMERGENCY MEDICINE

## 2020-01-07 RX ORDER — MORPHINE SULFATE 2 MG/ML
2 INJECTION, SOLUTION INTRAMUSCULAR; INTRAVENOUS ONCE
Status: COMPLETED | OUTPATIENT
Start: 2020-01-07 | End: 2020-01-07

## 2020-01-07 RX ORDER — LABETALOL HYDROCHLORIDE 5 MG/ML
10 INJECTION, SOLUTION INTRAVENOUS ONCE
Status: DISCONTINUED | OUTPATIENT
Start: 2020-01-07 | End: 2020-01-07

## 2020-01-07 RX ORDER — LABETALOL HYDROCHLORIDE 5 MG/ML
5 INJECTION, SOLUTION INTRAVENOUS ONCE
Status: COMPLETED | OUTPATIENT
Start: 2020-01-07 | End: 2020-01-08

## 2020-01-07 RX ORDER — KETOROLAC TROMETHAMINE 15 MG/ML
10 INJECTION, SOLUTION INTRAMUSCULAR; INTRAVENOUS ONCE
Status: COMPLETED | OUTPATIENT
Start: 2020-01-07 | End: 2020-01-07

## 2020-01-07 RX ORDER — MORPHINE SULFATE 4 MG/ML
4 INJECTION, SOLUTION INTRAMUSCULAR; INTRAVENOUS ONCE
Status: COMPLETED | OUTPATIENT
Start: 2020-01-07 | End: 2020-01-07

## 2020-01-07 RX ORDER — IOPAMIDOL 755 MG/ML
80 INJECTION, SOLUTION INTRAVASCULAR ONCE
Status: COMPLETED | OUTPATIENT
Start: 2020-01-07 | End: 2020-01-07

## 2020-01-07 RX ORDER — FENTANYL CITRATE 50 UG/ML
50 INJECTION, SOLUTION INTRAMUSCULAR; INTRAVENOUS ONCE
Status: COMPLETED | OUTPATIENT
Start: 2020-01-07 | End: 2020-01-07

## 2020-01-07 RX ADMIN — SODIUM CHLORIDE 70 ML: 9 INJECTION, SOLUTION INTRAVENOUS at 22:11

## 2020-01-07 RX ADMIN — MORPHINE SULFATE 4 MG: 4 INJECTION INTRAVENOUS at 19:31

## 2020-01-07 RX ADMIN — KETOROLAC TROMETHAMINE 10 MG: 15 INJECTION, SOLUTION INTRAMUSCULAR; INTRAVENOUS at 23:02

## 2020-01-07 RX ADMIN — IOPAMIDOL 75 ML: 755 INJECTION, SOLUTION INTRAVENOUS at 22:11

## 2020-01-07 RX ADMIN — MORPHINE SULFATE 2 MG: 2 INJECTION, SOLUTION INTRAMUSCULAR; INTRAVENOUS at 21:10

## 2020-01-07 RX ADMIN — FENTANYL CITRATE 50 MCG: 50 INJECTION INTRAMUSCULAR; INTRAVENOUS at 23:05

## 2020-01-07 ASSESSMENT — ENCOUNTER SYMPTOMS
COLOR CHANGE: 1
CONFUSION: 0
ARTHRALGIAS: 1

## 2020-01-08 ENCOUNTER — APPOINTMENT (OUTPATIENT)
Dept: PHYSICAL THERAPY | Facility: CLINIC | Age: 85
End: 2020-01-08
Attending: INTERNAL MEDICINE
Payer: MEDICARE

## 2020-01-08 PROBLEM — M25.511 RIGHT SHOULDER PAIN: Status: ACTIVE | Noted: 2020-01-08

## 2020-01-08 LAB
ALBUMIN UR-MCNC: NEGATIVE MG/DL
AMORPH CRY #/AREA URNS HPF: ABNORMAL /HPF
APPEARANCE UR: ABNORMAL
BILIRUB UR QL STRIP: NEGATIVE
COLOR UR AUTO: ABNORMAL
GLUCOSE UR STRIP-MCNC: NEGATIVE MG/DL
HGB UR QL STRIP: NEGATIVE
KETONES UR STRIP-MCNC: NEGATIVE MG/DL
LEUKOCYTE ESTERASE UR QL STRIP: NEGATIVE
NITRATE UR QL: NEGATIVE
PH UR STRIP: 8 PH (ref 5–7)
RBC #/AREA URNS AUTO: 0 /HPF (ref 0–2)
SOURCE: ABNORMAL
SP GR UR STRIP: 1.02 (ref 1–1.03)
UROBILINOGEN UR STRIP-MCNC: NORMAL MG/DL (ref 0–2)
WBC #/AREA URNS AUTO: 3 /HPF (ref 0–5)

## 2020-01-08 PROCEDURE — 96375 TX/PRO/DX INJ NEW DRUG ADDON: CPT

## 2020-01-08 PROCEDURE — 25000128 H RX IP 250 OP 636: Performed by: EMERGENCY MEDICINE

## 2020-01-08 PROCEDURE — 81001 URINALYSIS AUTO W/SCOPE: CPT | Performed by: EMERGENCY MEDICINE

## 2020-01-08 PROCEDURE — 25000132 ZZH RX MED GY IP 250 OP 250 PS 637: Mod: GY | Performed by: INTERNAL MEDICINE

## 2020-01-08 PROCEDURE — 97161 PT EVAL LOW COMPLEX 20 MIN: CPT | Mod: GP | Performed by: PHYSICAL THERAPIST

## 2020-01-08 PROCEDURE — 99219 ZZC INITIAL OBSERVATION CARE,LEVL II: CPT | Performed by: HOSPITALIST

## 2020-01-08 PROCEDURE — G0378 HOSPITAL OBSERVATION PER HR: HCPCS

## 2020-01-08 PROCEDURE — 25000132 ZZH RX MED GY IP 250 OP 250 PS 637: Mod: GY | Performed by: HOSPITALIST

## 2020-01-08 RX ORDER — ONDANSETRON 2 MG/ML
4 INJECTION INTRAMUSCULAR; INTRAVENOUS EVERY 6 HOURS PRN
Status: DISCONTINUED | OUTPATIENT
Start: 2020-01-08 | End: 2020-01-09 | Stop reason: HOSPADM

## 2020-01-08 RX ORDER — POLYETHYLENE GLYCOL 3350 17 G/17G
17 POWDER, FOR SOLUTION ORAL DAILY PRN
Status: DISCONTINUED | OUTPATIENT
Start: 2020-01-08 | End: 2020-01-09 | Stop reason: HOSPADM

## 2020-01-08 RX ORDER — HYDRALAZINE HYDROCHLORIDE 20 MG/ML
10 INJECTION INTRAMUSCULAR; INTRAVENOUS EVERY 4 HOURS PRN
Status: DISCONTINUED | OUTPATIENT
Start: 2020-01-08 | End: 2020-01-09 | Stop reason: HOSPADM

## 2020-01-08 RX ORDER — ACETAMINOPHEN 500 MG
1000 TABLET ORAL EVERY 6 HOURS PRN
Status: ON HOLD | COMMUNITY
End: 2020-02-05

## 2020-01-08 RX ORDER — BISACODYL 10 MG
10 SUPPOSITORY, RECTAL RECTAL DAILY PRN
Status: DISCONTINUED | OUTPATIENT
Start: 2020-01-08 | End: 2020-01-09 | Stop reason: HOSPADM

## 2020-01-08 RX ORDER — LANOLIN ALCOHOL/MO/W.PET/CERES
3 CREAM (GRAM) TOPICAL
Status: DISCONTINUED | OUTPATIENT
Start: 2020-01-08 | End: 2020-01-09 | Stop reason: HOSPADM

## 2020-01-08 RX ORDER — ACETAMINOPHEN 500 MG
1000 TABLET ORAL 3 TIMES DAILY
Status: DISCONTINUED | OUTPATIENT
Start: 2020-01-08 | End: 2020-01-09 | Stop reason: HOSPADM

## 2020-01-08 RX ORDER — AMLODIPINE BESYLATE 2.5 MG/1
2.5 TABLET ORAL DAILY
Status: DISCONTINUED | OUTPATIENT
Start: 2020-01-08 | End: 2020-01-09 | Stop reason: HOSPADM

## 2020-01-08 RX ORDER — ACETAMINOPHEN 500 MG
1000 TABLET ORAL 2 TIMES DAILY
COMMUNITY
End: 2020-02-06

## 2020-01-08 RX ORDER — AMOXICILLIN 250 MG
1 CAPSULE ORAL 2 TIMES DAILY PRN
Status: DISCONTINUED | OUTPATIENT
Start: 2020-01-08 | End: 2020-01-09 | Stop reason: HOSPADM

## 2020-01-08 RX ORDER — ACETAMINOPHEN 650 MG/1
650 SUPPOSITORY RECTAL EVERY 4 HOURS PRN
Status: DISCONTINUED | OUTPATIENT
Start: 2020-01-08 | End: 2020-01-09 | Stop reason: HOSPADM

## 2020-01-08 RX ORDER — ACETAMINOPHEN 325 MG/1
650 TABLET ORAL EVERY 4 HOURS PRN
Status: DISCONTINUED | OUTPATIENT
Start: 2020-01-08 | End: 2020-01-09 | Stop reason: HOSPADM

## 2020-01-08 RX ORDER — POLYETHYLENE GLYCOL 3350 17 G/17G
17 POWDER, FOR SOLUTION ORAL DAILY
Status: DISCONTINUED | OUTPATIENT
Start: 2020-01-08 | End: 2020-01-09 | Stop reason: HOSPADM

## 2020-01-08 RX ORDER — LOSARTAN POTASSIUM 50 MG/1
50 TABLET ORAL DAILY
Status: DISCONTINUED | OUTPATIENT
Start: 2020-01-08 | End: 2020-01-09 | Stop reason: HOSPADM

## 2020-01-08 RX ORDER — KETOROLAC TROMETHAMINE 15 MG/ML
15 INJECTION, SOLUTION INTRAMUSCULAR; INTRAVENOUS EVERY 6 HOURS PRN
Status: DISCONTINUED | OUTPATIENT
Start: 2020-01-08 | End: 2020-01-09 | Stop reason: HOSPADM

## 2020-01-08 RX ORDER — LEVOTHYROXINE SODIUM 88 UG/1
88 TABLET ORAL
Status: DISCONTINUED | OUTPATIENT
Start: 2020-01-08 | End: 2020-01-09 | Stop reason: HOSPADM

## 2020-01-08 RX ORDER — AMOXICILLIN 250 MG
2 CAPSULE ORAL 2 TIMES DAILY PRN
Status: DISCONTINUED | OUTPATIENT
Start: 2020-01-08 | End: 2020-01-09 | Stop reason: HOSPADM

## 2020-01-08 RX ORDER — OXYBUTYNIN CHLORIDE 10 MG/1
10 TABLET, EXTENDED RELEASE ORAL AT BEDTIME
Status: DISCONTINUED | OUTPATIENT
Start: 2020-01-08 | End: 2020-01-09 | Stop reason: HOSPADM

## 2020-01-08 RX ORDER — LIDOCAINE 40 MG/G
CREAM TOPICAL 2 TIMES DAILY PRN
Status: ON HOLD | COMMUNITY
End: 2020-02-05

## 2020-01-08 RX ORDER — HYDROMORPHONE HYDROCHLORIDE 1 MG/ML
.2-.3 INJECTION, SOLUTION INTRAMUSCULAR; INTRAVENOUS; SUBCUTANEOUS
Status: DISCONTINUED | OUTPATIENT
Start: 2020-01-08 | End: 2020-01-09 | Stop reason: HOSPADM

## 2020-01-08 RX ORDER — NALOXONE HYDROCHLORIDE 0.4 MG/ML
.1-.4 INJECTION, SOLUTION INTRAMUSCULAR; INTRAVENOUS; SUBCUTANEOUS
Status: DISCONTINUED | OUTPATIENT
Start: 2020-01-08 | End: 2020-01-09 | Stop reason: HOSPADM

## 2020-01-08 RX ORDER — LIDOCAINE 4 G/G
2 PATCH TOPICAL EVERY 24 HOURS
Status: DISCONTINUED | OUTPATIENT
Start: 2020-01-08 | End: 2020-01-09 | Stop reason: HOSPADM

## 2020-01-08 RX ORDER — OXYCODONE HYDROCHLORIDE 5 MG/1
5-10 TABLET ORAL
Status: DISCONTINUED | OUTPATIENT
Start: 2020-01-08 | End: 2020-01-09 | Stop reason: HOSPADM

## 2020-01-08 RX ORDER — LIDOCAINE 40 MG/G
CREAM TOPICAL
Status: DISCONTINUED | OUTPATIENT
Start: 2020-01-08 | End: 2020-01-09 | Stop reason: HOSPADM

## 2020-01-08 RX ORDER — ONDANSETRON 4 MG/1
4 TABLET, ORALLY DISINTEGRATING ORAL EVERY 6 HOURS PRN
Status: DISCONTINUED | OUTPATIENT
Start: 2020-01-08 | End: 2020-01-09 | Stop reason: HOSPADM

## 2020-01-08 RX ORDER — DOCUSATE SODIUM 100 MG/1
100 CAPSULE, LIQUID FILLED ORAL 2 TIMES DAILY PRN
COMMUNITY
End: 2020-02-06

## 2020-01-08 RX ADMIN — ACETAMINOPHEN 1000 MG: 500 TABLET, FILM COATED ORAL at 17:18

## 2020-01-08 RX ADMIN — LABETALOL HYDROCHLORIDE 5 MG: 5 INJECTION INTRAVENOUS at 00:03

## 2020-01-08 RX ADMIN — POLYETHYLENE GLYCOL 3350 17 G: 17 POWDER, FOR SOLUTION ORAL at 11:54

## 2020-01-08 RX ADMIN — AMLODIPINE BESYLATE 2.5 MG: 2.5 TABLET ORAL at 11:54

## 2020-01-08 RX ADMIN — ACETAMINOPHEN 1000 MG: 500 TABLET, FILM COATED ORAL at 21:07

## 2020-01-08 RX ADMIN — LOSARTAN POTASSIUM 50 MG: 50 TABLET, FILM COATED ORAL at 11:54

## 2020-01-08 RX ADMIN — LIDOCAINE 2 PATCH: 560 PATCH PERCUTANEOUS; TOPICAL; TRANSDERMAL at 11:54

## 2020-01-08 RX ADMIN — OXYBUTYNIN CHLORIDE 10 MG: 10 TABLET, EXTENDED RELEASE ORAL at 21:07

## 2020-01-08 RX ADMIN — ACETAMINOPHEN 650 MG: 325 TABLET, FILM COATED ORAL at 03:58

## 2020-01-08 RX ADMIN — LEVOTHYROXINE SODIUM 88 MCG: 88 TABLET ORAL at 11:55

## 2020-01-08 NOTE — PROGRESS NOTES
Patient resides at [facility].  Writer called [phone]. Writer left a VM for RN Admin to see what services they provide to the patient.  Writer awaits a call back

## 2020-01-08 NOTE — ED NOTES
Bed: ED13  Expected date: 1/7/20  Expected time: 6:44 PM  Means of arrival: Ambulance  Comments:  [place] 89f arm pain ETA now

## 2020-01-08 NOTE — PLAN OF CARE
Patient is confused and disoriented to time, place, and situation. Up with assist of 1to 2 and gait belt, blanchable redness on coccyx, turned and repositioned every 2 hours. Incontinent of bowel and bladder. Purewick in use. L PIV SL. C/o right arm pain, gave Tylenol x1. VSS ex hypertensive, hydralazine available if SBP > 190.

## 2020-01-08 NOTE — H&P
[facility]    History and Physical  Hospitalist       Date of Admission:  1/7/2020  Date of Service (when I saw the patient): 01/08/20    ASSESSMENT  Gennaro Walton is a very pleasant 89 year old with medical history that is most notable for chronic dementia, CKD 3, hypertension, prior traumatic left hip fracture with SAH, hypothyroid, and breast cancer who presents with right shoulder pain and found to have right shoulder ecchymosis.    PLAN    1) Right shoulder ecchymosis: Likely traumatic but we do not know the exact circumstances at present. X-rays negative for fractures.    -- Observation. Fall precautions. Tylenol, Oxycodone, IV Dilaudid as needed for pain. Anti-emetics as needed. Social work and case management consulted to assess what may have happened at her facility.    2) Accelerated hypertension: Likely exacerbated by pain, which we will treat as above. She does have an adrenal nodule noted incidentally on CT which could be an adrenoma. Would defer further evaluation of that to the outpatient realm given care goals as below.    -- PRN IV Hydralazine ordered for now; resume home medications in AM    3) Hyponatremia: Mild. Monitor as needed    4) Hypothyroid: Resume Synthroid when verified    5) Prior traumatic left hip fracture with SAH [facility] Noted.     6) Breast cancer: Status post prior left mastectomy. Noted.    7) Disposition: POLST form says DNR DNI, comfort cares only and avoid hospitalizations if possible, though her daughter adds that she is very hopeful her mother can be admitted here for pain relief.    Chief Complaint   Right shoulder pain    History is obtained from the patient, her daughter and grand-daughter at the bedside, and the ED physician whom I have spoken with    History of Present Illness   Gennaro Walton is a markedly pleasant 89 year old woman who presents with pain in the right shoulder; she has what seems to be significant dementia  and is unable to recall where she is or the events of the past 1-2 days. Her daughter at the bedside reports she saw her last evening feeling well, though she was complaining of pain in the shoulder and her daughter thought she meant the left shoulder since she had broken that one several months ago. However, she was ambulating well with her walker at that time. Today, she called her daughter from her facility complaining of severe right shoulder pain, exacerbated by any kind of movement, and was given Tylenol and aspirin without relief; EMS was called and her shirt taken off and extensive bruising of the right shoulder was revealed. A lidocaine patch had also been placed on the shoulder it seems. Her granddaughter has a picture of the bruising earlier today on her phone and currently, the bruising now appears to be less. She denies any other acute complaints otherwise.    In the ED, Blood pressure (!) 180/85, pulse 61, temperature 97.8  F (36.6  C), temperature source Oral, resp. rate 15, SpO2 93 %.    CBC and BMP were done and notable for Na 132. Troponin negative and EKG showed NSR as well as LVH with strain.INR 1.03. X-rays of right shoulder were negative for fractures. CT Head and aortic survey were negative for acute pathology though a 2.6 left adrenal nodule was revealed.     She was given 50 mcg Fentanyl, 6 mg Morphine, and Toradol with partial relief of the pain. She was also given 5 mg IV Labetalol.    PHYSICAL EXAM  Blood pressure (!) 180/85, pulse 61, temperature 97.8  F (36.6  C), temperature source Oral, resp. rate 15, SpO2 93 %.  Constitutional: Alert and oriented to person only; no apparent distress; markedly thin and frail  HEENT: normocephalic moist mucus membranes  Respiratory: lungs clear to auscultation bilaterally  Cardiovascular: regular S1 S2   GI: abdomen soft non tender non distended bowel sounds positive  Skin: no rash, good turgor  Musculoskeletal: no clubbing, cyanosis or edema; RUE  extensive shoulder ecchymosis, tender  Neurologic: extra-ocular muscles intact; moves all four extremities  Psychiatric: appropriate affect, but very limited insight and judgment     DVT Prophylaxis: Pneumatic Compression Devices  Code Status: DNR / DNI    Disposition: Expected discharge in 0-2 days    Lv Gray MD    Past Medical History    I have reviewed this patient's medical history and updated it with pertinent information if needed.   Past Medical History:   Diagnosis Date     Breast cancer (H)      CKD (chronic kidney disease) stage 3, GFR 30-59 ml/min (H)      Cognitive impairment      Hypertension      Hypothyroid      Osteoporosis        Past Surgical History   I have reviewed this patient's surgical history and updated it with pertinent information if needed.  Past Surgical History:   Procedure Laterality Date     ------------OTHER-------------      Left mastectomy     ------------OTHER-------------      Cataract surgery     OPEN REDUCTION INTERNAL FIXATION FEMUR MIDSHAFT Left 7/26/2019    Procedure: LEFT INTERTROCHANTERIC FEMUR FRACTURE OPEN REDUCTION AND INTERNAL FIXATION;  Surgeon: Chris Oseguera MD;  Location: [facility]       Prior to Admission Medications   Prior to Admission Medications   Prescriptions Last Dose Informant Patient Reported? Taking?   Lidocaine (LIDOCARE) 4 % Patch   No No   Sig: Place 2 patches onto the skin every 24 hours To prevent lidocaine toxicity, patient should be patch free for 12 hrs daily.  1 on Left hip, 1 on Left shoulder.   Menthol, Topical Analgesic, (BIOFREEZE) 4 % GEL   No No   Sig: Apply 1 Application topically 3 times daily as needed (Pain) Apply to painful areas, all except face, TID PRN.   acetaminophen (TYLENOL) 500 MG tablet   No No   Sig: Take 2 tablets (1,000 mg) by mouth 4 times daily   amLODIPine (NORVASC) 5 MG tablet   Yes No   Sig: Take 2.5 mg by mouth daily   calcium carbonate (TUMS) 500 MG chewable tablet   Yes No   Sig: Take 1 chew tab by mouth  2 times daily   levothyroxine (SYNTHROID/LEVOTHROID) 88 MCG tablet  Pharmacy Yes No   Sig: Take 88 mcg by mouth daily   losartan (COZAAR) 50 MG tablet  Pharmacy Yes No   Sig: Take 50 mg by mouth daily   oxybutynin ER (DITROPAN-XL) 10 MG 24 hr tablet   No No   Sig: Take 1 tablet (10 mg) by mouth At Bedtime   polyethylene glycol (MIRALAX/GLYCOLAX) packet   No No   Sig: Take 17 g by mouth daily   traMADol (ULTRAM) 50 MG tablet   Yes No   Sig: Take 12.5 mg by mouth nightly as needed for pain   vitamin D3 (CHOLECALCIFEROL) 2000 units tablet  Self Yes No   Sig: Take 1 tablet by mouth daily      Facility-Administered Medications: None     Allergies   No Known Allergies    Social History   I have reviewed this patient's social history and updated it with pertinent information if needed. Gennaro Walton  reports that she has never smoked. She has never used smokeless tobacco. She reports that she does not drink alcohol or use drugs.    Family History   Family history assessed and, except as above, is non-contributory.    Review of Systems   The 10 point Review of Systems is negative other than noted in the HPI or here.     Primary Care Physician   Mady Hagen    Data   Labs Ordered and Resulted from Time of ED Arrival Up to the Time of Departure from the ED   BASIC METABOLIC PANEL - Abnormal; Notable for the following components:       Result Value    Sodium 132 (*)     Glucose 148 (*)     All other components within normal limits   CBC WITH PLATELETS DIFFERENTIAL - Abnormal; Notable for the following components:    RBC Count 3.59 (*)     MCH 34.0 (*)     All other components within normal limits   ROUTINE UA WITH MICROSCOPIC - Abnormal; Notable for the following components:    pH Urine 8.0 (*)     Amorphous Crystals Few (*)     All other components within normal limits   INR   TROPONIN I       Data reviewed today:  I personally reviewed the EKG tracing showing NSR and LVH with strain.    Recent Results (from the past 24  hour(s))   Humerus XR, G/E 2 views, right    Narrative    HUMERUS TWO VIEWS RIGHT  1/7/2020 8:06 PM     HISTORY: pain after fall    COMPARISON: None.      Impression    IMPRESSION: Somewhat limited evaluation due to osteopenia. No acute  fracture is identified. Degenerative changes of the shoulder and  elbow. Postsurgical changes in the right lung.    WES GREER MD   XR Shoulder Right G/E 3 Views    Narrative    XR SHOULDER RT G/E 3 VW   1/7/2020 8:07 PM     HISTORY:  pain after fall    COMPARISON: None      Impression    IMPRESSION: No acute fracture or malalignment. Moderate glenohumeral  and mild acromioclavicular joint degenerative changes. Osteopenia.  Postsurgical changes project over the right lung.    WES GREER MD   Head CT w/o contrast    Narrative    EXAM: CT HEAD W/O CONTRAST  LOCATION: [facility]  DATE/TIME: 1/7/2020 10:12 PM    INDICATION: Fall, bruising on right arm  COMPARISON: None.  TECHNIQUE: Routine without IV contrast. Multiplanar reformats. Dose reduction techniques were used.    FINDINGS:  INTRACRANIAL CONTENTS: Mildly limited by motion. No definite intracranial hemorrhage, extraaxial collection, or mass effect.  No CT evidence of acute infarct. Mild volume loss and mild to moderate burden presumed chronic small vessel ischemia.    VISUALIZED ORBITS/SINUSES/MASTOIDS: No intraorbital abnormality. Mild scattered mucosal thickening of the paranasal sinuses. Small fluid level left sphenoid locule. Near complete opacification of the right mastoid air cells.    BONES/SOFT TISSUES: No acute abnormality.      Impression    IMPRESSION:  1.  Limited by motion. No definite acute intracranial abnormality.    2.  Mild to moderate age-related changes.    3.  Large right mastoid effusion.    4.  Small left sphenoid sinus fluid level.   CT Aortic Survey w Contrast    Narrative    EXAM: CTA CHEST, ABDOMEN AND PELVIS WITH CONTRAST  LOCATION: [facility]  DATE/TIME:  1/7/2020 10:13 PM    INDICATION: Chest and arm pain. Hypertension. Blood pressure difference between arms.  COMPARISON: None.    TECHNIQUE: Prior to the administration of intravenous contrast, helical sections were acquired through the chest. CT angiogram chest, abdomen and pelvis during arterial phase of injection of IV contrast. Dose reduction techniques were used.  CONTRAST: 75 mL [id number]    FINDINGS:    ANGIOGRAM CHEST, ABDOMEN, AND PELVIS: The aorta is normal in caliber without dissection. Atherosclerotic calcification in the aorta. No significant stenosis of the visualized portions of the major arteries of bilateral upper extremities. No visualized   pulmonary embolus.    LUNGS AND PLEURA: A few small calcified nodules scattered within the lungs likely relate to prior granulomatous infection. A few curvilinear opacities in the periphery of both lungs likely represent scarring and/or atelectasis. Mild bronchiectasis in   both lungs.    MEDIASTINUM/AXILLAE: A left breast prosthesis is present.    HEPATOBILIARY: Unremarkable.    SPLEEN: Unremarkable.    PANCREAS: Unremarkable.    ADRENAL GLANDS: 2.6 x 1.8 cm left adrenal nodule (series 11 image 86).    KIDNEYS/BLADDER: Moderate distention of the urinary bladder.    BOWEL: Several colonic diverticula are present without evidence of diverticulitis. The appendix is not visualized.    LYMPH NODES: Unremarkable.    MUSCULOSKELETAL: A few age-indeterminate compression deformities of the thoracic and lumbar vertebral bodies. Partial visualization of surgical hardware in the left hip, bridging an old intertrochanteric fracture.      Impression    IMPRESSION:   1.  No acute abnormality identified in the chest, abdomen or pelvis.  2.  The aorta is normal in caliber without dissection. No significant stenosis the visualized portions of the major arteries of bilateral upper extremities.  3.  2.6 cm indeterminate left adrenal nodule. In the absence of known malignancy,  this is most likely an adenoma. Recommend comparison with prior imaging studies, if available.

## 2020-01-08 NOTE — PROGRESS NOTES
RECEIVING UNIT ED HANDOFF REVIEW    ED Nurse Handoff Report was reviewed by: Fabio Davis RN on January 8, 2020 at 2:27 AM

## 2020-01-08 NOTE — ED PROVIDER NOTES
History     Chief Complaint:  Arm Pain    The history is provided by the patient and a relative.      Gennaro Walton is a 89 year old female who presents with a relative for evaluation of right shoulder pain and bruising starting earlier this morning. The patient states that the pain radiates down from her shoulder to her fingertips and states it feels like a cramping pain. Patient's relative states that there was no record of an incident at the patient's nursing home and was found with the bruise and complaining of the pain. The patient's relative denies confusion, leg pain, back pain, or recent falls. No numbness/tingling or weakness. To note, the patient's relative states that the patient had surgery on the her left shoulder and hip several months ago.     Allergies:  No Known Drug Allergies    Medications:    Norvasc  Aspirin  Synthroid  Cozaar  Ditropan  Miralax  Ultram    Past Medical History:    Hypertension  CKD stage 3  Cognitive impairment   Osteoporosis    Past Surgical History:    Open reduction internal fixation femur midshaft, left    Family History:    No past pertinent family history.    Social History:  Presents with a relative  Negative for tobacco use.  Negative for alcohol use.  Negative for drug use.  Marital Status:      Review of Systems   Musculoskeletal: Positive for arthralgias (right shoulder).   Skin: Positive for color change (Right shoulder bruise).   Psychiatric/Behavioral: Negative for confusion.   All other systems reviewed and are negative.    Physical Exam     Patient Vitals for the past 24 hrs:   BP Temp Temp src Pulse Heart Rate Resp SpO2   01/08/20 0040 -- -- -- -- -- -- 94 %   01/08/20 0021 (!) 161/81 -- -- 66 66 15 94 %   01/08/20 0005 -- -- -- -- -- -- 97 %   01/07/20 2353 (!) 192/103 -- -- 77 -- -- 96 %   01/07/20 2351 (!) 191/113 -- -- 78 -- -- 94 %   01/07/20 2321 (!) 196/91 -- -- 80 79 -- 96 %   01/07/20 2300 (!) 191/90 -- -- 69 69 14 94 %   01/07/20 2244 (!)  200/96 -- -- 73 -- -- 96 %   01/07/20 2230 (!) 201/94 -- -- -- -- -- 92 %   01/07/20 2110 (!) 168/102 -- -- -- -- -- 91 %   01/07/20 2100 (!) 206/94 -- -- -- -- -- 94 %   01/07/20 2045 (!) 193/86 -- -- -- -- -- --   01/07/20 2025 (!) 202/94 -- -- -- -- -- --   01/07/20 2020 (!) 204/98 -- -- -- -- -- --   01/07/20 1945 -- -- -- -- -- -- 92 %   01/07/20 1940 -- -- -- -- -- -- 91 %   01/07/20 1930 (!) 203/96 -- -- -- -- -- 94 %   01/07/20 1910 (!) 197/96 -- -- -- -- -- --   01/07/20 1855 (!) 220/94 -- -- -- -- -- 93 %   01/07/20 1854 (!) 220/94 -- -- -- -- -- --   01/07/20 1853 -- 97.8  F (36.6  C) Oral 84 -- -- 93 %       Physical Exam  General: Appears uncomfortable  Eyes:  The pupils are equal and round    Conjunctivae and sclerae are normal  ENT:    No head trauma  Neck:  Normal range of motion, no midline neck tenderness  CV:  Regular rate and rhythm    Skin warm and well perfused     Radial pulses 2+ bilaterally  Resp:  Lungs are clear    Non-labored    No rales    No wheezing   GI:  Abdomen is soft, there is no rigidity    No distension    No rebound tenderness     No abdominal tenderness  MS:  Pain with any movement of right UE. Lidocaine patch on right shoulder and left shoulder. No pain on palpation of right elbow, right forearm. Able to move right elbow/wrist/forearm w/o any apparent pain on this area. Compartments soft on right UE  Skin:  Ecchymosis and swelling on right upper arm/shoulder. Tender on this area.   Neuro:   Awake, alert.      Speech is normal and fluent.    Face is symmetric.     SILT on UE/LE    Able to move right UE but painful     strength equal bilaterally    Moving right hand fingers with equal strength compared to left    Emergency Department Course     ECG:  Indication: Arm Pain  Time: 2040  Vent. Rate 74 bpm. FL interval 180. QRS duration 132. QT/QTc 414/459. P-R-T axis 55 -14 88. Normal sinus rhythm. Left ventricular hypertrophy with QRS widening. Cannot rule out Septal infarct,  age undetermined. T wave abnormality, consider lateral ischemia. Abnormal ECG. No significant change compared to EKG dated 7/26/19. Read time: 2130      Imaging:  Radiology findings were communicated with the patient, family and Admitting MD who voiced understanding of the findings.    CT Aortic Survey w Contrast:  1.  No acute abnormality identified in the chest, abdomen or pelvis.  2.  The aorta is normal in caliber without dissection. No significant stenosis is visualized in the major arteries of bilateral upper extremities.  3.  2.6 cm indeterminate left adrenal nodule. In the absence of known malignancy, this is most likely an adenoma. Recommend comparison with prior imaging studies, if available.  As per radiology.     Head CT w/o Contrast:  1.  Limited by motion. No definite acute intracranial abnormality.  2.  Mild to moderate age-related changes.  3.  Large right mastoid effusion.  4.  Small left sphenoid sinus fluid level.  As per radiology.     XR Shoulder Right G/E 3 Views:  No acute fracture or malalignment. Moderate glenohumeral and mild acromioclavicular joint degenerative changes. Osteopenia. Postsurgical changes project over the right lung.  As per radiology.     Humerus XR G/E 2 Views, right:  Somewhat limited evaluation due to osteopenia. No acute fracture is identified. Degenerative changes of the shoulder and elbow. Postsurgical changes in the right lung.  As per radiology.     Laboratory:  Laboratory findings were communicated with the patient, family and Admitting MD who voiced understanding of the findings.    CBC: WBC: 8.9, HGB: 12.2, PLT: 236    BMP: Glucose 148 (high), Sodium 132 (low), o/w WNL (Creatinine: 0.53)    INR: 1.03    2030 Troponin: <0.015    Interventions:  1931 Morphine 4mg IV  2110 Morphine 2mg IV  2302 Toradol 10mg IV  2305 Sublimaze 50mcg IV  0003 Labetalol 5 mg IV    Emergency Department Course:  Past medical records, nursing notes, and vitals reviewed.    1920 I performed  an exam of the patient as documented above.     EKG obtained in the ED, see results above.   IV was inserted and blood was drawn for laboratory testing, results above.  The patient was sent for imaging while in the emergency department, results above.     2048 I rechecked the patient and discussed the results of her workup thus far.     2139 I rechecked the patient and discussed the results of her workup thus far.     2314 I rechecked the patient and discussed the results of her workup thus far.     Findings and plan explained to the Patient and daughter who consents to transfer. Discussed the patient with Dr. Martini at [facility].    I personally reviewed the laboratory and imaging results with the Patient and daughter and answered all related questions prior to transfer.     Impression & Plan     Medical Decision Making:  Gennaro Walton is a 89-year-old female who presented to the emergency department with arm pain.  Patient having right arm pain that started sometime today.  There is bruising and mild swelling on the right shoulder and upper humerus area and pain on palpation and with movement of this area.  Neurovascularly intact.  No evidence of cellulitis.  No evidence of erythema.  Doubt septic arthritis.  No one seems to know when this bruising started and there is a lidocaine patch on the right shoulder but per the daughter the facility did not know when it was put on. Patient denies falling but appears to have fallen at some point given amount of bruising on right shoulder. Denies chest pain, shortness of breath. Required multiple doses of pain medications in ED for pain control. Hypertensive in ED. At one point did have the nurse do blood pressures on both upper extremities and they were asymmetric though has equal pulses bilaterally. Given poor historian, hypertension, possible fall, obtained head CT and CT chest/abdomen/pelvis.  No acute traumatic injuries found.  No evidence of dissection.  Does  have an adrenal nodule that was discussed with patient and family. No neck pain or tenderness on neck and can move neck well so doubt cervical spine injury.Doubt DVT. Doubt compartment syndrome. Troponin negative, denies chest pain and has had pain most of day so doubt ACS. Patient doing better after pain medications though did become mildly hypoxic after pain medications. Patient not safe to go back to facility per family given amount of pain, fall risk, may require additional level of care after discharge. No obs beds at [facility], [facility] which were preferred hospitals. Family then chose [facility]. Obs bed at [facility] and accepted by physician. Discussed with family that will be obs admission.    Update: Now bed available at [facility] obs so now will go to obs bed at [facility]. Spoke to Dr. Gray    Diagnosis:    ICD-10-CM    1. Pain of right upper arm M79.621        Disposition:  Obs    Scribe Disclosure:  I, Sean Mendoza, am serving as a scribe at 7:15 PM on 1/7/2020 to document services personally performed by Georgie Maurer MD based on my observations and the provider's statements to me.     I, Chiqui Sky, am serving as a scribe on 1/7/2020 at 7:36 PM to personally document services performed by Georgie Maurer MD based on my observations and the provider's statements to me.        Georgie Maurer MD  01/08/20 0140       Georgie Maurer MD  01/08/20 0151       Georgie Maurer MD  01/08/20 0158

## 2020-01-08 NOTE — PHARMACY-ADMISSION MEDICATION HISTORY
Pharmacy Medication History  Admission medication history interview status for the 1/7/2020  admission is complete. See EPIC admission navigator for prior to admission medications     Medication history sources: [facility] and [facility] ([phone]) medication list  Medication history source reliability: Good  Adherence assessment: Good    Significant changes made to the medication list:  Removed tramadol 12.5 mg PO at bedtime as needed for pain. Patient is no longer receiving this, per facility.     Additional medication history information:   N/A    Medication reconciliation completed by provider prior to medication history? No    Time spent in this activity: 25 mins      Prior to Admission medications    Medication Sig Last Dose Taking? Auth Provider   acetaminophen (TYLENOL) 500 MG tablet Take 1,000 mg by mouth every 6 hours as needed for pain  at PRN Yes Unknown, Entered By History   acetaminophen (TYLENOL) 500 MG tablet Take 1,000 mg by mouth 2 times daily 1/7/2020 at AM Yes Unknown, Entered By History   amLODIPine (NORVASC) 5 MG tablet Take 2.5 mg by mouth daily 1/7/2020 at AM Yes Unknown, Entered By History   aspirin (ASA) 325 MG EC tablet Take 325 mg by mouth daily as needed for pain  at PRN Yes Unknown, Entered By History   calcium carbonate (TUMS) 500 MG chewable tablet Take 1 chew tab by mouth daily  1/7/2020 at AM Yes Reported, Patient   docusate sodium (COLACE) 100 MG capsule Take 100 mg by mouth 2 times daily as needed for constipation  at PRN Yes Unknown, Entered By History   levothyroxine (SYNTHROID/LEVOTHROID) 88 MCG tablet Take 88 mcg by mouth daily 1/7/2020 at AM Yes Unknown, Entered By History   Lidocaine (LIDOCARE) 4 % Patch Place 2 patches onto the skin every 24 hours To prevent lidocaine toxicity, patient should be patch free for 12 hrs daily.  1 on Left hip, 1 on Left shoulder. 1/7/2020 at AM Yes Savage Menjivar APRN CNP   lidocaine (LMX4) 4 % external cream Apply topically 2  times daily as needed for pain (Apply to affected areas)  at PRN Yes Unknown, Entered By History   losartan (COZAAR) 50 MG tablet Take 50 mg by mouth daily 1/7/2020 at AM Yes Unknown, Entered By History   oxybutynin ER (DITROPAN-XL) 10 MG 24 hr tablet Take 1 tablet (10 mg) by mouth At Bedtime 1/6/2020 at HS Yes Fei Marshall MD   polyethylene glycol (MIRALAX/GLYCOLAX) packet Take 17 g by mouth daily 1/7/2020 at AM Yes Savage Menjivar APRN CNP   vitamin D3 (CHOLECALCIFEROL) 2000 units tablet Take 1 tablet by mouth daily 1/7/2020 at AM Yes Unknown, Entered By History

## 2020-01-08 NOTE — ED NOTES
[facility]  ED Nurse Handoff Report    ED Chief complaint: Arm Pain      ED Diagnosis:   Final diagnoses:   None       Code Status: DNR    Allergies: No Known Allergies    Patient Story: right shoulder pain - came to ED from [facility]  Focused Assessment:  Significant right shoulder bruising and swelling, pt reporting significant pain to right shoulder. Xray negative for fracture. Pt denies injury- pt confused. Family at bedside stating that they were unaware of right shoulder injury. Per NH patient began complaining of right shoulder pain today- bruising looks old. Patient had pain patch from nursing home to right shoulder on arrival to ED. Patient typically uses wheelchair or walker to get around. Patient hypertensive.    Treatments and/or interventions provided: see MAR, repositioned patient with pillows  Patient's response to treatments and/or interventions: pain decreased from meds and BP decreased from meds.    To be done/followed up on inpatient unit:      Does this patient have any cognitive concerns?: Disoriented to time, Disoriented to place, Disoriented to situation and Disorientation to person    Activity level - Baseline/Home:  Walker and Wheelchair  Activity Level - Current:   Walker and Wheelchair    Patient's Preferred language: English   Needed?: No    Isolation: None  Infection: Not Applicable  Bariatric?: No    Vital Signs:   Vitals:    01/07/20 2351 01/07/20 2353 01/08/20 0005 01/08/20 0021   BP: (!) 191/113 (!) 192/103  (!) 161/81   Pulse: 78 77  66   Resp:    15   Temp:       TempSrc:       SpO2: 94% 96% 97% 94%       Cardiac Rhythm:     Was the PSS-3 completed:   Yes  What interventions are required if any?               Family Comments: family at bedside   OBS brochure/video discussed/provided to patient/family: N/A              Name of person given brochure if not patient:               Relationship to patient:     For the majority of the shift this  patient's behavior was Green.   Behavioral interventions performed were .    ED NURSE PHONE NUMBER: [phone]

## 2020-01-08 NOTE — PROGRESS NOTES
Hospitalist update note    Patient admitted this morning by Dr. Gray for evaluation of right shoulder ecchymosis. Pain is currently adequately controlled with APAP with associated improvement in blood pressure.    - PTA anti-hypertensives have been resumed  - PT consult   - CC/SW aware, awaiting disposition planning following PT yudy Davis MD  Hospitalist  [phone]

## 2020-01-08 NOTE — PLAN OF CARE
Discharge Planner PT   Patient plan for discharge: return to AL with previous services  Current status: Spoke to patient's daughter Caron to obtain prior level of function information. Patient lives at AL and has been doing well with use of her 4WW for mobility. Does pay for help with transfers and toileting but patient performed both independently.  Daughter with her roughly every other night. Patient able to dress and transfer by herself. Gets meals that she walks to or is pushed in a wheelchair. Assistance with bathing twice weekly.   Patient does not remember injuring her right shoulder. Demonstrates pain with shoulder flexion and elbow extension. Very limited mobility in left shoulder as well. No pain reported during functional mobility-able to demo very slow but independent to A for bed mobility, toileting and walking with 4WW >200 feet. No LOB. Slightly impulsive at times but overall knows her limitations and moves cautiously.    Significant bruising noted over right biceps into axilla and deltoid.  /64  Barriers to return to prior living situation: Pain in right shoulder, very limited mobility R>L shoulder, impulsive at times.  Recommendations for discharge: return to AL with assist for transfers and mobility due to pain, Caron asked that I call her after my assessment to share the results  Rationale for recommendations: Patient very functionally independent given poor shoulder ROM B and new onset right shoulder pain.       Entered by: Barb Mendoza 01/08/2020 3:35 PM

## 2020-01-08 NOTE — PROGRESS NOTES
01/08/20 1600   Quick Adds   Type of Visit Initial PT Evaluation   Living Environment   Lives With alone   Living Arrangements assisted living   Home Accessibility no concerns   Transportation Anticipated family or friend will provide   Living Environment Comment per daughter Caron: pays for assist with transfers and toilet but able to do Indep PTA. Uses her 4WW most of the time. Meals in dining area or brought to her room. Bathing assist 2x/week. Daughter visits nearly every other night. Able to dress herself.    Self-Care   Usual Activity Tolerance fair   Current Activity Tolerance fair   Regular Exercise No   Equipment Currently Used at Home walker, rolling   General Information   Onset of Illness/Injury or Date of Surgery - Date 01/07/20   Referring Physician Patito Davis MD   Patient/Family Goals Statement go home   Pertinent History of Current Problem (include personal factors and/or comorbidities that impact the POC) Pt admitted with right shoulder pain. Unknown cause-no falls that patient remembers. Hx of hip ORIF last July and compression fractures in March.   Precautions/Limitations fall precautions   General Observations very thin build   General Info Comments very pleasant and conversant   Cognitive Status Examination   Orientation person   Level of Consciousness alert   Follows Commands and Answers Questions 100% of the time   Personal Safety and Judgment at risk behaviors demonstrated;impulsive   Memory impaired   Cognitive Comment Patient did think she was at her AL and occassionally would be impulsive during mobility-moving quickly   Pain Assessment   Patient Currently in Pain No   Integumentary/Edema   Integumentary/Edema Comments signicant bruising on right biceps tendon insertion, deltoid and acromion   Posture    Posture Protracted shoulders;Kyphosis;Forward head position   Posture Comments very impaired posture   Range of Motion (ROM)   ROM Comment unable to flex or abduct right arm  "d/t discomfort. able to bend right elbow but increased pain with elbow extention. very limited left shoulder ROM as well but painfree   Strength   Strength Comments unable to achieve full range BUE antigravity, BLE WFL   Bed Mobility   Bed Mobility Comments sup<>sit I, no c/o pain   Transfer Skills   Transfer Comments sit<>stand with with SBA   Gait   Gait Comments x300 feet with 4WW and SBA, good negotiation in tight spaces and around corners   Balance   Balance Comments Needs BUE support for sit>stand from low surfaces, improved balance with BUE support of 4WW for transfers and mobility. Very slow transfers.   Sensory Examination   Sensory Perception Comments denies N/T in UE's or LE's   Clinical Impression   Criteria for Skilled Therapeutic Intervention no;evaluation only;no significant expected improvement in functional status   Influenced by the following impairments pain in right shoulder, impulsive with mobility at times (if she needs to use the bathroom), impaired balance   Functional limitations due to impairments relies on 4WW for stability, unable to reach R>L, falls risk when not using her AD   Clinical Presentation Stable/Uncomplicated   Clinical Presentation Rationale patient reporting no pain during eval   Clinical Decision Making (Complexity) Low complexity   Predicted Duration of Therapy Intervention (days/wks) one time only eval and treatment   Anticipated Discharge Disposition Home with Assist   Risk & Benefits of therapy have been explained Yes   Patient, Family & other staff in agreement with plan of care Yes   [facility] [employer]AC TM \"6 Clicks\"   2016, Trustees of [facility], under license to [employer].  All rights reserved.   6 Clicks Short Forms Basic Mobility Inpatient Short Form   [facility] AM-PAC  \"6 Clicks\" V.2 Basic Mobility Inpatient Short Form   1. Turning from your back to your side while in a flat bed without using bedrails? 4 - None   2. Moving from lying on " your back to sitting on the side of a flat bed without using bedrails? 4 - None   3. Moving to and from a bed to a chair (including a wheelchair)? 4 - None   4. Standing up from a chair using your arms (e.g., wheelchair, or bedside chair)? 4 - None   5. To walk in hospital room? 3 - A Little   6. Climbing 3-5 steps with a railing? 3 - A Little   Basic Mobility Raw Score (Score out of 24.Lower scores equate to lower levels of function) 22   Total Evaluation Time   Total Evaluation Time (Minutes) 30

## 2020-01-09 VITALS
SYSTOLIC BLOOD PRESSURE: 173 MMHG | RESPIRATION RATE: 15 BRPM | TEMPERATURE: 97 F | BODY MASS INDEX: 19.25 KG/M2 | HEART RATE: 63 BPM | DIASTOLIC BLOOD PRESSURE: 64 MMHG | WEIGHT: 98.55 LBS | OXYGEN SATURATION: 95 %

## 2020-01-09 PROCEDURE — 99217 ZZC OBSERVATION CARE DISCHARGE: CPT | Performed by: INTERNAL MEDICINE

## 2020-01-09 PROCEDURE — 25000132 ZZH RX MED GY IP 250 OP 250 PS 637: Mod: GY | Performed by: INTERNAL MEDICINE

## 2020-01-09 PROCEDURE — G0378 HOSPITAL OBSERVATION PER HR: HCPCS

## 2020-01-09 RX ADMIN — LEVOTHYROXINE SODIUM 88 MCG: 88 TABLET ORAL at 08:09

## 2020-01-09 RX ADMIN — LOSARTAN POTASSIUM 50 MG: 50 TABLET, FILM COATED ORAL at 08:10

## 2020-01-09 RX ADMIN — POLYETHYLENE GLYCOL 3350 17 G: 17 POWDER, FOR SOLUTION ORAL at 08:09

## 2020-01-09 RX ADMIN — ACETAMINOPHEN 1000 MG: 500 TABLET, FILM COATED ORAL at 08:10

## 2020-01-09 RX ADMIN — AMLODIPINE BESYLATE 2.5 MG: 2.5 TABLET ORAL at 08:10

## 2020-01-09 NOTE — DISCHARGE SUMMARY
[facility]    Discharge Summary  Hospitalist    Date of Admission:  1/7/2020  Date of Discharge:  1/9/2020  Discharging Provider: Patito Davis  Date of Service (when I saw the patient): 01/09/20    Discharge Diagnoses     1. Acute right shoulder pain due to mechanical fall  2. Essential hypertension  3. Hypothyroidism  4. History of traumatic left hip fracture, s/p ORIF in July 2019  5. History of traumatic SAH in July 2019  6. History of breast cancer s/p left mastectomy  7. Dementia without behavioral disturbance    History of Present Illness   Please see H&P by Dr. Lv Gray on 1/8/2020    Hospital Course   Gennaro Walton is a 89 year old female with hx of dementia and hypothyroidism who was admitted from her assisted living facility on 1/7/2020 with acute right shoulder pain and ecchymoses following an apparent fall. Right shoulder XR on arrival showed no acute fractures or dislocations, and head CT on arrival showed no acute bleed. She was observed for roughly 24 hours, and her pain has been adequately controlled with Tylenol and a lidocaine patch. She will discharge back to her senior living where her daughter stays with her. Home PT was ordered at discharge.     Patito Davis MD    Significant Results and Procedures   As noted above    Pending Results   None    Code Status   DNR / DNI       Primary Care Physician   Mady Hagen    Physical Exam   Temp: 97  F (36.1  C) Temp src: Oral BP: (!) 173/64 Pulse: 63 Heart Rate: (!) 48 Resp: 15 SpO2: 95 % O2 Device: None (Room air)    Vitals:    01/08/20 0250   Weight: 44.7 kg (98 lb 8.7 oz)     Vital Signs with Ranges  Temp:  [97  F (36.1  C)-98.1  F (36.7  C)] 97  F (36.1  C)  Pulse:  [60-87] 63  Heart Rate:  [48-64] 48  Resp:  [14-15] 15  BP: (127-173)/(60-70) 173/64  SpO2:  [94 %-95 %] 95 %  I/O last 3 completed shifts:  In: 240 [P.O.:240]  Out: -     Constitutional: Resting comfortably, NAD  HEENT: NC/AT, Sclera white, conjunctiva  clear, EOMI, MMM  Respiratory: Breathing non-labored. Lungs CTAB - no wheezes/crackles/rhonchi  Cardiovascular: Heart RRR, no m/r/g. No pedal edema.   GI: +BS. Abd soft/NT  Skin/Integument: Healing ecchymoses throughout RUE  Musculoskeletal: Normal muscle bulk and tone  Neurologic: Alert, able to make needs known, but otherwise pleasantly confused. CAMPO  Psychiatric: Calm and cooperative    Discharge Disposition   Discharged to assisted living facility  Condition at discharge: Satisfactory    Consultations This Hospital Stay   1. CARE COORDINATOR IP CONSULT  2. SOCIAL WORK IP CONSULT  3. PHYSICAL THERAPY ADULT IP CONSULT    Time Spent on this Encounter   IPatito MD, personally saw the patient today and spent less than or equal to 30 minutes discharging this patient.    Discharge Orders      Home Care PT Referral for Hospital Discharge      Reason for your hospital stay    Right shoulder pain due to extensive bruising. You were evaluated and cleared by physical therapy     Follow-up and recommended labs and tests     Follow up with primary care provider, Mady Hagen, as needed.  No follow up labs or test are needed.     Activity    Your activity upon discharge: activity as tolerated     MD face to face encounter    Documentation of Face to Face and Certification for Home Health Services    I certify that patient: Gennaro Walton is under my care and that I, or a nurse practitioner or physician's assistant working with me, had a face-to-face encounter that meets the physician face-to-face encounter requirements with this patient on: 1/9/2020.    This encounter with the patient was in whole, or in part, for the following medical condition, which is the primary reason for home health care: Mechanical fall, right shoulder contusion.    I certify that, based on my findings, the following services are medically necessary home health services: Physical Therapy.    My clinical findings support the need for  the above services because: Physical Therapy Services are needed to assess and treat the following functional impairments: mechanical fall with right shoulder contusion.    Further, I certify that my clinical findings support that this patient is homebound (i.e. absences from home require considerable and taxing effort and are for medical reasons or [employer] services or infrequently or of short duration when for other reasons) because: Mental health symptoms including: Patient gets lost or wanders to due to cognitive impairments...    Based on the above findings. I certify that this patient is confined to the home and needs intermittent skilled nursing care, physical therapy and/or speech therapy.  The patient is under my care, and I have initiated the establishment of the plan of care.  This patient will be followed by a physician who will periodically review the plan of care.  Physician/Provider to provide follow up care: Mady Hagen    Attending hospital physician (the Medicare certified [place] provider): Patito Davis MD  Physician Signature: See electronic signature associated with these discharge orders.  Date: 1/9/2020     Diet    Follow this diet upon discharge: Regular diet     Discharge Medications   Current Discharge Medication List      CONTINUE these medications which have NOT CHANGED    Details   !! acetaminophen (TYLENOL) 500 MG tablet Take 1,000 mg by mouth every 6 hours as needed for pain      !! acetaminophen (TYLENOL) 500 MG tablet Take 1,000 mg by mouth 2 times daily      amLODIPine (NORVASC) 5 MG tablet Take 2.5 mg by mouth daily      aspirin (ASA) 325 MG EC tablet Take 325 mg by mouth daily as needed for pain      calcium carbonate (TUMS) 500 MG chewable tablet Take 1 chew tab by mouth daily       docusate sodium (COLACE) 100 MG capsule Take 100 mg by mouth 2 times daily as needed for constipation      levothyroxine (SYNTHROID/LEVOTHROID) 88 MCG tablet Take 88 mcg by mouth daily       Lidocaine (LIDOCARE) 4 % Patch Place 2 patches onto the skin every 24 hours To prevent lidocaine toxicity, patient should be patch free for 12 hrs daily.  1 on Left hip, 1 on Left shoulder.    Associated Diagnoses: Acute pain      lidocaine (LMX4) 4 % external cream Apply topically 2 times daily as needed for pain (Apply to affected areas)      losartan (COZAAR) 50 MG tablet Take 50 mg by mouth daily      oxybutynin ER (DITROPAN-XL) 10 MG 24 hr tablet Take 1 tablet (10 mg) by mouth At Bedtime    Comments: If urinating without difficulty then start August 2nd.  Associated Diagnoses: Urinary frequency      polyethylene glycol (MIRALAX/GLYCOLAX) packet Take 17 g by mouth daily    Associated Diagnoses: Left displaced femoral neck fracture (H)      vitamin D3 (CHOLECALCIFEROL) 2000 units tablet Take 1 tablet by mouth daily       !! - Potential duplicate medications found. Please discuss with provider.        Allergies   No Known Allergies  Data   Most Recent 3 CBC's:  Recent Labs   Lab Test 01/07/20 2030 08/22/19  0700 08/05/19  0720   WBC 8.9 3.5* 9.8   HGB 12.2 10.3* 8.7*   MCV 99 102* 99    211 416      Most Recent 3 BMP's:  Recent Labs   Lab Test 01/07/20 2030 08/22/19  0700 08/02/19  0720   * 137 135   POTASSIUM 3.7 4.1 3.5   CHLORIDE 97 105 102   CO2 30 27 24   BUN 10 11 11   CR 0.53 0.57 0.60   ANIONGAP 5 5 9   LORETA 8.7 8.4* 8.2*   * 72 93     Most Recent 2 LFT's:  Recent Labs   Lab Test 07/25/19  1414   AST 19   ALT 16   ALKPHOS 95   BILITOTAL 0.3     Most Recent INR's and Anticoagulation Dosing History:  Anticoagulation Dose History     Recent Dosing and Labs Latest Ref Rng & Units 7/25/2019 1/7/2020    INR 0.86 - 1.14 1.02 1.03        Most Recent 3 Troponin's:  Recent Labs   Lab Test 01/07/20 2030 07/27/19  0322 07/26/19  2122   TROPI <0.015 0.037 0.035     Most Recent Cholesterol Panel:No lab results found.  Most Recent 6 Bacteria Isolates From Any Culture (See EPIC Reports for Culture  Details):  Recent Labs   Lab Test 08/07/19  1500   CULT 50,000 to 100,000 colonies/mL  Escherichia coli  *     Most Recent TSH, T4 and A1c Labs:No lab results found.  Results for orders placed or performed during the hospital encounter of 01/07/20   Humerus XR, G/E 2 views, right    Narrative    HUMERUS TWO VIEWS RIGHT  1/7/2020 8:06 PM     HISTORY: pain after fall    COMPARISON: None.      Impression    IMPRESSION: Somewhat limited evaluation due to osteopenia. No acute  fracture is identified. Degenerative changes of the shoulder and  elbow. Postsurgical changes in the right lung.    WES GREER MD   XR Shoulder Right G/E 3 Views    Narrative    XR SHOULDER RT G/E 3 VW   1/7/2020 8:07 PM     HISTORY:  pain after fall    COMPARISON: None      Impression    IMPRESSION: No acute fracture or malalignment. Moderate glenohumeral  and mild acromioclavicular joint degenerative changes. Osteopenia.  Postsurgical changes project over the right lung.    WES GREER MD   Head CT w/o contrast    Narrative    EXAM: CT HEAD W/O CONTRAST  LOCATION: [facility]  DATE/TIME: 1/7/2020 10:12 PM    INDICATION: Fall, bruising on right arm  COMPARISON: None.  TECHNIQUE: Routine without IV contrast. Multiplanar reformats. Dose reduction techniques were used.    FINDINGS:  INTRACRANIAL CONTENTS: Mildly limited by motion. No definite intracranial hemorrhage, extraaxial collection, or mass effect.  No CT evidence of acute infarct. Mild volume loss and mild to moderate burden presumed chronic small vessel ischemia.    VISUALIZED ORBITS/SINUSES/MASTOIDS: No intraorbital abnormality. Mild scattered mucosal thickening of the paranasal sinuses. Small fluid level left sphenoid locule. Near complete opacification of the right mastoid air cells.    BONES/SOFT TISSUES: No acute abnormality.      Impression    IMPRESSION:  1.  Limited by motion. No definite acute intracranial abnormality.    2.  Mild to moderate age-related  changes.    3.  Large right mastoid effusion.    4.  Small left sphenoid sinus fluid level.   CT Aortic Survey w Contrast    Narrative    EXAM: CTA CHEST, ABDOMEN AND PELVIS WITH CONTRAST  LOCATION: [facility]  DATE/TIME: 1/7/2020 10:13 PM    INDICATION: Chest and arm pain. Hypertension. Blood pressure difference between arms.  COMPARISON: None.    TECHNIQUE: Prior to the administration of intravenous contrast, helical sections were acquired through the chest. CT angiogram chest, abdomen and pelvis during arterial phase of injection of IV contrast. Dose reduction techniques were used.  CONTRAST: 75 mL [id number]    FINDINGS:    ANGIOGRAM CHEST, ABDOMEN, AND PELVIS: The aorta is normal in caliber without dissection. Atherosclerotic calcification in the aorta. No significant stenosis of the visualized portions of the major arteries of bilateral upper extremities. No visualized   pulmonary embolus.    LUNGS AND PLEURA: A few small calcified nodules scattered within the lungs likely relate to prior granulomatous infection. A few curvilinear opacities in the periphery of both lungs likely represent scarring and/or atelectasis. Mild bronchiectasis in   both lungs.    MEDIASTINUM/AXILLAE: A left breast prosthesis is present.    HEPATOBILIARY: Unremarkable.    SPLEEN: Unremarkable.    PANCREAS: Unremarkable.    ADRENAL GLANDS: 2.6 x 1.8 cm left adrenal nodule (series 11 image 86).    KIDNEYS/BLADDER: Moderate distention of the urinary bladder.    BOWEL: Several colonic diverticula are present without evidence of diverticulitis. The appendix is not visualized.    LYMPH NODES: Unremarkable.    MUSCULOSKELETAL: A few age-indeterminate compression deformities of the thoracic and lumbar vertebral bodies. Partial visualization of surgical hardware in the left hip, bridging an old intertrochanteric fracture.      Impression    IMPRESSION:   1.  No acute abnormality identified in the chest, abdomen or pelvis.  2.  The  aorta is normal in caliber without dissection. No significant stenosis the visualized portions of the major arteries of bilateral upper extremities.  3.  2.6 cm indeterminate left adrenal nodule. In the absence of known malignancy, this is most likely an adenoma. Recommend comparison with prior imaging studies, if available.

## 2020-01-09 NOTE — PLAN OF CARE
Pt A/O to self and knows she's in the hospital. Easily orients when reminded that shes in [place], [place]. BP elevated prior to meds-not outside parameters for PRN. Denies pain-Tylenol scheduled. Right shoulder green/purple bruising extending into axilla and down bi-cep, which is slightly edematous but improving. Occ incontinent of bladder-pure wik in place. Able to get to BR with A1+w-GB. Miralax given. BS hypoactive, unsure of last BM. Regular diet-room service with assist. Bi-lateral arms have limited AROM, requires tray set up. Pt plans to discharge back to [facility] today with order for HC PT. Dtr transported. RN gave report to Eli at [facility]- discharge orders sent. Pt left via w/c with daughter.

## 2020-01-09 NOTE — PLAN OF CARE
Gennaro reports drastic improvement with the pain in her right shoulder. Ecchymotic through shoulder. She is alert, disoriented to place and situation, and very pleasant. She's easily reoriented. Minimal assessment overnight to protect sleep.    Incontinent of urine at times. External catheter in place. A1 and gait belt to the restroom. Daughter Caron is involved and helpful. Anticipate discharge back to [facility] soon.

## 2020-01-09 NOTE — PROVIDER NOTIFICATION
MD Notification    Notified Person: MD    Notified Person Name: USAMA DavisCatina    Notification Date/Time: 1/9/2020 9:44 a.m.    Notification Interaction: FYI page as patients daughter would like homecare PT for discharge.    Purpose of Notification: FYI page for Homecare PT and face to face for discharge back to snf    Orders Received: await orders to be placed for faxing to [facility]    Comments:

## 2020-01-09 NOTE — PROGRESS NOTES
Writer called and left a voicemail for shelter [phone] that patient is discharging to home with previous level of assist.  Writer called patients daughter Caron to discuss discharge planning [phone] per Caron she does want to pick her mom up and will call this writer with a time as she is hoping before lunch.    Writer updated bedside and will confirm time once Caron returns a call.  Bedside RN thinks that the patient may benefit from a PT homecare at discharge, will discuss this further when Caron calls back.  Caron identifies no further needs at discharge and will schedule PCP follow up.    Addendum 1/9/2020 9:45 a.m.  Per call back from Caron she will be picking her mother up at ~11:15.  Writer did mention Homecare PT and daughter would like homecare PT at discharge provider paged and [facility]. Writer will fax this once [facility] calls back.  Bedside RN updated with discharge time.   Discharge orders faxed to Attn: Eli fax#[phone]

## 2020-01-13 ENCOUNTER — DOCUMENTATION ONLY (OUTPATIENT)
Dept: OTHER | Facility: CLINIC | Age: 85
End: 2020-01-13

## 2020-01-13 PROBLEM — Z71.89 ADVANCED DIRECTIVES, COUNSELING/DISCUSSION: Status: RESOLVED | Noted: 2019-08-09 | Resolved: 2020-01-13

## 2020-01-31 ENCOUNTER — APPOINTMENT (OUTPATIENT)
Dept: GENERAL RADIOLOGY | Facility: CLINIC | Age: 85
DRG: 481 | End: 2020-01-31
Attending: EMERGENCY MEDICINE
Payer: MEDICARE

## 2020-01-31 ENCOUNTER — HOSPITAL ENCOUNTER (INPATIENT)
Facility: CLINIC | Age: 85
LOS: 5 days | Discharge: NURSING, REHAB OR LONG-TERM CARE | DRG: 481 | End: 2020-02-05
Attending: EMERGENCY MEDICINE | Admitting: INTERNAL MEDICINE
Payer: MEDICARE

## 2020-01-31 DIAGNOSIS — S72.001A HIP FRACTURE, RIGHT, CLOSED, INITIAL ENCOUNTER (H): ICD-10-CM

## 2020-01-31 DIAGNOSIS — S72.001A CLOSED FRACTURE OF RIGHT HIP, INITIAL ENCOUNTER (H): Primary | ICD-10-CM
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
## 2020-01-31 LAB
ABO + RH BLD: NORMAL
ABO + RH BLD: NORMAL
ANION GAP SERPL CALCULATED.3IONS-SCNC: 6 MMOL/L (ref 3–14)
BASOPHILS # BLD AUTO: 0 10E9/L (ref 0–0.2)
BASOPHILS NFR BLD AUTO: 0.1 %
BLD GP AB SCN SERPL QL: NORMAL
BLOOD BANK CMNT PATIENT-IMP: NORMAL
BUN SERPL-MCNC: 9 MG/DL (ref 7–30)
CALCIUM SERPL-MCNC: 9 MG/DL (ref 8.5–10.1)
CHLORIDE SERPL-SCNC: 105 MMOL/L (ref 94–109)
CO2 SERPL-SCNC: 27 MMOL/L (ref 20–32)
CREAT SERPL-MCNC: 0.57 MG/DL (ref 0.52–1.04)
DIFFERENTIAL METHOD BLD: ABNORMAL
EOSINOPHIL # BLD AUTO: 0.2 10E9/L (ref 0–0.7)
EOSINOPHIL NFR BLD AUTO: 2.3 %
ERYTHROCYTE [DISTWIDTH] IN BLOOD BY AUTOMATED COUNT: 13.3 % (ref 10–15)
GFR SERPL CREATININE-BSD FRML MDRD: 78 ML/MIN/{1.73_M2}
GLUCOSE SERPL-MCNC: 110 MG/DL (ref 70–99)
HCT VFR BLD AUTO: 37.3 % (ref 35–47)
HGB BLD-MCNC: 12.1 G/DL (ref 11.7–15.7)
IMM GRANULOCYTES # BLD: 0 10E9/L (ref 0–0.4)
IMM GRANULOCYTES NFR BLD: 0.3 %
INTERPRETATION ECG - MUSE: NORMAL
LYMPHOCYTES # BLD AUTO: 1.2 10E9/L (ref 0.8–5.3)
LYMPHOCYTES NFR BLD AUTO: 16.6 %
MCH RBC QN AUTO: 32.9 PG (ref 26.5–33)
MCHC RBC AUTO-ENTMCNC: 32.4 G/DL (ref 31.5–36.5)
MCV RBC AUTO: 101 FL (ref 78–100)
MONOCYTES # BLD AUTO: 0.7 10E9/L (ref 0–1.3)
MONOCYTES NFR BLD AUTO: 9.5 %
NEUTROPHILS # BLD AUTO: 5 10E9/L (ref 1.6–8.3)
NEUTROPHILS NFR BLD AUTO: 71.2 %
PLATELET # BLD AUTO: 265 10E9/L (ref 150–450)
POTASSIUM SERPL-SCNC: 4.1 MMOL/L (ref 3.4–5.3)
RBC # BLD AUTO: 3.68 10E12/L (ref 3.8–5.2)
SODIUM SERPL-SCNC: 138 MMOL/L (ref 133–144)
SPECIMEN EXP DATE BLD: NORMAL
WBC # BLD AUTO: 7 10E9/L (ref 4–11)

## 2020-01-31 PROCEDURE — 25000128 H RX IP 250 OP 636: Performed by: EMERGENCY MEDICINE

## 2020-01-31 PROCEDURE — 86900 BLOOD TYPING SEROLOGIC ABO: CPT | Performed by: EMERGENCY MEDICINE

## 2020-01-31 PROCEDURE — 25800030 ZZH RX IP 258 OP 636: Performed by: INTERNAL MEDICINE

## 2020-01-31 PROCEDURE — 25000132 ZZH RX MED GY IP 250 OP 250 PS 637: Mod: GY | Performed by: INTERNAL MEDICINE

## 2020-01-31 PROCEDURE — 86901 BLOOD TYPING SEROLOGIC RH(D): CPT | Performed by: EMERGENCY MEDICINE

## 2020-01-31 PROCEDURE — 80048 BASIC METABOLIC PNL TOTAL CA: CPT | Performed by: EMERGENCY MEDICINE

## 2020-01-31 PROCEDURE — 12000000 ZZH R&B MED SURG/OB

## 2020-01-31 PROCEDURE — 25000128 H RX IP 250 OP 636: Performed by: INTERNAL MEDICINE

## 2020-01-31 PROCEDURE — 93005 ELECTROCARDIOGRAM TRACING: CPT

## 2020-01-31 PROCEDURE — 99285 EMERGENCY DEPT VISIT HI MDM: CPT | Mod: 25

## 2020-01-31 PROCEDURE — 73502 X-RAY EXAM HIP UNI 2-3 VIEWS: CPT

## 2020-01-31 PROCEDURE — 99223 1ST HOSP IP/OBS HIGH 75: CPT | Mod: AI | Performed by: INTERNAL MEDICINE

## 2020-01-31 PROCEDURE — 85025 COMPLETE CBC W/AUTO DIFF WBC: CPT | Performed by: EMERGENCY MEDICINE

## 2020-01-31 PROCEDURE — 71045 X-RAY EXAM CHEST 1 VIEW: CPT

## 2020-01-31 PROCEDURE — 86850 RBC ANTIBODY SCREEN: CPT | Performed by: EMERGENCY MEDICINE

## 2020-01-31 PROCEDURE — 96374 THER/PROPH/DIAG INJ IV PUSH: CPT

## 2020-01-31 RX ORDER — ONDANSETRON 4 MG/1
4 TABLET, ORALLY DISINTEGRATING ORAL EVERY 6 HOURS PRN
Status: DISCONTINUED | OUTPATIENT
Start: 2020-01-31 | End: 2020-02-01

## 2020-01-31 RX ORDER — ACETAMINOPHEN 500 MG
1000 TABLET ORAL 2 TIMES DAILY
Status: DISCONTINUED | OUTPATIENT
Start: 2020-01-31 | End: 2020-02-01

## 2020-01-31 RX ORDER — POLYETHYLENE GLYCOL 3350 17 G/17G
17 POWDER, FOR SOLUTION ORAL DAILY PRN
Status: DISCONTINUED | OUTPATIENT
Start: 2020-01-31 | End: 2020-02-05 | Stop reason: HOSPADM

## 2020-01-31 RX ORDER — POTASSIUM CHLORIDE 7.45 MG/ML
10 INJECTION INTRAVENOUS
Status: DISCONTINUED | OUTPATIENT
Start: 2020-01-31 | End: 2020-02-05 | Stop reason: HOSPADM

## 2020-01-31 RX ORDER — LIDOCAINE 40 MG/G
CREAM TOPICAL
Status: DISCONTINUED | OUTPATIENT
Start: 2020-01-31 | End: 2020-02-05 | Stop reason: HOSPADM

## 2020-01-31 RX ORDER — LOSARTAN POTASSIUM 50 MG/1
50 TABLET ORAL DAILY
Status: DISCONTINUED | OUTPATIENT
Start: 2020-02-01 | End: 2020-02-05 | Stop reason: HOSPADM

## 2020-01-31 RX ORDER — POTASSIUM CHLORIDE 1.5 G/1.58G
20-40 POWDER, FOR SOLUTION ORAL
Status: DISCONTINUED | OUTPATIENT
Start: 2020-01-31 | End: 2020-02-05 | Stop reason: HOSPADM

## 2020-01-31 RX ORDER — AMLODIPINE BESYLATE 2.5 MG/1
2.5 TABLET ORAL DAILY
Status: DISCONTINUED | OUTPATIENT
Start: 2020-02-01 | End: 2020-02-05 | Stop reason: HOSPADM

## 2020-01-31 RX ORDER — ACETAMINOPHEN 325 MG/1
650 TABLET ORAL EVERY 4 HOURS PRN
Status: DISCONTINUED | OUTPATIENT
Start: 2020-01-31 | End: 2020-02-01

## 2020-01-31 RX ORDER — NALOXONE HYDROCHLORIDE 0.4 MG/ML
.1-.4 INJECTION, SOLUTION INTRAMUSCULAR; INTRAVENOUS; SUBCUTANEOUS
Status: DISCONTINUED | OUTPATIENT
Start: 2020-01-31 | End: 2020-02-01

## 2020-01-31 RX ORDER — POTASSIUM CHLORIDE 1500 MG/1
20-40 TABLET, EXTENDED RELEASE ORAL
Status: DISCONTINUED | OUTPATIENT
Start: 2020-01-31 | End: 2020-02-05 | Stop reason: HOSPADM

## 2020-01-31 RX ORDER — PROCHLORPERAZINE MALEATE 5 MG
5 TABLET ORAL EVERY 6 HOURS PRN
Status: DISCONTINUED | OUTPATIENT
Start: 2020-01-31 | End: 2020-02-01

## 2020-01-31 RX ORDER — OXYBUTYNIN CHLORIDE 10 MG/1
10 TABLET, EXTENDED RELEASE ORAL AT BEDTIME
Status: DISCONTINUED | OUTPATIENT
Start: 2020-01-31 | End: 2020-02-05 | Stop reason: HOSPADM

## 2020-01-31 RX ORDER — AMOXICILLIN 250 MG
1 CAPSULE ORAL 2 TIMES DAILY PRN
Status: DISCONTINUED | OUTPATIENT
Start: 2020-01-31 | End: 2020-02-05 | Stop reason: HOSPADM

## 2020-01-31 RX ORDER — SODIUM CHLORIDE 9 MG/ML
INJECTION, SOLUTION INTRAVENOUS CONTINUOUS
Status: DISCONTINUED | OUTPATIENT
Start: 2020-01-31 | End: 2020-02-04

## 2020-01-31 RX ORDER — MORPHINE SULFATE 4 MG/ML
4 INJECTION, SOLUTION INTRAMUSCULAR; INTRAVENOUS ONCE
Status: COMPLETED | OUTPATIENT
Start: 2020-01-31 | End: 2020-01-31

## 2020-01-31 RX ORDER — BISACODYL 10 MG
10 SUPPOSITORY, RECTAL RECTAL DAILY PRN
Status: DISCONTINUED | OUTPATIENT
Start: 2020-01-31 | End: 2020-02-05 | Stop reason: HOSPADM

## 2020-01-31 RX ORDER — ONDANSETRON 2 MG/ML
4 INJECTION INTRAMUSCULAR; INTRAVENOUS EVERY 6 HOURS PRN
Status: DISCONTINUED | OUTPATIENT
Start: 2020-01-31 | End: 2020-02-01

## 2020-01-31 RX ORDER — LIDOCAINE 4 G/G
1 PATCH TOPICAL EVERY 24 HOURS
Status: DISCONTINUED | OUTPATIENT
Start: 2020-02-01 | End: 2020-02-05 | Stop reason: HOSPADM

## 2020-01-31 RX ORDER — AMOXICILLIN 250 MG
2 CAPSULE ORAL 2 TIMES DAILY PRN
Status: DISCONTINUED | OUTPATIENT
Start: 2020-01-31 | End: 2020-02-05 | Stop reason: HOSPADM

## 2020-01-31 RX ORDER — LIDOCAINE 4 G/G
1 PATCH TOPICAL EVERY 24 HOURS
Status: ON HOLD | COMMUNITY
End: 2020-02-05

## 2020-01-31 RX ORDER — PROCHLORPERAZINE 25 MG
12.5 SUPPOSITORY, RECTAL RECTAL EVERY 12 HOURS PRN
Status: DISCONTINUED | OUTPATIENT
Start: 2020-01-31 | End: 2020-02-01

## 2020-01-31 RX ORDER — POTASSIUM CL/LIDO/0.9 % NACL 10MEQ/0.1L
10 INTRAVENOUS SOLUTION, PIGGYBACK (ML) INTRAVENOUS
Status: DISCONTINUED | OUTPATIENT
Start: 2020-01-31 | End: 2020-02-05 | Stop reason: HOSPADM

## 2020-01-31 RX ORDER — HYDROMORPHONE HYDROCHLORIDE 1 MG/ML
.5-1 INJECTION, SOLUTION INTRAMUSCULAR; INTRAVENOUS; SUBCUTANEOUS
Status: DISCONTINUED | OUTPATIENT
Start: 2020-01-31 | End: 2020-02-01

## 2020-01-31 RX ORDER — LEVOTHYROXINE SODIUM 88 UG/1
88 TABLET ORAL DAILY
Status: DISCONTINUED | OUTPATIENT
Start: 2020-02-01 | End: 2020-02-05 | Stop reason: HOSPADM

## 2020-01-31 RX ORDER — POTASSIUM CHLORIDE 29.8 MG/ML
20 INJECTION INTRAVENOUS
Status: DISCONTINUED | OUTPATIENT
Start: 2020-01-31 | End: 2020-01-31 | Stop reason: CLARIF

## 2020-01-31 RX ADMIN — MORPHINE SULFATE 4 MG: 4 INJECTION INTRAVENOUS at 15:26

## 2020-01-31 RX ADMIN — SODIUM CHLORIDE: 9 INJECTION, SOLUTION INTRAVENOUS at 16:55

## 2020-01-31 RX ADMIN — OXYBUTYNIN CHLORIDE 10 MG: 10 TABLET, EXTENDED RELEASE ORAL at 21:38

## 2020-01-31 RX ADMIN — HYDROMORPHONE HYDROCHLORIDE 0.5 MG: 1 INJECTION, SOLUTION INTRAMUSCULAR; INTRAVENOUS; SUBCUTANEOUS at 18:58

## 2020-01-31 RX ADMIN — HYDROMORPHONE HYDROCHLORIDE 0.5 MG: 1 INJECTION, SOLUTION INTRAMUSCULAR; INTRAVENOUS; SUBCUTANEOUS at 17:02

## 2020-01-31 RX ADMIN — ACETAMINOPHEN 1000 MG: 500 TABLET, FILM COATED ORAL at 21:38

## 2020-01-31 RX ADMIN — HYDROMORPHONE HYDROCHLORIDE 0.5 MG: 1 INJECTION, SOLUTION INTRAMUSCULAR; INTRAVENOUS; SUBCUTANEOUS at 22:41

## 2020-01-31 ASSESSMENT — MIFFLIN-ST. JEOR
SCORE: 765.1
SCORE: 737.88

## 2020-01-31 ASSESSMENT — ENCOUNTER SYMPTOMS
HEADACHES: 0
ARTHRALGIAS: 1
NECK PAIN: 0

## 2020-01-31 ASSESSMENT — ACTIVITIES OF DAILY LIVING (ADL)
NUMBER_OF_TIMES_PATIENT_HAS_FALLEN_WITHIN_LAST_SIX_MONTHS: 6
ADLS_ACUITY_SCORE: 26
TRANSFERRING: 1-->ASSISTIVE EQUIPMENT
RETIRED_COMMUNICATION: 0-->UNDERSTANDS/COMMUNICATES WITHOUT DIFFICULTY
RETIRED_EATING: 0-->INDEPENDENT
WHICH_OF_THE_ABOVE_FUNCTIONAL_RISKS_HAD_A_RECENT_ONSET_OR_CHANGE?: AMBULATION;TRANSFERRING;TOILETING
COGNITION: 1 - ATTENTION OR MEMORY DEFICITS
SWALLOWING: 0-->SWALLOWS FOODS/LIQUIDS WITHOUT DIFFICULTY
AMBULATION: 1-->ASSISTIVE EQUIPMENT
DRESS: 0-->INDEPENDENT
FALL_HISTORY_WITHIN_LAST_SIX_MONTHS: YES
TOILETING: 1-->ASSISTIVE EQUIPMENT
BATHING: 2-->ASSISTIVE PERSON

## 2020-01-31 NOTE — CONSULTS
[facility]    Orthopedic Consultation    Gennaro Walton MRN# [id number]   Age: 89 year old YOB: 1923     Date of Admission:  1/31/2020    Reason for consult: Right hip fracture       Requesting physician: Dr. Moreno       Level of consult: Consult, follow and place orders           Assessment and Plan:   Assessment:   Right intertrochanteric femur fracture, minimal displacement      Plan:   Plan for OR tomorrow with Dr. Casper for short IM nail  NPO after midnight  Bed rest  Pain medication as needed  Type and Cross  Pre-op optimization per hospitalist           Chief Complaint:   Right hip fracture         History of Present Illness:   This patient is a 89 year old female who presents with the following condition requiring a hospital admission:    Patient resides at [facility] and sustained a fall today from standing. Event surrounding this are unclear. Patient has had left intertrochanteric femur fracture with ORIF by Dr. Oseguera on 7/26/19. This went on to heal well.   She does have cognitive delay. She is not chronically anticoagulated.           Past Medical History:     Past Medical History:   Diagnosis Date     Breast cancer (H)      CKD (chronic kidney disease) stage 3, GFR 30-59 ml/min (H)      Cognitive impairment      Hypertension      Hypothyroid      Osteoporosis              Past Surgical History:     Past Surgical History:   Procedure Laterality Date     ------------OTHER-------------      Left mastectomy     ------------OTHER-------------      Cataract surgery     OPEN REDUCTION INTERNAL FIXATION FEMUR MIDSHAFT Left 7/26/2019    Procedure: LEFT INTERTROCHANTERIC FEMUR FRACTURE OPEN REDUCTION AND INTERNAL FIXATION;  Surgeon: Chris Oseguera MD;  Location:  OR             Social History:     Social History     Tobacco Use     Smoking status: Never Smoker     Smokeless tobacco: Never Used   Substance Use Topics     Alcohol use: No     Frequency: Never             Family History:   No  family history on file.          Immunizations:     VACCINE/DOSE   Diptheria   DPT   DTAP   HBIG   Hepatitis A   Hepatitis B   HIB   Influenza   Measles   Meningococcal   MMR   Mumps   Pneumococcal   Polio   Rubella   Small Pox   TDAP   Varicella   Zoster             Allergies:     Allergies   Allergen Reactions     Sulfacetamide Hives             Medications:     Current Facility-Administered Medications   Medication     sodium chloride (PF) 0.9% PF flush 3 mL     sodium chloride (PF) 0.9% PF flush 3 mL     Current Outpatient Medications   Medication Sig     acetaminophen (TYLENOL) 500 MG tablet Take 1,000 mg by mouth every 6 hours as needed for pain     acetaminophen (TYLENOL) 500 MG tablet Take 1,000 mg by mouth 2 times daily     amLODIPine (NORVASC) 5 MG tablet Take 2.5 mg by mouth daily     aspirin (ASA) 325 MG EC tablet Take 325 mg by mouth daily as needed for pain     calcium carbonate (TUMS) 500 MG chewable tablet Take 1 chew tab by mouth daily      docusate sodium (COLACE) 100 MG capsule Take 100 mg by mouth 2 times daily as needed for constipation     levothyroxine (SYNTHROID/LEVOTHROID) 88 MCG tablet Take 88 mcg by mouth daily     Lidocaine (LIDOCARE) 4 % Patch Place 2 patches onto the skin every 24 hours To prevent lidocaine toxicity, patient should be patch free for 12 hrs daily.  1 on Left hip, 1 on Left shoulder.     lidocaine (LMX4) 4 % external cream Apply topically 2 times daily as needed for pain (Apply to affected areas)     losartan (COZAAR) 50 MG tablet Take 50 mg by mouth daily     oxybutynin ER (DITROPAN-XL) 10 MG 24 hr tablet Take 1 tablet (10 mg) by mouth At Bedtime     polyethylene glycol (MIRALAX/GLYCOLAX) packet Take 17 g by mouth daily     vitamin D3 (CHOLECALCIFEROL) 2000 units tablet Take 1 tablet by mouth daily             Review of Systems:   CV: NEGATIVE for chest pain, palpitations or peripheral edema  C: NEGATIVE for fever, chills, change in weight  E/M: NEGATIVE for ear, mouth  and throat problems  R: NEGATIVE for significant cough or SOB          Physical Exam:   All vitals have been reviewed  Patient Vitals for the past 24 hrs:   BP Temp Temp src Pulse Resp SpO2 Height Weight   01/31/20 1325 -- -- -- -- -- 96 % -- --   01/31/20 1321 -- -- -- -- -- 95 % -- --   01/31/20 1320 (!) 194/89 97.1  F (36.2  C) Oral 66 20 (!) 87 % 1.524 m (5') 45.4 kg (100 lb)     No intake or output data in the 24 hours ending 01/31/20 1452    Patient resting comfortably in bed  Right leg internally rotated and shortened  Minimal erythema of the surrounding skin.   Medial thigh laceration covered  Bilateral calves are soft, non-tender.  Bilateral lower extremity is NVI.  Sensation intact bilateral lower extremities  5/5 motor with resisted dorsi and plantar flexion bilaterally  +Dp pulse            Data:   All laboratory data reviewed  Results for orders placed or performed during the hospital encounter of 01/31/20   XR Pelvis w Hip Right 1 View     Status: None (Preliminary result)    Narrative    PELVIS AND RIGHT HIP ONE VIEW  1/31/2020 2:04 PM     HISTORY: Fall, right hip pain.    COMPARISON: 7/25/2019.      Impression    IMPRESSION: Interval ORIF of left intertrochanteric/subtrochanteric  fracture with an intramedullary ilan and dynamic compression screw.  This fracture appears at least mostly healed. There has been interval  development of a right intertrochanteric fracture with mild varus  angulation. No other acute-appearing bony abnormalities.   XR Chest 1 View     Status: None (Preliminary result)    Narrative    CHEST ONE VIEW  1/31/2020 2:05 PM     HISTORY:  Trauma to right hip.    COMPARISON: Chest x-ray 7/25/2019.      Impression    IMPRESSION: Patchy subpleural opacities at the right costophrenic  angle again identified, nonspecific. Postoperative change of the right  lung is stable. No new airspace disease identified. Stable  cardiomegaly. No pneumothorax is identified.   CBC with platelets  differential     Status: Abnormal   Result Value Ref Range    WBC 7.0 4.0 - 11.0 10e9/L    RBC Count 3.68 (L) 3.8 - 5.2 10e12/L    Hemoglobin 12.1 11.7 - 15.7 g/dL    Hematocrit 37.3 35.0 - 47.0 %     (H) 78 - 100 fl    MCH 32.9 26.5 - 33.0 pg    MCHC 32.4 31.5 - 36.5 g/dL    RDW 13.3 10.0 - 15.0 %    Platelet Count 265 150 - 450 10e9/L    Diff Method Automated Method     % Neutrophils 71.2 %    % Lymphocytes 16.6 %    % Monocytes 9.5 %    % Eosinophils 2.3 %    % Basophils 0.1 %    % Immature Granulocytes 0.3 %    Absolute Neutrophil 5.0 1.6 - 8.3 10e9/L    Absolute Lymphocytes 1.2 0.8 - 5.3 10e9/L    Absolute Monocytes 0.7 0.0 - 1.3 10e9/L    Absolute Eosinophils 0.2 0.0 - 0.7 10e9/L    Absolute Basophils 0.0 0.0 - 0.2 10e9/L    Abs Immature Granulocytes 0.0 0 - 0.4 10e9/L   Basic metabolic panel     Status: Abnormal   Result Value Ref Range    Sodium 138 133 - 144 mmol/L    Potassium 4.1 3.4 - 5.3 mmol/L    Chloride 105 94 - 109 mmol/L    Carbon Dioxide 27 20 - 32 mmol/L    Anion Gap 6 3 - 14 mmol/L    Glucose 110 (H) 70 - 99 mg/dL    Urea Nitrogen 9 7 - 30 mg/dL    Creatinine 0.57 0.52 - 1.04 mg/dL    GFR Estimate 78 >60 mL/min/[1.73_m2]    GFR Estimate If Black >90 >60 mL/min/[1.73_m2]    Calcium 9.0 8.5 - 10.1 mg/dL   EKG 12-lead, tracing only     Status: None   Result Value Ref Range    Interpretation ECG Click View Image link to view waveform and result           Attestation:  I have reviewed today's vital signs, notes, medications, labs and imaging with Dr. Casper.  Amount of time performed on this consult: 20 minutes.    Tonja Hall PA-C

## 2020-01-31 NOTE — ED PROVIDER NOTES
History     Chief Complaint:  Fall      HPI   Gennaro Walton is a 89 year old female with past medical history of cognitive impairment, osteoporosis, and breast cancer who presents to the emergency department for evaluation of fall. Per EMS and patient report the patient was at her residence when she had a mechanical fall causing her to injure her right hip. She did have a right hip replacement a while ago. Due to this fall EMS was called. En route she was stable, and given 2 mg dilaudid. She denies headache, neck pain, and chest pain.       Allergies:  No Known Drug Allergies      Medications:    Norvasc  Aspirin 325 mg   Colace  Levothyroxine    Cozaar    Past Medical History:    Breast cancer  CKD  Cognitive impairment  Hypertension  Hypothyroid  Osteoporosis    Past Surgical History:    Left mastectomy  Cataract surgery  Open reduction internal fixation femur midshaft    Family History:    History reviewed. No pertinent family history.      Social History:  The patient was accompanied to the ED by EMS.  Smoking Status: Never Smoker  Smokeless Tobacco: Never Used  Alcohol Use: No   Marital Status:   [5]     Review of Systems   Cardiovascular: Negative for chest pain.   Musculoskeletal: Positive for arthralgias (right hip). Negative for neck pain.   Neurological: Negative for headaches.   All other systems reviewed and are negative.      Physical Exam   First Vitals:  BP: (!) 194/89  Pulse: 66  Temp: 97.1  F (36.2  C)  Resp: 20  Height: 152.4 cm (5')  Weight: 45.4 kg (100 lb)  SpO2: (!) 87 %      Physical Exam  SKIN: No ecchymoses of the right lower extremity.  Normal tactile temperature of the distal right lower extremity.  HEMATOLOGIC/IMMUNOLOGIC/LYMPHATIC:  No pallor in general or focally of the right lower extremity.  HENT:  No facial or scalp trauma.  EYES: Normal conjunctiva.  CARDIOVASCULAR:  Normal rate and a regular rhythm.  Palpable equal pedal pulses.  RESPIRATORY:  No respiratory distress,  breath sounds equal and normal.  GASTROINTESTINAL:  Soft nontender abdomen.  MUSCULOSKELETAL:  Any attempted range of motion of the right lower extremity exacerbates right hip pain  NEUROLOGIC:  Alert, conversant, GCS 15.  No gross motor or sensory deficit of the right lower extremity.  PSYCHIATRIC:  Normal mood.    Emergency Department Course     ECG:  Indication: fall  Completed at 1341.  Read at 1345.   Normal sinus rhythm. Left axis deviation. Left ventricular hypertrophy with QRS widening and repolarization abnormality. Abnormal ECG.   Rate 69 bpm. IL interval 162. QRS duration 124. QT/QTc 400/428. P-R-T axes 41 -40 91.     Imaging:  Radiology findings were communicated with the patient who voiced understanding of the findings.    XR Pelvis with Hip Right 1 View:  IMPRESSION: Interval ORIF of left intertrochanteric/subtrochanteric  fracture with an intramedullary ilan and dynamic compression screw.  This fracture appears at least mostly healed. There has been interval  development of a right intertrochanteric fracture with mild varus  angulation. No other acute-appearing bony abnormalities.  reading per radiology.      Chest X-Ray. 2 Views:   IMPRESSION: Patchy subpleural opacities at the right costophrenic  angle again identified, nonspecific. Postoperative change of the right  lung is stable. No new airspace disease identified. Stable  cardiomegaly. No pneumothorax is identified.  reading per radiology.      Laboratory:  Laboratory findings were communicated with the patient who voiced understanding of the findings.    CBC: WBC 7.0, HGB 12.1,    BMP: Glucose 110 (H) o/w WNL (Creatinine 0.57)     Emergency Department Course:  Nursing notes and vitals reviewed.  1316: I performed an exam of the patient as documented above.    IV was inserted and blood was drawn for laboratory testing, results above.    The patient was sent for a XR Pelvis while in the emergency department, results above.        1412 Patient rechecked and updated.      1433 I spoke with Tonja Mendez of the Orthopedics service regarding patient's presentation, findings, and plan of care.     Findings and plan explained to the Patient who consents to admission. Discussed the patient with Dr. Verdugo, who will admit the patient to a  bed for further monitoring, evaluation, and treatment.   I personally reviewed the laboratory and imaging results with the Patient and answered all related questions prior to  admission.   Impression & Plan      Medical Decision Making:  Gennaro Walton who presents after a fall from a standing height. She tripped. Unfortunately resulted in a right hip fracture. Other screening testing was reassuring. I consulted Tonja BELLO with Orthopedics, and they plan on surgery tomorrow.  Admitted to the hospitalist service.    Diagnosis:    ICD-10-CM    1. Hip fracture, right, closed, initial encounter (H) S72.001A Basic metabolic panel   2. Fall, initial encounter W19.XXXA        Disposition:  Admitted under the supervision of Dr. Verdugo     Scribe Disclosure:  I, Elsa Hancock, am serving as a scribe at 1:18 PM on 1/31/2020 to document services personally performed by Garland Moreno MD based on my observations and the provider's statements to me.    Elsa Hancock  1/31/2020    EMERGENCY DEPARTMENT       Garland Moreno MD  01/31/20 1068

## 2020-01-31 NOTE — H&P
Admitted:     01/31/2020      PRIMARY CARE PHYSICIAN:  Mady Hagen MD       CHIEF COMPLAINT:  Hip pain after a fall.      HISTORY OF PRESENT ILLNESS:  Gennaro Walton is a very pleasant 89-year-old female with a past medical history significant for hypertension, hypothyroidism, history of falls and cognitive impairment, who presents to the emergency room today after a fall which resulted in right hip pain.  History is obtained per discussions with the patient's daughter who is at bedside.      The patient was recently hospitalized here at [facility] earlier this month from 01/07-01/09/2020 for management of acute right shoulder pain after a mechanical fall.  No acute injury was identified as imaging.  Her pain was controlled with Tylenol and Lidoderm patch and she was ultimately discharged back to her assisted living facility with home PT.  Earlier today, her daughter states that she underwent evaluation with an outpatient MRI to ensure she had no other injuries contributing to her pain.  She noted she had isolated episode of some loose stools this morning.  She brought her back home following her MRI and she had planned to go down to the dining room to eat some lunch.  It sounds as though during this time, she sustained a fall.  It was presumably mechanical in nature.  She does have noted history of falls which have resulted in previous injuries including a traumatic subarachnoid hemorrhage and traumatic left hip fracture, both of which occurred in 07/2019.  She typically ambulates with a walker.  Ultimately after her fall, she endorsed some right-sided discomfort.  She was brought to the emergency room for further evaluation.      In the emergency room, she was seen and evaluated by Dr. Moreno.  She was afebrile and hemodynamically stable.  She was hypertensive, but otherwise stable appearing.  She was given 2 mg of IV Dilaudid en route and was sleepy during her visit in the ER.  X-ray was obtained  and showed a right intertrochanteric fracture with mild varus angulation.  Orthopedic Service was contacted and anticipated to go to the OR tomorrow for operative repair per Dr. Casper.  In the meantime, her pain is being managed with IV Dilaudid.      When I saw her, she was sleeping quietly.  She did not appear to be in any discomfort.  Her daughter relays she has otherwise been in her usual state of health with no recent concerns.      PAST MEDICAL HISTORY:   1.  History of breast cancer.     2.  Reported history of stage III CKD.     3.  Cognitive impairment.   4.  Hypertension.   5.  Hypothyroidism.   6.  Osteoporosis.   7.  History of fall which resulted in traumatic subarachnoid hemorrhage and a left hip fracture,  both in 07/2019.      PAST SURGICAL HISTORY:  Left mastectomy for management of breast cancer ORIF in 07/2019 for management of left hip fracture, cataract surgery.      FAMILY HISTORY:  Reviewed and noncontributory to current presentation.      SOCIAL HISTORY:  She is a lifelong nonsmoker.  She does not consume alcohol.  She resides in an assisted living facility and ambulates with use of a walker.      HOME MEDICATIONS:  Prior to Admission medications    Medication Sig Last Dose Taking? Auth Provider   acetaminophen (TYLENOL) 500 MG tablet Take 1,000 mg by mouth every 6 hours as needed for pain prn Yes Unknown, Entered By History   acetaminophen (TYLENOL) 500 MG tablet Take 1,000 mg by mouth 2 times daily 1/31/2020 at am Yes Unknown, Entered By History   amLODIPine (NORVASC) 5 MG tablet Take 2.5 mg by mouth daily 1/31/2020 at am Yes Unknown, Entered By History   aspirin (ASA) 325 MG EC tablet Take 325 mg by mouth daily as needed for pain prn Yes Unknown, Entered By History   calcium carbonate (TUMS) 500 MG chewable tablet Take 1 chew tab by mouth daily  1/31/2020 at am Yes Reported, Patient   docusate sodium (COLACE) 100 MG capsule Take 100 mg by mouth 2 times daily as needed for constipation prn  Yes Unknown, Entered By History   levothyroxine (SYNTHROID/LEVOTHROID) 88 MCG tablet Take 88 mcg by mouth daily 1/31/2020 Yes Unknown, Entered By History   Lidocaine (LIDOCARE) 4 % Patch Place 1 patch onto the skin every 24 hours To prevent lidocaine toxicity, patient should be patch free for 12 hrs daily.  Apply to left shoulder 1/31/2020 at on at 07:30 am Yes Unknown, Entered By History   lidocaine (LMX4) 4 % external cream Apply topically 2 times daily as needed for pain (Apply to affected areas) prn Yes Unknown, Entered By History   losartan (COZAAR) 50 MG tablet Take 50 mg by mouth daily 1/31/2020 at am Yes Unknown, Entered By History   oxybutynin ER (DITROPAN-XL) 10 MG 24 hr tablet Take 1 tablet (10 mg) by mouth At Bedtime 1/30/2020 at pm Yes Fei Marshall MD   polyethylene glycol (MIRALAX/GLYCOLAX) packet Take 17 g by mouth daily 1/31/2020 at am Yes Savage Menjivar APRN CNP   vitamin D3 (CHOLECALCIFEROL) 2000 units tablet Take 1 tablet by mouth daily 1/31/2020 at am Yes Unknown, Entered By History                  ALLERGIES:  SULFA DRUGS CAUSE HIVES.      REVIEW OF SYSTEMS:  A full 12-point review of systems was discussed with the patient's daughter and negative unless otherwise and was negative unless otherwise stated in HPI.      PHYSICAL EXAMINATION:   VITAL SIGNS:  Temperature 97.1, pulse 77, respirations 20, blood pressure 176/89, O2 sat 95% on 2 liters nasal cannula.   GENERAL:  The patient is a frail, elderly-appearing female lying quietly on the [facility].  She does occasionally open her eyes and look at me and smile, but does not participate any further on my visit.  She is in no acute distress.   HEENT:  Pupils equal, round and reactive to light.  Extraocular movements intact.  Mucous membranes are moist.   CARDIOVASCULAR:  Heart rhythm regular, no murmurs, gallops, rubs.  Pulses are +2 and symmetric in bilateral upper extremities.  There is no extremity edema.   RESPIRATORY:  Lungs are clear to  auscultation bilaterally, free of rales or rhonchi, no increased work of breathing or accessory muscle use.   ABDOMEN:  Soft, nontender, nondistended, positive bowel sounds throughout.   INTEGUMENTARY:  Warm and dry, no rashes, jaundice or ecchymosis.      LABORATORY DATA AND IMAGING:    BMP shows sodium of 138, potassium 4.1, creatinine 0.57, glucose of 110, calcium 9.0.  CBC showed white count 7.0, hemoglobin 12.1, platelet count 265.      EKG showed normal sinus rhythm with no acute ischemic changes and findings of left ventricular hypertrophy.      X-ray of the pelvis and right hip showed right intertrochanteric fracture with mild varus angulation and no other acute bony abnormalities.  Her previous left intertrochanteric-subtrochanteric fracture was mostly healed.      A chest x-ray showed some patchy subpleural opacities at the right costophrenic angle which were nonspecific.  Otherwise, no acute pathology was identified.      ASSESSMENT AND PLAN:  Gennaro Walton is a 89-year-old female with past medical history significant for mild cognitive impairment, hypertension, hypothyroidism, and history of falls as well as some recent shoulder pain, who presents to the emergency room for evaluation after a fall which resulted in right hip pain.  She was found to have a hip fracture.  She came to the hospital today for ongoing evaluation and care.   1.  Right intertrochanteric hip fracture:  Sustained after what sounds to be a mechanical fall.  She has been evaluated by ortho and plans are for her to undergo surgical repair with an IM nail per Dr. Casper tomorrow.  For now, will attempt pain management with IV Dilaudid as needed.  Her daughter notes that after previous hip fracture, she was prescribed tramadol, but this seemed to exacerbate her confusion and requested that this not be administered.  She had previously symptoms with Tylenol alone.  Will have to minimize narcotic use to avoid precipitating delirium or  worsening confusion.  In regards to her preoperative risk assessment, she is felt to be an intermediate candidate for an intermediate risk procedure.  No reports of chest discomfort or other symptoms concerning for angina, thus no additional workup and evaluation needed prior to proceeding with surgery tomorrow morning.  She will be made n.p.o. at midnight.  Will continue gentle IV fluids to ensure hydration with NS at 75 mL an hour.   2.  Hypertension:  Blood pressures are elevated today, but suspect this is secondary in the setting of pain with her hip fracture.  She will be continued on her amlodipine and her losartan with holding parameters.   3.  Hypothyroidism:  Chronic and stable on levothyroxine.  This can be continued.   4.  Shoulder pain secondary to injury:  She underwent an evaluation with an MRI earlier today per her PCP's recommendations.  I am not able to access those results.  For now, continue symptomatic management with Lidoderm patch as previously prescribed.     Deep venous thrombosis prophylaxis:  PCDs only for now, given plans for surgery tomorrow.  Otherwise, will defer to Orthopedic Service for postoperative recommendations.      CODE STATUS:  DNR/DNI, as confirmed on POLST provided from her assisted living facility as well as her daughter at bedside.         SAMAN LOWE DO             D: 2020   T: 2020   MT: JERSON      Name:     MEME NAVARRO   MRN:      [id number]        Account:      [id number]   :      1923        Admitted:     2020                   Document: [id number]       cc: Mady Hagen MD

## 2020-01-31 NOTE — ED NOTES
Bed: ED05  Expected date:   Expected time:   Means of arrival:   Comments:  411  89 F R hip pain/fall  1303

## 2020-01-31 NOTE — PLAN OF CARE
Laceration on the left inner leg, that was not noted in the ED. Wound cleaned and dressed. Ostomy nurse consulted and primary RN notified.

## 2020-01-31 NOTE — PROGRESS NOTES
RECEIVING UNIT ED HANDOFF REVIEW    ED Nurse Handoff Report was reviewed by: Tatyana Wilkerson RN on January 31, 2020 at 3:59 PM

## 2020-01-31 NOTE — PHARMACY-ADMISSION MEDICATION HISTORY
Pharmacy Medication History  Admission medication history interview status for the 1/31/2020  admission is complete. See EPIC admission navigator for prior to admission medications     Medication history sources: med list provided by [facility] ([phone])  Medication history source reliability: Good  Adherence assessment: Good    Significant changes made to the medication list:  --  Changed Lidocaine patch from 2 to 1 q24h.      Additional medication history information:   ---  Called nurse at [facility] for some of the medications (we only received the first page).  Clarified that pt is no longer using lidocaine patch on her hip, only shoulder.    Medication reconciliation completed by provider prior to medication history? No    Time spent in this activity: 20 minutes      Prior to Admission medications    Medication Sig Last Dose Taking? Auth Provider   acetaminophen (TYLENOL) 500 MG tablet Take 1,000 mg by mouth every 6 hours as needed for pain prn Yes Unknown, Entered By History   acetaminophen (TYLENOL) 500 MG tablet Take 1,000 mg by mouth 2 times daily 1/31/2020 at am Yes Unknown, Entered By History   amLODIPine (NORVASC) 5 MG tablet Take 2.5 mg by mouth daily 1/31/2020 at am Yes Unknown, Entered By History   aspirin (ASA) 325 MG EC tablet Take 325 mg by mouth daily as needed for pain prn Yes Unknown, Entered By History   calcium carbonate (TUMS) 500 MG chewable tablet Take 1 chew tab by mouth daily  1/31/2020 at am Yes Reported, Patient   docusate sodium (COLACE) 100 MG capsule Take 100 mg by mouth 2 times daily as needed for constipation prn Yes Unknown, Entered By History   levothyroxine (SYNTHROID/LEVOTHROID) 88 MCG tablet Take 88 mcg by mouth daily 1/31/2020 Yes Unknown, Entered By History   Lidocaine (LIDOCARE) 4 % Patch Place 1 patch onto the skin every 24 hours To prevent lidocaine toxicity, patient should be patch free for 12 hrs daily.  Apply to left shoulder 1/31/2020 at on at  07:30 am Yes Unknown, Entered By History   lidocaine (LMX4) 4 % external cream Apply topically 2 times daily as needed for pain (Apply to affected areas) prn Yes Unknown, Entered By History   losartan (COZAAR) 50 MG tablet Take 50 mg by mouth daily 1/31/2020 at am Yes Unknown, Entered By History   oxybutynin ER (DITROPAN-XL) 10 MG 24 hr tablet Take 1 tablet (10 mg) by mouth At Bedtime 1/30/2020 at pm Yes Fei Marshall MD   polyethylene glycol (MIRALAX/GLYCOLAX) packet Take 17 g by mouth daily 1/31/2020 at am Yes Savage Menjivar APRN CNP   vitamin D3 (CHOLECALCIFEROL) 2000 units tablet Take 1 tablet by mouth daily 1/31/2020 at am Yes Unknown, Entered By History

## 2020-01-31 NOTE — ED NOTES
[facility]  ED Nurse Handoff Report    ED Chief complaint: Fall      ED Diagnosis:   Final diagnoses:   Hip fracture, right, closed, initial encounter (H)   Fall, initial encounter       Code Status: DNR / DNI in the past    Allergies:   Allergies   Allergen Reactions     Sulfacetamide Hives       Patient Story: Patient fell onto right hip, significant pain.   Focused Assessment:  CT shows hip fracture  Neuro: Hard of hearing, alert and oriented   Cards: WDL   Pulm: WDL     Treatments and/or interventions provided: Pain medications   Patient's response to treatments and/or interventions: improved pain level     To be done/followed up on inpatient unit:  None    Does this patient have any cognitive concerns?: none    Activity level - Baseline/Home:  Independent  Activity Level - Current:   Total Care    Patient's Preferred language: English   Needed?: No    Isolation: None  Infection: Not Applicable  Bariatric?: No    Vital Signs:   Vitals:    01/31/20 1320 01/31/20 1321 01/31/20 1325   BP: (!) 194/89     Pulse: 66     Resp: 20     Temp: 97.1  F (36.2  C)     TempSrc: Oral     SpO2: (!) 87% 95% 96%   Weight: 45.4 kg (100 lb)     Height: 1.524 m (5')         Cardiac Rhythm:     Was the PSS-3 completed:   Yes  What interventions are required if any?               Family Comments: Daughter at bedside  OBS brochure/video discussed/provided to patient/family: N/A              Name of person given brochure if not patient:               Relationship to patient:     For the majority of the shift this patient's behavior was Green.   Behavioral interventions performed were none.    ED NURSE PHONE NUMBER: Delia RN2 [phone]

## 2020-02-01 ENCOUNTER — ANESTHESIA (OUTPATIENT)
Dept: SURGERY | Facility: CLINIC | Age: 85
DRG: 481 | End: 2020-02-01
Payer: MEDICARE

## 2020-02-01 ENCOUNTER — ANESTHESIA EVENT (OUTPATIENT)
Dept: SURGERY | Facility: CLINIC | Age: 85
DRG: 481 | End: 2020-02-01
Payer: MEDICARE

## 2020-02-01 ENCOUNTER — APPOINTMENT (OUTPATIENT)
Dept: GENERAL RADIOLOGY | Facility: CLINIC | Age: 85
DRG: 481 | End: 2020-02-01
Attending: INTERNAL MEDICINE
Payer: MEDICARE

## 2020-02-01 PROBLEM — S72.009A HIP FRACTURE REQUIRING OPERATIVE REPAIR (H): Status: ACTIVE | Noted: 2020-02-01

## 2020-02-01 LAB
ALBUMIN UR-MCNC: 30 MG/DL
ANION GAP SERPL CALCULATED.3IONS-SCNC: 2 MMOL/L (ref 3–14)
APPEARANCE UR: ABNORMAL
BILIRUB UR QL STRIP: NEGATIVE
BUN SERPL-MCNC: 12 MG/DL (ref 7–30)
CALCIUM SERPL-MCNC: 8.3 MG/DL (ref 8.5–10.1)
CHLORIDE SERPL-SCNC: 106 MMOL/L (ref 94–109)
CO2 SERPL-SCNC: 29 MMOL/L (ref 20–32)
COLOR UR AUTO: YELLOW
CREAT SERPL-MCNC: 0.48 MG/DL (ref 0.52–1.04)
ERYTHROCYTE [DISTWIDTH] IN BLOOD BY AUTOMATED COUNT: 13.3 % (ref 10–15)
GFR SERPL CREATININE-BSD FRML MDRD: 82 ML/MIN/{1.73_M2}
GLUCOSE SERPL-MCNC: 99 MG/DL (ref 70–99)
GLUCOSE UR STRIP-MCNC: NEGATIVE MG/DL
HCT VFR BLD AUTO: 29.9 % (ref 35–47)
HGB BLD-MCNC: 9.8 G/DL (ref 11.7–15.7)
HGB UR QL STRIP: ABNORMAL
KETONES UR STRIP-MCNC: 5 MG/DL
LEUKOCYTE ESTERASE UR QL STRIP: ABNORMAL
MCH RBC QN AUTO: 33.3 PG (ref 26.5–33)
MCHC RBC AUTO-ENTMCNC: 32.8 G/DL (ref 31.5–36.5)
MCV RBC AUTO: 102 FL (ref 78–100)
MUCOUS THREADS #/AREA URNS LPF: PRESENT /LPF
NITRATE UR QL: NEGATIVE
PH UR STRIP: 5.5 PH (ref 5–7)
PLATELET # BLD AUTO: 203 10E9/L (ref 150–450)
POTASSIUM SERPL-SCNC: 3.8 MMOL/L (ref 3.4–5.3)
RBC # BLD AUTO: 2.94 10E12/L (ref 3.8–5.2)
RBC #/AREA URNS AUTO: 99 /HPF (ref 0–2)
SODIUM SERPL-SCNC: 137 MMOL/L (ref 133–144)
SOURCE: ABNORMAL
SP GR UR STRIP: 1.02 (ref 1–1.03)
UROBILINOGEN UR STRIP-MCNC: NORMAL MG/DL (ref 0–2)
WBC # BLD AUTO: 7.4 10E9/L (ref 4–11)
WBC #/AREA URNS AUTO: 114 /HPF (ref 0–5)

## 2020-02-01 PROCEDURE — C1713 ANCHOR/SCREW BN/BN,TIS/BN: HCPCS | Performed by: ORTHOPAEDIC SURGERY

## 2020-02-01 PROCEDURE — 81001 URINALYSIS AUTO W/SCOPE: CPT | Performed by: INTERNAL MEDICINE

## 2020-02-01 PROCEDURE — 80048 BASIC METABOLIC PNL TOTAL CA: CPT | Performed by: INTERNAL MEDICINE

## 2020-02-01 PROCEDURE — 85027 COMPLETE CBC AUTOMATED: CPT | Performed by: INTERNAL MEDICINE

## 2020-02-01 PROCEDURE — 71000014 ZZH RECOVERY PHASE 1 LEVEL 2 FIRST HR: Performed by: ORTHOPAEDIC SURGERY

## 2020-02-01 PROCEDURE — 25800030 ZZH RX IP 258 OP 636: Performed by: NURSE ANESTHETIST, CERTIFIED REGISTERED

## 2020-02-01 PROCEDURE — 25800030 ZZH RX IP 258 OP 636: Performed by: ORTHOPAEDIC SURGERY

## 2020-02-01 PROCEDURE — 12000047 ZZH R&B IMCU

## 2020-02-01 PROCEDURE — 40000281 ZZH STATISTIC TRANSPORT TIME EA 15 MIN

## 2020-02-01 PROCEDURE — 99207 ZZC MOONLIGHTING INDICATOR: CPT | Performed by: INTERNAL MEDICINE

## 2020-02-01 PROCEDURE — 71000015 ZZH RECOVERY PHASE 1 LEVEL 2 EA ADDTL HR: Performed by: ORTHOPAEDIC SURGERY

## 2020-02-01 PROCEDURE — 40000170 ZZH STATISTIC PRE-PROCEDURE ASSESSMENT II: Performed by: ORTHOPAEDIC SURGERY

## 2020-02-01 PROCEDURE — 25000125 ZZHC RX 250: Performed by: NURSE ANESTHETIST, CERTIFIED REGISTERED

## 2020-02-01 PROCEDURE — 25000566 ZZH SEVOFLURANE, EA 15 MIN: Performed by: ORTHOPAEDIC SURGERY

## 2020-02-01 PROCEDURE — 40000275 ZZH STATISTIC RCP TIME EA 10 MIN

## 2020-02-01 PROCEDURE — 25000128 H RX IP 250 OP 636: Performed by: NURSE ANESTHETIST, CERTIFIED REGISTERED

## 2020-02-01 PROCEDURE — 94660 CPAP INITIATION&MGMT: CPT

## 2020-02-01 PROCEDURE — 36415 COLL VENOUS BLD VENIPUNCTURE: CPT | Performed by: INTERNAL MEDICINE

## 2020-02-01 PROCEDURE — 27210794 ZZH OR GENERAL SUPPLY STERILE: Performed by: ORTHOPAEDIC SURGERY

## 2020-02-01 PROCEDURE — 25000132 ZZH RX MED GY IP 250 OP 250 PS 637: Mod: GY | Performed by: INTERNAL MEDICINE

## 2020-02-01 PROCEDURE — 99233 SBSQ HOSP IP/OBS HIGH 50: CPT | Performed by: INTERNAL MEDICINE

## 2020-02-01 PROCEDURE — C1769 GUIDE WIRE: HCPCS | Performed by: ORTHOPAEDIC SURGERY

## 2020-02-01 PROCEDURE — 36000063 ZZH SURGERY LEVEL 4 EA 15 ADDTL MIN: Performed by: ORTHOPAEDIC SURGERY

## 2020-02-01 PROCEDURE — 25000128 H RX IP 250 OP 636: Performed by: INTERNAL MEDICINE

## 2020-02-01 PROCEDURE — 37000009 ZZH ANESTHESIA TECHNICAL FEE, EACH ADDTL 15 MIN: Performed by: ORTHOPAEDIC SURGERY

## 2020-02-01 PROCEDURE — 40000277 XR SURGERY CARM FLUORO LESS THAN 5 MIN W STILLS

## 2020-02-01 PROCEDURE — 37000008 ZZH ANESTHESIA TECHNICAL FEE, 1ST 30 MIN: Performed by: ORTHOPAEDIC SURGERY

## 2020-02-01 PROCEDURE — 25800030 ZZH RX IP 258 OP 636: Performed by: ANESTHESIOLOGY

## 2020-02-01 PROCEDURE — 0QS636Z REPOSITION RIGHT UPPER FEMUR WITH INTRAMEDULLARY INTERNAL FIXATION DEVICE, PERCUTANEOUS APPROACH: ICD-10-PCS | Performed by: ORTHOPAEDIC SURGERY

## 2020-02-01 PROCEDURE — 36000065 ZZH SURGERY LEVEL 4 W FLUORO 1ST 30 MIN: Performed by: ORTHOPAEDIC SURGERY

## 2020-02-01 PROCEDURE — 25000128 H RX IP 250 OP 636: Performed by: ORTHOPAEDIC SURGERY

## 2020-02-01 PROCEDURE — 99207 ZZC NON-BILLABLE SERV PER CHARTING: CPT | Performed by: PHYSICIAN ASSISTANT

## 2020-02-01 PROCEDURE — 25000132 ZZH RX MED GY IP 250 OP 250 PS 637: Mod: GY | Performed by: ORTHOPAEDIC SURGERY

## 2020-02-01 PROCEDURE — 25000128 H RX IP 250 OP 636: Performed by: PHYSICIAN ASSISTANT

## 2020-02-01 PROCEDURE — 87086 URINE CULTURE/COLONY COUNT: CPT | Performed by: ORTHOPAEDIC SURGERY

## 2020-02-01 PROCEDURE — 25000125 ZZHC RX 250: Performed by: ORTHOPAEDIC SURGERY

## 2020-02-01 DEVICE — IMP NAIL SYN CAN FEM PROX TFNA 11X170MM 125D 04.037.112S: Type: IMPLANTABLE DEVICE | Site: FEMUR | Status: FUNCTIONAL

## 2020-02-01 DEVICE — IMP SCR SYN TFNA FENESTRATED LAG 85MM 04.038.185S: Type: IMPLANTABLE DEVICE | Site: FEMUR | Status: FUNCTIONAL

## 2020-02-01 DEVICE — IMP SCR SYN 5.0 TI LOCK T25 STARDRIVE 34MM 04.005.524S: Type: IMPLANTABLE DEVICE | Site: FEMUR | Status: FUNCTIONAL

## 2020-02-01 RX ORDER — DEXAMETHASONE SODIUM PHOSPHATE 4 MG/ML
INJECTION, SOLUTION INTRA-ARTICULAR; INTRALESIONAL; INTRAMUSCULAR; INTRAVENOUS; SOFT TISSUE PRN
Status: DISCONTINUED | OUTPATIENT
Start: 2020-02-01 | End: 2020-02-01

## 2020-02-01 RX ORDER — BUPIVACAINE HYDROCHLORIDE AND EPINEPHRINE 2.5; 5 MG/ML; UG/ML
INJECTION, SOLUTION INFILTRATION; PERINEURAL PRN
Status: DISCONTINUED | OUTPATIENT
Start: 2020-02-01 | End: 2020-02-01 | Stop reason: HOSPADM

## 2020-02-01 RX ORDER — GLYCOPYRROLATE 0.2 MG/ML
INJECTION, SOLUTION INTRAMUSCULAR; INTRAVENOUS PRN
Status: DISCONTINUED | OUTPATIENT
Start: 2020-02-01 | End: 2020-02-01

## 2020-02-01 RX ORDER — MAGNESIUM HYDROXIDE 1200 MG/15ML
LIQUID ORAL PRN
Status: DISCONTINUED | OUTPATIENT
Start: 2020-02-01 | End: 2020-02-01 | Stop reason: HOSPADM

## 2020-02-01 RX ORDER — HYDROMORPHONE HYDROCHLORIDE 1 MG/ML
0.2 INJECTION, SOLUTION INTRAMUSCULAR; INTRAVENOUS; SUBCUTANEOUS
Status: DISCONTINUED | OUTPATIENT
Start: 2020-02-01 | End: 2020-02-05 | Stop reason: HOSPADM

## 2020-02-01 RX ORDER — ONDANSETRON 2 MG/ML
4 INJECTION INTRAMUSCULAR; INTRAVENOUS EVERY 30 MIN PRN
Status: DISCONTINUED | OUTPATIENT
Start: 2020-02-01 | End: 2020-02-01 | Stop reason: HOSPADM

## 2020-02-01 RX ORDER — ONDANSETRON 4 MG/1
4 TABLET, ORALLY DISINTEGRATING ORAL EVERY 30 MIN PRN
Status: DISCONTINUED | OUTPATIENT
Start: 2020-02-01 | End: 2020-02-01 | Stop reason: HOSPADM

## 2020-02-01 RX ORDER — FENTANYL CITRATE 50 UG/ML
INJECTION, SOLUTION INTRAMUSCULAR; INTRAVENOUS PRN
Status: DISCONTINUED | OUTPATIENT
Start: 2020-02-01 | End: 2020-02-01

## 2020-02-01 RX ORDER — DIPHENHYDRAMINE HCL 12.5MG/5ML
12.5 LIQUID (ML) ORAL EVERY 6 HOURS PRN
Status: DISCONTINUED | OUTPATIENT
Start: 2020-02-01 | End: 2020-02-05 | Stop reason: HOSPADM

## 2020-02-01 RX ORDER — SODIUM CHLORIDE, SODIUM LACTATE, POTASSIUM CHLORIDE, CALCIUM CHLORIDE 600; 310; 30; 20 MG/100ML; MG/100ML; MG/100ML; MG/100ML
INJECTION, SOLUTION INTRAVENOUS CONTINUOUS
Status: DISCONTINUED | OUTPATIENT
Start: 2020-02-01 | End: 2020-02-03

## 2020-02-01 RX ORDER — HYDROMORPHONE HYDROCHLORIDE 1 MG/ML
.3-.5 INJECTION, SOLUTION INTRAMUSCULAR; INTRAVENOUS; SUBCUTANEOUS EVERY 5 MIN PRN
Status: DISCONTINUED | OUTPATIENT
Start: 2020-02-01 | End: 2020-02-01 | Stop reason: HOSPADM

## 2020-02-01 RX ORDER — FENTANYL CITRATE 50 UG/ML
25-50 INJECTION, SOLUTION INTRAMUSCULAR; INTRAVENOUS
Status: DISCONTINUED | OUTPATIENT
Start: 2020-02-01 | End: 2020-02-01 | Stop reason: HOSPADM

## 2020-02-01 RX ORDER — CEFAZOLIN SODIUM 1 G/3ML
1 INJECTION, POWDER, FOR SOLUTION INTRAMUSCULAR; INTRAVENOUS EVERY 8 HOURS
Status: COMPLETED | OUTPATIENT
Start: 2020-02-01 | End: 2020-02-02

## 2020-02-01 RX ORDER — EPHEDRINE SULFATE 50 MG/ML
INJECTION, SOLUTION INTRAMUSCULAR; INTRAVENOUS; SUBCUTANEOUS PRN
Status: DISCONTINUED | OUTPATIENT
Start: 2020-02-01 | End: 2020-02-01

## 2020-02-01 RX ORDER — AMOXICILLIN 250 MG
1 CAPSULE ORAL 2 TIMES DAILY
Status: DISCONTINUED | OUTPATIENT
Start: 2020-02-01 | End: 2020-02-05 | Stop reason: HOSPADM

## 2020-02-01 RX ORDER — PROCHLORPERAZINE MALEATE 5 MG
5 TABLET ORAL EVERY 6 HOURS PRN
Status: DISCONTINUED | OUTPATIENT
Start: 2020-02-01 | End: 2020-02-05 | Stop reason: HOSPADM

## 2020-02-01 RX ORDER — ONDANSETRON 2 MG/ML
INJECTION INTRAMUSCULAR; INTRAVENOUS PRN
Status: DISCONTINUED | OUTPATIENT
Start: 2020-02-01 | End: 2020-02-01

## 2020-02-01 RX ORDER — LIDOCAINE HYDROCHLORIDE 20 MG/ML
INJECTION, SOLUTION INFILTRATION; PERINEURAL PRN
Status: DISCONTINUED | OUTPATIENT
Start: 2020-02-01 | End: 2020-02-01

## 2020-02-01 RX ORDER — OXYCODONE HYDROCHLORIDE 5 MG/1
5 TABLET ORAL
Status: DISCONTINUED | OUTPATIENT
Start: 2020-02-01 | End: 2020-02-05 | Stop reason: HOSPADM

## 2020-02-01 RX ORDER — CEFAZOLIN SODIUM 2 G/100ML
2 INJECTION, SOLUTION INTRAVENOUS
Status: COMPLETED | OUTPATIENT
Start: 2020-02-01 | End: 2020-02-01

## 2020-02-01 RX ORDER — LIDOCAINE 40 MG/G
CREAM TOPICAL
Status: DISCONTINUED | OUTPATIENT
Start: 2020-02-01 | End: 2020-02-01 | Stop reason: HOSPADM

## 2020-02-01 RX ORDER — NITROGLYCERIN 0.4 MG/1
0.4 TABLET SUBLINGUAL EVERY 5 MIN PRN
Status: DISCONTINUED | OUTPATIENT
Start: 2020-02-01 | End: 2020-02-05 | Stop reason: HOSPADM

## 2020-02-01 RX ORDER — LIDOCAINE 40 MG/G
CREAM TOPICAL
Status: DISCONTINUED | OUTPATIENT
Start: 2020-02-01 | End: 2020-02-01

## 2020-02-01 RX ORDER — ONDANSETRON 2 MG/ML
4 INJECTION INTRAMUSCULAR; INTRAVENOUS EVERY 6 HOURS PRN
Status: DISCONTINUED | OUTPATIENT
Start: 2020-02-01 | End: 2020-02-05 | Stop reason: HOSPADM

## 2020-02-01 RX ORDER — DIPHENHYDRAMINE HYDROCHLORIDE 50 MG/ML
12.5 INJECTION INTRAMUSCULAR; INTRAVENOUS EVERY 6 HOURS PRN
Status: DISCONTINUED | OUTPATIENT
Start: 2020-02-01 | End: 2020-02-05 | Stop reason: HOSPADM

## 2020-02-01 RX ORDER — SODIUM CHLORIDE, SODIUM LACTATE, POTASSIUM CHLORIDE, CALCIUM CHLORIDE 600; 310; 30; 20 MG/100ML; MG/100ML; MG/100ML; MG/100ML
INJECTION, SOLUTION INTRAVENOUS CONTINUOUS
Status: DISCONTINUED | OUTPATIENT
Start: 2020-02-01 | End: 2020-02-01 | Stop reason: HOSPADM

## 2020-02-01 RX ORDER — SODIUM CHLORIDE, SODIUM LACTATE, POTASSIUM CHLORIDE, CALCIUM CHLORIDE 600; 310; 30; 20 MG/100ML; MG/100ML; MG/100ML; MG/100ML
INJECTION, SOLUTION INTRAVENOUS CONTINUOUS PRN
Status: DISCONTINUED | OUTPATIENT
Start: 2020-02-01 | End: 2020-02-01

## 2020-02-01 RX ORDER — AMOXICILLIN 250 MG
2 CAPSULE ORAL 2 TIMES DAILY
Status: DISCONTINUED | OUTPATIENT
Start: 2020-02-01 | End: 2020-02-05 | Stop reason: HOSPADM

## 2020-02-01 RX ORDER — PROPOFOL 10 MG/ML
INJECTION, EMULSION INTRAVENOUS PRN
Status: DISCONTINUED | OUTPATIENT
Start: 2020-02-01 | End: 2020-02-01

## 2020-02-01 RX ORDER — PROPOFOL 10 MG/ML
INJECTION, EMULSION INTRAVENOUS CONTINUOUS PRN
Status: DISCONTINUED | OUTPATIENT
Start: 2020-02-01 | End: 2020-02-01

## 2020-02-01 RX ORDER — NALOXONE HYDROCHLORIDE 0.4 MG/ML
.1-.4 INJECTION, SOLUTION INTRAMUSCULAR; INTRAVENOUS; SUBCUTANEOUS
Status: DISCONTINUED | OUTPATIENT
Start: 2020-02-01 | End: 2020-02-05 | Stop reason: HOSPADM

## 2020-02-01 RX ORDER — ONDANSETRON 4 MG/1
4 TABLET, ORALLY DISINTEGRATING ORAL EVERY 6 HOURS PRN
Status: DISCONTINUED | OUTPATIENT
Start: 2020-02-01 | End: 2020-02-05 | Stop reason: HOSPADM

## 2020-02-01 RX ORDER — ACETAMINOPHEN 325 MG/1
650 TABLET ORAL EVERY 4 HOURS PRN
Status: DISCONTINUED | OUTPATIENT
Start: 2020-02-04 | End: 2020-02-05 | Stop reason: HOSPADM

## 2020-02-01 RX ORDER — ACETAMINOPHEN 325 MG/1
975 TABLET ORAL EVERY 8 HOURS
Status: DISPENSED | OUTPATIENT
Start: 2020-02-01 | End: 2020-02-04

## 2020-02-01 RX ORDER — ACETAMINOPHEN 500 MG
1000 TABLET ORAL 2 TIMES DAILY
Status: DISCONTINUED | OUTPATIENT
Start: 2020-02-05 | End: 2020-02-05 | Stop reason: HOSPADM

## 2020-02-01 RX ORDER — NEOSTIGMINE METHYLSULFATE 1 MG/ML
VIAL (ML) INJECTION PRN
Status: DISCONTINUED | OUTPATIENT
Start: 2020-02-01 | End: 2020-02-01

## 2020-02-01 RX ORDER — CEFAZOLIN SODIUM 1 G/3ML
1 INJECTION, POWDER, FOR SOLUTION INTRAMUSCULAR; INTRAVENOUS SEE ADMIN INSTRUCTIONS
Status: DISCONTINUED | OUTPATIENT
Start: 2020-02-01 | End: 2020-02-01 | Stop reason: HOSPADM

## 2020-02-01 RX ADMIN — HYDROMORPHONE HYDROCHLORIDE 0.5 MG: 1 INJECTION, SOLUTION INTRAMUSCULAR; INTRAVENOUS; SUBCUTANEOUS at 08:23

## 2020-02-01 RX ADMIN — ONDANSETRON 4 MG: 2 INJECTION INTRAMUSCULAR; INTRAVENOUS at 12:52

## 2020-02-01 RX ADMIN — CEFAZOLIN SODIUM 2 G: 2 INJECTION, SOLUTION INTRAVENOUS at 12:19

## 2020-02-01 RX ADMIN — Medication 5 MG: at 12:51

## 2020-02-01 RX ADMIN — PHENYLEPHRINE HYDROCHLORIDE 100 MCG: 10 INJECTION INTRAVENOUS at 12:51

## 2020-02-01 RX ADMIN — GLYCOPYRROLATE 0.4 MG: 0.2 INJECTION, SOLUTION INTRAMUSCULAR; INTRAVENOUS at 13:07

## 2020-02-01 RX ADMIN — ACETAMINOPHEN 975 MG: 325 TABLET, FILM COATED ORAL at 23:34

## 2020-02-01 RX ADMIN — FENTANYL CITRATE 25 MCG: 50 INJECTION, SOLUTION INTRAMUSCULAR; INTRAVENOUS at 13:25

## 2020-02-01 RX ADMIN — HYDROMORPHONE HYDROCHLORIDE 0.5 MG: 1 INJECTION, SOLUTION INTRAMUSCULAR; INTRAVENOUS; SUBCUTANEOUS at 02:53

## 2020-02-01 RX ADMIN — FENTANYL CITRATE 25 MCG: 50 INJECTION, SOLUTION INTRAMUSCULAR; INTRAVENOUS at 13:32

## 2020-02-01 RX ADMIN — HYDROMORPHONE HYDROCHLORIDE 0.2 MG: 1 INJECTION, SOLUTION INTRAMUSCULAR; INTRAVENOUS; SUBCUTANEOUS at 18:43

## 2020-02-01 RX ADMIN — CEFAZOLIN 1 G: 1 INJECTION, POWDER, FOR SOLUTION INTRAMUSCULAR; INTRAVENOUS at 21:41

## 2020-02-01 RX ADMIN — NEOSTIGMINE METHYLSULFATE 2 MG: 1 INJECTION, SOLUTION INTRAVENOUS at 13:08

## 2020-02-01 RX ADMIN — ROCURONIUM BROMIDE 50 MG: 10 INJECTION INTRAVENOUS at 12:14

## 2020-02-01 RX ADMIN — LIDOCAINE HYDROCHLORIDE 50 MG: 20 INJECTION, SOLUTION INFILTRATION; PERINEURAL at 12:13

## 2020-02-01 RX ADMIN — AMLODIPINE BESYLATE 2.5 MG: 2.5 TABLET ORAL at 10:15

## 2020-02-01 RX ADMIN — PROPOFOL 60 MG: 10 INJECTION, EMULSION INTRAVENOUS at 12:13

## 2020-02-01 RX ADMIN — DEXAMETHASONE SODIUM PHOSPHATE 4 MG: 4 INJECTION, SOLUTION INTRA-ARTICULAR; INTRALESIONAL; INTRAMUSCULAR; INTRAVENOUS; SOFT TISSUE at 12:31

## 2020-02-01 RX ADMIN — PROPOFOL 25 MCG/KG/MIN: 10 INJECTION, EMULSION INTRAVENOUS at 12:21

## 2020-02-01 RX ADMIN — HYDROMORPHONE HYDROCHLORIDE 0.5 MG: 1 INJECTION, SOLUTION INTRAMUSCULAR; INTRAVENOUS; SUBCUTANEOUS at 10:15

## 2020-02-01 RX ADMIN — SODIUM CHLORIDE, POTASSIUM CHLORIDE, SODIUM LACTATE AND CALCIUM CHLORIDE: 600; 310; 30; 20 INJECTION, SOLUTION INTRAVENOUS at 16:12

## 2020-02-01 RX ADMIN — HYDROMORPHONE HYDROCHLORIDE 0.5 MG: 1 INJECTION, SOLUTION INTRAMUSCULAR; INTRAVENOUS; SUBCUTANEOUS at 06:11

## 2020-02-01 RX ADMIN — HYDROMORPHONE HYDROCHLORIDE 0.2 MG: 1 INJECTION, SOLUTION INTRAMUSCULAR; INTRAVENOUS; SUBCUTANEOUS at 23:34

## 2020-02-01 RX ADMIN — SODIUM CHLORIDE, POTASSIUM CHLORIDE, SODIUM LACTATE AND CALCIUM CHLORIDE: 600; 310; 30; 20 INJECTION, SOLUTION INTRAVENOUS at 11:54

## 2020-02-01 ASSESSMENT — MIFFLIN-ST. JEOR: SCORE: 737.8

## 2020-02-01 ASSESSMENT — ACTIVITIES OF DAILY LIVING (ADL)
ADLS_ACUITY_SCORE: 23
ADLS_ACUITY_SCORE: 26

## 2020-02-01 NOTE — ANESTHESIA PREPROCEDURE EVALUATION
Anesthesia Pre-Procedure Evaluation    Patient: Gennaro Walton   MRN: [id number] : 1923          Preoperative Diagnosis: LEFT INTROCANTERIC FEMUR FRACTURE    Procedure(s):  OPEN REDUCTION INTERNAL FIXATION LEFT FEMUR (SYNTHES) (IM NAILS, SHORT AND LONG NAILS - SYNTHES, FRACTURE TABLE OR KENDRICK TABLE AND C-ARM)    Past Medical History:   Diagnosis Date     Breast cancer (H)      CKD (chronic kidney disease) stage 3, GFR 30-59 ml/min (H)      Cognitive impairment      Hypertension      Hypothyroid      Osteoporosis      Past Surgical History:   Procedure Laterality Date     ------------OTHER-------------      Left mastectomy     ------------OTHER-------------      Cataract surgery     OPEN REDUCTION INTERNAL FIXATION FEMUR MIDSHAFT Left 2019    Procedure: LEFT INTERTROCHANTERIC FEMUR FRACTURE OPEN REDUCTION AND INTERNAL FIXATION;  Surgeon: Chris Oseguera MD;  Location: [facility] OR     Allergies   Allergen Reactions     Sulfacetamide Hives     Social History     Tobacco Use     Smoking status: Never Smoker     Smokeless tobacco: Never Used   Substance Use Topics     Alcohol use: No     Frequency: Never     Prior to Admission medications    Medication Sig Start Date End Date Taking? Authorizing Provider   acetaminophen (TYLENOL) 325 MG tablet Take 650 mg by mouth every 6 hours as needed for mild pain   Yes Unknown, Entered By History   amLODIPine (NORVASC) 5 MG tablet Take 2.5 mg by mouth daily   Yes Unknown, Entered By History   aspirin 81 MG EC tablet Take 81 mg by mouth daily   Yes Unknown, Entered By History   levothyroxine (SYNTHROID/LEVOTHROID) 88 MCG tablet Take 88 mcg by mouth daily   Yes Unknown, Entered By History   losartan (COZAAR) 50 MG tablet Take 50 mg by mouth daily   Yes Unknown, Entered By History   oxybutynin ER (DITROPAN-XL) 10 MG 24 hr tablet Take 10 mg by mouth At Bedtime 19  Yes Unknown, Entered By History   vitamin D3 (CHOLECALCIFEROL) 2000 units tablet Take 1 tablet by mouth daily   Yes  Unknown, Entered By History     Current Facility-Administered Medications Ordered in Epic   Medication Dose Route Frequency Last Rate Last Dose     [Auto Hold] acetaminophen (TYLENOL) tablet 1,000 mg  1,000 mg Oral BID   1,000 mg at 01/31/20 2138     [Auto Hold] acetaminophen (TYLENOL) tablet 650 mg  650 mg Oral Q4H PRN         [Auto Hold] amLODIPine (NORVASC) tablet 2.5 mg  2.5 mg Oral Daily   2.5 mg at 02/01/20 1015     [Auto Hold] bisacodyl (DULCOLAX) Suppository 10 mg  10 mg Rectal Daily PRN         ceFAZolin (ANCEF) 1 g vial to attach to  ml bag for ADULT or 50 ml bag for PEDS  1 g Intravenous See Admin Instructions         ceFAZolin (ANCEF) intermittent infusion 2 g in 100 mL dextrose PRE-MIX  2 g Intravenous Pre-Op/Pre-procedure x 1 dose         [Auto Hold] HYDROmorphone (PF) (DILAUDID) injection 0.5-1 mg  0.5-1 mg Intravenous Q2H PRN   0.5 mg at 02/01/20 1015     lactated ringers infusion   Intravenous Continuous         [Auto Hold] levothyroxine (SYNTHROID/LEVOTHROID) tablet 88 mcg  88 mcg Oral Daily         [Auto Hold] Lidocaine (LIDOCARE) 4 % Patch 1 patch  1 patch Transdermal Q24H   Stopped at 02/01/20 0829     lidocaine (LMX4) cream   Topical Once PRN         [Auto Hold] lidocaine (LMX4) cream   Topical Q1H PRN         [Auto Hold] lidocaine 1 % 0.1-1 mL  0.1-1 mL Other Q1H PRN         [Auto Hold] lidocaine patch in PLACE   Transdermal Q8H   Stopped at 02/01/20 0830     [Auto Hold] losartan (COZAAR) tablet 50 mg  50 mg Oral Daily         [Auto Hold] melatonin tablet 1 mg  1 mg Oral At Bedtime PRN         [Auto Hold] naloxone (NARCAN) injection 0.1-0.4 mg  0.1-0.4 mg Intravenous Q2 Min PRN         [Auto Hold] ondansetron (ZOFRAN-ODT) ODT tab 4 mg  4 mg Oral Q6H PRN        Or     [Auto Hold] ondansetron (ZOFRAN) injection 4 mg  4 mg Intravenous Q6H PRN         [Auto Hold] oxybutynin ER (DITROPAN-XL) 24 hr tablet 10 mg  10 mg Oral At Bedtime   10 mg at 01/31/20 5574     [Auto Hold] polyethylene glycol  (MIRALAX/GLYCOLAX) Packet 17 g  17 g Oral Daily PRN         [Auto Hold] potassium chloride (KLOR-CON) Packet 20-40 mEq  20-40 mEq Oral or Feeding Tube Q2H PRN         [Auto Hold] potassium chloride 10 mEq in 100 mL intermittent infusion with 10 mg lidocaine  10 mEq Intravenous Q1H PRN         [Auto Hold] potassium chloride 10 mEq in 100 mL sterile water intermittent infusion (premix)  10 mEq Intravenous Q1H PRN         [Auto Hold] potassium chloride ER (K-DUR/KLOR-CON M) CR tablet 20-40 mEq  20-40 mEq Oral Q2H PRN         [Auto Hold] prochlorperazine (COMPAZINE) injection 5 mg  5 mg Intravenous Q6H PRN        Or     [Auto Hold] prochlorperazine (COMPAZINE) tablet 5 mg  5 mg Oral Q6H PRN        Or     [Auto Hold] prochlorperazine (COMPAZINE) Suppository 12.5 mg  12.5 mg Rectal Q12H PRN         [Auto Hold] senna-docusate (SENOKOT-S/PERICOLACE) 8.6-50 MG per tablet 1 tablet  1 tablet Oral BID PRN        Or     [Auto Hold] senna-docusate (SENOKOT-S/PERICOLACE) 8.6-50 MG per tablet 2 tablet  2 tablet Oral BID PRN         sodium chloride (PF) 0.9% PF flush 10-20 mL  10-20 mL Intravenous Q1H PRN         [Auto Hold] sodium chloride (PF) 0.9% PF flush 3 mL  3 mL Intracatheter q1 min prn         [Auto Hold] sodium chloride (PF) 0.9% PF flush 3 mL  3 mL Intracatheter Q8H         sodium chloride 0.9% infusion   Intravenous Continuous 75 mL/hr at 01/31/20 1740       No current [facility]-ordered outpatient medications on file.       Recent Labs   Lab Test 07/26/19  0730 07/25/19  1414    137   POTASSIUM 4.1 3.8   CHLORIDE 105 104   CO2 29 27   ANIONGAP 5 6   * 136*   BUN 17 16   CR 0.64 0.61   LORETA 7.9* 8.2*     Recent Labs   Lab Test 07/26/19  0730 07/25/19  1414   WBC 8.9 8.2   HGB 8.6* 12.5    261     Recent Labs   Lab Test 07/25/19  1414   ABO B   RH Neg     No results for input(s): TROPI in the last 92398 hours.  No results for input(s): PH, PCO2, PO2, HCO3 in the last 05475 hours.  No results for input(s): HCG  in the last 47759 hours.  Recent Results (from the past 744 hour(s))   Humerus XR, G/E 2 views, right    Narrative    HUMERUS TWO VIEWS RIGHT  1/7/2020 8:06 PM     HISTORY: pain after fall    COMPARISON: None.      Impression    IMPRESSION: Somewhat limited evaluation due to osteopenia. No acute  fracture is identified. Degenerative changes of the shoulder and  elbow. Postsurgical changes in the right lung.    WES GREER MD   XR Shoulder Right G/E 3 Views    Narrative    XR SHOULDER RT G/E 3 VW   1/7/2020 8:07 PM     HISTORY:  pain after fall    COMPARISON: None      Impression    IMPRESSION: No acute fracture or malalignment. Moderate glenohumeral  and mild acromioclavicular joint degenerative changes. Osteopenia.  Postsurgical changes project over the right lung.    WES GREER MD   Head CT w/o contrast    Narrative    EXAM: CT HEAD W/O CONTRAST  LOCATION: [facility]  DATE/TIME: 1/7/2020 10:12 PM    INDICATION: Fall, bruising on right arm  COMPARISON: None.  TECHNIQUE: Routine without IV contrast. Multiplanar reformats. Dose reduction techniques were used.    FINDINGS:  INTRACRANIAL CONTENTS: Mildly limited by motion. No definite intracranial hemorrhage, extraaxial collection, or mass effect.  No CT evidence of acute infarct. Mild volume loss and mild to moderate burden presumed chronic small vessel ischemia.    VISUALIZED ORBITS/SINUSES/MASTOIDS: No intraorbital abnormality. Mild scattered mucosal thickening of the paranasal sinuses. Small fluid level left sphenoid locule. Near complete opacification of the right mastoid air cells.    BONES/SOFT TISSUES: No acute abnormality.      Impression    IMPRESSION:  1.  Limited by motion. No definite acute intracranial abnormality.    2.  Mild to moderate age-related changes.    3.  Large right mastoid effusion.    4.  Small left sphenoid sinus fluid level.   CT Aortic Survey w Contrast    Narrative    EXAM: CTA CHEST, ABDOMEN AND PELVIS WITH  CONTRAST  LOCATION: [facility]  DATE/TIME: 1/7/2020 10:13 PM    INDICATION: Chest and arm pain. Hypertension. Blood pressure difference between arms.  COMPARISON: None.    TECHNIQUE: Prior to the administration of intravenous contrast, helical sections were acquired through the chest. CT angiogram chest, abdomen and pelvis during arterial phase of injection of IV contrast. Dose reduction techniques were used.  CONTRAST: 75 mL [id number]    FINDINGS:    ANGIOGRAM CHEST, ABDOMEN, AND PELVIS: The aorta is normal in caliber without dissection. Atherosclerotic calcification in the aorta. No significant stenosis of the visualized portions of the major arteries of bilateral upper extremities. No visualized   pulmonary embolus.    LUNGS AND PLEURA: A few small calcified nodules scattered within the lungs likely relate to prior granulomatous infection. A few curvilinear opacities in the periphery of both lungs likely represent scarring and/or atelectasis. Mild bronchiectasis in   both lungs.    MEDIASTINUM/AXILLAE: A left breast prosthesis is present.    HEPATOBILIARY: Unremarkable.    SPLEEN: Unremarkable.    PANCREAS: Unremarkable.    ADRENAL GLANDS: 2.6 x 1.8 cm left adrenal nodule (series 11 image 86).    KIDNEYS/BLADDER: Moderate distention of the urinary bladder.    BOWEL: Several colonic diverticula are present without evidence of diverticulitis. The appendix is not visualized.    LYMPH NODES: Unremarkable.    MUSCULOSKELETAL: A few age-indeterminate compression deformities of the thoracic and lumbar vertebral bodies. Partial visualization of surgical hardware in the left hip, bridging an old intertrochanteric fracture.      Impression    IMPRESSION:   1.  No acute abnormality identified in the chest, abdomen or pelvis.  2.  The aorta is normal in caliber without dissection. No significant stenosis the visualized portions of the major arteries of bilateral upper extremities.  3.  2.6 cm indeterminate  left adrenal nodule. In the absence of known malignancy, this is most likely an adenoma. Recommend comparison with prior imaging studies, if available.   XR Pelvis w Hip Right 1 View    Narrative    PELVIS AND RIGHT HIP ONE VIEW  1/31/2020 2:04 PM     HISTORY: Fall, right hip pain.    COMPARISON: 7/25/2019.      Impression    IMPRESSION: Interval ORIF of left intertrochanteric/subtrochanteric  fracture with an intramedullary ilan and dynamic compression screw.  This fracture appears at least mostly healed. There has been interval  development of a right intertrochanteric fracture with mild varus  angulation. No other acute-appearing bony abnormalities.    TORO MAKI MD   XR Chest 1 View    Narrative    CHEST ONE VIEW  1/31/2020 2:05 PM     HISTORY:  Trauma to right hip.    COMPARISON: Chest x-ray 7/25/2019.      Impression    IMPRESSION: Patchy subpleural opacities at the right costophrenic  angle again identified, nonspecific. Postoperative change of the right  lung is stable. No new airspace disease identified. Stable  cardiomegaly. No pneumothorax is identified.    LINDA MOSES MD       RECENT LABS:       Anesthesia Evaluation     .             ROS/MED HX    ENT/Pulmonary:       Neurologic: Comment: Mild cognitive decline, currently only A/O x1 to person  SAH in the left sylvian fissure.    (+)dementia, other neuro     Cardiovascular:     (+) hypertension----. : . . . :. valvular problems/murmurs type: AI and MR .      (-) BOWER   METS/Exercise Tolerance:  1 - Eating, dressing   Hematologic:         Musculoskeletal:   (+) arthritis,  other musculoskeletal- osteoporosis, vertebral compression fracture      GI/Hepatic:         Renal/Genitourinary:         Endo:     (+) thyroid problem hypothyroidism, .      Psychiatric:         Infectious Disease:         Malignancy:   (+) Malignancy History of Breast          Other:                          Physical Exam  Normal systems: dental    Airway   Mallampati: I  TM  distance: >3 FB  Neck ROM: full    Dental     Cardiovascular   Rhythm and rate: regular and normal      Pulmonary (+) decreased breath sounds               Lab Results   Component Value Date    WBC 7.4 02/01/2020    HGB 9.8 (L) 02/01/2020    HCT 29.9 (L) 02/01/2020     02/01/2020     02/01/2020    POTASSIUM 3.8 02/01/2020    CHLORIDE 106 02/01/2020    CO2 29 02/01/2020    BUN 12 02/01/2020    CR 0.48 (L) 02/01/2020    GLC 99 02/01/2020    LORETA 8.3 (L) 02/01/2020    MAG 2.2 07/26/2019    ALBUMIN 3.3 (L) 07/25/2019    PROTTOTAL 6.5 (L) 07/25/2019    ALT 16 07/25/2019    AST 19 07/25/2019    ALKPHOS 95 07/25/2019    BILITOTAL 0.3 07/25/2019    INR 1.03 01/07/2020       Preop Vitals  BP Readings from Last 3 Encounters:   02/01/20 (!) 144/78   01/09/20 (!) 173/64   11/06/19 (!) 147/71    Pulse Readings from Last 3 Encounters:   01/31/20 77   01/08/20 63   11/06/19 57      Resp Readings from Last 3 Encounters:   02/01/20 16   01/09/20 15   08/27/19 16    SpO2 Readings from Last 3 Encounters:   02/01/20 93%   01/08/20 95%   11/06/19 96%      Temp Readings from Last 1 Encounters:   02/01/20 36.8  C (98.3  F) (Oral)    Ht Readings from Last 1 Encounters:   01/31/20 1.524 m (5')      Wt Readings from Last 1 Encounters:   02/01/20 42.6 kg (93 lb 15.7 oz)    Estimated body mass index is 18.35 kg/m  as calculated from the following:    Height as of 1/31/20: 1.524 m (5').    Weight as of an earlier encounter on 2/1/20: 42.6 kg (93 lb 15.7 oz).       Anesthesia Plan      History & Physical Review  History and physical reviewed and following examination; no interval change.    ASA Status:  4 .    NPO Status:  > 8 hours    Plan for General, ETT and RSI with Intravenous and Propofol induction. Maintenance will be Balanced.    PONV prophylaxis:  Ondansetron (or other 5HT-3) and Dexamethasone or Solumedrol  No midazolam  Fentanyl and small hydromorphone bolus ok  Background propofol gtt      Postoperative  Care  Postoperative pain management:  IV analgesics.      Consents  Anesthetic plan, risks, benefits and alternatives discussed with:  Patient..                 Kelly Baroneantonio Temple Pre-Procedure Evaluation    Patient: Gennaro Walton   MRN: [id number] : 1923          Preoperative Diagnosis: Femur fracture, right (H) [S72.91XA]    Procedure(s):  OPEN REDUCTION INTERNAL FIXATION LEFT INTERTROCHANTERIC FEMUR FRACTURE    Past Medical History:   Diagnosis Date     Breast cancer (H)      CKD (chronic kidney disease) stage 3, GFR 30-59 ml/min (H)      Cognitive impairment      Hypertension      Hypothyroid      Osteoporosis      Past Surgical History:   Procedure Laterality Date     ------------OTHER-------------      Left mastectomy     ------------OTHER-------------      Cataract surgery     OPEN REDUCTION INTERNAL FIXATION FEMUR MIDSHAFT Left 2019    Procedure: LEFT INTERTROCHANTERIC FEMUR FRACTURE OPEN REDUCTION AND INTERNAL FIXATION;  Surgeon: Chris Oseguera MD;  Location:  OR       Anesthesia Evaluation     .             ROS/MED HX    ENT/Pulmonary: Comment: Low oxygen saturations pre-op  Using accessory muscles      Neurologic: Comment: Very sedated    (+)dementia,     Cardiovascular:     (+) hypertension----. : . . . :. .       METS/Exercise Tolerance:     Hematologic:         Musculoskeletal:         GI/Hepatic:        (-) GERD   Renal/Genitourinary:     (+) chronic renal disease, type: CRI,       Endo:     (+) thyroid problem hypothyroidism, .      Psychiatric:         Infectious Disease:         Malignancy:   (+) Malignancy History of Breast          Other:                                 Lab Results   Component Value Date    WBC 7.4 2020    HGB 9.8 (L) 2020    HCT 29.9 (L) 2020     2020     2020    POTASSIUM 3.8 2020    CHLORIDE 106 2020    CO2 29 2020    BUN 12 2020    CR 0.48 (L) 2020    GLC 99 2020    LORETA 8.3  (L) 02/01/2020    MAG 2.2 07/26/2019    ALBUMIN 3.3 (L) 07/25/2019    PROTTOTAL 6.5 (L) 07/25/2019    ALT 16 07/25/2019    AST 19 07/25/2019    ALKPHOS 95 07/25/2019    BILITOTAL 0.3 07/25/2019    INR 1.03 01/07/2020       Preop Vitals  BP Readings from Last 3 Encounters:   02/01/20 (!) 144/78   01/09/20 (!) 173/64   11/06/19 (!) 147/71    Pulse Readings from Last 3 Encounters:   01/31/20 77   01/08/20 63   11/06/19 57      Resp Readings from Last 3 Encounters:   02/01/20 16   01/09/20 15   08/27/19 16    SpO2 Readings from Last 3 Encounters:   02/01/20 93%   01/08/20 95%   11/06/19 96%      Temp Readings from Last 1 Encounters:   02/01/20 36.8  C (98.3  F) (Oral)    Ht Readings from Last 1 Encounters:   01/31/20 1.524 m (5')      Wt Readings from Last 1 Encounters:   02/01/20 42.6 kg (93 lb 15.7 oz)    Estimated body mass index is 18.35 kg/m  as calculated from the following:    Height as of this encounter: 1.524 m (5').    Weight as of this encounter: 42.6 kg (93 lb 15.7 oz).       Anesthesia Plan      History & Physical Review  History and physical reviewed and following examination; no interval change.    ASA Status:  4 .        Plan for General and ETT     NO versed.  Very minimal narcotics.       Postoperative Care      Consents  Anesthetic plan, risks, benefits and alternatives discussed with:  Patient..                 Kelly Julian

## 2020-02-01 NOTE — PROGRESS NOTES
[facility]    Hospitalist Progress Note    Assessment & Plan   Gennaro Walton is a 89-year-old female with past medical history significant for mild cognitive impairment, hypertension, hypothyroidism, and history of falls as well as some recent shoulder pain, who presents to the emergency room for evaluation after a fall which resulted in right hip pain.  She was found to have a hip fracture.  She came to the hospital today for ongoing evaluation and care.     1.  Right intertrochanteric hip fracture:  Sustained after what sounds to be a mechanical fall.  She has been evaluated by ortho and plans are for her to undergo surgical repair with an IM nail per Dr. Casper today.  For now, will attempt pain management with IV Dilaudid as needed.  Her daughter notes that after previous hip fracture, she was prescribed tramadol, but this seemed to exacerbate her confusion and requested that this not be administered.  She had previously symptoms with Tylenol alone.  Will have to minimize narcotic use to avoid precipitating delirium or worsening confusion.  In regards to her preoperative risk assessment, she is felt to be an intermediate candidate for an intermediate risk procedure.  No reports of chest discomfort or other symptoms concerning for angina, thus no additional workup and evaluation needed prior to proceeding with surgery today morning.  She will be made n.p.o. at midnight.  Will continue gentle IV fluids to ensure hydration with NS at 75 mL an hour.     2.  Hypertension:  Blood pressures are elevated today, but suspect this is secondary in the setting of pain with her hip fracture.  She will be continued on her amlodipine and her losartan with holding parameters.     3.  Hypothyroidism:  Chronic and stable on levothyroxine.  This can be continued.     4.  Shoulder pain secondary to injury:  She underwent an evaluation with an MRI earlier today per her PCP's recommendations.  I am not able to access  those results.  For now, continue symptomatic management with Lidoderm patch as previously prescribed.      Deep venous thrombosis prophylaxis:  PCDs only for now, given plans for surgery today.  Otherwise, will defer to Orthopedic Service for postoperative recommendations.      CODE STATUS:  DNR/DNI, as confirmed on POLST provided from her assisted living facility as well as her daughter at bedside.     Ap Crisostomo MD   Text Page (7am to 6pm)    Interval History   Plan for the OR today. Patient seen and examined.  No acute events over night.  No fevers or chills. No chest pain or SOB. Answered patient questions.    -Data reviewed today: I reviewed all new labs and imaging results over the last 24 hours. I personally reviewed the EKG tracing showing SR.    Physical Exam   Temp: 98.3  F (36.8  C) Temp src: Oral BP: (!) 144/78 Pulse: 77 Heart Rate: 68 Resp: 16 SpO2: 94 % O2 Device: None (Room air) Oxygen Delivery: 3 LPM  Vitals:    01/31/20 1320 01/31/20 1630 02/01/20 0729   Weight: 45.4 kg (100 lb) 42.6 kg (94 lb) 42.6 kg (93 lb 15.7 oz)     Vital Signs with Ranges  Temp:  [97.1  F (36.2  C)-98.7  F (37.1  C)] 98.3  F (36.8  C)  Pulse:  [66-77] 77  Heart Rate:  [66-98] 68  Resp:  [12-20] 16  BP: (144-194)/(71-89) 144/78  SpO2:  [87 %-96 %] 94 %  I/O last 3 completed shifts:  In: 120 [P.O.:120]  Out: -     GENERAL:  The patient is a frail, elderly-appearing female lying quietly on the gurney.  She does occasionally open her eyes and look at me and smile, but does not participate any further on my visit.  She is in no acute distress.   HEENT:  Pupils equal, round and reactive to light.  Extraocular movements intact.  Mucous membranes are moist.   CARDIOVASCULAR:  Heart rhythm regular, no murmurs, gallops, rubs.  Pulses are +2 and symmetric in bilateral upper extremities.  There is no extremity edema.   RESPIRATORY:  Lungs are clear to auscultation bilaterally, free of rales or rhonchi, no increased work of breathing or  accessory muscle use.   ABDOMEN:  Soft, nontender, nondistended, positive bowel sounds throughout.   INTEGUMENTARY:  Warm and dry, no rashes, jaundice or ecchymosis.     Medications     lactated ringers       sodium chloride 75 mL/hr at 01/31/20 1740       acetaminophen  1,000 mg Oral BID     amLODIPine  2.5 mg Oral Daily     ceFAZolin  1 g Intravenous See Admin Instructions     ceFAZolin  2 g Intravenous Pre-Op/Pre-procedure x 1 dose     levothyroxine  88 mcg Oral Daily     Lidocaine  1 patch Transdermal Q24H     lidocaine   Transdermal Q8H     losartan  50 mg Oral Daily     oxybutynin ER  10 mg Oral At Bedtime     sodium chloride (PF)  3 mL Intracatheter Q8H       Data   Recent Labs   Lab 02/01/20  0632 01/31/20  1332   WBC 7.4 7.0   HGB 9.8* 12.1   * 101*    265    138   POTASSIUM 3.8 4.1   CHLORIDE 106 105   CO2 29 27   BUN 12 9   CR 0.48* 0.57   ANIONGAP 2* 6   LORETA 8.3* 9.0   GLC 99 110*       Imaging:   Recent Results (from the past 24 hour(s))   XR Pelvis w Hip Right 1 View    Narrative    PELVIS AND RIGHT HIP ONE VIEW  1/31/2020 2:04 PM     HISTORY: Fall, right hip pain.    COMPARISON: 7/25/2019.      Impression    IMPRESSION: Interval ORIF of left intertrochanteric/subtrochanteric  fracture with an intramedullary ilan and dynamic compression screw.  This fracture appears at least mostly healed. There has been interval  development of a right intertrochanteric fracture with mild varus  angulation. No other acute-appearing bony abnormalities.    TORO MAKI MD   XR Chest 1 View    Narrative    CHEST ONE VIEW  1/31/2020 2:05 PM     HISTORY:  Trauma to right hip.    COMPARISON: Chest x-ray 7/25/2019.      Impression    IMPRESSION: Patchy subpleural opacities at the right costophrenic  angle again identified, nonspecific. Postoperative change of the right  lung is stable. No new airspace disease identified. Stable  cardiomegaly. No pneumothorax is identified.    LINDA MOSES MD

## 2020-02-01 NOTE — ANESTHESIA POSTPROCEDURE EVALUATION
Patient: Gennaro Wilsonnbcandida    Procedure(s):  OPEN REDUCTION INTERNAL FIXATION RIGHT INTERTROCHANTERIC FEMUR FRACTURE    Diagnosis:Femur fracture, right (H) [S72.91XA]  Diagnosis Additional Information: No value filed.    Anesthesia Type:  General, ETT    Note:  Anesthesia Post Evaluation    Patient location during evaluation: PACU  Post op Mental Status: sleepy but arousable.  similar to pre-op.  Level of consciousness: confused  Pain management: unable to assess  Airway patency: patent  Cardiovascular status: bipap.  Respiratory status: BIPAP  Hydration status: acceptable  PONV: none             Last vitals:  Vitals:    02/01/20 1440 02/01/20 1450 02/01/20 1500   BP: (!) 129/101 (!) 155/65 (!) 155/95   Pulse: 70 67 64   Resp: 22 14 11   Temp:      SpO2: 95% 97% 96%         Electronically Signed By: Kelly Julian  February 1, 2020  3:38 PM

## 2020-02-01 NOTE — CONSULTS
Consult Date:  2020      ORTHOPEDIC CONSULTATION      CHIEF COMPLAINT:  Right hip injury.      HISTORY OF PRESENT ILLNESS:  The patient is a 89-year-old female with cognitive impairment who is seen at the request of Dr. Zayra Verdugo for evaluation of right hip injury.  She sustained a slip and fall.  She did have a hip fracture last summer of the left hip.  She has no other complaints or problems.      ALLERGIES:  SHE HAS NO KNOWN DRUG ALLERGIES.      MEDICATIONS:     1.  Norvasc.   2.  Aspirin.   3.  Colace.   4.  Levothyroxine,   5.  Cozaar      PAST MEDICAL HISTORY:  Significant for kidney disease, cognitive impairment and breast cancer.        PHYSICAL EXAMINATION:  Her right hip shortened and externally rotated.  Skin is intact.  She has no tenderness about the other extremities.  She is able to weakly flex and extend her ankle.  Perfusion is unremarkable.  Respirations are unremarkable.      X-rays show an intertrochanteric right hip fracture.      IMPRESSION:  Right intertrochanteric hip fracture.      RECOMMENDATIONS:  I reviewed the risks, benefits, complications and postoperative course with the patient and her family.  Surgery will be performed today.  Hopefully, weightbearing as tolerated will be allowed and all questions were answered.         MARYCHUY DE LOS SANTOS MD             D: 2020   T: 2020   MT: JERSON      Name:     MEME NAVARRO   MRN:      [id number]        Account:       [id number]   :      1923           Consult Date:  2020      Document: [id number]       cc: Mady Hagen MD

## 2020-02-01 NOTE — PROGRESS NOTES
[facility]    Medicine Progress Note - Hospitalist Service       Reviewed call re consult. Patient seen earlier today by Dr. Crisostomo who will continue to follow the patient.    Susan Sparks PA-C

## 2020-02-01 NOTE — OR NURSING
Pt arrived to preop room 8 after report from floor nurse.  Pt was on room air upon arrival.  O2 Sat 83 % on RA.  O2 applied per nasal cannula at 5L.  Pt was unable to tell me her name or .  Daughter, Caron at bedside to assist and sign consent.  Pt falls asleep in between assessments.  O2 sat ranging between 83-86% on 5L O2.  O2 sat will increase to 94% with voice commands to breathe.  Then will decrease back down to 83-85% once she falls asleep again.  Anesthesiologist at bedside, and spoke to daughter.

## 2020-02-01 NOTE — OP NOTE
Procedure Date: 2020      PREOPERATIVE DIAGNOSIS:  Displaced intertrochanteric/subtrochanteric right proximal femur fracture.      POSTOPERATIVE DIAGNOSIS:  Displaced intertrochanteric/subtrochanteric right proximal femur fracture.      PROCEDURES:  Intramedullary nailing, right proximal femur fracture.      SURGEON:  Marychuy De Los Santos MD      ASSISTANT:  Sheba Vieira PA-C      ANESTHESIA:  General.      ESTIMATED BLOOD LOSS:  25 mL.      COMPLICATIONS:  None.      DESCRIPTION OF PROCEDURE:  The patient was taken to the operating room where after administration of general anesthetic, antibiotic prophylaxis, tranexamic acid and sterile prep and drape, the fracture had been reduced previously with longitudinal traction and rotation using a Maria A table.  A short incision was made followed by placement of a guide pin with over reaming followed by placement of a Synthes 11 mm x 125 degree nail with excellent proximal and distal fixation.  I did compress through the construct and then locked the construct statically.  Distal screw purchase was acceptable.  The wounds were then irrigated and closed in layers with Marcaine and epinephrine given for pain control.      Intraoperative AP and lateral views of the right hip showed excellent fracture alignment and hardware position with no complications.         MARYCHUY DE LOS SANTOS MD             D: 2020   T: 2020   MT: REGINALDO      Name:     MEME NAVARRO   MRN:      [id number]        Account:        [id number]   :      1923           Procedure Date: 2020      Document: [id number]

## 2020-02-01 NOTE — PLAN OF CARE
Pt oriented to self only, dilaudid given for pain, cms intact, purewick in place, NPO @ midnight, surgery planned for today, will continue to monitor

## 2020-02-01 NOTE — ANESTHESIA CARE TRANSFER NOTE
Patient: Gennaro Walton    Procedure(s):  OPEN REDUCTION INTERNAL FIXATION RIGHT INTERTROCHANTERIC FEMUR FRACTURE    Diagnosis: Femur fracture, right (H) [S72.91XA]  Diagnosis Additional Information: No value filed.    Anesthesia Type:   General, ETT     Note:  Airway :Face Mask  Patient transferred to:PACU  Comments: Pt exhibits spont resps, all monitors and alarms on in pacu, report given to RN, vss.Handoff Report: Identifed the Patient, Identified the Reponsible Provider, Reviewed the pertinent medical history, Discussed the surgical course, Reviewed Intra-OP anesthesia mangement and issues during anesthesia, Set expectations for post-procedure period and Allowed opportunity for questions and acknowledgement of understanding      Vitals: (Last set prior to Anesthesia Care Transfer)    CRNA VITALS  2/1/2020 1253 - 2/1/2020 1330      2/1/2020             NIBP:  167/83    NIBP Mean:  106                Electronically Signed By: RITA Pepper CRNA  February 1, 2020  1:30 PM

## 2020-02-02 ENCOUNTER — APPOINTMENT (OUTPATIENT)
Dept: PHYSICAL THERAPY | Facility: CLINIC | Age: 85
DRG: 481 | End: 2020-02-02
Attending: ORTHOPAEDIC SURGERY
Payer: MEDICARE

## 2020-02-02 LAB
ANION GAP SERPL CALCULATED.3IONS-SCNC: 2 MMOL/L (ref 3–14)
BACTERIA SPEC CULT: NORMAL
BUN SERPL-MCNC: 11 MG/DL (ref 7–30)
CALCIUM SERPL-MCNC: 8.3 MG/DL (ref 8.5–10.1)
CHLORIDE SERPL-SCNC: 107 MMOL/L (ref 94–109)
CO2 SERPL-SCNC: 29 MMOL/L (ref 20–32)
CREAT SERPL-MCNC: 0.43 MG/DL (ref 0.52–1.04)
ERYTHROCYTE [DISTWIDTH] IN BLOOD BY AUTOMATED COUNT: 13.1 % (ref 10–15)
GFR SERPL CREATININE-BSD FRML MDRD: 86 ML/MIN/{1.73_M2}
GLUCOSE SERPL-MCNC: 94 MG/DL (ref 70–99)
HCT VFR BLD AUTO: 25.6 % (ref 35–47)
HGB BLD-MCNC: 8.3 G/DL (ref 11.7–15.7)
Lab: NORMAL
MCH RBC QN AUTO: 33.5 PG (ref 26.5–33)
MCHC RBC AUTO-ENTMCNC: 32.4 G/DL (ref 31.5–36.5)
MCV RBC AUTO: 103 FL (ref 78–100)
PLATELET # BLD AUTO: 156 10E9/L (ref 150–450)
POTASSIUM SERPL-SCNC: 3.9 MMOL/L (ref 3.4–5.3)
RBC # BLD AUTO: 2.48 10E12/L (ref 3.8–5.2)
SODIUM SERPL-SCNC: 138 MMOL/L (ref 133–144)
SPECIMEN SOURCE: NORMAL
WBC # BLD AUTO: 6.6 10E9/L (ref 4–11)

## 2020-02-02 PROCEDURE — 25000128 H RX IP 250 OP 636: Performed by: PHYSICIAN ASSISTANT

## 2020-02-02 PROCEDURE — 25800030 ZZH RX IP 258 OP 636: Performed by: ORTHOPAEDIC SURGERY

## 2020-02-02 PROCEDURE — 12000000 ZZH R&B MED SURG/OB

## 2020-02-02 PROCEDURE — 80048 BASIC METABOLIC PNL TOTAL CA: CPT | Performed by: INTERNAL MEDICINE

## 2020-02-02 PROCEDURE — 25000132 ZZH RX MED GY IP 250 OP 250 PS 637: Mod: GY | Performed by: ORTHOPAEDIC SURGERY

## 2020-02-02 PROCEDURE — 36415 COLL VENOUS BLD VENIPUNCTURE: CPT | Performed by: INTERNAL MEDICINE

## 2020-02-02 PROCEDURE — 85027 COMPLETE CBC AUTOMATED: CPT | Performed by: INTERNAL MEDICINE

## 2020-02-02 PROCEDURE — 99207 ZZC MOONLIGHTING INDICATOR: CPT | Performed by: INTERNAL MEDICINE

## 2020-02-02 PROCEDURE — 97161 PT EVAL LOW COMPLEX 20 MIN: CPT | Mod: GP

## 2020-02-02 PROCEDURE — 25000128 H RX IP 250 OP 636: Performed by: ORTHOPAEDIC SURGERY

## 2020-02-02 PROCEDURE — 97530 THERAPEUTIC ACTIVITIES: CPT | Mod: GP

## 2020-02-02 PROCEDURE — 25000132 ZZH RX MED GY IP 250 OP 250 PS 637: Mod: GY | Performed by: INTERNAL MEDICINE

## 2020-02-02 PROCEDURE — 99233 SBSQ HOSP IP/OBS HIGH 50: CPT | Performed by: INTERNAL MEDICINE

## 2020-02-02 RX ORDER — NALOXONE HYDROCHLORIDE 0.4 MG/ML
.1-.4 INJECTION, SOLUTION INTRAMUSCULAR; INTRAVENOUS; SUBCUTANEOUS
Status: DISCONTINUED | OUTPATIENT
Start: 2020-02-02 | End: 2020-02-02

## 2020-02-02 RX ORDER — CEFTRIAXONE 1 G/1
1 INJECTION, POWDER, FOR SOLUTION INTRAMUSCULAR; INTRAVENOUS EVERY 24 HOURS
Status: DISCONTINUED | OUTPATIENT
Start: 2020-02-02 | End: 2020-02-03

## 2020-02-02 RX ADMIN — SODIUM CHLORIDE, POTASSIUM CHLORIDE, SODIUM LACTATE AND CALCIUM CHLORIDE: 600; 310; 30; 20 INJECTION, SOLUTION INTRAVENOUS at 15:40

## 2020-02-02 RX ADMIN — LIDOCAINE 1 PATCH: 560 PATCH PERCUTANEOUS; TOPICAL; TRANSDERMAL at 06:15

## 2020-02-02 RX ADMIN — AMLODIPINE BESYLATE 2.5 MG: 2.5 TABLET ORAL at 08:14

## 2020-02-02 RX ADMIN — Medication 1 LOZENGE: at 06:14

## 2020-02-02 RX ADMIN — CEFAZOLIN 1 G: 1 INJECTION, POWDER, FOR SOLUTION INTRAMUSCULAR; INTRAVENOUS at 05:41

## 2020-02-02 RX ADMIN — ACETAMINOPHEN 975 MG: 325 TABLET, FILM COATED ORAL at 15:49

## 2020-02-02 RX ADMIN — ACETAMINOPHEN 975 MG: 325 TABLET, FILM COATED ORAL at 08:14

## 2020-02-02 RX ADMIN — SODIUM CHLORIDE, POTASSIUM CHLORIDE, SODIUM LACTATE AND CALCIUM CHLORIDE: 600; 310; 30; 20 INJECTION, SOLUTION INTRAVENOUS at 04:25

## 2020-02-02 RX ADMIN — OXYBUTYNIN CHLORIDE 10 MG: 10 TABLET, EXTENDED RELEASE ORAL at 21:13

## 2020-02-02 RX ADMIN — CEFTRIAXONE SODIUM 1 G: 1 INJECTION, POWDER, FOR SOLUTION INTRAMUSCULAR; INTRAVENOUS at 18:51

## 2020-02-02 RX ADMIN — OXYCODONE HYDROCHLORIDE 5 MG: 5 TABLET ORAL at 21:29

## 2020-02-02 RX ADMIN — LOSARTAN POTASSIUM 50 MG: 50 TABLET, FILM COATED ORAL at 08:14

## 2020-02-02 RX ADMIN — LEVOTHYROXINE SODIUM 88 MCG: 88 TABLET ORAL at 08:14

## 2020-02-02 RX ADMIN — ACETAMINOPHEN 975 MG: 325 TABLET, FILM COATED ORAL at 23:38

## 2020-02-02 RX ADMIN — OXYCODONE HYDROCHLORIDE 5 MG: 5 TABLET ORAL at 08:14

## 2020-02-02 RX ADMIN — ASPIRIN 325 MG: 325 TABLET, COATED ORAL at 08:14

## 2020-02-02 RX ADMIN — OXYCODONE HYDROCHLORIDE 5 MG: 5 TABLET ORAL at 05:40

## 2020-02-02 RX ADMIN — SENNOSIDES AND DOCUSATE SODIUM 1 TABLET: 8.6; 5 TABLET ORAL at 21:13

## 2020-02-02 ASSESSMENT — ACTIVITIES OF DAILY LIVING (ADL)
ADLS_ACUITY_SCORE: 25
ADLS_ACUITY_SCORE: 15.5
ADLS_ACUITY_SCORE: 25
ADLS_ACUITY_SCORE: 25
ADLS_ACUITY_SCORE: 22
ADLS_ACUITY_SCORE: 22

## 2020-02-02 NOTE — PLAN OF CARE
Vital signs stable on 6 L nasal cannula. Disoriented to place time and situation. Lung sounds diminished. Bowel sounds active, flatus present. Right hip incision dressing clean dry and intact with ice packs. Pain controlled with oxycodone and dilaudid. Patient is complaining of right shoulder pain so lidocaine patch applied there. Bedrest overnight with turn and repo. Tolerating diet. CMS/ neuros intact. Tele sinus with BBB. Cortez with adequate urine output.

## 2020-02-02 NOTE — PLAN OF CARE
.Discharge Planner OT   Patient plan for discharge: TCU  Current status: Orders received, chart reviewed. Per discussion with PT, patient discharging to TCU for next level care. PT following, OT defering orders to TCU level  Barriers to return to prior living situation: defer to PT  Recommendations for discharge: defer to PT  Rationale for recommendations: Due to patient low independence at this time and higher PLOF, recommending continued therapy services, defer OT eval to next level of care       Entered by: Justa Guerrero 02/02/2020 1:06 PM

## 2020-02-02 NOTE — PLAN OF CARE
Dx: Right hip femur fracture. Hx: left hip fracture 2019. Surg: ORIF femur fracture. VSS on room air sats 95%+. Tele: NSR. A/O to self, needs reorienting. Incisions covered with clean guaze. CMS intact. Pain controlled with prn dilaudid, pt is sensitive to pain meds, start with lowest doses. Up with A2. NPO diet while on BiPaP. Denies N&V. -gas, -bm. Voiding adequately to galindo catheter, lele colored. LS clear, diminished in lower lobes.

## 2020-02-02 NOTE — PLAN OF CARE
Discharge Planner PT   Patient plan for discharge: not stated  Current status: PT orders received, eval completed, treatment initiated. Pt is POD #1 IM nailing after fall at home with R femur fracture. PMH significant for HTN, hypothyroidism, falls, cognitive impairment. Pt with recent hospitalization 1/7-1/9 for R shoulder pain after fall, was discharged home with home PT. Pt oriented to self only at time of eval ; per chart at baseline pt lives in [place] & ambulates with 4WW.    Currently, pt received supine in bed. Pt pleasant & cooperative during session; reoriented to place & situation.Transport arrived during session for pt to move to 55, session shorted. Performed supine>sit with ModAx2. Once at EOB, pt moving to stand, needing multiple cues to wait. Sit>stand with FWW and Greg, CGA to maintain standing. Completed stand pivot transfer with ModAx2 to transport stretcher. Pt able to hold static standing with Greg but needs ModA x2 when stepping/pivoting. MaxAx2 sit>supine. Pt supine with transport aide upon departure of PT.     Barriers to return to prior living situation: lives alone at [facility], current level of assist, not able to ambulate this date  Recommendations for discharge: TCU, may benefit from  TCU  Rationale for recommendations: Pt is below baseline mobility, currently needing Ax2 for pivot transfer. Pt will benefit from continued skilled therapy in TCU to progress safety and independence with mobility toward baseline prior to returning home.        Entered by: Mago Weir 02/02/2020 11:31 AM

## 2020-02-02 NOTE — PROGRESS NOTES
"   02/02/20 1115   Quick Adds   Type of Visit Initial PT Evaluation   Living Environment   Lives With alone;facility resident   Living Arrangements assisted living   Home Accessibility no concerns   Living Environment Comment  Pt oriented to self only at time of eval ; per chart at baseline pt lives in [place] & ambulates with 4WW.   Self-Care   Usual Activity Tolerance moderate   Current Activity Tolerance fair   Equipment Currently Used at Home walker, rolling   Functional Level Prior   Ambulation 1-->assistive equipment  (4WW)   Transferring 1-->assistive equipment   Fall history within last six months yes   Number of times patient has fallen within last six months   (at least 2 since January per chart)   Prior Functional Level Comment At PT eval during last hospitalization 1/8/20, pt \"very slow but independent to SBA for bed mobility, toileting and walking with 4WW >200 feet. No LOB. Slightly impulsive at times but overall knows her limitations and moves cautiously\"   General Information   Onset of Illness/Injury or Date of Surgery - Date 02/01/20   Referring Physician Ap Crisostomo MD   Patient/Family Goals Statement not stated   Pertinent History of Current Problem (include personal factors and/or comorbidities that impact the POC) Pt is POD #1 IM nailing after fall at home with R femur fracture. PMH significant for HTN, hypothyroidism, falls, cognitive impairment. Pt with recent hospitalization 1/7-1/9 for R shoulder pain after fall, was discharged home with home PT.   Precautions/Limitations fall precautions   Weight-Bearing Status - RUE weight-bearing as tolerated   General Info Comments Activity: Up with assist   Cognitive Status Examination   Orientation person  (not oriented to time, place or situation)   Level of Consciousness alert   Follows Commands and Answers Questions 75% of the time;able to follow single-step instructions   Personal Safety and Judgment impaired;impulsive   Pain Assessment   Patient " "Currently in Pain Yes, see Vital Sign flowsheet  (pt rates pain as \"medium\" at rest, no number rating given)   Integumentary/Edema   Integumentary/Edema Comments dressing on R hip appears intact; pt does demo tendency to pick at dressing during session   Posture    Posture Protracted shoulders;Forward head position;Kyphosis   Range of Motion (ROM)   ROM Comment R LE ROM limited due to pain, L LE ROM appears WFL   Strength   Strength Comments Strength not formally assessed, pt does demo LE weakness R >L with bed mobilty & transfers   Bed Mobility   Bed Mobility Comments Supine>sit with ModAx2   Transfer Skills   Transfer Comments Sit>stand with Greg and FWW   Gait   Gait Comments Able to lift R LE off floor to step forward but unable to lift L LE to clear floor with walker support & assist available   Balance   Balance Comments Needs B UE support on walker and CGA for static standing    Sensory Examination   Sensory Perception Comments not assessed   Modality Interventions   Planned Modality Interventions Cryotherapy   General Therapy Interventions   Planned Therapy Interventions balance training;bed mobility training;gait training;ROM;strengthening;stretching;transfer training;home program guidelines;progressive activity/exercise   Clinical Impression   Criteria for Skilled Therapeutic Intervention yes, treatment indicated   PT Diagnosis difficulty ambulating    Influenced by the following impairments pain, decreased strength, decreased activity tolerance, impaired balance   Functional limitations due to impairments difficulty with bed mobility, transfers, ambulation   Clinical Presentation Stable/Uncomplicated   Clinical Presentation Rationale clinical judgment   Clinical Decision Making (Complexity) Low complexity   Therapy Frequency Daily   Predicted Duration of Therapy Intervention (days/wks) 3 days   Anticipated Discharge Disposition Transitional Care Facility  (may benefit from  TCU)   Risk & Benefits of " "therapy have been explained Yes   Patient, Family & other staff in agreement with plan of care Other (comment)  (pt agreeable to PT session, no discussion on discharge recs )   Clinical Impression Comments Pt is below baseline mobility, currently needing Ax2 for pivot transfer. Pt will benefit from continued skilled therapy in TCU to progress safety and independence with mobility toward baseline prior to returning home.    [facility] TM \"6 Clicks\"   2016, Trustees of [facility], under license to [employer].  All rights reserved.   6 Clicks Short Forms Basic Mobility Inpatient Short Form   [facility] AM-PAC  \"6 Clicks\" V.2 Basic Mobility Inpatient Short Form   1. Turning from your back to your side while in a flat bed without using bedrails? 3 - A Little   2. Moving from lying on your back to sitting on the side of a flat bed without using bedrails? 2 - A Lot   3. Moving to and from a bed to a chair (including a wheelchair)? 2 - A Lot   4. Standing up from a chair using your arms (e.g., wheelchair, or bedside chair)? 3 - A Little   5. To walk in hospital room? 1 - Total   6. Climbing 3-5 steps with a railing? 1 - Total   Basic Mobility Raw Score (Score out of 24.Lower scores equate to lower levels of function) 12   Total Evaluation Time   Total Evaluation Time (Minutes) 6     "

## 2020-02-02 NOTE — PLAN OF CARE
Dx: Right hip femur fracture. Hx: left hip fracture 2019. Surg: ORIF femur fracture. VSS ex on intermittnet BiPaP. Now on Oxy-mask 10L sating 98%. Tele: NSR. A/O to self, needs reorienting at times. Incisions covered with clean guaze. CMS intact. Pain controlled with prn dilaudid, pt is sensitive to pain meds, start with lowest doses. Up with A2. NPO diet while on BiPaP. Denies N&V. -gas, -bm. Voiding adequately to galindo catheter. LS clear, diminished in lower lobes.

## 2020-02-02 NOTE — PROGRESS NOTES
[facility] [place]    Hospitalist Progress Note    Assessment & Plan   Gennaro Walton is a 89-year-old female with past medical history significant for mild cognitive impairment, hypertension, hypothyroidism, and history of falls as well as some recent shoulder pain, who presents to the emergency room for evaluation after a fall which resulted in right hip pain.  She was found to have a hip fracture.  She came to the hospital today for ongoing evaluation and care.     1.  Right intertrochanteric hip fracture:  Sustained after what sounds to be a mechanical fall. Now s/p Intramedullary nailing, right proximal femur fracture on 2/1/20. Went well. Orthopedics following.     2.  Hypertension:  Blood pressures are elevated today, but suspect this is secondary in the setting of pain with her hip fracture.  Will not make significant adjustments today given some normal BPs, will reassess tomorrow.     3.  Hypothyroidism:  Chronic and stable on levothyroxine.  This can be continued.     4.  Shoulder pain secondary to injury:  She underwent an evaluation with an MRI earlier today per her PCP's recommendations.  I am not able to access those results.  For now, continue symptomatic management with Lidoderm patch as previously prescribed.      Deep venous thrombosis prophylaxis:  per ortho SCDs.      CODE STATUS:  DNR/DNI, as confirmed on POLST provided from her assisted living facility as well as her daughter at bedside.     Dispo: Will need TCU, at least 1-2 days.    Ap Crisostomo MD   Text Page (7am to 6pm)    Interval History   Patient seen and examined.  No acute events over night.  No fevers or chills. No chest pain or SOB. Answered patient questions. Now s/p Intramedullary nailing, right proximal femur fracture on 2/1/20. Went well. Orthopedics following.     -Data reviewed today: I reviewed all new labs and imaging results over the last 24 hours. I personally reviewed the EKG tracing showing SR.    Physical Exam    Temp: 98.6  F (37  C) Temp src: Axillary BP: (!) 151/63 Pulse: 71 Heart Rate: 86 Resp: 21 SpO2: (!) 89 % O2 Device: Nasal cannula Oxygen Delivery: 6 LPM  Vitals:    01/31/20 1320 01/31/20 1630 02/01/20 0729   Weight: 45.4 kg (100 lb) 42.6 kg (94 lb) 42.6 kg (93 lb 15.7 oz)     Vital Signs with Ranges  Temp:  [97.3  F (36.3  C)-99.3  F (37.4  C)] 98.6  F (37  C)  Pulse:  [64-82] 71  Heart Rate:  [61-86] 86  Resp:  [0-29] 21  BP: (129-166)/() 151/63  FiO2 (%):  [40 %] 40 %  SpO2:  [89 %-100 %] 89 %  I/O last 3 completed shifts:  In: 2780 [P.O.:500; I.V.:2280]  Out: 1950 [Urine:1900; Blood:50]    GENERAL:  The patient is a frail, elderly-appearing female lying quietly on the gurney.  She does occasionally open her eyes and look at me and smile, but does not participate any further on my visit.  She is in no acute distress.   HEENT:  Pupils equal, round and reactive to light.  Extraocular movements intact.  Mucous membranes are moist.   CARDIOVASCULAR:  Heart rhythm regular, no murmurs, gallops, rubs.  Pulses are +2 and symmetric in bilateral upper extremities.  There is no extremity edema.   RESPIRATORY:  Lungs are clear to auscultation bilaterally, free of rales or rhonchi, no increased work of breathing or accessory muscle use.   ABDOMEN:  Soft, nontender, nondistended, positive bowel sounds throughout.   INTEGUMENTARY:  Warm and dry, no rashes, jaundice or ecchymosis.     Medications     lactated ringers 100 mL/hr at 02/02/20 0425     - MEDICATION INSTRUCTIONS -       - MEDICATION INSTRUCTIONS -       sodium chloride 75 mL/hr at 01/31/20 1740       [START ON 2/5/2020] acetaminophen  1,000 mg Oral BID     acetaminophen  975 mg Oral Q8H     amLODIPine  2.5 mg Oral Daily     aspirin  325 mg Oral Daily     levothyroxine  88 mcg Oral Daily     Lidocaine  1 patch Transdermal Q24H     lidocaine   Transdermal Q8H     losartan  50 mg Oral Daily     oxybutynin ER  10 mg Oral At Bedtime     senna-docusate  1 tablet  Oral BID    Or     senna-docusate  2 tablet Oral BID     sodium chloride (PF)  3 mL Intracatheter Q8H       Data   Recent Labs   Lab 02/02/20  0454 02/01/20  0632 01/31/20  1332   WBC 6.6 7.4 7.0   HGB 8.3* 9.8* 12.1   * 102* 101*    203 265    137 138   POTASSIUM 3.9 3.8 4.1   CHLORIDE 107 106 105   CO2 29 29 27   BUN 11 12 9   CR 0.43* 0.48* 0.57   ANIONGAP 2* 2* 6   LORETA 8.3* 8.3* 9.0   GLC 94 99 110*       Imaging:   Recent Results (from the past 24 hour(s))   XR Surgery JUAN L/T 5 Min Fluoro w Stills    Narrative    XR SURGERY JUAN FLUORO LESS THAN 5 MIN W STILLS 2/1/2020 1:00 PM     HISTORY: Right ORIF hip    NUMBER OF IMAGES ACQUIRED: 2    VIEWS: 1    FLUOROSCOPY TIME: 1      Impression    IMPRESSION: Intraprocedural spot films demonstrate intramedullary nail  and screw fixation of the proximal right femur fracture with improved  alignment. Please reference the surgical report for further details.    WES GREER MD

## 2020-02-02 NOTE — PROGRESS NOTES
Gennaro Walton  2020  POD # 1 s/p Right IMN  Admit Date:  2020      Doing well.  Clean wound without signs of infection.  Pain well-controlled.  Objective:  Blood pressure (!) 151/63, pulse 71, temperature 98.6  F (37  C), temperature source Axillary, resp. rate 21, height 1.524 m (5'), weight 42.6 kg (93 lb 15.7 oz), SpO2 (!) 89 %.    Temperatures:  Current - Temp: 98.6  F (37  C); Max - Temp  Av.5  F (36.9  C)  Min: 97.3  F (36.3  C)  Max: 99.3  F (37.4  C)  Pulse range: Pulse  Av.4  Min: 64  Max: 82  Blood pressure range: Systolic (24hrs), Av , Min:129 , Max:166   ; Diastolic (24hrs), Av, Min:63, Max:101    Exam:  CMS: intact  alert, stable, wound ok  Dressing c/d/i  Calf s/nt  DF/PF   Communicates well with examiner    Labs:  Recent Labs   Lab Test 20  0454 20  0632 20  1332   POTASSIUM 3.9 3.8 4.1     Recent Labs   Lab Test 20  0454 20  0632 20  1332   HGB 8.3* 9.8* 12.1     Recent Labs   Lab Test 20  2030 19  1414   INR 1.03 1.02     Recent Labs   Lab Test 20  0454 20  0632 20  1332    203 265       PLAN: WBAT. AAT with assistance and walker. Aspirin for DVT ppx.

## 2020-02-03 ENCOUNTER — APPOINTMENT (OUTPATIENT)
Dept: PHYSICAL THERAPY | Facility: CLINIC | Age: 85
DRG: 481 | End: 2020-02-03
Payer: MEDICARE

## 2020-02-03 LAB
ANION GAP SERPL CALCULATED.3IONS-SCNC: 3 MMOL/L (ref 3–14)
BUN SERPL-MCNC: 10 MG/DL (ref 7–30)
CALCIUM SERPL-MCNC: 8 MG/DL (ref 8.5–10.1)
CHLORIDE SERPL-SCNC: 107 MMOL/L (ref 94–109)
CO2 SERPL-SCNC: 28 MMOL/L (ref 20–32)
CREAT SERPL-MCNC: 0.5 MG/DL (ref 0.52–1.04)
ERYTHROCYTE [DISTWIDTH] IN BLOOD BY AUTOMATED COUNT: 13.1 % (ref 10–15)
GFR SERPL CREATININE-BSD FRML MDRD: 81 ML/MIN/{1.73_M2}
GLUCOSE SERPL-MCNC: 77 MG/DL (ref 70–99)
HCT VFR BLD AUTO: 23.9 % (ref 35–47)
HGB BLD-MCNC: 7.5 G/DL (ref 11.7–15.7)
MCH RBC QN AUTO: 32.6 PG (ref 26.5–33)
MCHC RBC AUTO-ENTMCNC: 31.4 G/DL (ref 31.5–36.5)
MCV RBC AUTO: 104 FL (ref 78–100)
PLATELET # BLD AUTO: 146 10E9/L (ref 150–450)
POTASSIUM SERPL-SCNC: 3.7 MMOL/L (ref 3.4–5.3)
RBC # BLD AUTO: 2.3 10E12/L (ref 3.8–5.2)
SODIUM SERPL-SCNC: 138 MMOL/L (ref 133–144)
WBC # BLD AUTO: 5.6 10E9/L (ref 4–11)

## 2020-02-03 PROCEDURE — 97530 THERAPEUTIC ACTIVITIES: CPT | Mod: GP

## 2020-02-03 PROCEDURE — 36415 COLL VENOUS BLD VENIPUNCTURE: CPT | Performed by: INTERNAL MEDICINE

## 2020-02-03 PROCEDURE — 85027 COMPLETE CBC AUTOMATED: CPT | Performed by: INTERNAL MEDICINE

## 2020-02-03 PROCEDURE — 99232 SBSQ HOSP IP/OBS MODERATE 35: CPT | Performed by: PHYSICIAN ASSISTANT

## 2020-02-03 PROCEDURE — 25800030 ZZH RX IP 258 OP 636: Performed by: ORTHOPAEDIC SURGERY

## 2020-02-03 PROCEDURE — G0463 HOSPITAL OUTPT CLINIC VISIT: HCPCS

## 2020-02-03 PROCEDURE — 25000132 ZZH RX MED GY IP 250 OP 250 PS 637: Mod: GY | Performed by: ORTHOPAEDIC SURGERY

## 2020-02-03 PROCEDURE — 80048 BASIC METABOLIC PNL TOTAL CA: CPT | Performed by: INTERNAL MEDICINE

## 2020-02-03 PROCEDURE — 25000132 ZZH RX MED GY IP 250 OP 250 PS 637: Mod: GY | Performed by: INTERNAL MEDICINE

## 2020-02-03 PROCEDURE — 12000000 ZZH R&B MED SURG/OB

## 2020-02-03 RX ADMIN — OXYCODONE HYDROCHLORIDE 5 MG: 5 TABLET ORAL at 13:29

## 2020-02-03 RX ADMIN — OXYBUTYNIN CHLORIDE 10 MG: 10 TABLET, EXTENDED RELEASE ORAL at 22:18

## 2020-02-03 RX ADMIN — SENNOSIDES AND DOCUSATE SODIUM 2 TABLET: 8.6; 5 TABLET ORAL at 22:18

## 2020-02-03 RX ADMIN — AMLODIPINE BESYLATE 2.5 MG: 2.5 TABLET ORAL at 08:05

## 2020-02-03 RX ADMIN — SENNOSIDES AND DOCUSATE SODIUM 2 TABLET: 8.6; 5 TABLET ORAL at 08:04

## 2020-02-03 RX ADMIN — LEVOTHYROXINE SODIUM 88 MCG: 88 TABLET ORAL at 08:05

## 2020-02-03 RX ADMIN — OXYCODONE HYDROCHLORIDE 5 MG: 5 TABLET ORAL at 04:58

## 2020-02-03 RX ADMIN — LIDOCAINE 1 PATCH: 560 PATCH PERCUTANEOUS; TOPICAL; TRANSDERMAL at 08:05

## 2020-02-03 RX ADMIN — LOSARTAN POTASSIUM 50 MG: 50 TABLET, FILM COATED ORAL at 08:05

## 2020-02-03 RX ADMIN — ACETAMINOPHEN 975 MG: 325 TABLET, FILM COATED ORAL at 08:04

## 2020-02-03 RX ADMIN — ACETAMINOPHEN 650 MG: 325 TABLET, FILM COATED ORAL at 22:18

## 2020-02-03 RX ADMIN — ACETAMINOPHEN 975 MG: 325 TABLET, FILM COATED ORAL at 16:40

## 2020-02-03 RX ADMIN — SODIUM CHLORIDE, POTASSIUM CHLORIDE, SODIUM LACTATE AND CALCIUM CHLORIDE: 600; 310; 30; 20 INJECTION, SOLUTION INTRAVENOUS at 03:21

## 2020-02-03 RX ADMIN — ASPIRIN 325 MG: 325 TABLET, COATED ORAL at 08:05

## 2020-02-03 ASSESSMENT — MIFFLIN-ST. JEOR: SCORE: 762.5

## 2020-02-03 ASSESSMENT — ACTIVITIES OF DAILY LIVING (ADL)
ADLS_ACUITY_SCORE: 15.5
ADLS_ACUITY_SCORE: 15.5
ADLS_ACUITY_SCORE: 23
ADLS_ACUITY_SCORE: 15.5
ADLS_ACUITY_SCORE: 27
ADLS_ACUITY_SCORE: 23

## 2020-02-03 NOTE — PLAN OF CARE
Alert to self, pleasantly confused. VSS on 3L o2. Tele SR with BBB. CMS intact. Dressing CDI. PRN oxy for pain. Reg diet. Assist 2. Sitter. Cortez. IVF running. q2h repo.

## 2020-02-03 NOTE — PROGRESS NOTES
"[facility]  WOC Nurse Inpatient Wound Assessment   Reason for consultation: Evaluate and treat right medial calf      Assessment  Right medial calf wound due to Trauma.  Wound is clean without s/s of infection but is quiet looking wound, some depth to it, without active granulation noted.  Will use Iodosorb Gel and daily dressing changes    Treatment Plan  Right medial calf wound wound: Daily    1. Clean wound with saline or MicKlenz Spray- explore wound bed with a qtip to assess full depth of wound and \"blast\" the wound bed out with the MicroKlenz nozzle to clean wound bed of debris and old Iodosorb Gel, pat dry  2. Paint with No Sting Skin Prep (#[id number])) and allow to dry thoroughly- you will want to use the skin prep to help clean away and residue, this will help your dressing stick  3. Trim about 6\" length of NuGauze Ribbon, run through a small glob of Iodosorb Gel (#[id number]) and loosely pack into the full depth of the wound bed  4. Press a Mepilex  Border Dressing (#[id number]) to the area, making sure to conform nicely to skin curvatures   (begin placing the Mepilex at the most distal aspect first, smooth into place in an upward direction, then smooth side to side)   5. Time and date dressing change   -REPOSITION PT SIDE TO SIDE ONLY WHEN IN BED, EVERY 2 HOURS  -HEELS MUST BE KEPT ELEVATED AT ALL TIMES, USING AT LEAST 2 PILLOWS UNDER EACH CALF, ASSURING HEELS ARE FLOATING  -WHEN UP TO THE CHAIR PT NEEDS TO FULLY OFF LOAD EVERY 2 HOURS      Orders Written  Recommended provider order: n/a  WOC Nurse follow-up plan: weekly  Nursing to notify the Provider(s) and re-consult the WOC Nurse if wound(s) deteriorates or new skin concern.    Patient History  According to provider note(s):    89-year-old female with past medical history significant for mild cognitive impairment, hypertension, hypothyroidism, and history of falls as well as some recent shoulder pain, who presents to the emergency room " for evaluation after a fall which resulted in right hip pain.  She was found to have a hip fracture.  She came to the hospital today for ongoing evaluation and care.      1.  Right intertrochanteric hip fracture:  Sustained after what sounds to be a mechanical fall. Now s/p Intramedullary nailing, right proximal femur fracture on 2/1/20. Went well. Orthopedics following.      2.  Hypertension:  Blood pressures are elevated today, but suspect this is secondary in the setting of pain with her hip fracture.  Will not make significant adjustments today given some normal BPs, will reassess tomorrow.      3.  Hypothyroidism:  Chronic and stable on levothyroxine.  This can be continued.      4.  Shoulder pain secondary to injury:  She underwent an evaluation with an MRI earlier today per her PCP's recommendations.  I am not able to access those results.  For now, continue symptomatic management with Lidoderm patch as previously prescribed.   Objective Data  Containment of urine/stool: External catheter    Active Diet Order:  Orders Placed This Encounter      Regular Diet Adult    Output:   I/O last 3 completed shifts:  In: 1350 [P.O.:150; I.V.:1200]  Out: 1125 [Urine:1125]    Risk Assessment:   Sensory Perception: 3-->slightly limited  Moisture: 4-->rarely moist  Activity: 2-->chairfast  Mobility: 3-->slightly limited  Nutrition: 3-->adequate  Friction and Shear: 2-->potential problem  Cem Score: 17                          Labs:   Recent Labs   Lab 02/03/20  0715   HGB 7.5*   WBC 5.6       Physical Exam  Skin inspection: focused right medial calf    Wound Location:  Right medial calf wound  Date of last photo:  N/a- (Haikeffie not working on my phone)  Wound History: per pt she fell and injured her leg  Measurements (length x width x depth, in cm):  4.4cm x 2cm x up to about 1.5cm   Wound Base: no granulation tissue noted, healthy tissue from fat, to some connective tissue noted, no muscle noted, wound does not cut into  the muscle  Tunneling: no tunneling or undermining noted.  Need to fully probe to appreciate wound bed, spreading the depth apart a bit  Palpation of the wound bed: normal   Periwound skin: intact and an no erythema  Color: normal and consistent with surrounding tissue  Temperature: normal   Drainage: small  Description of drainage: serosanguinous  Odor: none  Pain: none at rest, moderate with cares/ packing    Interventions  Current support surface: Standard  Atmos Air mattress  Current off-loading measures: Pillows  Visual inspection of wound(s) completed  Wound Care: done per plan of care  Supplies: reviewed, gathered, placed at the bedside, discussed with RN and discussed with patient  Education provided: importance of repositioning, wound progress, Infection prevention , Moisture management, Nail care and Off-loading pressure  Discussed plan of care with Patient and Family    Josie Quinones RN

## 2020-02-03 NOTE — PLAN OF CARE
VSS, CMS intact. Up with strong assist of 2 and walker to BSC. Pain managed with tylenol and oxycodone. Dressing c/d/I. Dressing changed on left shin wound by [facility] nurse. Blanchable redness on mid spine, mepilex applied.  Cortez discontinued at 1300, incontinent x 1. Pleasantly confused, orient to person and occ to place. Long arm sit, sitter discontinued at 1600

## 2020-02-03 NOTE — PROGRESS NOTES
Cross Cover Note    Called by RN given abnormal UA (large leukocytes) and nurse notes to be more confused according family.  UC pending. Has galindo in place. Will add on ceftriaxone until culture returns (as on prophylactic antibiotics for orthopedic surgery with last dose given 2/2 in AM).    Susan Sparks PA-C

## 2020-02-03 NOTE — PLAN OF CARE
Alert but oriented to self only. Has baseline dementia. Daughter noticed pt to be more confused today and wondering if UTI might be driving this. On call MD notified. Put on Q 24 hr Ceftriaxone. Pt's IV has infiltrated, Resource contacted and will help with IV insertion. Evening nurse to follow up. On IVF at 100mL/hr. Cortez with adequate UO. VSS on 3L Oxymask now. Was on 6L at the time of arrival from [facility].POD 1 of right hip, dressing c/d/I. Pt also has a gash wound on the left leg. RN assessed the wound and the gash appears to be pretty deep with fat layer exposed. Please let the rounding doc know about this tomorrow as family is concerned. On regular diet. Tele has been SR with BBB. Mepilax on the coccyx for protection. Please turn and repo pt as needed. Otherwise no pain at rest. Pain managed with scheduled tylenol and ice this shift.Continue to monitor.

## 2020-02-03 NOTE — PROVIDER NOTIFICATION
MD Notification    Notified Person: MD    Notified Person Name: ACE Brown    Notification Date/Time: 2/2/2020 1814    Notification Interaction: On call answering service    Purpose of Notification: UA positive, culture pending. Not on any abx. Pt more confused. Not sure if sundowning or due to UTI. Daughter at the bedside concerned.    Orders Received:    Comments:

## 2020-02-03 NOTE — PLAN OF CARE
Discharge Planner PT   Patient plan for discharge: unable to state  Current status: Patient with complaints of pain at rest but agreeable to PT. Supine to sit completed with max assist +1, increased time. SpO2 = 87% sitting edge of bed on room air, RN had patient placed back on 1L O2 and SpO2 > 90% during remainder of activity. Sit <> stand completed x 3 reps with FWW, gait belt and min assist +1, increased time. Patient unable to weight bear on R LE due to pain, unable to ambulate. Sit to supine with max assist +2. Patient left in supine with sitter present and alarm activated.   Barriers to return to prior living situation: lives alone at [facility], unable to ambulate, pain   Recommendations for discharge: TCU  Rationale for recommendations: Patient unable to ambulate at this time and presents with mobility below baseline. She will benefit from skilled PT intervention at TCU to progress functional mobility prior to return home.        Entered by: Beatriz Owen 02/03/2020 11:26 AM

## 2020-02-03 NOTE — PROGRESS NOTES
[facility]  Admit Date:  2020  POD 2 Days Post-Op  S/P Procedure(s):  OPEN REDUCTION INTERNAL FIXATION RIGHT INTERTROCHANTERIC FEMUR FRACTURE    Patient resting comfortably in bed.    Pain controlled.  Tolerating oral intake.    Denies nausea or vomiting  Denies chest pain or shortness of breath  No events overnight.     Alert and orient to person  Vital Sign Ranges  Temperature Temp  Av.9  F (36.6  C)  Min: 97.6  F (36.4  C)  Max: 98.3  F (36.8  C)   Blood pressure Systolic (24hrs), Av , Min:120 , Max:138        Diastolic (24hrs), Av, Min:57, Max:63      Pulse Pulse  Av.8  Min: 55  Max: 71   Respirations Resp  Av.4  Min: 16  Max: 18   Pulse oximetry SpO2  Av.7 %  Min: 92 %  Max: 99 %       Dressing is clean, dry, and intact.   Minimal erythema of the surrounding skin.   Bilateral calves are soft, non-tender.  Bilateral lower extremity is NVI.  Sensation intact bilateral lower extremities  5/5 motor with resisted dorsi and plantar flexion bilaterally  +Dp pulse    We are aware that she has a rotator cuff tear, no change in WB status, ok to use UE as tolerated    Labs:  Recent Labs   Lab Test 20  0715 20  0454 20  0632   POTASSIUM 3.7 3.9 3.8     Recent Labs   Lab Test 20  0715 20  0454 20  0632   HGB 7.5* 8.3* 9.8*     Recent Labs   Lab Test 20  2030 19  1414   INR 1.03 1.02     Recent Labs   Lab Test 20  0715 20  0454 20  0632   * 156 203       A/P  1. S/p intertrochanteric femur fracture ORIF   Continue ASA for DVT prophylaxis.     Mobilize with PT/OT    WBAT with walker.     Continue current pain regiment.   Leave dressing intact    2. Disposition   Anticipate d/c to TCU based on progress with PT and medical clearance.    Tonja Hall PA-C

## 2020-02-04 ENCOUNTER — APPOINTMENT (OUTPATIENT)
Dept: PHYSICAL THERAPY | Facility: CLINIC | Age: 85
DRG: 481 | End: 2020-02-04
Payer: MEDICARE

## 2020-02-04 LAB
ANION GAP SERPL CALCULATED.3IONS-SCNC: <1 MMOL/L (ref 3–14)
BUN SERPL-MCNC: 9 MG/DL (ref 7–30)
CALCIUM SERPL-MCNC: 8.1 MG/DL (ref 8.5–10.1)
CHLORIDE SERPL-SCNC: 105 MMOL/L (ref 94–109)
CO2 SERPL-SCNC: 32 MMOL/L (ref 20–32)
CREAT SERPL-MCNC: 0.46 MG/DL (ref 0.52–1.04)
ERYTHROCYTE [DISTWIDTH] IN BLOOD BY AUTOMATED COUNT: 13.1 % (ref 10–15)
GFR SERPL CREATININE-BSD FRML MDRD: 84 ML/MIN/{1.73_M2}
GLUCOSE SERPL-MCNC: 86 MG/DL (ref 70–99)
HCT VFR BLD AUTO: 22.6 % (ref 35–47)
HGB BLD-MCNC: 7.6 G/DL (ref 11.7–15.7)
MCH RBC QN AUTO: 33.9 PG (ref 26.5–33)
MCHC RBC AUTO-ENTMCNC: 33.6 G/DL (ref 31.5–36.5)
MCV RBC AUTO: 101 FL (ref 78–100)
PLATELET # BLD AUTO: 155 10E9/L (ref 150–450)
POTASSIUM SERPL-SCNC: 3.7 MMOL/L (ref 3.4–5.3)
RBC # BLD AUTO: 2.24 10E12/L (ref 3.8–5.2)
SODIUM SERPL-SCNC: 137 MMOL/L (ref 133–144)
WBC # BLD AUTO: 5.1 10E9/L (ref 4–11)

## 2020-02-04 PROCEDURE — 12000000 ZZH R&B MED SURG/OB

## 2020-02-04 PROCEDURE — 80048 BASIC METABOLIC PNL TOTAL CA: CPT | Performed by: PHYSICIAN ASSISTANT

## 2020-02-04 PROCEDURE — G0463 HOSPITAL OUTPT CLINIC VISIT: HCPCS

## 2020-02-04 PROCEDURE — 99232 SBSQ HOSP IP/OBS MODERATE 35: CPT | Performed by: PHYSICIAN ASSISTANT

## 2020-02-04 PROCEDURE — 25000132 ZZH RX MED GY IP 250 OP 250 PS 637: Mod: GY | Performed by: ORTHOPAEDIC SURGERY

## 2020-02-04 PROCEDURE — 25000132 ZZH RX MED GY IP 250 OP 250 PS 637: Mod: GY | Performed by: INTERNAL MEDICINE

## 2020-02-04 PROCEDURE — 85027 COMPLETE CBC AUTOMATED: CPT | Performed by: PHYSICIAN ASSISTANT

## 2020-02-04 PROCEDURE — 97530 THERAPEUTIC ACTIVITIES: CPT | Mod: GP

## 2020-02-04 PROCEDURE — 36415 COLL VENOUS BLD VENIPUNCTURE: CPT | Performed by: PHYSICIAN ASSISTANT

## 2020-02-04 RX ORDER — OXYCODONE HYDROCHLORIDE 5 MG/1
5 TABLET ORAL
Qty: 30 TABLET | Refills: 0 | Status: SHIPPED | OUTPATIENT
Start: 2020-02-04 | End: 2020-02-06

## 2020-02-04 RX ORDER — ASPIRIN 325 MG
325 TABLET, DELAYED RELEASE (ENTERIC COATED) ORAL DAILY
Qty: 30 TABLET | Refills: 0 | DISCHARGE
Start: 2020-02-05 | End: 2020-11-13

## 2020-02-04 RX ORDER — AMOXICILLIN 250 MG
2 CAPSULE ORAL 2 TIMES DAILY
Qty: 40 TABLET | Refills: 0 | DISCHARGE
Start: 2020-02-04 | End: 2020-11-13

## 2020-02-04 RX ORDER — ONDANSETRON 4 MG/1
4 TABLET, ORALLY DISINTEGRATING ORAL EVERY 6 HOURS PRN
Qty: 10 TABLET | Refills: 0 | DISCHARGE
Start: 2020-02-04 | End: 2020-03-10

## 2020-02-04 RX ADMIN — AMLODIPINE BESYLATE 2.5 MG: 2.5 TABLET ORAL at 08:43

## 2020-02-04 RX ADMIN — ACETAMINOPHEN 650 MG: 325 TABLET, FILM COATED ORAL at 21:05

## 2020-02-04 RX ADMIN — OXYBUTYNIN CHLORIDE 10 MG: 10 TABLET, EXTENDED RELEASE ORAL at 21:05

## 2020-02-04 RX ADMIN — ASPIRIN 325 MG: 325 TABLET, COATED ORAL at 08:35

## 2020-02-04 RX ADMIN — ACETAMINOPHEN 975 MG: 325 TABLET, FILM COATED ORAL at 11:56

## 2020-02-04 RX ADMIN — LOSARTAN POTASSIUM 50 MG: 50 TABLET, FILM COATED ORAL at 08:35

## 2020-02-04 RX ADMIN — SENNOSIDES AND DOCUSATE SODIUM 1 TABLET: 8.6; 5 TABLET ORAL at 21:06

## 2020-02-04 RX ADMIN — OXYCODONE HYDROCHLORIDE 5 MG: 5 TABLET ORAL at 02:33

## 2020-02-04 RX ADMIN — LIDOCAINE 1 PATCH: 560 PATCH PERCUTANEOUS; TOPICAL; TRANSDERMAL at 08:45

## 2020-02-04 RX ADMIN — ACETAMINOPHEN 975 MG: 325 TABLET, FILM COATED ORAL at 04:13

## 2020-02-04 RX ADMIN — LEVOTHYROXINE SODIUM 88 MCG: 88 TABLET ORAL at 08:35

## 2020-02-04 RX ADMIN — SENNOSIDES AND DOCUSATE SODIUM 1 TABLET: 8.6; 5 TABLET ORAL at 08:35

## 2020-02-04 ASSESSMENT — MIFFLIN-ST. JEOR: SCORE: 763.5

## 2020-02-04 ASSESSMENT — ACTIVITIES OF DAILY LIVING (ADL)
ADLS_ACUITY_SCORE: 29
ADLS_ACUITY_SCORE: 29
ADLS_ACUITY_SCORE: 27
ADLS_ACUITY_SCORE: 29

## 2020-02-04 NOTE — PLAN OF CARE
Pt alert to self only overnight; pleasant and cooperative. CMS intact; pulses +2. Guaze dressing CDI. BP elevated, and pt needed 1L oxymask (due to mouth breathing while asleep) overnight, with O2 sats dipping to around 86-89% on RA. Up with heavy A2 ww/GB stand and pivot. Voiding adequately; incontinent of bladder x2. Pt denies pain at rest but 7/10 with movement; scheduled Tylenol given and PRN oxy 5mg x1. Discharge pending TCU.

## 2020-02-04 NOTE — CONSULTS
Care Transition Initial Assessment - PRITESH JONAS met with pt and spoke with pt's daughter, Caron by phone     Active Problems:    Closed right hip fracture (H)    Hip fracture requiring operative repair (H)       DATA  Lives With: alone   Living Arrangements: assisted living at [facility]     Description of Support System: Supportive  Who is your support system?: Children  Pt states that she is recently  about 2 years ago but pt believes it was this past January. Her and her  Matthew, had been  since 1955.. He  suddenly from heart and lung complications. Pt stills believes that she is in [place]  Pt lives at [place] but she is currently unaware of where she is currently and was surprized when told she was at  [facility] that she lives      Identified issues/concerns regarding health management: Pt had a fall at her AL apartment and sustained a hip fracture. Pt had surgical repair.  Pt had been recently hospitalized in January for shoulder pain. Pt has sold her home and moved into [facility] recemtly.  PRITESH spoke with pt and left her list of TCU's off of Medicare.gov website that lists the ratings of facilities.  PRITESH called pt;s daughter for suggestions also as pt states she would seek TCU in [facility] area near daughter.    ASSESSMENT  Cognitive Status:  awake, alert and disoriented to place.  Concerns to be addressed: TCU placement .     PLAN  Financial costs for the patient includes: TBD  Patient given options and choices for discharge. Pt given medicare.gov TCU list.  Patient/family is agreeable to the plan?  Yes. TCU choices given and referrals sent to [facility] (pt has been there previously) and [facility].  Pt's daughter knows that pt may be ready for discharge as soon as tomorrow and requests that she be contacted with discharge plan in time for her to be with pt at time of discharge to ease transition.    VICKI Jim  Critical access hospital  [phone]

## 2020-02-04 NOTE — PROGRESS NOTES
[facility]    Medicine Progress Note - Hospitalist Service       Date of Admission:  1/31/2020  Assessment & Plan   Gennaro Walton is a 89-year-old female with past medical history significant for mild cognitive impairment, hypertension, hypothyroidism, and history of falls as well as some recent shoulder pain, who presents to the emergency room for evaluation after a fall which resulted in right hip pain.  She was found to have a femur fracture.     Right intertrochanteric femur fracture s/p ORIF 2/1/20  Sustained after what sounds to be a mechanical fall. Now s/p Intramedullary nailing, right proximal femur fracture on 2/1/20. EBL 25mL.   - Orthopedics following  - PT recommending TCU vs memory care TCU, SW on board to assist  - Aggressive pulmonary toilet, Incentive Spirometry use Q 1 hour** Max encouragement from staff please  - Once patient weans off of Oxygen and Hemoglobin continues to stabilize she will be ready for discharge to TCU. Alternatively she could to go TCU with 1L NC however would like to give pt the chance to wean off with pulmonary hygiene     Left medial anterior leg laceration  ~4cm in length, no surrounding erythema. It seems this happened during the fall. No sutured in the ED. Open to air and subcutaneous layer exposed.  - >48' since presentation so unable to close   - Monitor wound healing, irrigate with NS and cover with cause, WOCN on board appreciate assistance - please see their note for discharge orders     Acute blood loss anemia   Baseline Hgb 12 range. Post surgery downtrended POD #0 9.8  - Monitor Hgb closely, some dilutional component  - Recommend transfusion if Hgb <7  - ASA 325mg/d for DVT ppx, monitor Hgb closely, continues to downtrend, monitor for symptoms of SOB, lightheadedness, etc...  - If Hgb downtrends again 2/4/20 will try ASA 81mg BID rather than full strength ASA - d/w Orthopedics 2/4    Recent Labs   Lab 02/04/20  0623 02/03/20  0715 02/02/20  [id number]  02/01/20  0632 01/31/20  1332   HGB 7.6* 7.5* 8.3* 9.8* 12.1     Shoulder pain secondary to injury with rotator cuff and biceps tendon tears s/p MRI 1/31/20 revealing:  Complete, full-thickness tears of the supraspinatus and infraspinatus tendons. Severe atrophy of the supraspinatus and infraspinatus muscles. Full-thickness tearing of a portion of the subscapularis tendon. Moderate subscapularis muscle atrophy. Advanced rotator cuff arthropathy. Glenohumeral joint effusion with synovitis and joint bodies. Complete tear of the long head biceps tendon. 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 She underwent an evaluation with an MRI of Rt shoulder at [facility] earlier day of admit which showed the above results.   - Lidoderm patch as previously prescribed  - Ortho aware of rotator cuff tear, no change in WB status, ok to use UE as tolerated     Hypertension:    BP are elevated postoperative but suspect this is secondary in the setting of pain with her hip fracture. BPs now improved.  - Monitor, avoid starting new antihypertensives in very frail woman with advanced age     Abnormal UA without UTI  UA Large LE, , mod blood, neg nitrites.  - Cortez discontinued 2/3/20  - Started Ceftriaxone 1gm Q 24' , discontinue due to neg UCx     Hypothyroidism:  Chronic and stable on levothyroxine.  This can be continued.     Diet: Regular Diet Adult  Advance Diet as Tolerated    DVT Prophylaxis: Defer to primary service - ASA 325mg/d  Cortez Catheter: not present  Code Status: DNR/DNI      Disposition Plan   Expected discharge: 1-2 days, recommended to transitional care unit once safe disposition plan/ TCU bed available and Weaned off O2 and Hgb stable.  Entered: Smiley Powell PA-C 02/04/2020, 1:15 PM       The patient's care was discussed with the Attending Physician, Dr. Davis, Bedside Nurse, Patient and Orthopedic Consultant.    Smiley AlcantarRushing)SHANICE  Hospitalist Service  [facility]  Hospital    ______________________________________________________________________    Interval History   Feeling ok today. Some Rt shoulder pain. No N/V. Taking PO. Tired today, has just worked with PT. Remains on 1L NC and NOT using her incentive spirometry, demonstrated use with extremely poor effort. Cortez removed yesterday.    Data reviewed today: I reviewed all medications, new labs and imaging results over the last 24 hours. I personally reviewed no images or EKG's today.    Physical Exam   Vital Signs: Temp: 97.9  F (36.6  C) Temp src: Oral BP: (!) 146/71 Pulse: 63 Heart Rate: 62 Resp: 16 SpO2: 91 % O2 Device: Nasal cannula Oxygen Delivery: 1 LPM  Weight: 99 lbs 10.37 oz    General: Awake, tired and somewhat lethargic, elderly woman who appears stated age, wakes when spoken to. Looks comfortable laying in bed. No acute distress.  HEENT: Normocephalic, atraumatic. Extraocular movements intact.   Respiratory: Clear to auscultation bilaterally, no rales, wheezing, or rhonchi to anterolateral fields. 1L NC in place for comfort, dips to 87-88% if removed. Very poor effort with IS use.  Cardiovascular: Regular rate and rhythm, +S1 and S2, no murmur auscultated. No peripheral edema.   Gastrointestinal: Soft, non-tender, non-distended. Bowel sounds present.   Skin: Bandage to left anterior medial shin for healing laceration, c/d/i.  Musculoskeletal: No joint swelling, erythema or tenderness. Moves all extremities equally. 5/5 dorsi/plantar flexion to LLE, RLE 4/5. Rt hip with surgical bandages, c/d/i.  Neurologic: AAO x2-3. Cranial nerves 2-12 grossly intact, normal strength and sensation.  Psychiatric: Pleasantly confused, answers questions appropriately.    Data   Recent Labs   Lab 02/04/20  0623 02/03/20  0715 02/02/20  0454   WBC 5.1 5.6 6.6   HGB 7.6* 7.5* 8.3*   * 104* 103*    146* 156    138 138   POTASSIUM 3.7 3.7 3.9   CHLORIDE 105 107 107   CO2 32 28 29   BUN 9 10 11   CR 0.46* 0.50*  0.43*   ANIONGAP <1* 3 2*   LORETA 8.1* 8.0* 8.3*   GLC 86 77 94     No results found for this or any previous visit (from the past 24 hour(s)).  Medications     - MEDICATION INSTRUCTIONS -       - MEDICATION INSTRUCTIONS -       sodium chloride 75 mL/hr at 01/31/20 1740       [START ON 2/5/2020] acetaminophen  1,000 mg Oral BID     acetaminophen  975 mg Oral Q8H     amLODIPine  2.5 mg Oral Daily     aspirin  325 mg Oral Daily     levothyroxine  88 mcg Oral Daily     Lidocaine  1 patch Transdermal Q24H     lidocaine   Transdermal Q8H     losartan  50 mg Oral Daily     oxybutynin ER  10 mg Oral At Bedtime     senna-docusate  1 tablet Oral BID    Or     senna-docusate  2 tablet Oral BID     sodium chloride (PF)  3 mL Intracatheter Q8H

## 2020-02-04 NOTE — PROGRESS NOTES
WOC nurse evaluated the mid spine. Ordered Z-flow as patient manage to wiggle out on her back and also ordered pulsate mattress. Hospitalist notified.

## 2020-02-04 NOTE — PLAN OF CARE
Discharge Planner PT   Patient plan for discharge: not stated  Current status:     Focus of session on functional mobility. Pt with increased pain this AM. Assist to roll to L side with mod A , pt able to maintain sidelying on L for nursing cares with CGA. Supine > sit with max A x 2. Pt able to scoot towards EOB with inc time and min A x 1. STS with FWW and mod A x 2, slow speed, difficulty with transitioning RUE onto walker. Cues for upright posture. Standing tolerance ~ 1 min, able to slide ft toward HOB, unable to tolerate much WB through RLE. No RLE buckling in standing. Sit > supine with max A x 2. Total A x 2 positioning in bed. VSS during pressure. Upon return to supine pt reports inc discomfort on R side of chest. RN notified and aware     Barriers to return to prior living situation: Unable to ambulate, high fall risk, functional weakness     Recommendations for discharge: TCU  Rationale for recommendations: Pt will benefit from continued skilled PT at TCU to improve strength, balance, gait, endurance to improve IND with functional mobility as pt currently unable to transfer or ambulate          Entered by: Yocasta Rivera 02/04/2020 12:05 PM

## 2020-02-04 NOTE — PROGRESS NOTES
Spiritual Health  55     visited Pt per request. Pt said she thinks she is doing well. She says she will be moving out of her home of 56 years to live near her daughter in [place]. Her  passed away unexpectedly in December and she says it has been difficult having him gone. She has been [employer]ec[employer]ism for many years and thinks she will find a new Taoist in the [place] area to go to. She also has two granddaughters who are twins who she speaks of fondly. She says her maribel hasn't been a source of support for her but she expects it will be when she gets older.     SH listened and encouraged reflective conversation.     Pt is coping well with health condition and finds support in her family.     SH will follow up with Pt later this week.     Vicky Farrell  Chaplain Resident

## 2020-02-04 NOTE — PROGRESS NOTES
Spiritual Health  55    SH attempted to visit Pt per request. SH attempted to visit Pt several times but Pt was unavailable during each attempt.     SH will attempt to visit Pt Tuesday 2/4/20.     Vicky Farrell  Chaplain Resident

## 2020-02-04 NOTE — PLAN OF CARE
91-92% 1LNC, Mid 80s in RA. Encourage IS. Other vitals stable. CMS good. Up with 2 and walker . Up in chair of couple of hours, tolerated fine. Incontinent of bladder, purewick in place. Up on BSC X 2  and voided small amount. Non blanchable PU mid spine, WOC consulted. Repositioned every 2 hours but pt. Managed to wiggle out on her back. Z-flow used  for positioning. Pulsate mattress ordered. Confused, orient to self only. Possible discharge to TCU tomorrow . Will continue to monitor.

## 2020-02-04 NOTE — PROGRESS NOTES
WOC nurse consult ordered for blanchable redness and small top layer skin off mid spine. Mepilex dressing changed.

## 2020-02-04 NOTE — PROGRESS NOTES
Orthopedic [facility]  [facility]  Admit Date:  2020  POD 3 Days Post-Op  S/P Procedure(s):  OPEN REDUCTION INTERNAL FIXATION RIGHT INTERTROCHANTERIC FEMUR FRACTURE    Patient resting comfortably in bed.    Pain controlled at rest  Tolerating oral intake.    Denies nausea or vomiting  Denies chest pain or shortness of breath  No events overnight.     Alert and orient to person  Vital Sign Ranges  Temperature Temp  Av  F (36.7  C)  Min: 97.9  F (36.6  C)  Max: 98.3  F (36.8  C)   Blood pressure Systolic (24hrs), Av , Min:105 , Max:150        Diastolic (24hrs), Av, Min:22, Max:71      Pulse Pulse  Av  Min: 59  Max: 63   Respirations Resp  Av.2  Min: 16  Max: 18   Pulse oximetry SpO2  Av.1 %  Min: 87 %  Max: 94 %       Dressing is clean, dry, and intact.   Minimal erythema of the surrounding skin.   Bilateral calves are soft, non-tender.  Bilateral lower extremity is NVI.  Sensation intact bilateral lower extremities  Active dorsi and plantar flexion bilaterally  +Dp pulse    Labs:  Recent Labs   Lab Test 20  0623 20  0715 20  0454   POTASSIUM 3.7 3.7 3.9     Recent Labs   Lab Test 20  0623 20  0715 20  0454   HGB 7.6* 7.5* 8.3*     Recent Labs   Lab Test 20  2030 19  1414   INR 1.03 1.02     Recent Labs   Lab Test 20  0623 20  0715 20  0454    146* 156       A/P  1. S/p right intertrochanteric femur fracture ORIF   Continue ASA for DVT prophylaxis.     Mobilize with PT/OT    WBAT with walker.     Continue current pain regiment.   Leave dressing intact    2. Disposition   Anticipate d/c to TCU based on medical clearance.    Tonja Hall PA-C

## 2020-02-04 NOTE — PROGRESS NOTES
[facility]  WO Nurse Inpatient Wound Assessment   Reason for consultation: Evaluate and treat posterior midline/right spine PI      Assessment  Posterior midline/ right spine acute wound due to Pressure Injury , stage 2  Wound bed is nonblanchable, bright red erythema with an area of slightly moist, red dermal tissue in the center, clean without obvious s/s of infection.  Very protuberant spine, will need to be vigilant about positioning side to side only and will use Mepilex Border Dressings for dressings, RN to order low air loss mattress and will try using z flow pads for positioning.     Treatment Plan  Midline spine wound: Daily    1. Clean wound with saline or MicroKlenz Spray, pat dry  2. Paint periwound tissue with No Sting Skin Prep (#[id number]) and allow to dry thoroughly- do not skip using the skin prep- will help dressings to stick better.   3. Press a Mepilex  Border Dressing (#[id number]) to the area, making sure to conform nicely to skin curvatures   4. Time and date dressing change  -REPOSITION PT SIDE TO SIDE ONLY WHEN IN BED, EVERY 2 HOURS  -HEELS MUST BE KEPT ELEVATED AT ALL TIMES, USING AT LEAST 2 PILLOWS UNDER EACH CALF, ASSURING HEELS ARE FLOATING  -WHEN UP TO THE CHAIR PT NEEDS TO FULLY OFF LOAD EVERY 2 HOURS  - use z-flow pads to help with positioning.  These will help hold pt over to her side better, will be harder for her to wiggle out off of her side and to her back  Pulsate mattress         Orders Written  Recommended provider order: none  WOC Nurse follow-up plan: weekly  Nursing to notify the Provider(s) and re-consult the WO Nurse if wound(s) deteriorates or new skin concern.    Patient History  According to provider note(s):  Admit Date:  1/31/2020  Post-Op 02-  S/P Procedure(s):  OPEN REDUCTION INTERNAL FIXATION RIGHT INTERTROCHANTERIC FEMUR FRACTURE       Objective Data  Containment of urine/stool: Diaper    Active Diet Order:  Orders Placed This  Encounter      Regular Diet Adult      Advance Diet as Tolerated    Output:   I/O last 3 completed shifts:  In: 720 [P.O.:720]  Out: 300 [Urine:300]    Risk Assessment:   Sensory Perception: 3-->slightly limited  Moisture: 3-->occasionally moist  Activity: 2-->chairfast  Mobility: 3-->slightly limited  Nutrition: 3-->adequate  Friction and Shear: 1-->problem  Cem Score: 15                          Labs:   Recent Labs   Lab 02/04/20  0623   HGB 7.6*   WBC 5.1       Physical Exam  Skin inspection: focused spine  February 4, 2020      Wound Location:  Center right thoracic spine,   Measurements (length x width x depth, in cm):   Large are of persistent bright red erythema that is blanchable at the margins and transitions to nonblanchable and then with an area of moist red dermal tissue in the center.  Nonblanchable area is about 1.5cm x 1.5cm and the exposed dermal tissue is about 0.8cm x 0.8cm x 0.2cm  Tunneling: N/A  Undermining: N/A  Palpation of the wound bed: normal   Periwound skin: intact and erythema- blanchable  Color: red  Temperature: warm  Drainage: scant  Description of drainage: serous  Odor: none  Pain: winces with assessment, no pain at rest    Interventions  Current support surface: Standard  Atmos Air mattress  Current off-loading measures: Pillows under calves and Pillows  Visual inspection of wound(s) completed  Wound Care: done per plan of care  Supplies: reviewed, gathered, placed at the bedside, discussed with RN and discussed with patient  Education provided: importance of repositioning, wound progress, Infection prevention , Hygiene and Off-loading pressure  Discussed plan of care with Patient and Nurse- discussed ordering Pulsate mattress, use of z flow pads for positioning     Josie Quinones RN

## 2020-02-05 VITALS
BODY MASS INDEX: 19.56 KG/M2 | DIASTOLIC BLOOD PRESSURE: 72 MMHG | HEART RATE: 67 BPM | OXYGEN SATURATION: 87 % | HEIGHT: 60 IN | WEIGHT: 99.65 LBS | SYSTOLIC BLOOD PRESSURE: 165 MMHG | TEMPERATURE: 98 F | RESPIRATION RATE: 18 BRPM

## 2020-02-05 LAB
ANION GAP SERPL CALCULATED.3IONS-SCNC: 1 MMOL/L (ref 3–14)
BUN SERPL-MCNC: 9 MG/DL (ref 7–30)
CALCIUM SERPL-MCNC: 8.3 MG/DL (ref 8.5–10.1)
CHLORIDE SERPL-SCNC: 106 MMOL/L (ref 94–109)
CO2 SERPL-SCNC: 31 MMOL/L (ref 20–32)
CREAT SERPL-MCNC: 0.49 MG/DL (ref 0.52–1.04)
ERYTHROCYTE [DISTWIDTH] IN BLOOD BY AUTOMATED COUNT: 13.2 % (ref 10–15)
GFR SERPL CREATININE-BSD FRML MDRD: 82 ML/MIN/{1.73_M2}
GLUCOSE SERPL-MCNC: 92 MG/DL (ref 70–99)
HCT VFR BLD AUTO: 24.6 % (ref 35–47)
HGB BLD-MCNC: 8.2 G/DL (ref 11.7–15.7)
MCH RBC QN AUTO: 33.7 PG (ref 26.5–33)
MCHC RBC AUTO-ENTMCNC: 33.3 G/DL (ref 31.5–36.5)
MCV RBC AUTO: 101 FL (ref 78–100)
PLATELET # BLD AUTO: 204 10E9/L (ref 150–450)
POTASSIUM SERPL-SCNC: 3.9 MMOL/L (ref 3.4–5.3)
RBC # BLD AUTO: 2.43 10E12/L (ref 3.8–5.2)
SODIUM SERPL-SCNC: 138 MMOL/L (ref 133–144)
WBC # BLD AUTO: 5.4 10E9/L (ref 4–11)

## 2020-02-05 PROCEDURE — 85027 COMPLETE CBC AUTOMATED: CPT | Performed by: PHYSICIAN ASSISTANT

## 2020-02-05 PROCEDURE — 25000132 ZZH RX MED GY IP 250 OP 250 PS 637: Mod: GY | Performed by: INTERNAL MEDICINE

## 2020-02-05 PROCEDURE — 99238 HOSP IP/OBS DSCHRG MGMT 30/<: CPT | Performed by: INTERNAL MEDICINE

## 2020-02-05 PROCEDURE — 80048 BASIC METABOLIC PNL TOTAL CA: CPT | Performed by: PHYSICIAN ASSISTANT

## 2020-02-05 PROCEDURE — 25000132 ZZH RX MED GY IP 250 OP 250 PS 637: Mod: GY | Performed by: ORTHOPAEDIC SURGERY

## 2020-02-05 PROCEDURE — 36415 COLL VENOUS BLD VENIPUNCTURE: CPT | Performed by: PHYSICIAN ASSISTANT

## 2020-02-05 RX ORDER — LIDOCAINE 4 G/G
1 PATCH TOPICAL EVERY 24 HOURS
Qty: 14 PATCH | Refills: 0 | Status: SHIPPED | OUTPATIENT
Start: 2020-02-05 | End: 2020-02-06

## 2020-02-05 RX ADMIN — AMLODIPINE BESYLATE 2.5 MG: 2.5 TABLET ORAL at 08:09

## 2020-02-05 RX ADMIN — ASPIRIN 325 MG: 325 TABLET, COATED ORAL at 08:09

## 2020-02-05 RX ADMIN — ACETAMINOPHEN 650 MG: 325 TABLET, FILM COATED ORAL at 05:11

## 2020-02-05 RX ADMIN — LEVOTHYROXINE SODIUM 88 MCG: 88 TABLET ORAL at 08:09

## 2020-02-05 RX ADMIN — ACETAMINOPHEN 650 MG: 325 TABLET, FILM COATED ORAL at 00:54

## 2020-02-05 RX ADMIN — ACETAMINOPHEN 650 MG: 325 TABLET, FILM COATED ORAL at 11:01

## 2020-02-05 RX ADMIN — LOSARTAN POTASSIUM 50 MG: 50 TABLET, FILM COATED ORAL at 08:09

## 2020-02-05 RX ADMIN — LIDOCAINE 1 PATCH: 560 PATCH PERCUTANEOUS; TOPICAL; TRANSDERMAL at 08:08

## 2020-02-05 RX ADMIN — SENNOSIDES AND DOCUSATE SODIUM 2 TABLET: 8.6; 5 TABLET ORAL at 08:09

## 2020-02-05 ASSESSMENT — ACTIVITIES OF DAILY LIVING (ADL)
ADLS_ACUITY_SCORE: 29
ADLS_ACUITY_SCORE: 27
ADLS_ACUITY_SCORE: 29
ADLS_ACUITY_SCORE: 29

## 2020-02-05 NOTE — PROGRESS NOTES
VSS, CMS intact. Up with 2 and walker. Pain managed with tylenol. Dressing left shin, mid spine changed. Alert and orient to person. Discharge to tcu, left floor via wheelchair.

## 2020-02-05 NOTE — PROGRESS NOTES
SW:  D: Received discharge orders for this patient. Bed available at [facility] today, 2/5/20 at 2:30 p.m. Patient to transport via [facility]. Skilled nursing facility notified of patients discharge time, MD orders sent via [facility] medications faxed. PAS on front of patient chart. Patient and family are aware and in agreement with this plan.   P: Will continue to follow.       Betty Andrade   [facility]  [phone]

## 2020-02-05 NOTE — PROGRESS NOTES
Orthopedic Surgery  Gennaro Walton  2020  Admit Date:  2020  POD: 4 Days Post-Op   Procedure(s):  OPEN REDUCTION INTERNAL FIXATION RIGHT INTERTROCHANTERIC FEMUR FRACTURE     Patient resting comfortably in bed.    Pain controlled.  Tolerating oral intake.    Denies nausea or vomiting  Denies chest pain or shortness of breath    Vital Sign Ranges  Temperature Temp  Av.9  F (36.6  C)  Min: 97.2  F (36.2  C)  Max: 98.2  F (36.8  C)   Blood pressure Systolic (24hrs), Av , Min:129 , Max:165        Diastolic (24hrs), Av, Min:56, Max:72      Pulse Pulse  Av  Min: 63  Max: 67   Respirations Resp  Av.3  Min: 16  Max: 18   Pulse oximetry SpO2  Av.4 %  Min: 87 %  Max: 97 %       Dressing is clean, dry, and intact.   Minimal erythema of the surrounding skin.   Dressing on Left low leg  Bilateral calves are soft, non-tender.  Left lower extremity is NVI.  Sensation intact bilateral lower extremities  Patient able to resist dorsi and plantar flexion bilaterally  +Dp pulse    Labs:  Recent Labs   Lab Test 20  0650 20  0623 20  0715   POTASSIUM 3.9 3.7 3.7     Recent Labs   Lab Test 20  0650 20  0623 20  0715   HGB 8.2* 7.6* 7.5*     Recent Labs   Lab Test 20  2030 19  1414   INR 1.03 1.02     Recent Labs   Lab Test 20  0650 20  0623 20  0715    155 146*       1. PLAN:   Continue ASA for DVT prophylaxis.     Mobilize with PT/OT    WBAT right LE.     Continue current pain regiment.   Dressings: Keep intact.  Change if >60% saturated    2. Disposition   Anticipate d/c to TCU when medically cleared and progressing in PT.    Kelly Dunn PA-C

## 2020-02-05 NOTE — DISCHARGE SUMMARY
[facility]  Discharge Summary        Gennaro Walton MRN# [id number]   YOB: 1923 Age: 89 year old     Date of Admission:  1/31/2020  Date of Discharge:  2/5/2020  Admitting Physician:  Catalino Casper MD  Discharge Physician: Fei Medina MD  Discharging Service: Hospitalist     Primary Provider:  Mady Hagen  Primary Care Physician Phone Number: [phone]         Discharge Diagnoses/Problem Oriented Hospital Course (Providers):    Gennaro Walton was admitted on 1/31/2020 by Catalino Casper MD and I would refer you to their history and physical.  The following problems were addressed during her hospitalization:    Gennaro Walton is a 89-year-old female with past medical history significant for mild cognitive impairment, hypertension, hypothyroidism, and history of falls as well as some recent shoulder pain, who presents to the emergency room for evaluation after a fall which resulted in right hip pain.  She was found to have a femur fracture.     Right intertrochanteric femur fracture s/p ORIF 2/1/20 with post op hypoxemia  Sustained after what sounds to be a mechanical fall. Now s/p Intramedullary nailing, right proximal femur fracture on 2/1/20. EBL 25mL.   - Orthopedics following  - PT recommending TCU vs memory care TCU, SW on board to assist  - Aggressive pulmonary toilet, Incentive Spirometry   - still needing 1L n/c which I will order for 1 month  - asa 325 mg for dvt prophylaxis     Left medial anterior leg laceration  ~4cm in length, no surrounding erythema. It seems this happened during the fall. No sutured in the ED. Open to air and subcutaneous layer exposed.  - >48' since presentation so unable to close   - Monitor wound healing, irrigate with NS and cover with cause, WOCN on board appreciate assistance - please see their  discharge orders     Acute blood loss anemia   Baseline Hgb 12 range. Post surgery downtrended POD #0 9.8  - Monitor Hgb  closely, some dilutional component  - Recommend transfusion if Hgb <7  - ASA 325mg/d for DVT ppx, monitor Hgb closely, continues to downtrend, monitor for symptoms of SOB, lightheadedness, etc...  - hgb improving trend 7.5 > 7.6 > 8.2          Shoulder pain secondary to injury with rotator cuff and biceps tendon tears s/p MRI 1/31/20 revealing:  Complete, full-thickness tears of the supraspinatus and infraspinatus tendons. Severe atrophy of the supraspinatus and infraspinatus muscles. Full-thickness tearing of a portion of the subscapularis tendon. Moderate subscapularis muscle atrophy. Advanced rotator cuff arthropathy. Glenohumeral joint effusion with synovitis and joint bodies. Complete tear of the long head biceps tendon. 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 She underwent an evaluation with an MRI of Rt shoulder at [facility] earlier day of admit which showed the above results.   - Lidoderm patch as previously prescribed  - Ortho aware of rotator cuff tear, no change in WB status, ok to use UE as tolerated     Hypertension:    BP are elevated postoperative but suspect this is secondary in the setting of pain with her hip fracture. BPs now improved.  - Monitor, avoid starting new antihypertensives in very frail woman with advanced age      Abnormal UA without UTI  UA Large LE, , mod blood, neg nitrites.  - Cortez discontinued 2/3/20  - Started Ceftriaxone 1gm Q 24' , discontinued due to neg UCx     Hypothyroidism:  Chronic and stable on levothyroxine.  This can be continued.              Code Status:      DNR / DNI         Important Results:      NAD  HEENT NORMAL  PULM CTA  CV RRR  GI SOFT  MS NO EDEMA  NEURO MOVES ALL 4 EXT  DERM WARM AND PERFUSED         Pending Results:        Unresulted Labs Ordered in the Past 30 Days of this Admission     No orders found from 1/1/2020 to 2/1/2020.               Discharge Instructions and Follow-Up:      Follow-up Appointments     Follow Up and recommended labs  and tests      Follow up with Dr. Casper/[facility] PAC in 2 weeks for bandage removal, suture   removal and follow-up x-rays.  No follow up labs or test are needed prior   to visit.  Call [facility] for appointment or questions [phone]  After hours: [phone]         Follow Up and recommended labs and tests      Follow up with ortho as directed    Follow up with PCP after discharge from TCU                  Discharge Disposition:      Discharged to rehabilitation facility         Discharge Medications:        Current Discharge Medication List      START taking these medications    Details   ondansetron (ZOFRAN-ODT) 4 MG ODT tab Take 1 tablet (4 mg) by mouth every 6 hours as needed for nausea or vomiting  Qty: 10 tablet, Refills: 0    Associated Diagnoses: Hip fracture, right, closed, initial encounter (H)      oxyCODONE (ROXICODONE) 5 MG tablet Take 1 tablet (5 mg) by mouth every 3 hours as needed for severe pain  Qty: 30 tablet, Refills: 0    Associated Diagnoses: Hip fracture, right, closed, initial encounter (H)      senna-docusate (SENOKOT-S/PERICOLACE) 8.6-50 MG tablet Take 2 tablets by mouth 2 times daily  Qty: 40 tablet, Refills: 0    Associated Diagnoses: Hip fracture, right, closed, initial encounter (H)         CONTINUE these medications which have CHANGED    Details   aspirin (ASA) 325 MG EC tablet Take 1 tablet (325 mg) by mouth daily  Qty: 30 tablet, Refills: 0    Associated Diagnoses: Hip fracture, right, closed, initial encounter (H)      Lidocaine (LIDOCARE) 4 % Patch Place 1 patch onto the skin every 24 hours To prevent lidocaine toxicity, patient should be patch free for 12 hrs daily.  Apply to left shoulder  Qty: 14 patch, Refills: 0    Associated Diagnoses: Closed fracture of right hip, initial encounter (H)         CONTINUE these medications which have NOT CHANGED    Details   acetaminophen (TYLENOL) 500 MG tablet Take 1,000 mg by mouth 2 times daily      amLODIPine (NORVASC) 5 MG tablet Take 2.5  mg by mouth daily      calcium carbonate (TUMS) 500 MG chewable tablet Take 1 chew tab by mouth daily       docusate sodium (COLACE) 100 MG capsule Take 100 mg by mouth 2 times daily as needed for constipation      levothyroxine (SYNTHROID/LEVOTHROID) 88 MCG tablet Take 88 mcg by mouth daily      losartan (COZAAR) 50 MG tablet Take 50 mg by mouth daily      oxybutynin ER (DITROPAN-XL) 10 MG 24 hr tablet Take 1 tablet (10 mg) by mouth At Bedtime    Comments: If urinating without difficulty then start August 2nd.  Associated Diagnoses: Urinary frequency      polyethylene glycol (MIRALAX/GLYCOLAX) packet Take 17 g by mouth daily    Associated Diagnoses: Left displaced femoral neck fracture (H)      vitamin D3 (CHOLECALCIFEROL) 2000 units tablet Take 1 tablet by mouth daily         STOP taking these medications       lidocaine (LMX4) 4 % external cream Comments:   Reason for Stopping:                    Allergies:         Allergies   Allergen Reactions     Sulfacetamide Hives            Consultations This Hospital Stay:      Consultation during this admission received from orthopedics          Discharge Orders for Skilled Facility (from Discharge Orders):        After Care Instructions     Activity - Up with assistive device      Activity - Up with nursing assistance      Advance Diet as Tolerated      Follow this diet upon discharge: Orders Placed This Encounter      Regular Diet Adult         Advance Diet as Tolerated      Follow this diet upon discharge: Orders Placed This Encounter      Regular Diet Adult      Advance Diet as Tolerated         Fall precautions      Fall precautions      General info for SNF      Length of Stay Estimate: Short Term Care: Estimated # of Days <30  Condition at Discharge: Improving  Level of care:skilled   Rehabilitation Potential: Good  Admission H&P remains valid and up-to-date: Yes  Recent Chemotherapy: N/A  Use Nursing Home Standing Orders: Yes         General info for SNF       Length of Stay Estimate: Short Term Care: Estimated # of Days <30  Condition at Discharge: Improving  Level of care:skilled   Rehabilitation Potential: Fair  Admission H&P remains valid and up-to-date: Yes  Recent Chemotherapy: N/A  Use Nursing Home Standing Orders: Yes         Mantoux instructions      Give two-step Mantoux (PPD) Per Facility Policy Yes         Mantoux instructions      Give two-step Mantoux (PPD) Per Facility Policy Yes         Weight bearing status      WBAT with walker         Wound care      Site:   Right hip  Instructions:  Leave dressing intact, reinforce if needed                    Rehab orders for Skilled Facility (from Discharge Orders):      Referrals     Future Labs/Procedures    Occupational Therapy Adult Consult     Comments:    Evaluate and treat as clinically indicated.    Reason:  right intertrochanteric femur fracture ORIF    Occupational Therapy Adult Consult     Comments:    Evaluate and treat as clinically indicated.    Reason:  Hip fx    Physical Therapy Adult Consult     Comments:    Evaluate and treat as clinically indicated.    Reason:  right intertrochanteric femur fracture ORIF    Physical Therapy Adult Consult     Comments:    Evaluate and treat as clinically indicated.    Reason:  Hip fx             Discharge Time:      Less than 30 minutes.        Image Results From This Hospital Stay (For Non-EPIC Providers):        Results for orders placed or performed during the hospital encounter of 01/31/20   XR Pelvis w Hip Right 1 View    Narrative    PELVIS AND RIGHT HIP ONE VIEW  1/31/2020 2:04 PM     HISTORY: Fall, right hip pain.    COMPARISON: 7/25/2019.      Impression    IMPRESSION: Interval ORIF of left intertrochanteric/subtrochanteric  fracture with an intramedullary ilan and dynamic compression screw.  This fracture appears at least mostly healed. There has been interval  development of a right intertrochanteric fracture with mild varus  angulation. No other  acute-appearing bony abnormalities.    TORO MAKI MD   XR Chest 1 View    Narrative    CHEST ONE VIEW  1/31/2020 2:05 PM     HISTORY:  Trauma to right hip.    COMPARISON: Chest x-ray 7/25/2019.      Impression    IMPRESSION: Patchy subpleural opacities at the right costophrenic  angle again identified, nonspecific. Postoperative change of the right  lung is stable. No new airspace disease identified. Stable  cardiomegaly. No pneumothorax is identified.    LINDA MOSES MD   XR Surgery JUAN L/T 5 Min Fluoro w Stills    Narrative    XR SURGERY JUAN FLUORO LESS THAN 5 MIN W STILLS 2/1/2020 1:00 PM     HISTORY: Right ORIF hip    NUMBER OF IMAGES ACQUIRED: 2    VIEWS: 1    FLUOROSCOPY TIME: 1      Impression    IMPRESSION: Intraprocedural spot films demonstrate intramedullary nail  and screw fixation of the proximal right femur fracture with improved  alignment. Please reference the surgical report for further details.    WES GREER MD           Most Recent Lab Results In EPIC (For Non-EPIC Providers):    Most Recent 3 CBC's:  Recent Labs   Lab Test 02/05/20  0650 02/04/20  0623 02/03/20  0715   WBC 5.4 5.1 5.6   HGB 8.2* 7.6* 7.5*   * 101* 104*    155 146*      Most Recent 3 BMP's:  Recent Labs   Lab Test 02/05/20  0650 02/04/20  0623 02/03/20  0715    137 138   POTASSIUM 3.9 3.7 3.7   CHLORIDE 106 105 107   CO2 31 32 28   BUN 9 9 10   CR 0.49* 0.46* 0.50*   ANIONGAP 1* <1* 3   LORETA 8.3* 8.1* 8.0*   GLC 92 86 77     Most Recent 3 Troponin's:  Recent Labs   Lab Test 01/07/20 2030 07/27/19  0322 07/26/19  2122   TROPI <0.015 0.037 0.035     Most Recent 3 INR's:  Recent Labs   Lab Test 01/07/20 2030 07/25/19  1414   INR 1.03 1.02     Most Recent 2 LFT's:  Recent Labs   Lab Test 07/25/19  1414   AST 19   ALT 16   ALKPHOS 95   BILITOTAL 0.3     Most Recent Cholesterol Panel:No lab results found.  Most Recent 6 Bacteria Isolates From Any Culture (See EPIC Reports for Culture Details):  Recent  Labs   Lab Test 02/01/20 2114 08/07/19  1500   CULT <10,000 colonies/mL  mixed urogenital jamari  Susceptibility testing not routinely done   50,000 to 100,000 colonies/mL  Escherichia coli  *     Most Recent TSH, T4 and HgbA1c:No lab results found.

## 2020-02-05 NOTE — PLAN OF CARE
Pt oriented to self, and was able to say she was in [facility] [place] once, but not the hospital. Cooperative with cares. CMS intact; Aquacel dressing to R hip is CDI. Mepilex to mid-back CDI; non-blanchable redness. Mepilex to coccyx is CDI; blanchable redness. Repositioned Q2 with Z-flow for extra support, pulsate mattress installed, and heels/elbows protected with pillows. Mepilex to L shin wound has small drainage. Unable to wean off 1L NC, but otherwise VSS. Pt able to get IS up to 1000 a few times. Up heavy A2 ww/GB to the BSC. No episodes of incontinence overnight. Adequate I/Os. PRN Tylenol given Q4 for pain, as pt consistently rates as 7/10. Discharge pending TCU.

## 2020-02-05 NOTE — PLAN OF CARE
Physical Therapy Discharge Summary    Reason for therapy discharge:    Discharged to transitional care facility.    Progress towards therapy goal(s). See goals on Care Plan in [facility] electronic health record for goal details.  Goals not met.  Barriers to achieving goals:   discharge from facility.    Therapy recommendation(s):    Continued therapy is recommended.  Rationale/Recommendations:  Pt will benefit from continued skilled PT services to maximize safety and IND with functional mobility.

## 2020-02-06 ENCOUNTER — NURSING HOME VISIT (OUTPATIENT)
Dept: GERIATRICS | Facility: CLINIC | Age: 85
End: 2020-02-06
Payer: MEDICARE

## 2020-02-06 ENCOUNTER — DOCUMENTATION ONLY (OUTPATIENT)
Dept: OTHER | Facility: CLINIC | Age: 85
End: 2020-02-06

## 2020-02-06 VITALS
OXYGEN SATURATION: 92 % | TEMPERATURE: 98.7 F | HEART RATE: 74 BPM | DIASTOLIC BLOOD PRESSURE: 71 MMHG | BODY MASS INDEX: 18.29 KG/M2 | SYSTOLIC BLOOD PRESSURE: 139 MMHG | HEIGHT: 63 IN | WEIGHT: 103.2 LBS | RESPIRATION RATE: 20 BRPM

## 2020-02-06 DIAGNOSIS — R53.81 DEBILITY: ICD-10-CM

## 2020-02-06 DIAGNOSIS — N18.30 CKD (CHRONIC KIDNEY DISEASE) STAGE 3, GFR 30-59 ML/MIN (H): ICD-10-CM

## 2020-02-06 DIAGNOSIS — Z87.81 S/P ORIF (OPEN REDUCTION INTERNAL FIXATION) FRACTURE: ICD-10-CM

## 2020-02-06 DIAGNOSIS — M75.101 TEAR OF RIGHT ROTATOR CUFF, UNSPECIFIED TEAR EXTENT, UNSPECIFIED WHETHER TRAUMATIC: ICD-10-CM

## 2020-02-06 DIAGNOSIS — G89.29 CHRONIC PAIN OF BOTH SHOULDERS: ICD-10-CM

## 2020-02-06 DIAGNOSIS — R41.89 COGNITIVE IMPAIRMENT: ICD-10-CM

## 2020-02-06 DIAGNOSIS — S81.802D LEG WOUND, LEFT, SUBSEQUENT ENCOUNTER: ICD-10-CM

## 2020-02-06 DIAGNOSIS — S72.91XD CLOSED FRACTURE OF RIGHT FEMUR WITH ROUTINE HEALING, UNSPECIFIED FRACTURE MORPHOLOGY, UNSPECIFIED PORTION OF FEMUR, SUBSEQUENT ENCOUNTER: ICD-10-CM
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
## 2020-02-06 DIAGNOSIS — S72.001A CLOSED FRACTURE OF RIGHT HIP, INITIAL ENCOUNTER (H): ICD-10-CM

## 2020-02-06 DIAGNOSIS — M25.511 CHRONIC PAIN OF BOTH SHOULDERS: ICD-10-CM

## 2020-02-06 DIAGNOSIS — Z91.81 H/O FALLING: ICD-10-CM

## 2020-02-06 DIAGNOSIS — M25.512 CHRONIC PAIN OF BOTH SHOULDERS: ICD-10-CM

## 2020-02-06 DIAGNOSIS — Z98.890 S/P ORIF (OPEN REDUCTION INTERNAL FIXATION) FRACTURE: ICD-10-CM

## 2020-02-06 DIAGNOSIS — L89.102 PRESSURE INJURY OF BACK, STAGE 2 (H): ICD-10-CM

## 2020-02-06 DIAGNOSIS — G89.18 ACUTE POST-OPERATIVE PAIN: ICD-10-CM

## 2020-02-06 DIAGNOSIS — I10 ESSENTIAL HYPERTENSION: ICD-10-CM

## 2020-02-06 DIAGNOSIS — J96.01 ACUTE RESPIRATORY FAILURE WITH HYPOXIA (H): ICD-10-CM

## 2020-02-06 PROCEDURE — 99310 SBSQ NF CARE HIGH MDM 45: CPT | Performed by: NURSE PRACTITIONER

## 2020-02-06 RX ORDER — ACETAMINOPHEN 500 MG
1000 TABLET ORAL 3 TIMES DAILY
Start: 2020-02-06

## 2020-02-06 RX ORDER — TRAMADOL HYDROCHLORIDE 50 MG/1
25 TABLET ORAL EVERY 6 HOURS PRN
Qty: 30 TABLET | Refills: 0 | Status: SHIPPED | OUTPATIENT
Start: 2020-02-06 | End: 2020-03-02

## 2020-02-06 RX ORDER — LIDOCAINE 4 G/G
2 PATCH TOPICAL EVERY 24 HOURS
Qty: 14 PATCH | Refills: 0
Start: 2020-02-06 | End: 2020-02-27

## 2020-02-06 ASSESSMENT — MIFFLIN-ST. JEOR: SCORE: 827.24

## 2020-02-06 NOTE — LETTER
2/6/2020        RE: Gennaro Walton  [address]  [address]  [place] [address]        [facility]  PRIMARY CARE PROVIDER AND CLINIC:  Mady Hagen MD, [address] / [place] [address]  Chief Complaint   Patient presents with     Hospital F/U     [facility] Record Number:  [id number]  Place of [facility] where encounter [facility] [[id number][facility]    Gennaro Walton  is a 89 year old  (9/8/1923), admitted to the above facility from  [facility]. Hospital stay 01/31/20 through 02/05/20..  Admitted to this facility for  rehab, medical management and nursing care.    HPI:    HPI information obtained from: facility chart records, facility staff, patient report, [facility] chart review and family/first contact Daughter Caron report.     Brief Summary of Hospital Course: Mrs. Walton has a h/o falls and h/o several fractures and resides in a [facility] and fell again and presented to hospital and found to have a Right intertrochanteric fracture and is s/p ORIF with a intramedullary nailing 1 Feb.  Does have some post operative pain issues, also noted to have mild post operative hypoxia with unclear etiology.  She also has known bilateral shoulder pain and now s/p fall has increased/exacerbated Right shoulder pain which MRI notes significant tendon and muscle damage, advanced rotator cuff damage and DJD.  Was seen by Ortho whom did not comment much on the shoulder, but non-surgical, WBAT was noted.  She was also found to have a small LLE wound and a back pressure ulcer present.  After surgery she was noted to improve, thus now has transitioned to the Tcu here for ongoing cares and PT/OT.     Updates on Status Since Skilled nursing Admission: In meeting Mrs. Walton today for admission, and later speaking with daughter Caron; Mrs. Walton has clear degree of cognitive/memory impairment and is a poor historian.  She does endorse  some pain, most in her Right shoulder than hip, does not rate, but reports mild/none at rest, really bad with weight bearing.  She reports the Lidocaine patch really helps her shoulders.  Besides intermittent pain with movement, she has no other complaints.     CODE STATUS/ADVANCE DIRECTIVES DISCUSSION:   DNR / DNI  Patient's living condition: lives in an assisted living facility     ALLERGIES: Sulfacetamide  PAST MEDICAL HISTORY:  has a past medical history of Breast cancer (H), CKD (chronic kidney disease) stage 3, GFR 30-59 ml/min (H), Cognitive impairment, Hypertension, Hypothyroid, and Osteoporosis. She also has no past medical history of Complication of anesthesia, Difficult intubation, Malignant hyperthermia, Motion sickness, or PONV (postoperative nausea and vomiting).  PAST SURGICAL HISTORY:   has a past surgical history that includes Open Reduction Internal Fixation Femur Midshaft (Left, 7/26/2019); ------------other-------------; and ------------other-------------.  FAMILY HISTORY: family history is not on file.  SOCIAL HISTORY:   reports that she has never smoked. She has never used smokeless tobacco. She reports that she does not drink alcohol or use drugs.    Post Discharge Medication Reconciliation Status: discharge medications reconciled and changed, per note/orders (see AVS)    Current Outpatient Medications   Medication Sig Dispense Refill     acetaminophen (TYLENOL) 500 MG tablet Take 2 tablets (1,000 mg) by mouth 3 times daily       amLODIPine (NORVASC) 5 MG tablet Take 2.5 mg by mouth daily       aspirin (ASA) 325 MG EC tablet Take 1 tablet (325 mg) by mouth daily 30 tablet 0     calcium carbonate (TUMS) 500 MG chewable tablet Take 1 chew tab by mouth daily        levothyroxine (SYNTHROID/LEVOTHROID) 88 MCG tablet Take 88 mcg by mouth daily       Lidocaine (LIDOCARE) 4 % Patch Place 2 patches onto the skin every 24 hours To prevent lidocaine toxicity, patient should be patch free for 12 hrs  "daily.  Apply to bilateral shoulders, on during the day. 14 patch 0     losartan (COZAAR) 50 MG tablet Take 50 mg by mouth daily       ondansetron (ZOFRAN-ODT) 4 MG ODT tab Take 1 tablet (4 mg) by mouth every 6 hours as needed for nausea or vomiting 10 tablet 0     oxybutynin ER (DITROPAN-XL) 10 MG 24 hr tablet Take 1 tablet (10 mg) by mouth At Bedtime       polyethylene glycol (MIRALAX/GLYCOLAX) packet Take 17 g by mouth daily       senna-docusate (SENOKOT-S/PERICOLACE) 8.6-50 MG tablet Take 2 tablets by mouth 2 times daily 40 tablet 0     traMADol (ULTRAM) 50 MG tablet Take 0.5 tablets (25 mg) by mouth every 6 hours as needed for pain 30 tablet 0     vitamin D3 (CHOLECALCIFEROL) 2000 units tablet Take 1 tablet by mouth daily       ROS:  Limited secondary to cognitive impairment but today 7 point ROS done including, light headedness/dizziness, fever/chills, pain, Resp, CV, GI, and  and is negative other than noted in HPI.     Vitals:  /71   Pulse 74   Temp 98.7  F (37.1  C)   Resp 20   Ht 1.6 m (5' 3\")   Wt 46.8 kg (103 lb 3.2 oz)   SpO2 92%   BMI 18.28 kg/m        Exam:  GENERAL APPEARANCE:  Elderly female resting in bed, NAD, non-toxic.  ENT:  Mouth and oropharynx normal, moist mucous membranes.   RESP:  Lungs diminished in lower lobes, but otherwise CTA.  Regular relaxed breathing effort.  No cough.   CV: S1/S2 no murmur or rubs.  Regular rhythm and rate.  No generalized edema.   ABDOMEN:   Protuberant but, soft, non-tender with active bowel sounds.  No guarding, rigidity, or rebound tenderness.  EXTREMITIES:  No lower extremity edema, no calf tenderness.   PSYCH: Alert to self, knows this is not home, alert only some what to situation. Suspect some degree of cognitive/memory impairment.   Right mid back has a small reddened area with skin break down pressure ulcer, no drainage, no s/s of infection.   LLE has a ~1x2.5 cm wound, roughly 1 cm deep and packed with dressing, whitish wound bed, no " drainage, no fowl odor.   Right hip has a surgical occlusive dressing in place, no overt s/s of infection/drainage.     Lab/Diagnostic data:  I have personally reviewed labs, which are in facility or EMR chart.   I have personally reviewed images, which are in facility or EMR chart.     ASSESSMENT/PLAN:  Fall, subsequent encounter  H/O falling  Closed fracture of right femur with routine healing, unspecified fracture morphology, unspecified portion of femur, subsequent encounter  S/P ORIF (open reduction internal fixation) fracture 1 Feb with intramedullary nailing  Acute post-operative pain  Mrs. Walton has a h/o falls and surgery related to falls, now again s/p ORIF due to femur fracture.   Has occlusive dressing in place, no overt s/s of drainage or infection.   Does note pain but worse with weight bearing, minimal at rest.   -WBAT per Ortho.   -On  for prophylaxis.   --Ortho f/u in 2 weeks.     Spent 15+ minutes discussing analgesics with daughter Caron whom reports easily delirium/confusion with opiates would like to avoid them.  Last time noted not to do well on Oxycodone, thus we suggested transition to Tramadol as that is what we used last admission.  Daughter is in agreement, she is also concurring with increase in Acetaminophen.   -Changed Acetaminophen to 1000 mg's TID.   -Started Tramadol 25 mg's q6h PRN for now.   -Stopping Oxycodone.     Acute respiratory failure with hypoxia (H)  Etiology unclear and currently she is doing well on RA.  Atelectasis could be contributing but unclear.   -OOBTC TID with meals.   -Did write PRN O2 orders to keep SaO2 > 90%, if starts getting worse, will need to consider workup.  Did not see CXR done.      Leg wound, left, subsequent encounter  Presumed secondary to fall, came with wound care orders.   No overt s/s of infection.   -Continue hospital wound care.   -Skin RN to eval and treat.     Pressure injury of back, stage 2 (H)  Present on admission to the TCU.   Currently it's early stage II with some redness.  It is Right mid back, where she does not have any dermal padding.  Has dressing in place.   -Skin RN to eval and treat.     CKD (chronic kidney disease) stage 3, GFR 30-59 ml/min (H)  Creatine baseline is around 0.5; no recent creatine checks in EMR.   -Will check creatine 10 Feb.     Tear of right rotator cuff, unspecified tear extent, unspecified whether traumatic  Chronic pain of both shoulders  EMR notes Ortho is aware of shoulders, no formal rec's, but DC summary notes WBAT.   Patient does note Right side is worse since fall.     As noted above, spent time discussing analgesics with daughter/POA.  In addition to above, we suggested using 2 lidocaine patches, both shoulders, on during the day; in attempt to lower opiate use.  Daughter noted agreement.   -Lidocaine patches, 1 each shoulder bilaterally, on during the day.   -WBAT.   --If get's worse, consider sling and return to Ortho.     Essential hypertension  SBP's are erratic, 120-170's, HR WNL's, asymptomatic.   No current s/s of clinical CHF.   We noted back in Aug with her last Tcu stay, SBP's were the same.   -Continues PTA Norvasc and Losartan.   -No changes, continue to clinically monitor.     Cognitive impairment  Note ongoing cognitive/memory impairment, was noted with last admission.   -No changes, continue to clinically monitor.     Debility  -PT/OT to eval and treat.     Orders written by provider at facility  -As noted above.     Total unit/floor time of 50 minutes consisted of the following: Examination of patient, reviewing the record including pertinent labs and imaging, placing orders, and completing documentation.  More than 50% of this time (30 minutes) (>50%) was spent in coordination of care with the patient, family, nursing staff and other healthcare providers.   This time was spent on discussing the care plan including hospital discharge recommendations, medications, wound cares,  cognitive impairment, discharge/safe placement, specialty follow up need.  Time was also spent on discussing POLST and code status.       Time with patient/family included: Discussion of diagnostic and imaging test results, diagnosis and prognosis, overall goal and disposition plan.      Medical decision making and discussions included:   Discussion about pain, delirium, cognitive impairment and balancing that with analgesics.  As noted above, we will trial Tramadol over Oxycodone and increase non-opiates in an attempt to void them.  Also discussed rest of POC with daughter.     Electronically signed by:  RITA Adhikari CNP                     Sincerely,        RITA Adhikari CNP

## 2020-02-06 NOTE — PROGRESS NOTES
[place] [facility]  PRIMARY CARE PROVIDER AND CLINIC:  Mady Hagen MD, [address] [place]\[facility]\"" / [place] [address]  Chief Complaint   Patient presents with     Hospital F/U     [place] Medical Record Number:  [id number]  Place of Service where encounter took place:  [facility] [[id number]]    Gennaro Walton  is a 89 year old  (9/8/1923), admitted to the above facility from  [facility]. Hospital stay 01/31/20 through 02/05/20..  Admitted to this facility for  rehab, medical management and nursing care.    HPI:    HPI information obtained from: facility chart records, facility staff, patient report, [facility] chart review and family/first contact Daughter Caron report.     Brief Summary of Hospital Course: Mrs. Walton has a h/o falls and h/o several fractures and resides in a [facility] and fell again and presented to hospital and found to have a Right intertrochanteric fracture and is s/p ORIF with a intramedullary nailing 1 Feb.  Does have some post operative pain issues, also noted to have mild post operative hypoxia with unclear etiology.  She also has known bilateral shoulder pain and now s/p fall has increased/exacerbated Right shoulder pain which MRI notes significant tendon and muscle damage, advanced rotator cuff damage and DJD.  Was seen by Ortho whom did not comment much on the shoulder, but non-surgical, WBAT was noted.  She was also found to have a small LLE wound and a back pressure ulcer present.  After surgery she was noted to improve, thus now has transitioned to the Tcu here for ongoing cares and PT/OT.     Updates on Status Since Skilled nursing Admission: In meeting Mrs. Walton today for admission, and later speaking with daughter Caron; Mrs. Walton has clear degree of cognitive/memory impairment and is a poor historian.  She does endorse some pain, most in her Right shoulder than hip, does not rate, but reports mild/none at rest,  really bad with weight bearing.  She reports the Lidocaine patch really helps her shoulders.  Besides intermittent pain with movement, she has no other complaints.     CODE STATUS/ADVANCE DIRECTIVES DISCUSSION:   DNR / DNI  Patient's living condition: lives in an assisted living facility     ALLERGIES: Sulfacetamide  PAST MEDICAL HISTORY:  has a past medical history of Breast cancer (H), CKD (chronic kidney disease) stage 3, GFR 30-59 ml/min (H), Cognitive impairment, Hypertension, Hypothyroid, and Osteoporosis. She also has no past medical history of Complication of anesthesia, Difficult intubation, Malignant hyperthermia, Motion sickness, or PONV (postoperative nausea and vomiting).  PAST SURGICAL HISTORY:   has a past surgical history that includes Open Reduction Internal Fixation Femur Midshaft (Left, 7/26/2019); ------------other-------------; and ------------other-------------.  FAMILY HISTORY: family history is not on file.  SOCIAL HISTORY:   reports that she has never smoked. She has never used smokeless tobacco. She reports that she does not drink alcohol or use drugs.    Post Discharge Medication Reconciliation Status: discharge medications reconciled and changed, per note/orders (see AVS)    Current Outpatient Medications   Medication Sig Dispense Refill     acetaminophen (TYLENOL) 500 MG tablet Take 2 tablets (1,000 mg) by mouth 3 times daily       amLODIPine (NORVASC) 5 MG tablet Take 2.5 mg by mouth daily       aspirin (ASA) 325 MG EC tablet Take 1 tablet (325 mg) by mouth daily 30 tablet 0     calcium carbonate (TUMS) 500 MG chewable tablet Take 1 chew tab by mouth daily        levothyroxine (SYNTHROID/LEVOTHROID) 88 MCG tablet Take 88 mcg by mouth daily       Lidocaine (LIDOCARE) 4 % Patch Place 2 patches onto the skin every 24 hours To prevent lidocaine toxicity, patient should be patch free for 12 hrs daily.  Apply to bilateral shoulders, on during the day. 14 patch 0     losartan (COZAAR) 50 MG  "tablet Take 50 mg by mouth daily       ondansetron (ZOFRAN-ODT) 4 MG ODT tab Take 1 tablet (4 mg) by mouth every 6 hours as needed for nausea or vomiting 10 tablet 0     oxybutynin ER (DITROPAN-XL) 10 MG 24 hr tablet Take 1 tablet (10 mg) by mouth At Bedtime       polyethylene glycol (MIRALAX/GLYCOLAX) packet Take 17 g by mouth daily       senna-docusate (SENOKOT-S/PERICOLACE) 8.6-50 MG tablet Take 2 tablets by mouth 2 times daily 40 tablet 0     traMADol (ULTRAM) 50 MG tablet Take 0.5 tablets (25 mg) by mouth every 6 hours as needed for pain 30 tablet 0     vitamin D3 (CHOLECALCIFEROL) 2000 units tablet Take 1 tablet by mouth daily       ROS:  Limited secondary to cognitive impairment but today 7 point ROS done including, light headedness/dizziness, fever/chills, pain, Resp, CV, GI, and  and is negative other than noted in HPI.     Vitals:  /71   Pulse 74   Temp 98.7  F (37.1  C)   Resp 20   Ht 1.6 m (5' 3\")   Wt 46.8 kg (103 lb 3.2 oz)   SpO2 92%   BMI 18.28 kg/m       Exam:  GENERAL APPEARANCE:  Elderly female resting in bed, NAD, non-toxic.  ENT:  Mouth and oropharynx normal, moist mucous membranes.   RESP:  Lungs diminished in lower lobes, but otherwise CTA.  Regular relaxed breathing effort.  No cough.   CV: S1/S2 no murmur or rubs.  Regular rhythm and rate.  No generalized edema.   ABDOMEN:   Protuberant but, soft, non-tender with active bowel sounds.  No guarding, rigidity, or rebound tenderness.  EXTREMITIES:  No lower extremity edema, no calf tenderness.   PSYCH: Alert to self, knows this is not home, alert only some what to situation. Suspect some degree of cognitive/memory impairment.   Right mid back has a small reddened area with skin break down pressure ulcer, no drainage, no s/s of infection.   LLE has a ~1x2.5 cm wound, roughly 1 cm deep and packed with dressing, whitish wound bed, no drainage, no fowl odor.   Right hip has a surgical occlusive dressing in place, no overt s/s of " infection/drainage.     Lab/Diagnostic data:  I have personally reviewed labs, which are in facility or EMR chart.   I have personally reviewed images, which are in facility or EMR chart.     ASSESSMENT/PLAN:  Fall, subsequent encounter  H/O falling  Closed fracture of right femur with routine healing, unspecified fracture morphology, unspecified portion of femur, subsequent encounter  S/P ORIF (open reduction internal fixation) fracture 1 Feb with intramedullary nailing  Acute post-operative pain  Mrs. Walton has a h/o falls and surgery related to falls, now again s/p ORIF due to femur fracture.   Has occlusive dressing in place, no overt s/s of drainage or infection.   Does note pain but worse with weight bearing, minimal at rest.   -WBAT per Ortho.   -On  for prophylaxis.   --Ortho f/u in 2 weeks.     Spent 15+ minutes discussing analgesics with daughter Caron whom reports easily delirium/confusion with opiates would like to avoid them.  Last time noted not to do well on Oxycodone, thus we suggested transition to Tramadol as that is what we used last admission.  Daughter is in agreement, she is also concurring with increase in Acetaminophen.   -Changed Acetaminophen to 1000 mg's TID.   -Started Tramadol 25 mg's q6h PRN for now.   -Stopping Oxycodone.     Acute respiratory failure with hypoxia (H)  Etiology unclear and currently she is doing well on RA.  Atelectasis could be contributing but unclear.   -OOBTC TID with meals.   -Did write PRN O2 orders to keep SaO2 > 90%, if starts getting worse, will need to consider workup.  Did not see CXR done.      Leg wound, left, subsequent encounter  Presumed secondary to fall, came with wound care orders.   No overt s/s of infection.   -Continue hospital wound care.   -Skin RN to eval and treat.     Pressure injury of back, stage 2 (H)  Present on admission to the TCU.  Currently it's early stage II with some redness.  It is Right mid back, where she does not have  any dermal padding.  Has dressing in place.   -Skin RN to eval and treat.     CKD (chronic kidney disease) stage 3, GFR 30-59 ml/min (H)  Creatine baseline is around 0.5; no recent creatine checks in EMR.   -Will check creatine 10 Feb.     Tear of right rotator cuff, unspecified tear extent, unspecified whether traumatic  Chronic pain of both shoulders  EMR notes Ortho is aware of shoulders, no formal rec's, but DC summary notes WBAT.   Patient does note Right side is worse since fall.     As noted above, spent time discussing analgesics with daughter/POA.  In addition to above, we suggested using 2 lidocaine patches, both shoulders, on during the day; in attempt to lower opiate use.  Daughter noted agreement.   -Lidocaine patches, 1 each shoulder bilaterally, on during the day.   -WBAT.   --If get's worse, consider sling and return to Ortho.     Essential hypertension  SBP's are erratic, 120-170's, HR WNL's, asymptomatic.   No current s/s of clinical CHF.   We noted back in Aug with her last Tcu stay, SBP's were the same.   -Continues PTA Norvasc and Losartan.   -No changes, continue to clinically monitor.     Cognitive impairment  Note ongoing cognitive/memory impairment, was noted with last admission.   -No changes, continue to clinically monitor.     Debility  -PT/OT to eval and treat.     Orders written by provider at facility  -As noted above.     Total unit/floor time of 50 minutes consisted of the following: Examination of patient, reviewing the record including pertinent labs and imaging, placing orders, and completing documentation.  More than 50% of this time (30 minutes) (>50%) was spent in coordination of care with the patient, family, nursing staff and other healthcare providers.   This time was spent on discussing the care plan including hospital discharge recommendations, medications, wound cares, cognitive impairment, discharge/safe placement, specialty follow up need.  Time was also spent on  discussing POLST and code status.       Time with patient/family included: Discussion of diagnostic and imaging test results, diagnosis and prognosis, overall goal and disposition plan.      Medical decision making and discussions included:   Discussion about pain, delirium, cognitive impairment and balancing that with analgesics.  As noted above, we will trial Tramadol over Oxycodone and increase non-opiates in an attempt to void them.  Also discussed rest of POC with daughter.     Electronically signed by:  RITA Adhikari CNP

## 2020-02-07 VITALS
TEMPERATURE: 98.2 F | SYSTOLIC BLOOD PRESSURE: 168 MMHG | OXYGEN SATURATION: 94 % | HEIGHT: 63 IN | HEART RATE: 55 BPM | WEIGHT: 101.8 LBS | RESPIRATION RATE: 18 BRPM | BODY MASS INDEX: 18.04 KG/M2 | DIASTOLIC BLOOD PRESSURE: 63 MMHG

## 2020-02-07 ASSESSMENT — MIFFLIN-ST. JEOR: SCORE: 820.89

## 2020-02-10 ENCOUNTER — HOSPITAL LABORATORY (OUTPATIENT)
Dept: OTHER | Facility: CLINIC | Age: 85
End: 2020-02-10

## 2020-02-10 ENCOUNTER — NURSING HOME VISIT (OUTPATIENT)
Dept: GERIATRICS | Facility: CLINIC | Age: 85
End: 2020-02-10
Payer: MEDICARE

## 2020-02-10 DIAGNOSIS — G89.29 CHRONIC PAIN OF BOTH SHOULDERS: ICD-10-CM

## 2020-02-10 DIAGNOSIS — S81.802D LEG WOUND, LEFT, SUBSEQUENT ENCOUNTER: ICD-10-CM

## 2020-02-10 DIAGNOSIS — D62 ANEMIA DUE TO BLOOD LOSS, ACUTE: ICD-10-CM

## 2020-02-10 DIAGNOSIS — I10 ESSENTIAL HYPERTENSION: ICD-10-CM

## 2020-02-10 DIAGNOSIS — M25.512 CHRONIC PAIN OF BOTH SHOULDERS: ICD-10-CM

## 2020-02-10 DIAGNOSIS — R09.02 POSTOPERATIVE HYPOXIA: ICD-10-CM

## 2020-02-10 DIAGNOSIS — S72.141S CLOSED FRACTURE OF FEMUR, INTERTROCHANTERIC, RIGHT, SEQUELA: ICD-10-CM
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
## 2020-02-10 DIAGNOSIS — M25.511 CHRONIC PAIN OF BOTH SHOULDERS: ICD-10-CM

## 2020-02-10 DIAGNOSIS — E03.9 HYPOTHYROIDISM, UNSPECIFIED TYPE: ICD-10-CM

## 2020-02-10 DIAGNOSIS — Z98.890 S/P ORIF (OPEN REDUCTION INTERNAL FIXATION) FRACTURE: ICD-10-CM

## 2020-02-10 DIAGNOSIS — R41.3 MEMORY IMPAIRMENT: ICD-10-CM

## 2020-02-10 DIAGNOSIS — R53.81 PHYSICAL DECONDITIONING: ICD-10-CM

## 2020-02-10 DIAGNOSIS — K59.01 SLOW TRANSIT CONSTIPATION: ICD-10-CM

## 2020-02-10 DIAGNOSIS — M75.101 TEAR OF RIGHT ROTATOR CUFF, UNSPECIFIED TEAR EXTENT, UNSPECIFIED WHETHER TRAUMATIC: ICD-10-CM

## 2020-02-10 DIAGNOSIS — Z87.81 S/P ORIF (OPEN REDUCTION INTERNAL FIXATION) FRACTURE: ICD-10-CM

## 2020-02-10 DIAGNOSIS — N39.41 URGE INCONTINENCE OF URINE: ICD-10-CM

## 2020-02-10 DIAGNOSIS — Z98.890 POSTOPERATIVE HYPOXIA: ICD-10-CM

## 2020-02-10 LAB
ANION GAP SERPL CALCULATED.3IONS-SCNC: 3 MMOL/L (ref 3–14)
BUN SERPL-MCNC: 10 MG/DL (ref 7–30)
CALCIUM SERPL-MCNC: 8.4 MG/DL (ref 8.5–10.1)
CHLORIDE SERPL-SCNC: 105 MMOL/L (ref 94–109)
CO2 SERPL-SCNC: 28 MMOL/L (ref 20–32)
CREAT SERPL-MCNC: 0.53 MG/DL (ref 0.52–1.04)
ERYTHROCYTE [DISTWIDTH] IN BLOOD BY AUTOMATED COUNT: 14.5 % (ref 10–15)
GFR SERPL CREATININE-BSD FRML MDRD: 80 ML/MIN/{1.73_M2}
GLUCOSE SERPL-MCNC: 95 MG/DL (ref 70–99)
HCT VFR BLD AUTO: 27.3 % (ref 35–47)
HGB BLD-MCNC: 9.1 G/DL (ref 11.7–15.7)
MCH RBC QN AUTO: 34.3 PG (ref 26.5–33)
MCHC RBC AUTO-ENTMCNC: 33.3 G/DL (ref 31.5–36.5)
MCV RBC AUTO: 103 FL (ref 78–100)
PLATELET # BLD AUTO: 374 10E9/L (ref 150–450)
POTASSIUM SERPL-SCNC: 3.5 MMOL/L (ref 3.4–5.3)
RBC # BLD AUTO: 2.65 10E12/L (ref 3.8–5.2)
SODIUM SERPL-SCNC: 136 MMOL/L (ref 133–144)
WBC # BLD AUTO: 7.6 10E9/L (ref 4–11)

## 2020-02-10 PROCEDURE — 99305 1ST NF CARE MODERATE MDM 35: CPT | Performed by: INTERNAL MEDICINE

## 2020-02-10 NOTE — LETTER
2/10/2020        RE: Gennaro Walton  [address]  [address]  [place] [address]        [place] [facility]  INITIAL VISIT NOTE  February 10, 2020    PRIMARY CARE PROVIDER AND CLINIC:  Mady Hagen [facility] [address] / [place] [address]    Chief Complaint   Patient presents with     Hospital F/U       HPI:    Gennaro Walton is a 89 year old  (9/8/1923) female who [facility]. Medical history is notable for mild memory impairment, breast cancer, hypertension, hypothyroidism, and osteoporosis.    She was hospitalized at [facility] from January 31 through February 5, 2020 after she presented with right hip pain and left anterior leg laceration following a fall.  She had normal sinus rhythm on electrocardiogram. Work-up revealed right intertrochanteric hip fracture for which she underwent intramedullary nailing on February 1, 2020.  Postop hospital course was complicated by development of hypoxia.  Hemoglobin dropped to 7.5 postoperatively due to blood loss of surgery.    Patient is admitted to this facility for medical management, nursing care, and rehab.     Patient was seen today in her room, while sitting in the chair.  She reports no right hip pain while sitting.  She denies fever, chills, chest pain, palpitation, nausea, vomiting, abdominal pain, constipation, diarrhea, or urinary symptoms.  She does endorse chronic bilateral shoulder pain.  Her blood pressure is running moderately elevated.    CODE STATUS:   DNR / DNI    PAST MEDICAL HISTORY:   Left breast cancer, status post mastectomy with reconstruction in 1985  Chronic kidney disease, stage III  Hypertension  Hypothyroidism  Osteoporosis  Fall with traumatic subarachnoid hemorrhage and left hip fracture in July 2019  Chronic bilateral shoulder pain with advanced right shoulder rotator cuff arthropathy and full-thickness tear of the supraspinatus  and infraspinatus tendons, noted on MRI on 2020  Urge incontinence  Diverticulum of esophagus  Insomnia  Left adrenal nodule, likely adenoma, measuring 2.6 cm, noted on CT angiogram on 2020  Mild memory impairment    PAST SURGICAL HISTORY:   Past Surgical History:   Procedure Laterality Date     ------------OTHER-------------      Left mastectomy     ------------OTHER-------------      Cataract surgery     OPEN REDUCTION INTERNAL FIXATION FEMUR MIDSHAFT Left 2019    Procedure: LEFT INTERTROCHANTERIC FEMUR FRACTURE OPEN REDUCTION AND INTERNAL FIXATION;  Surgeon: Chris Oseguera MD;  Location:  OR     OPEN REDUCTION INTERNAL FIXATION HIP NAILING Right 2020    Procedure: OPEN REDUCTION INTERNAL FIXATION RIGHT INTERTROCHANTERIC FEMUR FRACTURE;  Surgeon: Catalino Casper MD;  Location:  OR       FAMILY HISTORY:   Father  of heart attack at age of 46.  Mother probably had parotid gland tumor.  Daughter had vaginal cancer.     SOCIAL HISTORY:  Patient is .  She is apparently in the process of moving to a senior living apartment.  She normally does not use any assistive device for ambulation.    Social History     Tobacco Use     Smoking status: Never Smoker     Smokeless tobacco: Never Used   Substance Use Topics     Alcohol use: No     Frequency: Never       MEDICATIONS:  Current Outpatient Medications   Medication Sig Dispense Refill     acetaminophen (TYLENOL) 500 MG tablet Take 2 tablets (1,000 mg) by mouth 3 times daily       amLODIPine (NORVASC) 5 MG tablet Take 2.5 mg by mouth daily       aspirin (ASA) 325 MG EC tablet Take 1 tablet (325 mg) by mouth daily 30 tablet 0     calcium carbonate (TUMS) 500 MG chewable tablet Take 1 chew tab by mouth daily        levothyroxine (SYNTHROID/LEVOTHROID) 88 MCG tablet Take 88 mcg by mouth daily       Lidocaine (LIDOCARE) 4 % Patch Place 2 patches onto the skin every 24 hours To prevent lidocaine toxicity, patient should be  "patch free for 12 hrs daily.  Apply to bilateral shoulders, on during the day. 14 patch 0     losartan (COZAAR) 50 MG tablet Take 50 mg by mouth daily       ondansetron (ZOFRAN-ODT) 4 MG ODT tab Take 1 tablet (4 mg) by mouth every 6 hours as needed for nausea or vomiting 10 tablet 0     oxybutynin ER (DITROPAN-XL) 10 MG 24 hr tablet Take 1 tablet (10 mg) by mouth At Bedtime       polyethylene glycol (MIRALAX/GLYCOLAX) packet Take 17 g by mouth daily       senna-docusate (SENOKOT-S/PERICOLACE) 8.6-50 MG tablet Take 2 tablets by mouth 2 times daily 40 tablet 0     traMADol (ULTRAM) 50 MG tablet Take 0.5 tablets (25 mg) by mouth every 6 hours as needed for pain 30 tablet 0     vitamin D3 (CHOLECALCIFEROL) 2000 units tablet Take 1 tablet by mouth daily         ALLERGIES:  Allergies   Allergen Reactions     Sulfacetamide Hives       ROS:  10 point ROS neg other than the symptoms noted above in the HPI.    PHYSICAL EXAM:  BP (!) 168/63   Pulse 55   Temp 98.2  F (36.8  C)   Resp 18   Ht 1.6 m (5' 3\")   Wt 46.2 kg (101 lb 12.8 oz)   SpO2 94%   BMI 18.03 kg/m      Gen: Cooperative and in no acute distress  HEENT: Normocephalic; oropharynx clear; conjunctival pallor+  Card: Normal S1, S2, RRR  Resp: Lungs clear to auscultation bilaterally  GI: Abdomen soft, non-tender, non-distended, +BS  Ext: 1+ right lower extremity and trace left lower extremity edema  Neuro: CX II-XII grossly intact; ROM in all four extremities grossly intact  Psych: Alert and oriented almost x3; normal affect  Skin: No acute rash    LABORATORY/IMAGING DATA:  Lab Results   Component Value Date    WBC 5.4 02/05/2020     Lab Results   Component Value Date    RBC 2.43 02/05/2020     Lab Results   Component Value Date    HGB 8.2 02/05/2020     Lab Results   Component Value Date    HCT 24.6 02/05/2020     Lab Results   Component Value Date     02/05/2020     Lab Results   Component Value Date    MCH 33.7 02/05/2020     Lab Results   Component " Value Date    MCHC 33.3 02/05/2020     Lab Results   Component Value Date    RDW 13.2 02/05/2020     Lab Results   Component Value Date     02/05/2020       Last Comprehensive Metabolic Panel:  Sodium   Date Value Ref Range Status   02/05/2020 138 133 - 144 mmol/L Final     Potassium   Date Value Ref Range Status   02/05/2020 3.9 3.4 - 5.3 mmol/L Final     Chloride   Date Value Ref Range Status   02/05/2020 106 94 - 109 mmol/L Final     Carbon Dioxide   Date Value Ref Range Status   02/05/2020 31 20 - 32 mmol/L Final     Anion Gap   Date Value Ref Range Status   02/05/2020 1 (L) 3 - 14 mmol/L Final     Glucose   Date Value Ref Range Status   02/05/2020 92 70 - 99 mg/dL Final     Urea Nitrogen   Date Value Ref Range Status   02/05/2020 9 7 - 30 mg/dL Final     Creatinine   Date Value Ref Range Status   02/05/2020 0.49 (L) 0.52 - 1.04 mg/dL Final     GFR Estimate   Date Value Ref Range Status   02/05/2020 82 >60 mL/min/[1.73_m2] Final     Comment:     Non  GFR Calc  Starting 12/18/2018, serum creatinine based estimated GFR (eGFR) will be   calculated using the Chronic Kidney Disease Epidemiology Collaboration   (CKD-EPI) equation.       Calcium   Date Value Ref Range Status   02/05/2020 8.3 (L) 8.5 - 10.1 mg/dL Final         ASSESSMENT/PLAN:  Fall, subsequent encounter,  Right intertrochanteric hip fracture, status post intramedullary nailing on February 1, 2020,  Left anterior leg laceration,  Physical deconditioning.  Patient is hemodynamically stable and her pain is controlled.  Plan:  Fall precautions  Pain management with scheduled acetaminophen 1000 mg p.o. 3 times daily and PRN tramadol  Aspirin 325 mg p.o. daily for DVT prophylaxis  Routine wound care  WBAT right lower extremity  PT/OT evaluation and therapy  Follow-up with Ortho clinic in 2 weeks as scheduled    Acute blood loss anemia.  Due to surgical blood loss.  Hemoglobin was stable at 8.2 upon discharge from hospital on February  5.  Plan:  Monitor hemoglobin periodically (we will follow-up on CBC from today February 10)    Postop hypoxia.  Likely due to atelectasis.  Resolved.  Plan:  Encourage frequent use of incentive spirometer  Monitor oxygen saturation    Advanced right shoulder rotator cuff arthropathy,  Chronic bilateral shoulder pain.  Plan:  Continue conservative management with pain control and PT/OT  WBAT  Continue lidocaine patch    Pressure injury of right mid back, stage 2.  Present on admission to the TCU.    Plan:  WOC RN to evaluate and treat    Hypertension, uncontrolled.  Blood pressure is moderately elevated.  Plan:  Optimize pain management  Increase PTA amlodipine from 2.5 mg p.o. daily to 5 mg p.o. daily  Continue PTA losartan 50 mg p.o. daily  Continue blood pressure and further adjust BP regimen as indicated    Hypothyroidism.  Plan:  Continue PTA levothyroxine 88 mcg p.o. daily    Urinary urge incontinence.  Plan:  Continue PTA oxybutynin ER 10 mg p.o. at bedtime    Slow transit constipation.  Plan:  Continue bowel regimen    Mild memory impairment.  Plan:  Implement standard precautions for prevention of delirium  Continue OT evaluation and therapy      Electronically signed by:  Benigno Joyner MD                      Sincerely,        Benigno Joyner MD

## 2020-02-13 ENCOUNTER — NURSING HOME VISIT (OUTPATIENT)
Dept: GERIATRICS | Facility: CLINIC | Age: 85
End: 2020-02-13
Payer: MEDICARE

## 2020-02-13 VITALS
HEIGHT: 63 IN | RESPIRATION RATE: 16 BRPM | WEIGHT: 102.8 LBS | DIASTOLIC BLOOD PRESSURE: 53 MMHG | HEART RATE: 62 BPM | TEMPERATURE: 98.7 F | OXYGEN SATURATION: 93 % | BODY MASS INDEX: 18.21 KG/M2 | SYSTOLIC BLOOD PRESSURE: 183 MMHG

## 2020-02-13 DIAGNOSIS — Z98.890 S/P ORIF (OPEN REDUCTION INTERNAL FIXATION) FRACTURE: ICD-10-CM

## 2020-02-13 DIAGNOSIS — S72.141S CLOSED FRACTURE OF FEMUR, INTERTROCHANTERIC, RIGHT, SEQUELA: ICD-10-CM

## 2020-02-13 DIAGNOSIS — I10 ESSENTIAL HYPERTENSION: ICD-10-CM

## 2020-02-13 DIAGNOSIS — M25.512 CHRONIC PAIN OF BOTH SHOULDERS: ICD-10-CM

## 2020-02-13 DIAGNOSIS — R53.81 DEBILITY: ICD-10-CM

## 2020-02-13 DIAGNOSIS — S81.802D LEG WOUND, LEFT, SUBSEQUENT ENCOUNTER: ICD-10-CM

## 2020-02-13 DIAGNOSIS — Z87.81 S/P ORIF (OPEN REDUCTION INTERNAL FIXATION) FRACTURE: ICD-10-CM

## 2020-02-13 DIAGNOSIS — M75.101 TEAR OF RIGHT ROTATOR CUFF, UNSPECIFIED TEAR EXTENT, UNSPECIFIED WHETHER TRAUMATIC: ICD-10-CM

## 2020-02-13 DIAGNOSIS — G89.29 CHRONIC PAIN OF BOTH SHOULDERS: ICD-10-CM

## 2020-02-13 DIAGNOSIS — M25.511 CHRONIC PAIN OF BOTH SHOULDERS: ICD-10-CM
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
## 2020-02-13 DIAGNOSIS — N18.30 CKD (CHRONIC KIDNEY DISEASE) STAGE 3, GFR 30-59 ML/MIN (H): ICD-10-CM

## 2020-02-13 DIAGNOSIS — R41.89 COGNITIVE IMPAIRMENT: ICD-10-CM

## 2020-02-13 PROCEDURE — 99309 SBSQ NF CARE MODERATE MDM 30: CPT | Performed by: NURSE PRACTITIONER

## 2020-02-13 ASSESSMENT — MIFFLIN-ST. JEOR: SCORE: 825.43

## 2020-02-13 NOTE — LETTER
2/13/2020        RE: Gennaro Walton  [address]  [address]  [place] [address]        [facility]  [place] Medical Record Number:  [id number]  [facility] of [facility] took [facility] [[id number]]  Chief Complaint   Patient presents with     RECHECK       HPI:    Gennaro Walton  is a 89 year old (9/8/1923), who is being seen today for an episodic care visit.  HPI information obtained from: facility chart records, facility staff, patient report and [facility] chart review.     Met with Mrs. Walton today for follow up.  Mrs. Walton has advanced cognitive/memory impairment but reports she is doing well.  She is endorsing ongoing chronic R>L bilateral shoulder pain, does not rate, but she has no other complaints.       Past Medical and Surgical History reviewed in Epic today.    MEDICATIONS:  Current Outpatient Medications   Medication Sig Dispense Refill     acetaminophen (TYLENOL) 500 MG tablet Take 2 tablets (1,000 mg) by mouth 3 times daily       amLODIPine (NORVASC) 5 MG tablet Take 5 mg by mouth daily        aspirin (ASA) 325 MG EC tablet Take 1 tablet (325 mg) by mouth daily 30 tablet 0     calcium carbonate (TUMS) 500 MG chewable tablet Take 1 chew tab by mouth daily        levothyroxine (SYNTHROID/LEVOTHROID) 88 MCG tablet Take 88 mcg by mouth daily       Lidocaine (LIDOCARE) 4 % Patch Place 2 patches onto the skin every 24 hours To prevent lidocaine toxicity, patient should be patch free for 12 hrs daily.  Apply to bilateral shoulders, on during the day. 14 patch 0     losartan (COZAAR) 50 MG tablet Take 50 mg by mouth daily       ondansetron (ZOFRAN-ODT) 4 MG ODT tab Take 1 tablet (4 mg) by mouth every 6 hours as needed for nausea or vomiting 10 tablet 0     oxybutynin ER (DITROPAN-XL) 10 MG 24 hr tablet Take 1 tablet (10 mg) by mouth At Bedtime       polyethylene glycol (MIRALAX/GLYCOLAX) packet Take 17 g by mouth daily        "senna-docusate (SENOKOT-S/PERICOLACE) 8.6-50 MG tablet Take 2 tablets by mouth 2 times daily 40 tablet 0     traMADol (ULTRAM) 50 MG tablet Take 0.5 tablets (25 mg) by mouth every 6 hours as needed for pain 30 tablet 0     vitamin D3 (CHOLECALCIFEROL) 2000 units tablet Take 1 tablet by mouth daily       REVIEW OF SYSTEMS:  Limited secondary to cognitive impairment but today 7 point ROS done including, light headedness/dizziness, fever/chills, pain, Resp, CV, GI, and  and is negative other than noted in HPI.      Objective:  BP (!) 183/53   Pulse 62   Temp 98.7  F (37.1  C)   Resp 16   Ht 1.6 m (5' 3\")   Wt 46.6 kg (102 lb 12.8 oz)   SpO2 93%   BMI 18.21 kg/m        Exam:  GENERAL APPEARANCE:  Elderly female resting in bed, NAD, non-toxic.  ENT:  Mouth and oropharynx normal, moist mucous membranes.   RESP:  Lungs diminished in lower lobes, but otherwise CTA.  Regular relaxed breathing effort.  No cough.   CV: S1/S2 no murmur or rubs.  Regular rhythm and rate.  No generalized edema.   ABDOMEN:   Protuberant but, soft, non-tender with active bowel sounds.  No guarding, rigidity, or rebound tenderness.  EXTREMITIES:  No lower extremity edema, no calf tenderness.   PSYCH: Alert to self, knows this is not home, alert only some what to situation.  Ongoing degree of cognitive/memory impairment, stable/unchanged.   LLE has a ~1x2.5 cm wound, packed with dressing, no drainage, no fowl odor.   Right hip has a surgical occlusive dressing in place, no overt s/s of infection/drainage.     Labs:   I have personally reviewed labs, which are in facility or EMR chart.     ASSESSMENT/PLAN:  Fall, subsequent encounter  Closed fracture of femur, intertrochanteric, right, sequela  S/P ORIF (open reduction internal fixation) fracture 1 Feb with intramedullary nailing  Does not remember fall or surgery, denies any pain.   -Incision appears well.   -WBAT.   -No changes, continue to clinically monitor.     Leg wound, left, " subsequent encounter  -Continues with RN wound cares, no s/s of infection.     Tear of right rotator cuff, unspecified tear extent, unspecified whether traumatic  Chronic pain of both shoulders  Continues to note R>L bilateral shoulder pain, does not rate, reports worse with movement.    Appears unchanged.   -Continues TID Acetaminophen.   -Continues Bilateral lidocaine patches.   -Continues PRN Tramadol q daily.   --Do not escalate unless approved from family.     Essential hypertension  SBP's are erratic ranging 120-180's but most of the time >160.   Norvasc was increased two days ago.   -Continue increased Norvasc.   -Continue PTA Losartan.   -No changes, continue to clinically monitor.     CKD (chronic kidney disease) stage 3, GFR 30-59 ml/min (H)  -Creatine baseline at 0.53 when checked 2/10.     Cognitive impairment  Ongoing advanced cognitive/memory impairment, stable/unchanged.     Debility  -Continues with PT/OT.     Orders written by provider at facility  -NNO.     Electronically signed by:  RITA Adhikari CNP           Sincerely,        RITA Adhikari CNP

## 2020-02-13 NOTE — PROGRESS NOTES
[facility]  [place] Medical Record Number:  [id number]  Place of Service where encounter took place:  [facility] [[id number]]  Chief Complaint   Patient presents with     RECHECK       HPI:    Gennaro Walton  is a 89 year old (9/8/1923), who is being seen today for an episodic care visit.  HPI information obtained from: facility chart records, facility staff, patient report and [facility] chart review.     Met with Mrs. Walton today for follow up.  Mrs. Walton has advanced cognitive/memory impairment but reports she is doing well.  She is endorsing ongoing chronic R>L bilateral shoulder pain, does not rate, but she has no other complaints.       Past Medical and Surgical History reviewed in Epic today.    MEDICATIONS:  Current Outpatient Medications   Medication Sig Dispense Refill     acetaminophen (TYLENOL) 500 MG tablet Take 2 tablets (1,000 mg) by mouth 3 times daily       amLODIPine (NORVASC) 5 MG tablet Take 5 mg by mouth daily        aspirin (ASA) 325 MG EC tablet Take 1 tablet (325 mg) by mouth daily 30 tablet 0     calcium carbonate (TUMS) 500 MG chewable tablet Take 1 chew tab by mouth daily        levothyroxine (SYNTHROID/LEVOTHROID) 88 MCG tablet Take 88 mcg by mouth daily       Lidocaine (LIDOCARE) 4 % Patch Place 2 patches onto the skin every 24 hours To prevent lidocaine toxicity, patient should be patch free for 12 hrs daily.  Apply to bilateral shoulders, on during the day. 14 patch 0     losartan (COZAAR) 50 MG tablet Take 50 mg by mouth daily       ondansetron (ZOFRAN-ODT) 4 MG ODT tab Take 1 tablet (4 mg) by mouth every 6 hours as needed for nausea or vomiting 10 tablet 0     oxybutynin ER (DITROPAN-XL) 10 MG 24 hr tablet Take 1 tablet (10 mg) by mouth At Bedtime       polyethylene glycol (MIRALAX/GLYCOLAX) packet Take 17 g by mouth daily       senna-docusate (SENOKOT-S/PERICOLACE) 8.6-50 MG tablet Take 2 tablets by mouth 2 times daily 40 tablet 0  "    traMADol (ULTRAM) 50 MG tablet Take 0.5 tablets (25 mg) by mouth every 6 hours as needed for pain 30 tablet 0     vitamin D3 (CHOLECALCIFEROL) 2000 units tablet Take 1 tablet by mouth daily       REVIEW OF SYSTEMS:  Limited secondary to cognitive impairment but today 7 point ROS done including, light headedness/dizziness, fever/chills, pain, Resp, CV, GI, and  and is negative other than noted in HPI.      Objective:  BP (!) 183/53   Pulse 62   Temp 98.7  F (37.1  C)   Resp 16   Ht 1.6 m (5' 3\")   Wt 46.6 kg (102 lb 12.8 oz)   SpO2 93%   BMI 18.21 kg/m       Exam:  GENERAL APPEARANCE:  Elderly female resting in bed, NAD, non-toxic.  ENT:  Mouth and oropharynx normal, moist mucous membranes.   RESP:  Lungs diminished in lower lobes, but otherwise CTA.  Regular relaxed breathing effort.  No cough.   CV: S1/S2 no murmur or rubs.  Regular rhythm and rate.  No generalized edema.   ABDOMEN:   Protuberant but, soft, non-tender with active bowel sounds.  No guarding, rigidity, or rebound tenderness.  EXTREMITIES:  No lower extremity edema, no calf tenderness.   PSYCH: Alert to self, knows this is not home, alert only some what to situation.  Ongoing degree of cognitive/memory impairment, stable/unchanged.   LLE has a ~1x2.5 cm wound, packed with dressing, no drainage, no fowl odor.   Right hip has a surgical occlusive dressing in place, no overt s/s of infection/drainage.     Labs:   I have personally reviewed labs, which are in facility or EMR chart.     ASSESSMENT/PLAN:  Fall, subsequent encounter  Closed fracture of femur, intertrochanteric, right, sequela  S/P ORIF (open reduction internal fixation) fracture 1 Feb with intramedullary nailing  Does not remember fall or surgery, denies any pain.   -Incision appears well.   -WBAT.   -No changes, continue to clinically monitor.     Leg wound, left, subsequent encounter  -Continues with RN wound cares, no s/s of infection.     Tear of right rotator cuff, " unspecified tear extent, unspecified whether traumatic  Chronic pain of both shoulders  Continues to note R>L bilateral shoulder pain, does not rate, reports worse with movement.    Appears unchanged.   -Continues TID Acetaminophen.   -Continues Bilateral lidocaine patches.   -Continues PRN Tramadol q daily.   --Do not escalate unless approved from family.     Essential hypertension  SBP's are erratic ranging 120-180's but most of the time >160.   Norvasc was increased two days ago.   -Continue increased Norvasc.   -Continue PTA Losartan.   -No changes, continue to clinically monitor.     CKD (chronic kidney disease) stage 3, GFR 30-59 ml/min (H)  -Creatine baseline at 0.53 when checked 2/10.     Cognitive impairment  Ongoing advanced cognitive/memory impairment, stable/unchanged.     Debility  -Continues with PT/OT.     Orders written by provider at facility  -NNO.     Electronically signed by:  RITA Adhikari CNP

## 2020-02-21 ENCOUNTER — NURSING HOME VISIT (OUTPATIENT)
Dept: GERIATRICS | Facility: CLINIC | Age: 85
End: 2020-02-21
Payer: MEDICARE

## 2020-02-21 VITALS
HEART RATE: 56 BPM | OXYGEN SATURATION: 94 % | SYSTOLIC BLOOD PRESSURE: 163 MMHG | WEIGHT: 98.2 LBS | HEIGHT: 63 IN | TEMPERATURE: 98.7 F | BODY MASS INDEX: 17.4 KG/M2 | RESPIRATION RATE: 18 BRPM | DIASTOLIC BLOOD PRESSURE: 72 MMHG

## 2020-02-21 DIAGNOSIS — M25.511 CHRONIC PAIN OF BOTH SHOULDERS: ICD-10-CM

## 2020-02-21 DIAGNOSIS — B35.1 ONYCHOMYCOSIS: Primary | ICD-10-CM

## 2020-02-21 DIAGNOSIS — G89.29 CHRONIC PAIN OF BOTH SHOULDERS: ICD-10-CM

## 2020-02-21 DIAGNOSIS — R52 ACUTE PAIN: ICD-10-CM
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
## 2020-02-21 DIAGNOSIS — N18.30 CKD (CHRONIC KIDNEY DISEASE) STAGE 3, GFR 30-59 ML/MIN (H): ICD-10-CM

## 2020-02-21 DIAGNOSIS — Z87.81 S/P ORIF (OPEN REDUCTION INTERNAL FIXATION) FRACTURE: ICD-10-CM

## 2020-02-21 DIAGNOSIS — I10 ESSENTIAL HYPERTENSION: ICD-10-CM

## 2020-02-21 DIAGNOSIS — L60.8 NAIL DEFORMITY: ICD-10-CM

## 2020-02-21 DIAGNOSIS — M25.512 CHRONIC PAIN OF BOTH SHOULDERS: ICD-10-CM

## 2020-02-21 DIAGNOSIS — S81.802D LEG WOUND, LEFT, SUBSEQUENT ENCOUNTER: ICD-10-CM

## 2020-02-21 DIAGNOSIS — R41.89 COGNITIVE IMPAIRMENT: ICD-10-CM

## 2020-02-21 DIAGNOSIS — R53.81 DEBILITY: ICD-10-CM

## 2020-02-21 DIAGNOSIS — Z98.890 S/P ORIF (OPEN REDUCTION INTERNAL FIXATION) FRACTURE: ICD-10-CM

## 2020-02-21 PROCEDURE — 99309 SBSQ NF CARE MODERATE MDM 30: CPT | Performed by: NURSE PRACTITIONER

## 2020-02-21 RX ORDER — PRENATAL VIT 91/IRON/FOLIC/DHA 28-975-200
1 COMBINATION PACKAGE (EA) ORAL 2 TIMES DAILY
Qty: 60 G | Refills: 0
Start: 2020-02-21 | End: 2020-03-22

## 2020-02-21 ASSESSMENT — MIFFLIN-ST. JEOR: SCORE: 804.56

## 2020-02-21 NOTE — PROGRESS NOTES
[facility]  [place] Medical Record Number:  [id number]  Place of Service where encounter took place:  [facility] [[id number]]  Chief Complaint   Patient presents with     RECHECK       HPI:    Gennaro Walton  is a 89 year old (9/8/1923), who is being seen today for an episodic care visit.  HPI information obtained from: facility chart records, facility staff, patient report, [facility] chart review and family/first contact daughter/Caron report.     Past Medical and Surgical History reviewed in [facility] today.    Met with Mrs. Walton and spoke with daughter Caron today for follow up.  Mrs. Walton reports she is feeling well.  She denies any pain today.  We were asked to look at her Left great toe nail which is loose.  She denies any pain in her toe.  She denies any trauma to the toes, no visible s/s of trauma.  She has no complaints today.       Current Outpatient Medications   Medication Sig Dispense Refill     acetaminophen (TYLENOL) 500 MG tablet Take 2 tablets (1,000 mg) by mouth 3 times daily       amLODIPine (NORVASC) 5 MG tablet Take 5 mg by mouth daily        aspirin (ASA) 325 MG EC tablet Take 1 tablet (325 mg) by mouth daily 30 tablet 0     calcium carbonate (TUMS) 500 MG chewable tablet Take 1 chew tab by mouth daily        levothyroxine (SYNTHROID/LEVOTHROID) 88 MCG tablet Take 88 mcg by mouth daily       Lidocaine (LIDOCARE) 4 % Patch Place 2 patches onto the skin every 24 hours To prevent lidocaine toxicity, patient should be patch free for 12 hrs daily.  Apply to bilateral shoulders, on during the day. 14 patch 0     losartan (COZAAR) 50 MG tablet Take 50 mg by mouth daily       Menthol, Topical Analgesic, (BIOFREEZE) 4 % EX GEL Apply to right hip topically every 4 hours as needed for pain MAX 4x/day       ondansetron (ZOFRAN-ODT) 4 MG ODT tab Take 1 tablet (4 mg) by mouth every 6 hours as needed for nausea or vomiting 10 tablet 0     oxybutynin ER  "(DITROPAN-XL) 10 MG 24 hr tablet Take 1 tablet (10 mg) by mouth At Bedtime       polyethylene glycol (MIRALAX/GLYCOLAX) packet Take 17 g by mouth daily       senna-docusate (SENOKOT-S/PERICOLACE) 8.6-50 MG tablet Take 2 tablets by mouth 2 times daily 40 tablet 0     terbinafine 1 % EX external cream Apply 1 g topically 2 times daily Bilateral toes BID x 30 days. 60 g 0     traMADol (ULTRAM) 50 MG tablet Take 0.5 tablets (25 mg) by mouth every 6 hours as needed for pain 30 tablet 0     vitamin D3 (CHOLECALCIFEROL) 2000 units tablet Take 1 tablet by mouth daily       REVIEW OF SYSTEMS:  Limited secondary to cognitive impairment but today 7 point ROS done including, light headedness/dizziness, fever/chills, pain, Resp, CV, GI, and  and is negative other than noted in HPI.      Objective:  BP (!) 163/72   Pulse 56   Temp 98.7  F (37.1  C)   Resp 18   Ht 1.6 m (5' 3\")   Wt 44.5 kg (98 lb 3.2 oz)   SpO2 94%   BMI 17.40 kg/m       Exam:  GENERAL APPEARANCE:  Elderly female sitting up in WC, NAD, non-toxic.  ENT:  Mouth and oropharynx normal, moist mucous membranes.   RESP:  Lungs diminished in lower lobes, but otherwise CTA.  Regular relaxed breathing effort.  No cough.   CV: S1/S2 no murmur or rubs.  Regular rhythm and rate.  No generalized edema.   ABDOMEN:   Protuberant but, soft, non-tender with active bowel sounds.  No guarding, rigidity, or rebound tenderness.  EXTREMITIES:  No lower extremity edema, no calf tenderness.   PSYCH: Alert to self, knows this is not home, alert only some what to situation.  Ongoing degree of cognitive/memory impairment, stable/unchanged.   LLE has a ~1x2.5 cm wound, packed with dressing, no drainage, no fowl odor.   Right hip incision is approximated, healing well, no s/s of infection.   TOE: Left great toe nail is loose with separation at the superior end by the skin, non-tender, no s/s of infection.  Mild degree of onychomycosis.     Labs:   None recent. "     ASSESSMENT/PLAN:  Onychomycosis  Nail deformity, L Great toe  Left great toe nail is loose and  from the skin at the superior end.  Non-tender, no s/s of trauma, she denies any trauma.  There is mild onychomycosis but not convinced that is the etiology on exam.    Due to no trauma, non-tender, no s/s of infection, no urgency for intervention at this time.   -Will start BID Lamisil cream x 30 days for prophylaxis.   -Secure with bandaid, monitor daily.   --Did call daughter and updated her, suggested Podiatry in near future, she will consider.     Fall, subsequent encounter  S/P ORIF (open reduction internal fixation) fracture 1 Feb with intramedullary nailing  Leg wound, left, subsequent encounter  Right incision is healing nicely, no s/s of infection.   Left wound is healing slower than ideal, but is healing, no s/s of infection.   -No changes, continue to clinically monitor.   -Ortho f/u Mid March.     Acute pain  Chronic pain of both shoulders  Lately reports pain is minimal and tolerable.   -No changes, continue to clinically monitor.     Essential hypertension  SBP's continue to be erratic but review of VS's show VSS and within acceptable range.  Asymptomatic.   No current s/s of clinical CHF.   -No changes, continue to clinically monitor.     CKD (chronic kidney disease) stage 3, GFR 30-59 ml/min (H)  -No changes, continue to clinically monitor.     Cognitive impairment  Stable/unchanged.   -No changes, continue to clinically monitor.     Debility  -Continues with PT/OT.     Orders written by provider at facility  -As noted above.     Electronically signed by:  RITA Adhikari CNP

## 2020-02-21 NOTE — LETTER
2/21/2020        RE: Gennaro Walton  [address]  [address]  [place] [address]        [facility]  [place] Medical Record Number:  [id number]  [facility] of [facility] where encounter took place[facility] [[id number]]  Chief Complaint   Patient presents with     RECHECK       HPI:    Gennaro Walton  is a 89 year old (9/8/1923), who is being seen today for an episodic care visit.  HPI information obtained from: facility chart records, facility staff, patient report, [facility] chart review and family/first contact daughter/Caron report.     Past Medical and Surgical History reviewed in [facility] today.    Met with Mrs. Walton and spoke with daughter Caron today for follow up.  Mrs. Walton reports she is feeling well.  She denies any pain today.  We were asked to look at her Left great toe nail which is loose.  She denies any pain in her toe.  She denies any trauma to the toes, no visible s/s of trauma.  She has no complaints today.       Current Outpatient Medications   Medication Sig Dispense Refill     acetaminophen (TYLENOL) 500 MG tablet Take 2 tablets (1,000 mg) by mouth 3 times daily       amLODIPine (NORVASC) 5 MG tablet Take 5 mg by mouth daily        aspirin (ASA) 325 MG EC tablet Take 1 tablet (325 mg) by mouth daily 30 tablet 0     calcium carbonate (TUMS) 500 MG chewable tablet Take 1 chew tab by mouth daily        levothyroxine (SYNTHROID/LEVOTHROID) 88 MCG tablet Take 88 mcg by mouth daily       Lidocaine (LIDOCARE) 4 % Patch Place 2 patches onto the skin every 24 hours To prevent lidocaine toxicity, patient should be patch free for 12 hrs daily.  Apply to bilateral shoulders, on during the day. 14 patch 0     losartan (COZAAR) 50 MG tablet Take 50 mg by mouth daily       Menthol, Topical Analgesic, (BIOFREEZE) 4 % EX GEL Apply to right hip topically every 4 hours as needed for pain MAX 4x/day       ondansetron (ZOFRAN-ODT) 4 MG ODT tab Take 1 tablet (4 mg)  "by mouth every 6 hours as needed for nausea or vomiting 10 tablet 0     oxybutynin ER (DITROPAN-XL) 10 MG 24 hr tablet Take 1 tablet (10 mg) by mouth At Bedtime       polyethylene glycol (MIRALAX/GLYCOLAX) packet Take 17 g by mouth daily       senna-docusate (SENOKOT-S/PERICOLACE) 8.6-50 MG tablet Take 2 tablets by mouth 2 times daily 40 tablet 0     terbinafine 1 % EX external cream Apply 1 g topically 2 times daily Bilateral toes BID x 30 days. 60 g 0     traMADol (ULTRAM) 50 MG tablet Take 0.5 tablets (25 mg) by mouth every 6 hours as needed for pain 30 tablet 0     vitamin D3 (CHOLECALCIFEROL) 2000 units tablet Take 1 tablet by mouth daily       REVIEW OF SYSTEMS:  Limited secondary to cognitive impairment but today 7 point ROS done including, light headedness/dizziness, fever/chills, pain, Resp, CV, GI, and  and is negative other than noted in HPI.      Objective:  BP (!) 163/72   Pulse 56   Temp 98.7  F (37.1  C)   Resp 18   Ht 1.6 m (5' 3\")   Wt 44.5 kg (98 lb 3.2 oz)   SpO2 94%   BMI 17.40 kg/m        Exam:  GENERAL APPEARANCE:  Elderly female sitting up in WC, NAD, non-toxic.  ENT:  Mouth and oropharynx normal, moist mucous membranes.   RESP:  Lungs diminished in lower lobes, but otherwise CTA.  Regular relaxed breathing effort.  No cough.   CV: S1/S2 no murmur or rubs.  Regular rhythm and rate.  No generalized edema.   ABDOMEN:   Protuberant but, soft, non-tender with active bowel sounds.  No guarding, rigidity, or rebound tenderness.  EXTREMITIES:  No lower extremity edema, no calf tenderness.   PSYCH: Alert to self, knows this is not home, alert only some what to situation.  Ongoing degree of cognitive/memory impairment, stable/unchanged.   LLE has a ~1x2.5 cm wound, packed with dressing, no drainage, no fowl odor.   Right hip incision is approximated, healing well, no s/s of infection.   TOE: Left great toe nail is loose with separation at the superior end by the skin, non-tender, no s/s of " infection.  Mild degree of onychomycosis.     Labs:   None recent.     ASSESSMENT/PLAN:  Onychomycosis  Nail deformity, L Great toe  Left great toe nail is loose and  from the skin at the superior end.  Non-tender, no s/s of trauma, she denies any trauma.  There is mild onychomycosis but not convinced that is the etiology on exam.    Due to no trauma, non-tender, no s/s of infection, no urgency for intervention at this time.   -Will start BID Lamisil cream x 30 days for prophylaxis.   -Secure with bandaid, monitor daily.   --Did call daughter and updated her, suggested Podiatry in near future, she will consider.     Fall, subsequent encounter  S/P ORIF (open reduction internal fixation) fracture 1 Feb with intramedullary nailing  Leg wound, left, subsequent encounter  Right incision is healing nicely, no s/s of infection.   Left wound is healing slower than ideal, but is healing, no s/s of infection.   -No changes, continue to clinically monitor.   -Ortho f/u Mid March.     Acute pain  Chronic pain of both shoulders  Lately reports pain is minimal and tolerable.   -No changes, continue to clinically monitor.     Essential hypertension  SBP's continue to be erratic but review of VS's show VSS and within acceptable range.  Asymptomatic.   No current s/s of clinical CHF.   -No changes, continue to clinically monitor.     CKD (chronic kidney disease) stage 3, GFR 30-59 ml/min (H)  -No changes, continue to clinically monitor.     Cognitive impairment  Stable/unchanged.   -No changes, continue to clinically monitor.     Debility  -Continues with PT/OT.     Orders written by provider at facility  -As noted above.     Electronically signed by:  RITA Adhikari CNP           Sincerely,        RITA Adhikari CNP

## 2020-02-27 ENCOUNTER — NURSING HOME VISIT (OUTPATIENT)
Dept: GERIATRICS | Facility: CLINIC | Age: 85
End: 2020-02-27
Payer: MEDICARE

## 2020-02-27 VITALS
HEART RATE: 60 BPM | DIASTOLIC BLOOD PRESSURE: 55 MMHG | WEIGHT: 97.6 LBS | HEIGHT: 63 IN | BODY MASS INDEX: 17.29 KG/M2 | SYSTOLIC BLOOD PRESSURE: 143 MMHG | RESPIRATION RATE: 18 BRPM | OXYGEN SATURATION: 94 % | TEMPERATURE: 98.8 F

## 2020-02-27 DIAGNOSIS — L60.8 NAIL DEFORMITY: ICD-10-CM

## 2020-02-27 DIAGNOSIS — M25.511 CHRONIC PAIN OF BOTH SHOULDERS: ICD-10-CM

## 2020-02-27 DIAGNOSIS — Z87.81 S/P ORIF (OPEN REDUCTION INTERNAL FIXATION) FRACTURE: ICD-10-CM
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
## 2020-02-27 DIAGNOSIS — Z98.890 S/P ORIF (OPEN REDUCTION INTERNAL FIXATION) FRACTURE: ICD-10-CM

## 2020-02-27 DIAGNOSIS — R53.81 DEBILITY: ICD-10-CM

## 2020-02-27 DIAGNOSIS — R52 ACUTE PAIN: ICD-10-CM

## 2020-02-27 DIAGNOSIS — R41.89 COGNITIVE IMPAIRMENT: ICD-10-CM

## 2020-02-27 DIAGNOSIS — M25.512 CHRONIC PAIN OF BOTH SHOULDERS: ICD-10-CM

## 2020-02-27 DIAGNOSIS — I10 ESSENTIAL HYPERTENSION: ICD-10-CM

## 2020-02-27 DIAGNOSIS — S81.802D LEG WOUND, LEFT, SUBSEQUENT ENCOUNTER: ICD-10-CM

## 2020-02-27 DIAGNOSIS — G89.29 CHRONIC PAIN OF BOTH SHOULDERS: ICD-10-CM

## 2020-02-27 DIAGNOSIS — S72.001A CLOSED FRACTURE OF RIGHT HIP, INITIAL ENCOUNTER (H): ICD-10-CM

## 2020-02-27 DIAGNOSIS — B35.1 ONYCHOMYCOSIS: ICD-10-CM

## 2020-02-27 DIAGNOSIS — N18.30 CKD (CHRONIC KIDNEY DISEASE) STAGE 3, GFR 30-59 ML/MIN (H): ICD-10-CM

## 2020-02-27 PROCEDURE — 99309 SBSQ NF CARE MODERATE MDM 30: CPT | Performed by: NURSE PRACTITIONER

## 2020-02-27 RX ORDER — LIDOCAINE 4 G/G
2 PATCH TOPICAL EVERY 24 HOURS
Qty: 14 PATCH | Refills: 0
Start: 2020-02-27 | End: 2020-11-13

## 2020-02-27 ASSESSMENT — MIFFLIN-ST. JEOR: SCORE: 801.84

## 2020-02-27 NOTE — LETTER
2/27/2020        RE: Gennaro Walton  [address]  [address]  [place] [place] [address]        [place] [facility]  [place] Medical Record Number:  [id number]  [facility] of [facility] where encounter took place[facility] [[id number]]  Chief Complaint   Patient presents with     RECHECK       HPI:    Gennaro Walton  is a 89 year old (9/8/1923), who is being seen today for an episodic care visit.  HPI information obtained from: facility chart records, facility staff, patient report and [facility] chart review.     Met with Mrs. Walton today for follow up.  Mrs. Walton was just returning from PT/OT, reports overall she is doing well.  She continues to endorse her chronic shoulder and general pains, but stable/unchanged.  She has no other complaints.     Past Medical and Surgical History reviewed in Epic today.    MEDICATIONS:  Current Outpatient Medications   Medication Sig Dispense Refill     acetaminophen (TYLENOL) 500 MG tablet Take 2 tablets (1,000 mg) by mouth 3 times daily       amLODIPine (NORVASC) 5 MG tablet Take 5 mg by mouth daily        aspirin (ASA) 325 MG EC tablet Take 1 tablet (325 mg) by mouth daily 30 tablet 0     calcium carbonate (TUMS) 500 MG chewable tablet Take 1 chew tab by mouth daily        levothyroxine (SYNTHROID/LEVOTHROID) 88 MCG tablet Take 88 mcg by mouth daily       Lidocaine (LIDOCARE) 4 % Patch Place 2 patches onto the skin every 24 hours To prevent lidocaine toxicity, patient should be patch free for 12 hrs daily.  Apply 1 patch to each shoulder, on during the day. 14 patch 0     losartan (COZAAR) 50 MG tablet Take 50 mg by mouth daily       Menthol, Topical Analgesic, (BIOFREEZE) 4 % GEL Externally apply topically See Admin Instructions Apply to (Right hip and any non-facial pain area) topically every 4 hours as needed for pain MAX 4x/day       ondansetron (ZOFRAN-ODT) 4 MG ODT tab Take 1 tablet (4 mg) by mouth every 6 hours as needed for nausea  "or vomiting 10 tablet 0     oxybutynin ER (DITROPAN-XL) 10 MG 24 hr tablet Take 1 tablet (10 mg) by mouth At Bedtime       polyethylene glycol (MIRALAX/GLYCOLAX) packet Take 17 g by mouth daily       senna-docusate (SENOKOT-S/PERICOLACE) 8.6-50 MG tablet Take 2 tablets by mouth 2 times daily 40 tablet 0     terbinafine 1 % EX external cream Apply 1 g topically 2 times daily Bilateral toes BID x 30 days. 60 g 0     traMADol (ULTRAM) 50 MG tablet Take 0.5 tablets (25 mg) by mouth every 6 hours as needed for pain 30 tablet 0     vitamin D3 (CHOLECALCIFEROL) 2000 units tablet Take 1 tablet by mouth daily       REVIEW OF SYSTEMS:  Limited secondary to cognitive impairment but today 7 point ROS done including, light headedness/dizziness, fever/chills, pain, Resp, CV, GI, and  and is negative other than noted in HPI.      Objective:  BP (!) 143/55   Pulse 60   Temp 98.8  F (37.1  C)   Resp 18   Ht 1.6 m (5' 3\")   Wt 44.3 kg (97 lb 9.6 oz)   SpO2 94%   BMI 17.29 kg/m        Exam:  GENERAL APPEARANCE:  Elderly female sitting on side of bed, NAD, non-toxic.  ENT:  Mouth and oropharynx normal, moist mucous membranes.   RESP:  Lungs with faint coarseness in lower lobes, but otherwise CTA.  Regular relaxed breathing effort.  No cough.   CV: S1/S2 no murmur or rubs.  Regular rhythm and rate.  No generalized edema.   ABDOMEN:   Protuberant but, soft, non-tender with active bowel sounds.  No guarding, rigidity, or rebound tenderness.  EXTREMITIES:  No lower extremity edema, no calf tenderness.   PSYCH: Alert to self, knows this is not home, alert only some what to situation.  Ongoing degree of cognitive/memory impairment, stable/unchanged.   LLE has a ~1x2.5 cm wound, packed with dressing, no drainage, no fowl odor.   Right hip incision is approximated, healing well, no s/s of infection.   TOE: Left great toe nail is missing, no s/s of infection, non-tender.     Labs:   None-recent.     ASSESSMENT/PLAN:  Fall, subsequent " "encounter  S/P ORIF (open reduction internal fixation) fracture 1 Feb with intramedullary nailing  Left hip incision healed well.   Denies any Left hip pain.   -Continues on ASA until around 3/17 for prophylaxis.   -Ortho f/u 3/16.     Leg wound, left, subsequent encounter  Ongoing small LLE leg wound with q daily hydrogel packing.    Wound with no s/s of infection, but continues to look dry and not getting any worse, but not any better, query if too dry.   -Sent a note to wound care RN, to see if we increase to BID packings or change to other packing to help promote healing.      Acute pain  Chronic pain of both shoulders  Everyday has an area that \"hurts\" usually it's her bilateral shoulders, some times LE's, some times arm's, but most of the time it's vague and mild.  She reports ongoing general soreness.   Avoiding opiates due to easily encephalopathy.   -Continues TID Acetaminophen.   -Continues PRN daily Tramadol.   -Lidocaine patches to bilateral shoulders.   -Biofreeze PRN to any non-facial pains QID PRN.     Nail deformity, L Great toe  Onychomycosis  Last week Left toe nail was loose.  We noted mild onychomycosis due to no s/s of trauma, but with recent fall, we query if there was some occult trauma.  Now nail has fallen off, no s/s of infection, non-tender.   -Continues with BID Lamisil.   -No changes, continue to clinically monitor.     Essential hypertension  SBP's erratic and on higher side, but with some lower ones, allowing due to age and frequent falls.   No current s/s of clinical CHF.   -No changes, continue to clinically monitor.     CKD (chronic kidney disease) stage 3, GFR 30-59 ml/min (H)  Creatine was baseline at 0.53 when last checked 2/10.   -No changes, continue to clinically monitor.     Cognitive impairment  Stable/unchanged.   -No changes, continue to clinically monitor.     Debility  -Continues with PT/OT.     Orders written by provider at facility  -As noted above.     Electronically " signed by:  RITA Adhikari CNP           Sincerely,        Savage Menjivar, RITA CNP

## 2020-02-27 NOTE — PROGRESS NOTES
[place] [facility]  [place] Medical Record Number:  [id number]  [facility] of Service where encounter took place:  [facility] [[id number]]  Chief Complaint   Patient presents with     RECHECK       HPI:    Gennaro Walton  is a 89 year old (9/8/1923), who is being seen today for an episodic care visit.  HPI information obtained from: facility chart records, facility staff, patient report and [facility] chart review.     Met with Mrs. Walton today for follow up.  Mrs. Walton was just returning from PT/OT, reports overall she is doing well.  She continues to endorse her chronic shoulder and general pains, but stable/unchanged.  She has no other complaints.     Past Medical and Surgical History reviewed in Epic today.    MEDICATIONS:  Current Outpatient Medications   Medication Sig Dispense Refill     acetaminophen (TYLENOL) 500 MG tablet Take 2 tablets (1,000 mg) by mouth 3 times daily       amLODIPine (NORVASC) 5 MG tablet Take 5 mg by mouth daily        aspirin (ASA) 325 MG EC tablet Take 1 tablet (325 mg) by mouth daily 30 tablet 0     calcium carbonate (TUMS) 500 MG chewable tablet Take 1 chew tab by mouth daily        levothyroxine (SYNTHROID/LEVOTHROID) 88 MCG tablet Take 88 mcg by mouth daily       Lidocaine (LIDOCARE) 4 % Patch Place 2 patches onto the skin every 24 hours To prevent lidocaine toxicity, patient should be patch free for 12 hrs daily.  Apply 1 patch to each shoulder, on during the day. 14 patch 0     losartan (COZAAR) 50 MG tablet Take 50 mg by mouth daily       Menthol, Topical Analgesic, (BIOFREEZE) 4 % GEL Externally apply topically See Admin Instructions Apply to (Right hip and any non-facial pain area) topically every 4 hours as needed for pain MAX 4x/day       ondansetron (ZOFRAN-ODT) 4 MG ODT tab Take 1 tablet (4 mg) by mouth every 6 hours as needed for nausea or vomiting 10 tablet 0     oxybutynin ER (DITROPAN-XL) 10 MG 24 hr tablet Take 1 tablet (10 mg)  "by mouth At Bedtime       polyethylene glycol (MIRALAX/GLYCOLAX) packet Take 17 g by mouth daily       senna-docusate (SENOKOT-S/PERICOLACE) 8.6-50 MG tablet Take 2 tablets by mouth 2 times daily 40 tablet 0     terbinafine 1 % EX external cream Apply 1 g topically 2 times daily Bilateral toes BID x 30 days. 60 g 0     traMADol (ULTRAM) 50 MG tablet Take 0.5 tablets (25 mg) by mouth every 6 hours as needed for pain 30 tablet 0     vitamin D3 (CHOLECALCIFEROL) 2000 units tablet Take 1 tablet by mouth daily       REVIEW OF SYSTEMS:  Limited secondary to cognitive impairment but today 7 point ROS done including, light headedness/dizziness, fever/chills, pain, Resp, CV, GI, and  and is negative other than noted in HPI.      Objective:  BP (!) 143/55   Pulse 60   Temp 98.8  F (37.1  C)   Resp 18   Ht 1.6 m (5' 3\")   Wt 44.3 kg (97 lb 9.6 oz)   SpO2 94%   BMI 17.29 kg/m       Exam:  GENERAL APPEARANCE:  Elderly female sitting on side of bed, NAD, non-toxic.  ENT:  Mouth and oropharynx normal, moist mucous membranes.   RESP:  Lungs with faint coarseness in lower lobes, but otherwise CTA.  Regular relaxed breathing effort.  No cough.   CV: S1/S2 no murmur or rubs.  Regular rhythm and rate.  No generalized edema.   ABDOMEN:   Protuberant but, soft, non-tender with active bowel sounds.  No guarding, rigidity, or rebound tenderness.  EXTREMITIES:  No lower extremity edema, no calf tenderness.   PSYCH: Alert to self, knows this is not home, alert only some what to situation.  Ongoing degree of cognitive/memory impairment, stable/unchanged.   LLE has a ~1x2.5 cm wound, packed with dressing, no drainage, no fowl odor.   Right hip incision is approximated, healing well, no s/s of infection.   TOE: Left great toe nail is missing, no s/s of infection, non-tender.     Labs:   None-recent.     ASSESSMENT/PLAN:  Fall, subsequent encounter  S/P ORIF (open reduction internal fixation) fracture 1 Feb with intramedullary " "nailing  Left hip incision healed well.   Denies any Left hip pain.   -Continues on ASA until around 3/17 for prophylaxis.   -Ortho f/u 3/16.     Leg wound, left, subsequent encounter  Ongoing small LLE leg wound with q daily hydrogel packing.    Wound with no s/s of infection, but continues to look dry and not getting any worse, but not any better, query if too dry.   -Sent a note to wound care RN, to see if we increase to BID packings or change to other packing to help promote healing.      Acute pain  Chronic pain of both shoulders  Everyday has an area that \"hurts\" usually it's her bilateral shoulders, some times LE's, some times arm's, but most of the time it's vague and mild.  She reports ongoing general soreness.   Avoiding opiates due to easily encephalopathy.   -Continues TID Acetaminophen.   -Continues PRN daily Tramadol.   -Lidocaine patches to bilateral shoulders.   -Biofreeze PRN to any non-facial pains QID PRN.     Nail deformity, L Great toe  Onychomycosis  Last week Left toe nail was loose.  We noted mild onychomycosis due to no s/s of trauma, but with recent fall, we query if there was some occult trauma.  Now nail has fallen off, no s/s of infection, non-tender.   -Continues with BID Lamisil.   -No changes, continue to clinically monitor.     Essential hypertension  SBP's erratic and on higher side, but with some lower ones, allowing due to age and frequent falls.   No current s/s of clinical CHF.   -No changes, continue to clinically monitor.     CKD (chronic kidney disease) stage 3, GFR 30-59 ml/min (H)  Creatine was baseline at 0.53 when last checked 2/10.   -No changes, continue to clinically monitor.     Cognitive impairment  Stable/unchanged.   -No changes, continue to clinically monitor.     Debility  -Continues with PT/OT.     Orders written by provider at facility  -As noted above.     Electronically signed by:  RITA Adhikari CNP       "

## 2020-03-02 ENCOUNTER — NURSING HOME VISIT (OUTPATIENT)
Dept: GERIATRICS | Facility: CLINIC | Age: 85
End: 2020-03-02
Payer: MEDICARE

## 2020-03-02 VITALS
DIASTOLIC BLOOD PRESSURE: 76 MMHG | HEIGHT: 63 IN | TEMPERATURE: 98.4 F | SYSTOLIC BLOOD PRESSURE: 164 MMHG | OXYGEN SATURATION: 93 % | RESPIRATION RATE: 16 BRPM | WEIGHT: 98.8 LBS | HEART RATE: 60 BPM | BODY MASS INDEX: 17.5 KG/M2

## 2020-03-02 DIAGNOSIS — Z98.890 S/P ORIF (OPEN REDUCTION INTERNAL FIXATION) FRACTURE: Primary | ICD-10-CM

## 2020-03-02 DIAGNOSIS — Z87.81 S/P ORIF (OPEN REDUCTION INTERNAL FIXATION) FRACTURE: Primary | ICD-10-CM

## 2020-03-02 DIAGNOSIS — S81.802D LEG WOUND, LEFT, SUBSEQUENT ENCOUNTER: ICD-10-CM

## 2020-03-02 DIAGNOSIS — M25.511 CHRONIC PAIN OF BOTH SHOULDERS: ICD-10-CM

## 2020-03-02 DIAGNOSIS — G89.29 CHRONIC PAIN OF BOTH SHOULDERS: ICD-10-CM

## 2020-03-02 DIAGNOSIS — I10 ESSENTIAL HYPERTENSION: ICD-10-CM

## 2020-03-02 DIAGNOSIS — R41.89 COGNITIVE IMPAIRMENT: ICD-10-CM

## 2020-03-02 DIAGNOSIS — M25.512 CHRONIC PAIN OF BOTH SHOULDERS: ICD-10-CM

## 2020-03-02 DIAGNOSIS — N18.30 CKD (CHRONIC KIDNEY DISEASE) STAGE 3, GFR 30-59 ML/MIN (H): ICD-10-CM

## 2020-03-02 DIAGNOSIS — L60.8 NAIL DEFORMITY: ICD-10-CM

## 2020-03-02 DIAGNOSIS — R53.81 DEBILITY: ICD-10-CM

## 2020-03-02 PROCEDURE — 99309 SBSQ NF CARE MODERATE MDM 30: CPT | Performed by: NURSE PRACTITIONER

## 2020-03-02 ASSESSMENT — MIFFLIN-ST. JEOR: SCORE: 807.28

## 2020-03-02 NOTE — LETTER
3/2/2020        RE: Gennaro Walton  [address]  [address]  [place] [address]        [place] [facility]  [place] [facility] Record Number:  [id number]  [facility] of [facility] where encounter took place[facility] [[id number]]  Chief Complaint   Patient presents with     RECHECK       HPI:    Gennaro Walton  is a 89 year old (9/8/1923), who is being seen today for an episodic care visit.  HPI information obtained from: facility chart records, facility staff, patient report and [facility] chart review.     Met with Mrs. Walton today for follow up.  Mrs. Walton reports overall she is doing well.  She continues to endorse chronic shoulder pain, some days better than others, but has no other complaints.     Past Medical and Surgical History reviewed in Epic today.    MEDICATIONS:  Current Outpatient Medications   Medication Sig Dispense Refill     acetaminophen (TYLENOL) 500 MG tablet Take 2 tablets (1,000 mg) by mouth 3 times daily       amLODIPine (NORVASC) 5 MG tablet Take 5 mg by mouth daily        aspirin (ASA) 325 MG EC tablet Take 1 tablet (325 mg) by mouth daily 30 tablet 0     calcium carbonate (TUMS) 500 MG chewable tablet Take 1 chew tab by mouth daily        levothyroxine (SYNTHROID/LEVOTHROID) 88 MCG tablet Take 88 mcg by mouth daily       Lidocaine (LIDOCARE) 4 % Patch Place 2 patches onto the skin every 24 hours To prevent lidocaine toxicity, patient should be patch free for 12 hrs daily.  Apply 1 patch to each shoulder, on during the day. 14 patch 0     losartan (COZAAR) 50 MG tablet Take 50 mg by mouth daily       Menthol, Topical Analgesic, (BIOFREEZE) 4 % GEL Externally apply topically See Admin Instructions Apply to (Right hip and any non-facial pain area) topically every 4 hours as needed for pain MAX 4x/day       ondansetron (ZOFRAN-ODT) 4 MG ODT tab Take 1 tablet (4 mg) by mouth every 6 hours as needed for nausea or vomiting 10 tablet 0     oxybutynin ER  "(DITROPAN-XL) 10 MG 24 hr tablet Take 1 tablet (10 mg) by mouth At Bedtime       polyethylene glycol (MIRALAX/GLYCOLAX) packet Take 17 g by mouth daily       senna-docusate (SENOKOT-S/PERICOLACE) 8.6-50 MG tablet Take 2 tablets by mouth 2 times daily 40 tablet 0     terbinafine 1 % EX external cream Apply 1 g topically 2 times daily Bilateral toes BID x 30 days. 60 g 0     vitamin D3 (CHOLECALCIFEROL) 2000 units tablet Take 1 tablet by mouth daily       REVIEW OF SYSTEMS:  Limited secondary to cognitive impairment but today 7 point ROS done including, light headedness/dizziness, fever/chills, pain, Resp, CV, GI, and  and is negative other than noted in HPI.      Objective:  BP (!) 164/76   Pulse 60   Temp 98.4  F (36.9  C)   Resp 16   Ht 1.6 m (5' 3\")   Wt 44.8 kg (98 lb 12.8 oz)   SpO2 93%   BMI 17.50 kg/m        Exam:  GENERAL APPEARANCE:  Elderly female sitting up in WC, NAD, non-toxic.  ENT:  Mouth and oropharynx normal, moist mucous membranes.   RESP:  Lungs with mild diminished lower lobes, but otherwise CTA.  Regular relaxed breathing effort.  No cough.   CV: S1/S2 no murmur or rubs.  Regular rhythm and rate.  No generalized edema.   ABDOMEN:   Protuberant but, soft, non-tender with active bowel sounds.  No guarding, rigidity, or rebound tenderness.  EXTREMITIES:  No lower extremity edema, no calf tenderness.   PSYCH: Alert to self, somewhat to situation but ongoing degree of cognitive/memory impairment, stable/unchanged.   LLE has a ~1x2.5 cm wound, packed with dressing, no drainage, no fowl odor.   Right hip incision not assessed today.   TOE: Left great toe nail is missing, no s/s of infection, non-tender.     Labs:   None-recent.     ASSESSMENT/PLAN:  S/P ORIF (open reduction internal fixation) fracture 1 Feb with intramedullary nailing  Leg wound, left, subsequent encounter  Chronic pain of both shoulders  Mrs. Walton has been doing well.  Incision healed, no Right hip pain.     Ongoing LLE " wound has not really improved but not worse, no s/s of infection.  Last week we changed wound care to hydrogel q daily to provide more moisture, as would was very dry.  Would was moist today, but not overly wet, still no s/s of infection.   -Continue new wound care.     Reports ongoing chronic pain as mild and tolerable.   -Continues bilateral shoulder Lidocaine patches.   -Continues PRN Biofreeze to other areas.   -Continues TID Acetaminophen.   -DC'd PRN Tramadol.   -Avoid opiates, delirium easily.     Nail deformity, L Great toe  Left toe nail fell off couple weeks ago, no pain, no s/s of infection.   -No changes, continue to clinically monitor.     Essential hypertension  SBP's continue to be erratic, range 120-160's, asymptomatic.   Most of the >160's are in the AM pre medications.   Allowing permissive HTN due to age.   No current s/s of clinical CHF.   -No changes, continue to clinically monitor.     CKD (chronic kidney disease) stage 3, GFR 30-59 ml/min (H)  Creatine baseline when last checked 2/10.   -No changes, continue to clinically monitor.     Cognitive impairment  Ongoing/stable.   -No changes, continue to clinically monitor.   -Avoiding opiates as noted above.     Debility  -Continues with PT/OT.     Orders written by provider at facility  -DC'd Tramadol.     Electronically signed by:  RITA Adhikari CNP           Sincerely,        RITA Adhikari CNP

## 2020-03-02 NOTE — PROGRESS NOTES
[place] [facility]  [place] Medical Record Number:  [id number]  [facility] of Service where encounter took place[facility] [[id number]]  Chief Complaint   Patient presents with     RECHECK       HPI:    Gennaro Walton  is a 89 year old (9/8/1923), who is being seen today for an episodic care visit.  HPI information obtained from: facility chart records, facility staff, patient report and [facility] chart review.     Met with Mrs. Walton today for follow up.  Mrs. Walton reports overall she is doing well.  She continues to endorse chronic shoulder pain, some days better than others, but has no other complaints.     Past Medical and Surgical History reviewed in Epic today.    MEDICATIONS:  Current Outpatient Medications   Medication Sig Dispense Refill     acetaminophen (TYLENOL) 500 MG tablet Take 2 tablets (1,000 mg) by mouth 3 times daily       amLODIPine (NORVASC) 5 MG tablet Take 5 mg by mouth daily        aspirin (ASA) 325 MG EC tablet Take 1 tablet (325 mg) by mouth daily 30 tablet 0     calcium carbonate (TUMS) 500 MG chewable tablet Take 1 chew tab by mouth daily        levothyroxine (SYNTHROID/LEVOTHROID) 88 MCG tablet Take 88 mcg by mouth daily       Lidocaine (LIDOCARE) 4 % Patch Place 2 patches onto the skin every 24 hours To prevent lidocaine toxicity, patient should be patch free for 12 hrs daily.  Apply 1 patch to each shoulder, on during the day. 14 patch 0     losartan (COZAAR) 50 MG tablet Take 50 mg by mouth daily       Menthol, Topical Analgesic, (BIOFREEZE) 4 % GEL Externally apply topically See Admin Instructions Apply to (Right hip and any non-facial pain area) topically every 4 hours as needed for pain MAX 4x/day       ondansetron (ZOFRAN-ODT) 4 MG ODT tab Take 1 tablet (4 mg) by mouth every 6 hours as needed for nausea or vomiting 10 tablet 0     oxybutynin ER (DITROPAN-XL) 10 MG 24 hr tablet Take 1 tablet (10 mg) by mouth At Bedtime       polyethylene  "glycol (MIRALAX/GLYCOLAX) packet Take 17 g by mouth daily       senna-docusate (SENOKOT-S/PERICOLACE) 8.6-50 MG tablet Take 2 tablets by mouth 2 times daily 40 tablet 0     terbinafine 1 % EX external cream Apply 1 g topically 2 times daily Bilateral toes BID x 30 days. 60 g 0     vitamin D3 (CHOLECALCIFEROL) 2000 units tablet Take 1 tablet by mouth daily       REVIEW OF SYSTEMS:  Limited secondary to cognitive impairment but today 7 point ROS done including, light headedness/dizziness, fever/chills, pain, Resp, CV, GI, and  and is negative other than noted in HPI.      Objective:  BP (!) 164/76   Pulse 60   Temp 98.4  F (36.9  C)   Resp 16   Ht 1.6 m (5' 3\")   Wt 44.8 kg (98 lb 12.8 oz)   SpO2 93%   BMI 17.50 kg/m       Exam:  GENERAL APPEARANCE:  Elderly female sitting up in WC, NAD, non-toxic.  ENT:  Mouth and oropharynx normal, moist mucous membranes.   RESP:  Lungs with mild diminished lower lobes, but otherwise CTA.  Regular relaxed breathing effort.  No cough.   CV: S1/S2 no murmur or rubs.  Regular rhythm and rate.  No generalized edema.   ABDOMEN:   Protuberant but, soft, non-tender with active bowel sounds.  No guarding, rigidity, or rebound tenderness.  EXTREMITIES:  No lower extremity edema, no calf tenderness.   PSYCH: Alert to self, somewhat to situation but ongoing degree of cognitive/memory impairment, stable/unchanged.   LLE has a ~1x2.5 cm wound, packed with dressing, no drainage, no fowl odor.   Right hip incision not assessed today.   TOE: Left great toe nail is missing, no s/s of infection, non-tender.     Labs:   None-recent.     ASSESSMENT/PLAN:  S/P ORIF (open reduction internal fixation) fracture 1 Feb with intramedullary nailing  Leg wound, left, subsequent encounter  Chronic pain of both shoulders  Mrs. Walton has been doing well.  Incision healed, no Right hip pain.     Ongoing LLE wound has not really improved but not worse, no s/s of infection.  Last week we changed wound " care to hydrogel q daily to provide more moisture, as would was very dry.  Would was moist today, but not overly wet, still no s/s of infection.   -Continue new wound care.     Reports ongoing chronic pain as mild and tolerable.   -Continues bilateral shoulder Lidocaine patches.   -Continues PRN Biofreeze to other areas.   -Continues TID Acetaminophen.   -DC'd PRN Tramadol.   -Avoid opiates, delirium easily.     Nail deformity, L Great toe  Left toe nail fell off couple weeks ago, no pain, no s/s of infection.   -No changes, continue to clinically monitor.     Essential hypertension  SBP's continue to be erratic, range 120-160's, asymptomatic.   Most of the >160's are in the AM pre medications.   Allowing permissive HTN due to age.   No current s/s of clinical CHF.   -No changes, continue to clinically monitor.     CKD (chronic kidney disease) stage 3, GFR 30-59 ml/min (H)  Creatine baseline when last checked 2/10.   -No changes, continue to clinically monitor.     Cognitive impairment  Ongoing/stable.   -No changes, continue to clinically monitor.   -Avoiding opiates as noted above.     Debility  -Continues with PT/OT.     Orders written by provider at facility  -DC'd Tramadol.     Electronically signed by:  RITA Adhikari CNP

## 2020-03-10 ENCOUNTER — DISCHARGE SUMMARY NURSING HOME (OUTPATIENT)
Dept: GERIATRICS | Facility: CLINIC | Age: 85
End: 2020-03-10
Payer: MEDICARE

## 2020-03-10 VITALS
OXYGEN SATURATION: 96 % | BODY MASS INDEX: 17.5 KG/M2 | HEART RATE: 61 BPM | RESPIRATION RATE: 16 BRPM | HEIGHT: 63 IN | SYSTOLIC BLOOD PRESSURE: 118 MMHG | DIASTOLIC BLOOD PRESSURE: 62 MMHG | WEIGHT: 98.8 LBS | TEMPERATURE: 98.7 F

## 2020-03-10 DIAGNOSIS — Z98.890 POSTOPERATIVE HYPOXEMIA: ICD-10-CM

## 2020-03-10 DIAGNOSIS — M25.512 CHRONIC PAIN OF BOTH SHOULDERS: ICD-10-CM

## 2020-03-10 DIAGNOSIS — E03.9 HYPOTHYROIDISM, UNSPECIFIED TYPE: ICD-10-CM

## 2020-03-10 DIAGNOSIS — S72.144D CLOSED NONDISPLACED INTERTROCHANTERIC FRACTURE OF RIGHT FEMUR WITH ROUTINE HEALING, SUBSEQUENT ENCOUNTER: Primary | ICD-10-CM
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
## 2020-03-10 DIAGNOSIS — S81.802D LEG WOUND, LEFT, SUBSEQUENT ENCOUNTER: ICD-10-CM

## 2020-03-10 DIAGNOSIS — K59.01 SLOW TRANSIT CONSTIPATION: ICD-10-CM

## 2020-03-10 DIAGNOSIS — R53.81 PHYSICAL DECONDITIONING: ICD-10-CM

## 2020-03-10 DIAGNOSIS — N39.41 URGE INCONTINENCE OF URINE: ICD-10-CM

## 2020-03-10 DIAGNOSIS — I10 ESSENTIAL HYPERTENSION: ICD-10-CM

## 2020-03-10 DIAGNOSIS — R09.02 POSTOPERATIVE HYPOXEMIA: ICD-10-CM

## 2020-03-10 DIAGNOSIS — Z98.890 S/P ORIF (OPEN REDUCTION INTERNAL FIXATION) FRACTURE: ICD-10-CM

## 2020-03-10 DIAGNOSIS — M25.511 CHRONIC PAIN OF BOTH SHOULDERS: ICD-10-CM

## 2020-03-10 DIAGNOSIS — G89.29 CHRONIC PAIN OF BOTH SHOULDERS: ICD-10-CM

## 2020-03-10 DIAGNOSIS — D62 ANEMIA DUE TO BLOOD LOSS, ACUTE: ICD-10-CM

## 2020-03-10 DIAGNOSIS — Z87.81 S/P ORIF (OPEN REDUCTION INTERNAL FIXATION) FRACTURE: ICD-10-CM

## 2020-03-10 PROCEDURE — 99316 NF DSCHRG MGMT 30 MIN+: CPT | Performed by: NURSE PRACTITIONER

## 2020-03-10 ASSESSMENT — MIFFLIN-ST. JEOR: SCORE: 807.28

## 2020-03-10 NOTE — LETTER
3/10/2020        RE: Gennaro Walton  [address]  [address]  [place] [address]        [facility] [place] DISCHARGE SUMMARY  PATIENT'S NAME: Gennaro Walton  YOB: 1923  MEDICAL RECORD NUMBER:  [id number]  Place of Service where encounter took place:  [facility] [[id number]]    PRIMARY CARE PROVIDER AND CLINIC RESPONSIBLE AFTER TRANSFER:   Mady Hagen MD, [facility] [address] [place] [address]    Assisted Living: [facility]     Transferring providers: Nati Donohue CNA, Benigno Joyner MD  Recent Hospitalization/ED:  [facility] stay 01/31/2020 to 02/05/2020.  Date of SNF Admission: February / 05 / 2020  Date of SNF (anticipated) Discharge: March / 11 / 2020  Discharged to: previous assisted living  Cognitive Scores: SLUMS: 10/30 and CPT: 4.5/5.6  Physical Function: walks  ft with FWW and CGA  DME: Walker    CODE STATUS/ADVANCE DIRECTIVES DISCUSSION:  DNR / DNI   ALLERGIES: Sulfacetamide    Per recent TCU provider progress notes:  89 year old female DVEIN resident with h/o CKD 3, HTN, hypothyroidism,  falls and h/o several fractures who fell yet again and was found to have a right intertrochanteric fracture and is s/p ORIF with a intramedullary nailing on 1 Feb. Post op had pain, blood loss anemia and hypoxia with unclear etiology. Chronic bilateral shoulder pain and now s/p fall has increased/exacerbated right shoulder pain which MRI notes significant tendon and muscle damage, advanced rotator cuff damage and DJD.  Was seen by Ortho whom did not comment much on the shoulder, but non-surgical, WBAT was noted.  She was also found to have a small LLE wound and a back pressure ulcer present. Pain managed with tylenol, PRN tramadol initially now tramadol stopped. Lost toenail from great toe - no infection.     DISCHARGE DIAGNOSIS/NURSING FACILITY COURSE:  Closed nondisplaced intertrochanteric fracture of  right femur with routine healing, subsequent encounter  Falls, subsequent encounter  S/P ORIF (open reduction internal fixation) fracture  Physical deconditioning  Acute, healing well. Up with walker, no leg pain. ASA prophylaxis through 3/17 and to see ortho 6 weeks from surgery.     Leg wound, left, subsequent encounter  Stable, continues daily wound care: cleanse, fill wound with anasept hydrogel and cover with adhesive.     Anemia due to blood loss, acute  Improved Hgb last check   Lab Results   Component Value Date    HGB 9.1 02/10/2020    HGB 8.2 02/05/2020     Postoperative hypoxemia  Resolved. Currently sats 95% on room air.     Chronic pain of both shoulders  Reports managed well with current meds. No changes needed.     Essential hypertension  Chronic and stable. SBP range 101-170. High in AM, lower in PM. Due to frailty and falls will not pursue tighter BP control at this time. F/U with PCP for further management.     Hypothyroidism, unspecified type  Meds as PTA.     Urge incontinence of urine  Meds as PTA.     Slow transit constipation  No complaints, doing well with Miralax and senna s.       Past Medical History:  has a past medical history of Breast cancer (H), CKD (chronic kidney disease) stage 3, GFR 30-59 ml/min (H), Cognitive impairment, Hypertension, Hypothyroid, and Osteoporosis. She also has no past medical history of Complication of anesthesia, Difficult intubation, Malignant hyperthermia, Motion sickness, or PONV (postoperative nausea and vomiting).    Discharge Medications:    Current Outpatient Medications   Medication Sig Dispense Refill     acetaminophen (TYLENOL) 500 MG tablet Take 2 tablets (1,000 mg) by mouth 3 times daily       amLODIPine (NORVASC) 5 MG tablet Take 5 mg by mouth daily        aspirin (ASA) 325 MG EC tablet Take 1 tablet (325 mg) by mouth daily 30 tablet 0     calcium carbonate (TUMS) 500 MG chewable tablet Take 1 chew tab by mouth daily        levothyroxine  "(SYNTHROID/LEVOTHROID) 88 MCG tablet Take 88 mcg by mouth daily       Lidocaine (LIDOCARE) 4 % Patch Place 2 patches onto the skin every 24 hours To prevent lidocaine toxicity, patient should be patch free for 12 hrs daily.  Apply 1 patch to each shoulder, on during the day. 14 patch 0     losartan (COZAAR) 50 MG tablet Take 50 mg by mouth daily       Menthol, Topical Analgesic, (BIOFREEZE) 4 % GEL Externally apply topically See Admin Instructions Apply to (Right hip and any non-facial pain area) topically every 4 hours as needed for pain MAX 4x/day       ondansetron (ZOFRAN-ODT) 4 MG ODT tab Take 1 tablet (4 mg) by mouth every 6 hours as needed for nausea or vomiting 10 tablet 0     oxybutynin ER (DITROPAN-XL) 10 MG 24 hr tablet Take 1 tablet (10 mg) by mouth At Bedtime       polyethylene glycol (MIRALAX/GLYCOLAX) packet Take 17 g by mouth daily       senna-docusate (SENOKOT-S/PERICOLACE) 8.6-50 MG tablet Take 2 tablets by mouth 2 times daily 40 tablet 0     terbinafine 1 % EX external cream Apply 1 g topically 2 times daily Bilateral toes BID x 30 days. 60 g 0     vitamin D3 (CHOLECALCIFEROL) 2000 units tablet Take 1 tablet by mouth daily         Medication Changes/Rationale:     Stopped PRN Zofran - no nausea    Stopped PRN tramadol - no hip pain    Added Lamisil to toes x 30 days    Increased Norvasc to 5 mg PO daily diagnosis HTN    Controlled medications sent with patient:   not applicable/none     ROS:   10 point ROS of systems including Constitutional, Eyes, Respiratory, Cardiovascular, Gastroenterology, Genitourinary, Integumentary, Musculoskeletal, Psychiatric were all negative except for pertinent positives noted in my HPI.    Physical Exam:   Vitals: /62   Pulse 61   Temp 98.7  F (37.1  C)   Resp 16   Ht 1.6 m (5' 3\")   Wt 44.8 kg (98 lb 12.8 oz)   SpO2 96%   BMI 17.50 kg/m    BMI= Body mass index is 17.5 kg/m .  GENERAL APPEARANCE:  Alert, in no distress, pleasant, cooperative, oriented x " self, place and recent events  EYES:  EOM, lids, pupils and irises normal, sclera clear and conjunctiva normal, no discharge or mattering on lids or lashes noted  ENT:  Mouth normal, moist mucous membranes, nose normal without drainage or crusting, external ears without lesions, hearing acuity intact  NECK: supple, symmetrical, trachea midline  RESP:  respiratory effort normal, no chest wall tenderness, no respiratory distress, Lung sounds clear, patient is on room air  CV:  Auscultation of heart done, rate and rhythm controlled and regular, no murmur, no rub or gallop. Edema none bilateral lower extremities. VASCULAR: open area left leg stable, wound base red and moist, no drainage.   ABDOMEN:  normal bowel sounds, soft, nontender, no palpable masses.  M/S:   Gait and station walks with assist , no tenderness or swelling of the joints; able to move all extremities, digits normal. Thoracic kyphosis  SKIN:  Inspection and palpation of skin and subcutaneous tissue: left lower leg wound with red base, moist but not wet. Pressure area on back stage 1, resolving with use of foam dressing.   NEURO: cranial nerves 2-12 grossly intact, no facial asymmetry, no speech deficits and able to follow directions, moves all extremities symmetrically  PSYCH:  insight and judgement and memory impaired at baseline, affect and mood normal     SNF labs:   Most Recent 3 CBC's:  Recent Labs   Lab Test 02/10/20  1115 02/05/20  0650 02/04/20  0623   WBC 7.6 5.4 5.1   HGB 9.1* 8.2* 7.6*   * 101* 101*    204 155     Most Recent 3 BMP's:  Recent Labs   Lab Test 02/10/20  1115 02/05/20  0650 02/04/20  0623    138 137   POTASSIUM 3.5 3.9 3.7   CHLORIDE 105 106 105   CO2 28 31 32   BUN 10 9 9   CR 0.53 0.49* 0.46*   ANIONGAP 3 1* <1*   LORETA 8.4* 8.3* 8.1*   GLC 95 92 86     Most Recent 2 LFT's:  Recent Labs   Lab Test 07/25/19  1414   AST 19   ALT 16   ALKPHOS 95   BILITOTAL 0.3       DISCHARGE PLAN:    Follow up labs: No labs  orders/due    Medical Follow Up:      Follow up with primary care provider in 2-3 weeks  Follow up with specialist ortho as recommended     MTM referral needed and placed by this provider: No    Current [place] scheduled appointments:   No future appointments.    Discharge Services: Home Care:  Occupational Therapy, Physical Therapy, Registered Nurse, Home Health Aide and From:  [facility]    Discharge Instructions Verbalized to Patient at Discharge:     Wound care to left lower leg:  cleanse, fill wound with anasept hydrogel and cover with adhesive.       TOTAL DISCHARGE TIME:   Greater than 30 minutes  Electronically signed by:  Nati Donohue CNA     Documentation of Face to Face and Certification for Home Health Services    I certify that patient: Gennaro Walton is under my care and that I, or a nurse practitioner or physician's assistant working with me, had a face-to-face encounter that meets the physician face-to-face encounter requirements with this patient on: 3/10/2020.    This encounter with the patient was in whole, or in part, for the following medical condition, which is the primary reason for home health care: weakness after fall, femur fracture and repair.    I certify that, based on my findings, the following services are medically necessary home health services: Nursing, Occupational Therapy, Physical Therapy and HHA.    My clinical findings support the need for the above services because: Nurse is needed: To assess status, pain, wound care after changes in medications or other medical regimen.., Occupational Therapy Services are needed to assess and treat cognitive ability and address ADL safety due to impairment in self care., Physical Therapy Services are needed to assess and treat the following functional impairments: weakness and debility. and HHA for bathing    Further, I certify that my clinical findings support that this patient is homebound (i.e. absences from home  require considerable and taxing effort and are for medical reasons or Hindu services or infrequently or of short duration when for other reasons) because: Requires assistance of another person or specialized equipment to access medical services because patient: Is prone to wander/get lost without assistance. and Is unable to exit home safely on own due to: weakness, pain, falls...    Based on the above findings. I certify that this patient is confined to the home and needs intermittent skilled nursing care, physical therapy and/or speech therapy.  The patient is under my care, and I have initiated the establishment of the plan of care.  This patient will be followed by a physician who will periodically review the plan of care.  Physician/Provider to provide follow up care: Mady Hagen    Attending hospital physician (the Medicare certified [facility] provider): Dr Joyner  Physician Signature: See electronic signature associated with these discharge orders.  Date: 3/10/2020                    Sincerely,        RITA Castellanos CNP

## 2020-03-10 NOTE — PROGRESS NOTES
[place] [facility] DISCHARGE SUMMARY  PATIENT'S NAME: Gennaro Walton  YOB: 1923  MEDICAL RECORD NUMBER:  [id number]  Place of Service where encounter took place:  [facility] [[id number]]    PRIMARY CARE PROVIDER AND CLINIC RESPONSIBLE AFTER TRANSFER:   Mady Hagen MD, [place] [address] / [place] [address]    Assisted Living: [facility]     Transferring providers: Nati Donohue CNA, Benigno Joyner MD  Recent Hospitalization/ED:  [facility] stay 01/31/2020 to 02/05/2020.  Date of SNF Admission: February / 05 / 2020  Date of SNF (anticipated) Discharge: March / 11 / 2020  Discharged to: previous assisted living  Cognitive Scores: SLUMS: 10/30 and CPT: 4.5/5.6  Physical Function: walks  ft with FWW and CGA  DME: Walker    CODE STATUS/ADVANCE DIRECTIVES DISCUSSION:  DNR / DNI   ALLERGIES: Sulfacetamide    Per recent TCU provider progress notes:  89 year old female long-term resident with h/o CKD 3, HTN, hypothyroidism,  falls and h/o several fractures who fell yet again and was found to have a right intertrochanteric fracture and is s/p ORIF with a intramedullary nailing on 1 Feb. Post op had pain, blood loss anemia and hypoxia with unclear etiology. Chronic bilateral shoulder pain and now s/p fall has increased/exacerbated right shoulder pain which MRI notes significant tendon and muscle damage, advanced rotator cuff damage and DJD.  Was seen by Ortho whom did not comment much on the shoulder, but non-surgical, WBAT was noted.  She was also found to have a small LLE wound and a back pressure ulcer present. Pain managed with tylenol, PRN tramadol initially now tramadol stopped. Lost toenail from great toe - no infection.     DISCHARGE DIAGNOSIS/NURSING FACILITY COURSE:  Closed nondisplaced intertrochanteric fracture of right femur with routine healing, subsequent encounter  Falls, subsequent encounter  S/P ORIF  (open reduction internal fixation) fracture  Physical deconditioning  Acute, healing well. Up with walker, no leg pain. ASA prophylaxis through 3/17 and to see ortho 6 weeks from surgery.     Leg wound, left, subsequent encounter  Stable, continues daily wound care: cleanse, fill wound with anasept hydrogel and cover with adhesive.     Anemia due to blood loss, acute  Improved Hgb last check   Lab Results   Component Value Date    HGB 9.1 02/10/2020    HGB 8.2 02/05/2020     Postoperative hypoxemia  Resolved. Currently sats 95% on room air.     Chronic pain of both shoulders  Reports managed well with current meds. No changes needed.     Essential hypertension  Chronic and stable. SBP range 101-170. High in AM, lower in PM. Due to frailty and falls will not pursue tighter BP control at this time. F/U with PCP for further management.     Hypothyroidism, unspecified type  Meds as PTA.     Urge incontinence of urine  Meds as PTA.     Slow transit constipation  No complaints, doing well with Miralax and senna s.       Past Medical History:  has a past medical history of Breast cancer (H), CKD (chronic kidney disease) stage 3, GFR 30-59 ml/min (H), Cognitive impairment, Hypertension, Hypothyroid, and Osteoporosis. She also has no past medical history of Complication of anesthesia, Difficult intubation, Malignant hyperthermia, Motion sickness, or PONV (postoperative nausea and vomiting).    Discharge Medications:    Current Outpatient Medications   Medication Sig Dispense Refill     acetaminophen (TYLENOL) 500 MG tablet Take 2 tablets (1,000 mg) by mouth 3 times daily       amLODIPine (NORVASC) 5 MG tablet Take 5 mg by mouth daily        aspirin (ASA) 325 MG EC tablet Take 1 tablet (325 mg) by mouth daily 30 tablet 0     calcium carbonate (TUMS) 500 MG chewable tablet Take 1 chew tab by mouth daily        levothyroxine (SYNTHROID/LEVOTHROID) 88 MCG tablet Take 88 mcg by mouth daily       Lidocaine (LIDOCARE) 4 % Patch  "Place 2 patches onto the skin every 24 hours To prevent lidocaine toxicity, patient should be patch free for 12 hrs daily.  Apply 1 patch to each shoulder, on during the day. 14 patch 0     losartan (COZAAR) 50 MG tablet Take 50 mg by mouth daily       Menthol, Topical Analgesic, (BIOFREEZE) 4 % GEL Externally apply topically See Admin Instructions Apply to (Right hip and any non-facial pain area) topically every 4 hours as needed for pain MAX 4x/day       ondansetron (ZOFRAN-ODT) 4 MG ODT tab Take 1 tablet (4 mg) by mouth every 6 hours as needed for nausea or vomiting 10 tablet 0     oxybutynin ER (DITROPAN-XL) 10 MG 24 hr tablet Take 1 tablet (10 mg) by mouth At Bedtime       polyethylene glycol (MIRALAX/GLYCOLAX) packet Take 17 g by mouth daily       senna-docusate (SENOKOT-S/PERICOLACE) 8.6-50 MG tablet Take 2 tablets by mouth 2 times daily 40 tablet 0     terbinafine 1 % EX external cream Apply 1 g topically 2 times daily Bilateral toes BID x 30 days. 60 g 0     vitamin D3 (CHOLECALCIFEROL) 2000 units tablet Take 1 tablet by mouth daily         Medication Changes/Rationale:     Stopped PRN Zofran - no nausea    Stopped PRN tramadol - no hip pain    Added Lamisil to toes x 30 days    Increased Norvasc to 5 mg PO daily diagnosis HTN    Controlled medications sent with patient:   not applicable/none     ROS:   10 point ROS of systems including Constitutional, Eyes, Respiratory, Cardiovascular, Gastroenterology, Genitourinary, Integumentary, Musculoskeletal, Psychiatric were all negative except for pertinent positives noted in my HPI.    Physical Exam:   Vitals: /62   Pulse 61   Temp 98.7  F (37.1  C)   Resp 16   Ht 1.6 m (5' 3\")   Wt 44.8 kg (98 lb 12.8 oz)   SpO2 96%   BMI 17.50 kg/m    BMI= Body mass index is 17.5 kg/m .  GENERAL APPEARANCE:  Alert, in no distress, pleasant, cooperative, oriented x self, place and recent events  EYES:  EOM, lids, pupils and irises normal, sclera clear and " conjunctiva normal, no discharge or mattering on lids or lashes noted  ENT:  Mouth normal, moist mucous membranes, nose normal without drainage or crusting, external ears without lesions, hearing acuity intact  NECK: supple, symmetrical, trachea midline  RESP:  respiratory effort normal, no chest wall tenderness, no respiratory distress, Lung sounds clear, patient is on room air  CV:  Auscultation of heart done, rate and rhythm controlled and regular, no murmur, no rub or gallop. Edema none bilateral lower extremities. VASCULAR: open area left leg stable, wound base red and moist, no drainage.   ABDOMEN:  normal bowel sounds, soft, nontender, no palpable masses.  M/S:   Gait and station walks with assist , no tenderness or swelling of the joints; able to move all extremities, digits normal. Thoracic kyphosis  SKIN:  Inspection and palpation of skin and subcutaneous tissue: left lower leg wound with red base, moist but not wet. Pressure area on back stage 1, resolving with use of foam dressing.   NEURO: cranial nerves 2-12 grossly intact, no facial asymmetry, no speech deficits and able to follow directions, moves all extremities symmetrically  PSYCH:  insight and judgement and memory impaired at baseline, affect and mood normal     SNF labs:   Most Recent 3 CBC's:  Recent Labs   Lab Test 02/10/20  1115 02/05/20  0650 02/04/20  0623   WBC 7.6 5.4 5.1   HGB 9.1* 8.2* 7.6*   * 101* 101*    204 155     Most Recent 3 BMP's:  Recent Labs   Lab Test 02/10/20  1115 02/05/20  0650 02/04/20  0623    138 137   POTASSIUM 3.5 3.9 3.7   CHLORIDE 105 106 105   CO2 28 31 32   BUN 10 9 9   CR 0.53 0.49* 0.46*   ANIONGAP 3 1* <1*   LORETA 8.4* 8.3* 8.1*   GLC 95 92 86     Most Recent 2 LFT's:  Recent Labs   Lab Test 07/25/19  1414   AST 19   ALT 16   ALKPHOS 95   BILITOTAL 0.3       DISCHARGE PLAN:    Follow up labs: No labs orders/due    Medical Follow Up:      Follow up with primary care provider in 2-3  weeks  Follow up with specialist ortho as recommended     MTM referral needed and placed by this provider: No    Current [place] scheduled appointments:   No future appointments.    Discharge Services: Home Care:  Occupational Therapy, Physical Therapy, Registered Nurse, Home Health Aide and From:  [facility]    Discharge Instructions Verbalized to Patient at Discharge:     Wound care to left lower leg:  cleanse, fill wound with anasept hydrogel and cover with adhesive.       TOTAL DISCHARGE TIME:   Greater than 30 minutes  Electronically signed by:  Nati Donohue CNA     Documentation of Face to Face and Certification for Home Health Services    I certify that patient: Gennaro Walton is under my care and that I, or a nurse practitioner or physician's assistant working with me, had a face-to-face encounter that meets the physician face-to-face encounter requirements with this patient on: 3/10/2020.    This encounter with the patient was in whole, or in part, for the following medical condition, which is the primary reason for home health care: weakness after fall, femur fracture and repair.    I certify that, based on my findings, the following services are medically necessary home health services: Nursing, Occupational Therapy, Physical Therapy and HHA.    My clinical findings support the need for the above services because: Nurse is needed: To assess status, pain, wound care after changes in medications or other medical regimen.., Occupational Therapy Services are needed to assess and treat cognitive ability and address ADL safety due to impairment in self care., Physical Therapy Services are needed to assess and treat the following functional impairments: weakness and debility. and HHA for bathing    Further, I certify that my clinical findings support that this patient is homebound (i.e. absences from home require considerable and taxing effort and are for medical reasons or [employer]  services or infrequently or of short duration when for other reasons) because: Requires assistance of another person or specialized equipment to access medical services because patient: Is prone to wander/get lost without assistance. and Is unable to exit home safely on own due to: weakness, pain, falls...    Based on the above findings. I certify that this patient is confined to the home and needs intermittent skilled nursing care, physical therapy and/or speech therapy.  The patient is under my care, and I have initiated the establishment of the plan of care.  This patient will be followed by a physician who will periodically review the plan of care.  Physician/Provider to provide follow up care: Mady Hagen    Attending hospital physician (the Medicare certified [facility] provider): Dr Joyner  Physician Signature: See electronic signature associated with these discharge orders.  Date: 3/10/2020

## 2020-03-11 ENCOUNTER — HEALTH MAINTENANCE LETTER (OUTPATIENT)
Age: 85
End: 2020-03-11

## 2020-05-17 NOTE — PROGRESS NOTES
"SPIRITUAL HEALTH SERVICES Progress Note  [facility] 55    Visited per LOS.     Pt sleeping. Pt's daughter reported \"being a person of maribel\" she would have liked to have known that someone from the hospital was available for support during Pt's hospital stay. Pt's daughter was not aware of the SH services were available. Pt's Daughter further stated she \"didn't think her mother was going to make it,\" but is thankful her mother \"pulled through\" and will be transferring to [facility] very soon.      SH acknowledged the missed opportunity to support her and her mother in a time great distress. SH further oriented Pt's daughter with SH services and the services at [facility]. Pt's daughter cited no further needs for SH at this time.           SH is available if needs arise.      Mak Florentino  Chaplain Resident  " INTERVAL HPI/OVERNIGHT EVENTS:  Patient S&E at bedside. No o/n events, No head ache this morning    VITAL SIGNS:  T(F): 98.5 (20 @ 12:00)  HR: 101 (20 @ 13:00)  BP: 153/65 (20 @ 13:00)  RR: 20 (20 @ 13:00)  SpO2: 98% (20 @ 13:00)  Wt(kg): --    PHYSICAL EXAM:    Constitutional: NAD  Eyes: EOMI, sclera non-icteric  Neck: supple, no masses, no JVD  Respiratory: CTA b/l, good air entry b/l  Cardiovascular: RRR, no M/R/G  Gastrointestinal: soft, NTND, no masses palpable, + BS, no hepatosplenomegaly  Extremities: no c/c/e  Neurological: AAOx3      MEDICATIONS  (STANDING):  allopurinol 300 milliGRAM(s) Oral daily  cefTRIAXone   IVPB 2000 milliGRAM(s) IV Intermittent every 24 hours  chlorhexidine 4% Liquid 1 Application(s) Topical <User Schedule>  hydroxyurea 1000 milliGRAM(s) Oral every 8 hours  sodium chloride 0.9%. 1000 milliLiter(s) (50 mL/Hr) IV Continuous <Continuous>  tretinoin 50 milliGRAM(s) Oral two times a day    MEDICATIONS  (PRN):  albuterol/ipratropium for Nebulization 3 milliLiter(s) Nebulizer four times a day PRN Bronchospasm  FIRST- Mouthwash  BLM 10 milliLiter(s) Swish and Spit every 3 hours PRN oral pain  morphine  - Injectable 2 milliGRAM(s) IV Push once PRN Severe Pain (7 - 10)      Allergies    No Known Allergies    Intolerances        LABS:                        7.2    86.86 )-----------( 16       ( 17 May 2020 13:12 )             21.9     -    140  |  103  |  9   ----------------------------<  121<H>  3.6   |  23  |  0.55    Ca    8.4      17 May 2020 13:12  Phos  3.0     05-  Mg     2.0     -    TPro  7.4  /  Alb  3.6  /  TBili  1.8<H>  /  DBili  x   /  AST  22  /  ALT  10  /  AlkPhos  90  05-16    PT/INR - ( 17 May 2020 13:12 )   PT: 16.8 sec;   INR: 1.46 ratio         PTT - ( 17 May 2020 13:12 )  PTT:29.0 sec  Urinalysis Basic - ( 15 May 2020 23:06 )    Color: Yellow / Appearance: Slightly Turbid / S.023 / pH: x  Gluc: x / Ketone: Negative  / Bili: Negative / Urobili: 4 mg/dL   Blood: x / Protein: 30 mg/dL / Nitrite: Negative   Leuk Esterase: Negative / RBC: 7 /hpf / WBC 24 /HPF   Sq Epi: x / Non Sq Epi: 24 /hpf / Bacteria: Many        RADIOLOGY & ADDITIONAL TESTS:  Studies reviewed.

## 2020-11-13 ENCOUNTER — HOSPITAL ENCOUNTER (INPATIENT)
Facility: CLINIC | Age: 85
LOS: 1 days | Discharge: NURSING, REHAB OR LONG-TERM CARE | DRG: 542 | End: 2020-11-16
Attending: PHYSICIAN ASSISTANT | Admitting: INTERNAL MEDICINE
Payer: MEDICARE

## 2020-11-13 ENCOUNTER — APPOINTMENT (OUTPATIENT)
Dept: CT IMAGING | Facility: CLINIC | Age: 85
DRG: 542 | End: 2020-11-13
Attending: PHYSICIAN ASSISTANT
Payer: MEDICARE

## 2020-11-13 DIAGNOSIS — S22.080A CLOSED COMPRESSION FRACTURE OF THORACOLUMBAR VERTEBRA, INITIAL ENCOUNTER (H): Primary | ICD-10-CM

## 2020-11-13 DIAGNOSIS — M54.9 BACK PAIN: ICD-10-CM

## 2020-11-13 DIAGNOSIS — S32.010A CLOSED COMPRESSION FRACTURE OF THORACOLUMBAR VERTEBRA, INITIAL ENCOUNTER (H): Primary | ICD-10-CM

## 2020-11-13 DIAGNOSIS — B37.2 CANDIDIASIS OF SKIN: ICD-10-CM

## 2020-11-13 LAB
ALBUMIN SERPL-MCNC: 3.2 G/DL (ref 3.4–5)
ALBUMIN UR-MCNC: NEGATIVE MG/DL
ALP SERPL-CCNC: 78 U/L (ref 40–150)
ALT SERPL W P-5'-P-CCNC: 14 U/L (ref 0–50)
ANION GAP SERPL CALCULATED.3IONS-SCNC: 5 MMOL/L (ref 3–14)
APPEARANCE UR: CLEAR
AST SERPL W P-5'-P-CCNC: 16 U/L (ref 0–45)
BACTERIA #/AREA URNS HPF: ABNORMAL /HPF
BASOPHILS # BLD AUTO: 0 10E9/L (ref 0–0.2)
BASOPHILS NFR BLD AUTO: 0.3 %
BILIRUB SERPL-MCNC: 0.5 MG/DL (ref 0.2–1.3)
BILIRUB UR QL STRIP: NEGATIVE
BUN SERPL-MCNC: 8 MG/DL (ref 7–30)
CALCIUM SERPL-MCNC: 9.1 MG/DL (ref 8.5–10.1)
CHLORIDE SERPL-SCNC: 107 MMOL/L (ref 94–109)
CO2 SERPL-SCNC: 28 MMOL/L (ref 20–32)
COLOR UR AUTO: YELLOW
CREAT BLD-MCNC: 0.5 MG/DL (ref 0.52–1.04)
CREAT SERPL-MCNC: 0.49 MG/DL (ref 0.52–1.04)
DIFFERENTIAL METHOD BLD: ABNORMAL
EOSINOPHIL # BLD AUTO: 0.1 10E9/L (ref 0–0.7)
EOSINOPHIL NFR BLD AUTO: 2.4 %
ERYTHROCYTE [DISTWIDTH] IN BLOOD BY AUTOMATED COUNT: 13.1 % (ref 10–15)
GFR SERPL CREATININE-BSD FRML MDRD: 81 ML/MIN/{1.73_M2}
GFR SERPL CREATININE-BSD FRML MDRD: >90 ML/MIN/{1.73_M2}
GLUCOSE SERPL-MCNC: 85 MG/DL (ref 70–99)
GLUCOSE UR STRIP-MCNC: NEGATIVE MG/DL
HCT VFR BLD AUTO: 38.6 % (ref 35–47)
HGB BLD-MCNC: 12.8 G/DL (ref 11.7–15.7)
HGB UR QL STRIP: NEGATIVE
IMM GRANULOCYTES # BLD: 0 10E9/L (ref 0–0.4)
IMM GRANULOCYTES NFR BLD: 0.2 %
INTERPRETATION ECG - MUSE: NORMAL
KETONES UR STRIP-MCNC: NEGATIVE MG/DL
LEUKOCYTE ESTERASE UR QL STRIP: NEGATIVE
LYMPHOCYTES # BLD AUTO: 1.1 10E9/L (ref 0.8–5.3)
LYMPHOCYTES NFR BLD AUTO: 19.4 %
MCH RBC QN AUTO: 33.3 PG (ref 26.5–33)
MCHC RBC AUTO-ENTMCNC: 33.2 G/DL (ref 31.5–36.5)
MCV RBC AUTO: 101 FL (ref 78–100)
MONOCYTES # BLD AUTO: 0.7 10E9/L (ref 0–1.3)
MONOCYTES NFR BLD AUTO: 12.3 %
MUCOUS THREADS #/AREA URNS LPF: PRESENT /LPF
NEUTROPHILS # BLD AUTO: 3.8 10E9/L (ref 1.6–8.3)
NEUTROPHILS NFR BLD AUTO: 65.4 %
NITRATE UR QL: NEGATIVE
NRBC # BLD AUTO: 0 10*3/UL
NRBC BLD AUTO-RTO: 0 /100
PH UR STRIP: 8 PH (ref 5–7)
PLATELET # BLD AUTO: 227 10E9/L (ref 150–450)
POTASSIUM SERPL-SCNC: 3.7 MMOL/L (ref 3.4–5.3)
PROT SERPL-MCNC: 6.2 G/DL (ref 6.8–8.8)
RBC # BLD AUTO: 3.84 10E12/L (ref 3.8–5.2)
RBC #/AREA URNS AUTO: 1 /HPF (ref 0–2)
SODIUM SERPL-SCNC: 140 MMOL/L (ref 133–144)
SOURCE: ABNORMAL
SP GR UR STRIP: 1.01 (ref 1–1.03)
TROPONIN I SERPL-MCNC: <0.015 UG/L (ref 0–0.04)
UROBILINOGEN UR STRIP-MCNC: NORMAL MG/DL (ref 0–2)
WBC # BLD AUTO: 5.9 10E9/L (ref 4–11)
WBC #/AREA URNS AUTO: <1 /HPF (ref 0–5)

## 2020-11-13 PROCEDURE — G0378 HOSPITAL OBSERVATION PER HR: HCPCS

## 2020-11-13 PROCEDURE — C9803 HOPD COVID-19 SPEC COLLECT: HCPCS

## 2020-11-13 PROCEDURE — 80053 COMPREHEN METABOLIC PANEL: CPT | Performed by: PHYSICIAN ASSISTANT

## 2020-11-13 PROCEDURE — 71260 CT THORAX DX C+: CPT

## 2020-11-13 PROCEDURE — 81001 URINALYSIS AUTO W/SCOPE: CPT | Performed by: PHYSICIAN ASSISTANT

## 2020-11-13 PROCEDURE — 250N000009 HC RX 250: Performed by: PHYSICIAN ASSISTANT

## 2020-11-13 PROCEDURE — 99220 PR INITIAL OBSERVATION CARE,LEVEL III: CPT | Performed by: INTERNAL MEDICINE

## 2020-11-13 PROCEDURE — U0003 INFECTIOUS AGENT DETECTION BY NUCLEIC ACID (DNA OR RNA); SEVERE ACUTE RESPIRATORY SYNDROME CORONAVIRUS 2 (SARS-COV-2) (CORONAVIRUS DISEASE [COVID-19]), AMPLIFIED PROBE TECHNIQUE, MAKING USE OF HIGH THROUGHPUT TECHNOLOGIES AS DESCRIBED BY CMS-2020-01-R: HCPCS | Performed by: PHYSICIAN ASSISTANT

## 2020-11-13 PROCEDURE — 85025 COMPLETE CBC W/AUTO DIFF WBC: CPT | Performed by: PHYSICIAN ASSISTANT

## 2020-11-13 PROCEDURE — 250N000013 HC RX MED GY IP 250 OP 250 PS 637: Performed by: INTERNAL MEDICINE

## 2020-11-13 PROCEDURE — 82565 ASSAY OF CREATININE: CPT

## 2020-11-13 PROCEDURE — 99285 EMERGENCY DEPT VISIT HI MDM: CPT | Mod: 25

## 2020-11-13 PROCEDURE — 93005 ELECTROCARDIOGRAM TRACING: CPT

## 2020-11-13 PROCEDURE — 84484 ASSAY OF TROPONIN QUANT: CPT | Performed by: PHYSICIAN ASSISTANT

## 2020-11-13 PROCEDURE — 250N000011 HC RX IP 250 OP 636: Performed by: PHYSICIAN ASSISTANT

## 2020-11-13 PROCEDURE — 250N000013 HC RX MED GY IP 250 OP 250 PS 637: Performed by: PHYSICIAN ASSISTANT

## 2020-11-13 RX ORDER — NALOXONE HYDROCHLORIDE 0.4 MG/ML
.1-.4 INJECTION, SOLUTION INTRAMUSCULAR; INTRAVENOUS; SUBCUTANEOUS
Status: DISCONTINUED | OUTPATIENT
Start: 2020-11-13 | End: 2020-11-16 | Stop reason: HOSPADM

## 2020-11-13 RX ORDER — AMLODIPINE BESYLATE 2.5 MG/1
2.5 TABLET ORAL DAILY
Status: DISCONTINUED | OUTPATIENT
Start: 2020-11-14 | End: 2020-11-16 | Stop reason: HOSPADM

## 2020-11-13 RX ORDER — CHOLECALCIFEROL (VITAMIN D3) 50 MCG
50 TABLET ORAL DAILY
Status: DISCONTINUED | OUTPATIENT
Start: 2020-11-14 | End: 2020-11-16 | Stop reason: HOSPADM

## 2020-11-13 RX ORDER — AMOXICILLIN 250 MG
2 CAPSULE ORAL 2 TIMES DAILY
Status: DISCONTINUED | OUTPATIENT
Start: 2020-11-13 | End: 2020-11-16 | Stop reason: HOSPADM

## 2020-11-13 RX ORDER — ACETAMINOPHEN 325 MG/1
650 TABLET ORAL ONCE
Status: COMPLETED | OUTPATIENT
Start: 2020-11-13 | End: 2020-11-13

## 2020-11-13 RX ORDER — HYDRALAZINE HYDROCHLORIDE 20 MG/ML
10 INJECTION INTRAMUSCULAR; INTRAVENOUS EVERY 6 HOURS PRN
Status: DISCONTINUED | OUTPATIENT
Start: 2020-11-13 | End: 2020-11-16 | Stop reason: HOSPADM

## 2020-11-13 RX ORDER — LOSARTAN POTASSIUM 50 MG/1
50 TABLET ORAL DAILY
Status: DISCONTINUED | OUTPATIENT
Start: 2020-11-14 | End: 2020-11-16 | Stop reason: HOSPADM

## 2020-11-13 RX ORDER — SENNOSIDES 8.6 MG
1 TABLET ORAL 2 TIMES DAILY PRN
COMMUNITY
End: 2020-12-23 | Stop reason: ALTCHOICE

## 2020-11-13 RX ORDER — ACETAMINOPHEN 500 MG
1000 TABLET ORAL 3 TIMES DAILY
Status: DISCONTINUED | OUTPATIENT
Start: 2020-11-13 | End: 2020-11-16 | Stop reason: HOSPADM

## 2020-11-13 RX ORDER — AMOXICILLIN 250 MG
1 CAPSULE ORAL 2 TIMES DAILY
Status: DISCONTINUED | OUTPATIENT
Start: 2020-11-13 | End: 2020-11-16 | Stop reason: HOSPADM

## 2020-11-13 RX ORDER — LIDOCAINE 4 G/G
1 PATCH TOPICAL EVERY MORNING
Status: ON HOLD | COMMUNITY
End: 2020-11-16

## 2020-11-13 RX ORDER — LIDOCAINE 40 MG/G
CREAM TOPICAL
Status: DISCONTINUED | OUTPATIENT
Start: 2020-11-13 | End: 2020-11-16 | Stop reason: HOSPADM

## 2020-11-13 RX ORDER — ONDANSETRON 2 MG/ML
4 INJECTION INTRAMUSCULAR; INTRAVENOUS EVERY 6 HOURS PRN
Status: DISCONTINUED | OUTPATIENT
Start: 2020-11-13 | End: 2020-11-16 | Stop reason: HOSPADM

## 2020-11-13 RX ORDER — DOCUSATE SODIUM 100 MG/1
100 CAPSULE, LIQUID FILLED ORAL 2 TIMES DAILY PRN
COMMUNITY
End: 2020-11-25

## 2020-11-13 RX ORDER — ONDANSETRON 4 MG/1
4 TABLET, ORALLY DISINTEGRATING ORAL EVERY 6 HOURS PRN
Status: DISCONTINUED | OUTPATIENT
Start: 2020-11-13 | End: 2020-11-16 | Stop reason: HOSPADM

## 2020-11-13 RX ORDER — CLONIDINE HYDROCHLORIDE 0.1 MG/1
0.1 TABLET ORAL EVERY 6 HOURS PRN
Status: DISCONTINUED | OUTPATIENT
Start: 2020-11-13 | End: 2020-11-16 | Stop reason: HOSPADM

## 2020-11-13 RX ORDER — POLYETHYLENE GLYCOL 3350 17 G/17G
17 POWDER, FOR SOLUTION ORAL DAILY
Status: DISCONTINUED | OUTPATIENT
Start: 2020-11-13 | End: 2020-11-16 | Stop reason: HOSPADM

## 2020-11-13 RX ORDER — IOPAMIDOL 755 MG/ML
80 INJECTION, SOLUTION INTRAVASCULAR ONCE
Status: COMPLETED | OUTPATIENT
Start: 2020-11-13 | End: 2020-11-13

## 2020-11-13 RX ORDER — LEVOTHYROXINE SODIUM 88 UG/1
88 TABLET ORAL DAILY
Status: DISCONTINUED | OUTPATIENT
Start: 2020-11-14 | End: 2020-11-16 | Stop reason: HOSPADM

## 2020-11-13 RX ORDER — POLYETHYLENE GLYCOL 3350 17 G/17G
17 POWDER, FOR SOLUTION ORAL DAILY
Status: DISCONTINUED | OUTPATIENT
Start: 2020-11-13 | End: 2020-11-13

## 2020-11-13 RX ORDER — ACETAMINOPHEN 500 MG
1000 TABLET ORAL EVERY 6 HOURS PRN
COMMUNITY
End: 2020-12-01 | Stop reason: DRUGHIGH

## 2020-11-13 RX ORDER — ACETAMINOPHEN 325 MG/1
650 TABLET ORAL EVERY 4 HOURS PRN
Status: DISCONTINUED | OUTPATIENT
Start: 2020-11-13 | End: 2020-11-16 | Stop reason: HOSPADM

## 2020-11-13 RX ORDER — OXYCODONE HYDROCHLORIDE 5 MG/1
5 TABLET ORAL EVERY 4 HOURS PRN
Status: DISCONTINUED | OUTPATIENT
Start: 2020-11-13 | End: 2020-11-14

## 2020-11-13 RX ORDER — ACETAMINOPHEN 650 MG/1
650 SUPPOSITORY RECTAL EVERY 4 HOURS PRN
Status: DISCONTINUED | OUTPATIENT
Start: 2020-11-13 | End: 2020-11-16 | Stop reason: HOSPADM

## 2020-11-13 RX ADMIN — IOPAMIDOL 80 ML: 755 INJECTION, SOLUTION INTRAVENOUS at 13:59

## 2020-11-13 RX ADMIN — ACETAMINOPHEN 650 MG: 325 TABLET, FILM COATED ORAL at 15:10

## 2020-11-13 RX ADMIN — DOCUSATE SODIUM 50 MG AND SENNOSIDES 8.6 MG 2 TABLET: 8.6; 5 TABLET, FILM COATED ORAL at 21:41

## 2020-11-13 RX ADMIN — ACETAMINOPHEN 1000 MG: 500 TABLET, FILM COATED ORAL at 21:41

## 2020-11-13 RX ADMIN — SODIUM CHLORIDE 70 ML: 9 INJECTION, SOLUTION INTRAVENOUS at 13:59

## 2020-11-13 ASSESSMENT — ENCOUNTER SYMPTOMS
HEMATURIA: 0
DIARRHEA: 0
VOMITING: 0
BACK PAIN: 1
APPETITE CHANGE: 0
CONSTIPATION: 0
SORE THROAT: 1
ABDOMINAL PAIN: 0

## 2020-11-13 NOTE — ED TRIAGE NOTES
"Chronic pain she has been for the past 24 hours yelling out in pain staff is concerned and she has a spinal fracture from a past injury.  She comes from [facility], daughter will be her soon. VSS, no IV, patient just is taking tyl for her pain and the staff is wondering if she could have something stronger to help her through the night.    To note patient has no idea why she is her and has no pain right now.  She is repeating \"[place], is that in [place]\"  "

## 2020-11-13 NOTE — PROGRESS NOTES
RECEIVING UNIT ED HANDOFF REVIEW    ED Nurse Handoff Report was reviewed by: Bryan Prince RN on November 13, 2020 at 5:15 PM

## 2020-11-13 NOTE — PHARMACY-ADMISSION MEDICATION HISTORY
Pharmacy Medication History  Admission medication history interview status for the 11/13/2020  admission is complete. See EPIC admission navigator for prior to admission medications       Medication history sources: med list from [facility]. Called for last doses and missing page info  Location of interview: phone  Medication history source reliability: Good  Adherence assessment: unable to assess    Significant changes made to the medication list:  Added apap prn, decreased norvasc dose, changed asa to prn, changed lidocaine dose, removed biofreeze, changed senna s to senna prn      Additional medication history information:   none    Medication reconciliation completed by provider prior to medication history? No    Time spent in this activity: 15 minutes      Prior to Admission medications    Medication Sig Last Dose Taking? Auth Provider   acetaminophen (TYLENOL) 500 MG tablet Take 1,000 mg by mouth every 6 hours as needed for mild pain See scheduled doses also. Max 4000mg in 24 hours prn Yes Unknown, Entered By History   acetaminophen (TYLENOL) 500 MG tablet Take 2 tablets (1,000 mg) by mouth 3 times daily 11/13/2020 at am Yes Savage Menjivar, APRN CNP   amLODIPine (NORVASC) 5 MG tablet Take 2.5 mg by mouth daily  11/13/2020 at am Yes Unknown, Entered By History   aspirin (ASA) 325 MG EC tablet Take 325 mg by mouth daily as needed (back pain) prn Yes Unknown, Entered By History   calcium carbonate (TUMS) 500 MG chewable tablet Take 1 chew tab by mouth daily  11/13/2020 at am Yes Reported, Patient   docusate sodium (COLACE) 100 MG capsule Take 100 mg by mouth 2 times daily as needed for constipation prn Yes Unknown, Entered By History   levothyroxine (SYNTHROID/LEVOTHROID) 88 MCG tablet Take 88 mcg by mouth daily 11/13/2020 at am Yes Unknown, Entered By History   Lidocaine (LIDOCARE) 4 % Patch Place 1 patch onto the skin every morning To prevent lidocaine toxicity, patient should be patch free for 12  hrs daily. To right shoulder. Remove in the evening 11/13/2020 at am Yes Unknown, Entered By History   losartan (COZAAR) 50 MG tablet Take 50 mg by mouth daily 11/13/2020 at am Yes Unknown, Entered By History   oxybutynin ER (DITROPAN-XL) 10 MG 24 hr tablet Take 1 tablet (10 mg) by mouth At Bedtime 11/12/2020 at hs Yes Fei Marshall MD   polyethylene glycol (MIRALAX/GLYCOLAX) packet Take 17 g by mouth daily 11/13/2020 at am Yes Savage Menjivar APRN CNP   sennosides (SENOKOT) 8.6 MG tablet Take 1 tablet by mouth daily as needed for constipation prn Yes Unknown, Entered By History   vitamin D3 (CHOLECALCIFEROL) 2000 units tablet Take 1 tablet by mouth daily 11/13/2020 at am Yes Unknown, Entered By History

## 2020-11-13 NOTE — PROGRESS NOTES
[facility]  ED Nurse Handoff Report    ED Chief complaint: Back Pain      ED Diagnosis:   Final diagnoses:   None       Code Status: Per hospitalist.    Allergies:   Allergies   Allergen Reactions     Sulfacetamide Hives       Patient Story:  Chronic pain she has been for the past 24 hours yelling out in pain staff is concerned and she has a spinal fracture from a past injury.  She comes from [facility], daughter at bedside. VSS, no IV, patient just is taking tylenol for her pain and the staff is wondering if she could have something stronger to help her through the night.  Focused Assessment:  C/O of R posterior rib pain. Difficulty moving due to pain.      Treatments and/or interventions provided: Lidocaine patch to R ribs.  Tylenol  Patient's response to treatments and/or interventions: TBD    To be done/followed up on inpatient unit:  Asymptomatic COVID swab    Does this patient have any cognitive concerns?: Baseline dementia    Activity level - Baseline/Home:  [facility]  Activity Level - Current:   [facility] and Unknown    Patient's Preferred language: English   Needed?: No    Isolation: None  Infection: Not Applicable  Patient tested for COVID 19 prior to admission: YES  Bariatric?: No    Vital Signs:   Vitals:    11/13/20 0947 11/13/20 1000 11/13/20 1100 11/13/20 1522   BP:  (!) 176/77 (!) 169/77 (!) 197/90   Pulse:  59 57 65   Resp:    18   Temp: 97.7  F (36.5  C)      TempSrc: Temporal      SpO2:  91% 91% 92%   Weight:           Cardiac Rhythm:     Was the PSS-3 completed:   Yes  What interventions are required if any?               Family Comments: Daughter  OBS brochure/video discussed/provided to patient/family: N/A              Name of person given brochure if not patient: NA              Relationship to patient: NA    For the majority of the shift this patient's behavior was Green.   Behavioral interventions performed were NA.    ED NURSE PHONE NUMBER:  [phone]    Earline Yo RN, BSN  Emergency Department  [facility]

## 2020-11-13 NOTE — ED NOTES
Bed: ED27  Expected date: 11/13/20  Expected time: 9:37 AM  Means of arrival: Ambulance  Comments:  [place] 89f back pain ETA now

## 2020-11-13 NOTE — ED PROVIDER NOTES
History     Chief Complaint:  Back Pain       HPI   Gennaro Walton is a 89 year old female with a history of spinal fractures who presents from [facility] with back pain. Staff at the home says that for the past 24 hours the patient has been yelling out in pain. Here in the ED the daughter arrived and says that she called the patient yesterday and was saying that the patient had mentioned some generalized pain, but it was not too severe. She called again this morning at 0730 and says that the patient said the pain was much worse and localized in her back. The staff at the facility said that the patient had been unable to sleep due to the pain. The daughter notes that last year the patient had similar pain and imaging had shown it was due to a pressure injury in the spine. At the facility the patient was given Tylenol, Aspercreme, and a Lidocaine patch. Here in the ED, the patient says that she currently has no pain, but notes that it is likely because she is not moving. The patient says that has had a minor sore throat for the past couple weeks, and has had multiple negative COVID tests. She denies any fever, vomiting, hematuria, abdominal pain, changes in bowel movements, change in appetite, or and recent falls.       Allergies:  Sulfacetamide     Medications:    Senokot  Miralax  Ditropan  Biofreeze  Cozaar  Lidocare  Synthroid   Tums  Aspirin  Norvasc     Past Medical History:    Breast cancer  CKD  Cognitive impairment  Hypertension   Hypothyroid  Osteoporosis   Compression fracture of lumbar vertebra   Traumatic subarachnoid hemorrhage   Anemia  UTI  Onychomycosis    Mitral regurgitation  Aortic regurgitation   GERD    Past Surgical History:    Left mastectomy  Cataract surgery  ORIF femur midshaft  ORIF hip nailing    Thyroidectomy   Appendectomy  Tumor removal- cell tumor of chest   Tonsillectomy     Family History:    CAD  Vaginal cancer  Hypertension   Brain cancer     Social History:  The  patient was accompanied to the ED by daughter.  Smoking Status: Never Smoker  Smokeless Tobacco: Never Used  Alcohol Use: No  Drug Use: Negative  PCP: Mady Hagen    Review of Systems   Constitutional: Negative for appetite change.   HENT: Positive for sore throat.    Gastrointestinal: Negative for abdominal pain, constipation, diarrhea and vomiting.   Genitourinary: Negative for hematuria.   Musculoskeletal: Positive for back pain.   All other systems reviewed and are negative.    Physical Exam     Patient Vitals for the past 24 hrs:   BP Temp Temp src Pulse Resp SpO2 Weight   11/13/20 1634 (!) 193/86 -- -- 62 -- 91 % --   11/13/20 1522 (!) 197/90 -- -- 65 18 92 % --   11/13/20 1300 (!) 168/71 -- -- 56 -- 93 % --   11/13/20 1200 (!) 177/78 -- -- 58 -- -- --   11/13/20 1100 (!) 169/77 -- -- 57 -- 91 % --   11/13/20 1000 (!) 176/77 -- -- 59 -- 91 % --   11/13/20 0947 -- 97.7  F (36.5  C) Temporal -- -- -- --   11/13/20 0946 (!) 187/79 -- -- 63 12 92 % 44 kg (97 lb)        Physical Exam  General: Resting comfortably.  Alert and oriented.   Head:  The scalp, face, and head appear normal.  No evidence of head injury.  Eyes:  Conjunctivae and sclerae are normal. Pupils are equal round and reactive to light.  Extraocular eye movements are intact.   ENT:    The oropharynx is normal    Uvula is in the midline     MMM   Neck:  No lymphadenopathy  CV:  Regular rate and rhythm     Normal S1/S2    Peripheral pulse intact all 4 extremities.  No peripheral edema.  Resp:  Lungs are clear to auscultation    Non-labored    No rales or wheezing     Equal aeration throughout.  GI:  Abdomen is soft, non-distended    No abdominal tenderness     Normal bowel sounds   MS:  There is tenderness to the midline thoracic area on the right parathoracic area as well as the right posterior/inferior rib cage.  No midline lumbar, or cervical spinal tenderness.  Skin:  No rash or acute skin lesions noted   Neuro: Speech is normal and fluent.  Cranial nerves 2-12 grossly intact.  strength is equal. Strength and sensation intact in all 4 extremities.  Cannot perform finger-nose-finger and heel shin given patient cannot follow complex commands.  No focal deficits.       Emergency Department Course     ECG:  ECG taken at 1147, ECG read at 1150  Normal sinus rhythm  Left axis deviation  Left ventricular hypertrophy with QRS widening and repolarization abnormality.  Rate 60 bpm. PA interval 178 ms. QRS duration 126 ms. QT/QTc 394/394 ms. P-R-T axes 51 -34 101.    Imaging:  Radiology findings were communicated with the patient who voiced understanding of the findings.    CT Aortic Survey w Contrast  1.  No evidence for aortic aneurysm or dissection.  2.  Ectasia of the ascending thoracic aorta measures 3.8 cm in  diameter, and is not significantly changed.  3.  Scattered colonic diverticulosis, without convincing evidence for  diverticulitis.  4.  Thoracic kyphosis and multiple thoracolumbar vertebral body  compressions are unchanged.  MONSTER BETTENCOURT MD  Reading per radiology     Laboratory:  Laboratory findings were communicated with the patient and daughter who voiced understanding of the findings.    UA with micro: pH 8.0 (H), bacteria few (A), mucous present (A) o/w negative    Creatinine POCT (Collected 1221): 0.5 (L)   CBC: WBC 5.9, HGB 12.8,   CMP: creatinine 0.49 (L, albumin 3.2 (L), protein total 6.2 (L) o/w WNL  Troponin (Collected 1215): <0.015     Asymptomatic COVID-19 Virus (Coronavirus) by PCR: in process     Procedures    Interventions:  1510 Tylenol 650 mg Oral     Emergency Department Course:    1101 Nursing notes and vitals reviewed.    1109 I performed an exam of the patient as documented above.     1154 The patient provided a urine sample here in the emergency department. This was sent for laboratory testing, findings above.     1215 The patient provided a urine sample here in the emergency department. This was sent for  laboratory testing, findings above.     1350 The patient was sent for a CT while in the emergency department, results above.      1653 I spoke with Dr. Collazo of the hospitalsit service regarding patient's presentation, findings, and plan of care.      Findings and plan explained to the Patient and daughter who consents to admission. Discussed the patient with Dr. Collazo, who will admit the patient to a bed for further monitoring, evaluation, and treatment.     Impression & Plan      Medical Decision Making:  Gennaro Walton is a 89 year old female who presents for evaluation of back pain.  Please refer to HPI for full details.  Patient does have a history of subacute compression fractures as well as a remote history of hip fractures.  She is residing at assisted living, and has not had significant pain.  Although the last night, the patient started complaining of pain in her low back which persisted.  Given the persistence of the pain, and this being new in nature, EMS was called to have the patient evaluated.  Patient was given Tylenol before arrival.  She describes the pain as sharp pain especially when moving or trying to walk in the right thoracic areas in the posterior rib cage.  She denies any other associated symptoms.  No fever no cough.  She is vitally stable here aside from hypertension and low oxygen saturations although this seems to be baseline for the patient given recent visits also displayed these.  Given the patient's elevated blood pressure and new onset back pain, did consider aortic pathology.  Fortunately, this was CT aortic survey was negative for any aortic abnormalities does demonstrate several thoracolumbar compression fractures, none which appear new.  No other acute abnormalities noted.  Labs here are reassuring.  UA is normal with no signs infection.  Patient was given Tylenol and did have significant  pain.  Daughter is concerned of her ability to take care of her self at her assisted  living as she does not have significant help.  Given this, I did offer admission.  Daughter and patient agree to this.  I did touch base with  with Dr. Collazo of the hospitalist service to admit this patient to observation.  All questions were answered prior to admission.  Patient/daughter understand agree to admission.    Covid-19  Gennaro Walton was evaluated during a global COVID-19 pandemic, which necessitated consideration that the patient might be at risk for infection with the SARS-CoV-2 virus that causes COVID-19.   Applicable protocols for evaluation were followed during the patient's care.   COVID-19 was considered as part of the patient's evaluation. The plan for testing is:  a test was obtained during this visit.    Diagnosis:    ICD-10-CM    1. Back pain  M54.9         Disposition:   The patient is admitted into the care of Dr. Collazo.        Scribe Disclosure:  I, Michael Giles, am serving as a scribe at 11:08 AM on 11/13/2020 to document services personally performed by Brittany Driscoll PA-C based on my observations and the provider's statements to me.        Brittany Driscoll PA-C  11/13/20 2003

## 2020-11-13 NOTE — H&P
[facility]    History and Physical  Hospitalist       Date of Admission:  11/13/2020  Date of Service (when I saw the patient): 11/13/20    Assessment & Plan   Gennaro Walton is a pleasant 89 year old woman with a history of (L) breast cancer, HTN, hypothyroidism, bilateral hip fractures, previous thoracolumbar compression fractures and possible dementia, who was sent to the ED today from her AL facility for evaluation of new back pain.  Current problems include:    Back/rib pain with movement of the waist and/or torso.  Has marked tenderness over Right mid-postero-lateral ribs.  - check XRs of Right rib cage in a.m.  - Gennaro does not think the lidocaine patch is helping  - continue PRN tylenol; add low dose PRN oxycodone  - trial of Amol Gonzalez and/or heating pad  - Ortho. Surgery consult 11/14    Hx of thoraco-lumbar compression fractures; no reported history of recent falls.  No focal tenderness over mid-lower spine.  No apparent new injury on the CT-aortic study done earlier today (ordered in ED).  - analgesics as above  - Ortho. Surgery to eval.; will defer to them re. whether Gennaro needs a repeat MRI of spine.  - PT eval. In a.m.    HTN with hypertensive urgency; pressures elevated earlier this evening.  - resume home losartan and amlodiping  - add PRN clonidine and hydralazine    Low albumin; suspect Gennaro is malnourished.  - RD eval. 11/14    Deconditioning and frailty; multifactorial.  - PT eval.  - LSW to consult; may need more hands-on support at her current facility.    Hypothyroidism.  - resume PTA synthroid.      DVT Prophylaxis: Pneumatic Compression Devices and Ambulate every shift  Code Status: DNR / DNI    Disposition: Expected discharge in 1-2 days.      SHERMAN Collazo MD, FACP     Internal Medicine Hospitalist    Primary Care Physician   Mady Hagen    Chief Complaint   Acute mid to low back pain while at her AL facility last night and today.    History was obtained  from the ED provider, the EHR and from patient    History of Present Illness   Per ED provider:  Gennaro Walton is a pleasant 89 year old woman with a history of spinal fractures who presented from [facility] with back pain.  Staff at the home reported that for the past 24 hours, Gennaro has been yelling out in pain.  In the ED this evening, her daughter arrived and says that she called the patient yesterday and was saying that the patient had mentioned some generalized pain, but it was not too severe.  She called again this morning at 0730 and says that the patient said the pain was much worse and localized in her back. The staff at the facility said that the patient had been unable to sleep due to the pain. The daughter notes that last year the patient had similar pain and imaging had shown it was due to a pressure injury in the spine. At the facility the patient was given Tylenol, Aspercreme, and a Lidocaine patch. Here in the ED, the patient says that she currently has no pain, but notes that it is likely because she is not moving. The patient says that has had a minor sore throat for the past couple weeks, and has had multiple negative COVID tests. She denies any fever, vomiting, hematuria, abdominal pain, changes in bowel movements, change in appetite, or and recent falls.     In talking with Gennaro, she cannot recall specific times in the past two days when she had back pain; most of her pain seems to be in the Right mid-posterior/lateral rib cage, and bothers her mainly when twisting her torso and repositioning.      Past Medical History    I have reviewed this patient's medical history and updated it with pertinent information if needed.   Past Medical History:   Diagnosis Date     Breast cancer (H)      CKD (chronic kidney disease) stage 3, GFR 30-59 ml/min (H)      Cognitive impairment      Hypertension      Hypothyroid      Osteoporosis        Past Surgical History   I have reviewed this  patient's surgical history and updated it with pertinent information if needed.  Past Surgical History:   Procedure Laterality Date     ------------OTHER-------------      Left mastectomy     ------------OTHER-------------      Cataract surgery     OPEN REDUCTION INTERNAL FIXATION FEMUR MIDSHAFT Left 7/26/2019    Procedure: LEFT INTERTROCHANTERIC FEMUR FRACTURE OPEN REDUCTION AND INTERNAL FIXATION;  Surgeon: Chris Oseguera MD;  Location:  OR     OPEN REDUCTION INTERNAL FIXATION HIP NAILING Right 2/1/2020    Procedure: OPEN REDUCTION INTERNAL FIXATION RIGHT INTERTROCHANTERIC FEMUR FRACTURE;  Surgeon: Catalino Casper MD;  Location:  OR       Prior to Admission Medications   Prior to Admission Medications   Prescriptions Last Dose Informant Patient Reported? Taking?   Lidocaine (LIDOCARE) 4 % Patch 11/13/2020 at am  Yes Yes   Sig: Place 1 patch onto the skin every morning To prevent lidocaine toxicity, patient should be patch free for 12 hrs daily. To right shoulder. Remove in the evening   acetaminophen (TYLENOL) 500 MG tablet 11/13/2020 at am  No Yes   Sig: Take 2 tablets (1,000 mg) by mouth 3 times daily   acetaminophen (TYLENOL) 500 MG tablet prn  Yes Yes   Sig: Take 1,000 mg by mouth every 6 hours as needed for mild pain See scheduled doses also. Max 4000mg in 24 hours   amLODIPine (NORVASC) 5 MG tablet 11/13/2020 at am Nursing Home Yes Yes   Sig: Take 2.5 mg by mouth daily 1/2 x 5mg tablet = 2.5mg   aspirin (ASA) 325 MG EC tablet prn  Yes Yes   Sig: Take 325 mg by mouth daily as needed (back pain)   calcium carbonate (TUMS) 500 MG chewable tablet 11/13/2020 at am Nursing Home Yes Yes   Sig: Take 1 chew tab by mouth daily    docusate sodium (COLACE) 100 MG capsule prn  Yes Yes   Sig: Take 100 mg by mouth 2 times daily as needed for constipation   levothyroxine (SYNTHROID/LEVOTHROID) 88 MCG tablet 11/13/2020 at am Nursing Home Yes Yes   Sig: Take 88 mcg by mouth daily   losartan (COZAAR) 50 MG tablet  11/13/2020 at am Nursing Home Yes Yes   Sig: Take 50 mg by mouth daily   oxybutynin ER (DITROPAN-XL) 10 MG 24 hr tablet 11/12/2020 at  Nursing Home No Yes   Sig: Take 1 tablet (10 mg) by mouth At Bedtime   polyethylene glycol (MIRALAX/GLYCOLAX) packet 11/13/2020 at am Nursing Home No Yes   Sig: Take 17 g by mouth daily   sennosides (SENOKOT) 8.6 MG tablet prn  Yes Yes   Sig: Take 1 tablet by mouth daily as needed for constipation   vitamin D3 (CHOLECALCIFEROL) 2000 units tablet 11/13/2020 at am Nursing Home Yes Yes   Sig: Take 1 tablet by mouth daily      Facility-Administered Medications: None     Allergies   Allergies   Allergen Reactions     Sulfacetamide Hives       Social History   I have reviewed this patient's social history and updated it with pertinent information if needed. Gennaro Walton  reports that she has never smoked. She has never used smokeless tobacco. She reports that she does not drink alcohol or use drugs.    Family History   I have reviewed this patient's family history and updated it with pertinent information if needed.   No family history on file.    Review of Systems   The 10 point Review of Systems is negative other than noted in the HPI or here.  No recent f/c/SOB; no chest or abdominal Sx.  No leg/arm swelling.    Physical Exam   Temp: 97.7  F (36.5  C) Temp src: Temporal BP: (!) 193/86 Pulse: 62   Resp: 18 SpO2: 91 % O2 Device: None (Room air)    Vital Signs with Ranges  Temp:  [97.7  F (36.5  C)] 97.7  F (36.5  C)  Pulse:  [56-65] 62  Resp:  [12-18] 18  BP: (168-197)/(71-90) 193/86  SpO2:  [91 %-93 %] 91 %  97 lbs 0 oz    Constitutional: awake, lying in bed; in no apparent distress; is frail and thin.  Eyes: sclerae clear; PERRLA/EOMI  HEENT: AT/NC; mucous membranes slightly dry, normal dentition.  Neck: supple, good ROM; no LA, no bruits, no JVD, no thyromegaly    Respiratory: fair air entry bilaterally; no wheezing; faint inspiratory rhonchi Left base  Back: no focal tenderness  over spine; does have marked tenderness over Right posterior/mid rib cage; no other abnormalities  Cardiovascular: Regular rate and rhythm; S1, S2 noted; no murmur, rub or gallop  GI: abdomen flat, + bowel sounds, soft, non-tender, non-distended; no masses or organomegaly  Skin: no rash, no cyanosis  Musculoskeletal: no edema; no obvious joint abnormalities  Neurologic: alert; oriented to person, follows directions well; no focal motor or sensory deficits  Psychiatric: no obvious signs of anxiety or depression.    Data   Data reviewed today:  I personally reviewed all recent labs/imaging results.    Recent Labs   Lab 11/13/20  1215   WBC 5.9   HGB 12.8   *         POTASSIUM 3.7   CHLORIDE 107   CO2 28   BUN 8   CR 0.49*   ANIONGAP 5   LORETA 9.1   GLC 85   ALBUMIN 3.2*   PROTTOTAL 6.2*   BILITOTAL 0.5   ALKPHOS 78   ALT 14   AST 16   TROPI <0.015       Recent Results (from the past 24 hour(s))   CT Aortic Survey w Contrast    Narrative    CT AORTIC SURVEY WITH CONTRAST   11/13/2020 2:21 PM     HISTORY: Acute onset back pain. Hypertension.    TECHNIQUE: 80 mL Isovue-370 IV were administered. Precontrast images  were obtained through the chest. After contrast administration,  volumetric helical sections were acquired in the arterial phase from  the thoracic inlet to the ischial tuberosities. Coronal images were  also reconstructed. Radiation dose for this scan was reduced using  automated exposure control, adjustment of the mA and/or kV according  to patient size, or iterative reconstruction technique.    COMPARISON: CT of the chest, abdomen, and pelvis performed 1/7/2020.    FINDINGS:   VASCULATURE: Atherosclerotic calcification of the thoracoabdominal  aorta and coronary arteries. Tortuous thoracoabdominal aorta. Ectasia  of the ascending thoracic aorta measures up to 3.8 cm in diameter. No  evidence for aortic dissection. Cardiac enlargement.    LUNGS AND PLEURA: No pleural effusions. Scattered  calcified granulomas  in both lungs are unchanged. Mild scarring and/or fibrosis about the  periphery of both lower lungs.    MEDIASTINUM/AXILLAE: No enlarged lymph nodes are identified in the  chest. No pericardial effusion. Left breast implant is again noted.    HEPATOBILIARY: Unremarkable. No hepatic masses are seen.    PANCREAS: Normal.    SPLEEN: Normal.    ADRENAL GLANDS: A 2.6 cm indeterminate left adrenal nodule is  unchanged.    KIDNEYS/BLADDER: Unremarkable. No hydronephrosis.    BOWEL: Scattered colonic diverticulosis. No bowel obstruction. No  convincing evidence for colitis or diverticulitis. The appendix is not  visualized; however, no significant inflammatory change is seen within  the expected region of the appendix.    PELVIC ORGANS: Unremarkable.    LYMPH NODES: No enlarged lymph nodes are identified in the abdomen or  pelvis.    ADDITIONAL FINDINGS: None.    MUSCULOSKELETAL: Postoperative changes of intramedullary ilan and hip  screw placement in both proximal femurs. Degenerative changes are  noted throughout the thoracolumbar spine. Thoracic kyphosis. Multiple  thoracolumbar vertebral body compressions have a similar appearance to  the previous exam.      Impression    IMPRESSION:   1.  No evidence for aortic aneurysm or dissection.  2.  Ectasia of the ascending thoracic aorta measures 3.8 cm in  diameter, and is not significantly changed.  3.  Scattered colonic diverticulosis, without convincing evidence for  diverticulitis.  4.  Thoracic kyphosis and multiple thoracolumbar vertebral body  compressions are unchanged.    MONSTER BETTENCOURT MD

## 2020-11-14 ENCOUNTER — APPOINTMENT (OUTPATIENT)
Dept: PHYSICAL THERAPY | Facility: CLINIC | Age: 85
DRG: 542 | End: 2020-11-14
Attending: INTERNAL MEDICINE
Payer: MEDICARE

## 2020-11-14 ENCOUNTER — APPOINTMENT (OUTPATIENT)
Dept: GENERAL RADIOLOGY | Facility: CLINIC | Age: 85
DRG: 542 | End: 2020-11-14
Attending: INTERNAL MEDICINE
Payer: MEDICARE

## 2020-11-14 LAB
ANION GAP SERPL CALCULATED.3IONS-SCNC: 6 MMOL/L (ref 3–14)
BUN SERPL-MCNC: 9 MG/DL (ref 7–30)
CALCIUM SERPL-MCNC: 8.8 MG/DL (ref 8.5–10.1)
CHLORIDE SERPL-SCNC: 102 MMOL/L (ref 94–109)
CO2 SERPL-SCNC: 28 MMOL/L (ref 20–32)
CREAT SERPL-MCNC: 0.51 MG/DL (ref 0.52–1.04)
ERYTHROCYTE [DISTWIDTH] IN BLOOD BY AUTOMATED COUNT: 13.1 % (ref 10–15)
GFR SERPL CREATININE-BSD FRML MDRD: 81 ML/MIN/{1.73_M2}
GLUCOSE SERPL-MCNC: 85 MG/DL (ref 70–99)
HCT VFR BLD AUTO: 37.6 % (ref 35–47)
HGB BLD-MCNC: 12.4 G/DL (ref 11.7–15.7)
LABORATORY COMMENT REPORT: NORMAL
MCH RBC QN AUTO: 32.9 PG (ref 26.5–33)
MCHC RBC AUTO-ENTMCNC: 33 G/DL (ref 31.5–36.5)
MCV RBC AUTO: 100 FL (ref 78–100)
PLATELET # BLD AUTO: 220 10E9/L (ref 150–450)
POTASSIUM SERPL-SCNC: 3.5 MMOL/L (ref 3.4–5.3)
RBC # BLD AUTO: 3.77 10E12/L (ref 3.8–5.2)
SARS-COV-2 RNA SPEC QL NAA+PROBE: NEGATIVE
SARS-COV-2 RNA SPEC QL NAA+PROBE: NORMAL
SODIUM SERPL-SCNC: 136 MMOL/L (ref 133–144)
SPECIMEN SOURCE: NORMAL
SPECIMEN SOURCE: NORMAL
WBC # BLD AUTO: 5.9 10E9/L (ref 4–11)

## 2020-11-14 PROCEDURE — G0378 HOSPITAL OBSERVATION PER HR: HCPCS

## 2020-11-14 PROCEDURE — 71101 X-RAY EXAM UNILAT RIBS/CHEST: CPT | Mod: RT

## 2020-11-14 PROCEDURE — 250N000013 HC RX MED GY IP 250 OP 250 PS 637: Performed by: STUDENT IN AN ORGANIZED HEALTH CARE EDUCATION/TRAINING PROGRAM

## 2020-11-14 PROCEDURE — 97110 THERAPEUTIC EXERCISES: CPT | Mod: GP

## 2020-11-14 PROCEDURE — 250N000013 HC RX MED GY IP 250 OP 250 PS 637: Performed by: INTERNAL MEDICINE

## 2020-11-14 PROCEDURE — 97161 PT EVAL LOW COMPLEX 20 MIN: CPT | Mod: GP

## 2020-11-14 PROCEDURE — 99207 PR CDG-CODE CATEGORY CHANGED: CPT | Performed by: STUDENT IN AN ORGANIZED HEALTH CARE EDUCATION/TRAINING PROGRAM

## 2020-11-14 PROCEDURE — 99222 1ST HOSP IP/OBS MODERATE 55: CPT | Mod: AI | Performed by: ORTHOPAEDIC SURGERY

## 2020-11-14 PROCEDURE — 85027 COMPLETE CBC AUTOMATED: CPT | Performed by: INTERNAL MEDICINE

## 2020-11-14 PROCEDURE — 36415 COLL VENOUS BLD VENIPUNCTURE: CPT | Performed by: INTERNAL MEDICINE

## 2020-11-14 PROCEDURE — 99207 PR CDG-CODE CATEGORY CHANGED: CPT | Performed by: ORTHOPAEDIC SURGERY

## 2020-11-14 PROCEDURE — 80048 BASIC METABOLIC PNL TOTAL CA: CPT | Performed by: INTERNAL MEDICINE

## 2020-11-14 PROCEDURE — 99225 PR SUBSEQUENT OBSERVATION CARE,LEVEL II: CPT | Performed by: STUDENT IN AN ORGANIZED HEALTH CARE EDUCATION/TRAINING PROGRAM

## 2020-11-14 RX ORDER — MULTIVITAMIN,THERAPEUTIC
1 TABLET ORAL DAILY
Status: DISCONTINUED | OUTPATIENT
Start: 2020-11-14 | End: 2020-11-16 | Stop reason: HOSPADM

## 2020-11-14 RX ORDER — KETOCONAZOLE 20 MG/G
CREAM TOPICAL DAILY
Status: DISCONTINUED | OUTPATIENT
Start: 2020-11-14 | End: 2020-11-14 | Stop reason: ALTCHOICE

## 2020-11-14 RX ORDER — ANALGESIC BALM 1.74; 4.06 G/29G; G/29G
OINTMENT TOPICAL EVERY 4 HOURS PRN
Status: DISCONTINUED | OUTPATIENT
Start: 2020-11-14 | End: 2020-11-16 | Stop reason: HOSPADM

## 2020-11-14 RX ORDER — NYSTATIN 100000 U/G
OINTMENT TOPICAL 2 TIMES DAILY
Status: DISCONTINUED | OUTPATIENT
Start: 2020-11-14 | End: 2020-11-14

## 2020-11-14 RX ORDER — MICONAZOLE NITRATE 20 MG/G
CREAM TOPICAL 2 TIMES DAILY
Status: DISCONTINUED | OUTPATIENT
Start: 2020-11-14 | End: 2020-11-16 | Stop reason: HOSPADM

## 2020-11-14 RX ADMIN — ACETAMINOPHEN 1000 MG: 500 TABLET, FILM COATED ORAL at 21:37

## 2020-11-14 RX ADMIN — MICONAZOLE NITRATE: 20 CREAM TOPICAL at 18:46

## 2020-11-14 RX ADMIN — Medication 50 MCG: at 09:11

## 2020-11-14 RX ADMIN — THERA TABS 1 TABLET: TAB at 15:01

## 2020-11-14 RX ADMIN — LOSARTAN POTASSIUM 50 MG: 50 TABLET, FILM COATED ORAL at 09:10

## 2020-11-14 RX ADMIN — ACETAMINOPHEN 1000 MG: 500 TABLET, FILM COATED ORAL at 15:00

## 2020-11-14 RX ADMIN — LEVOTHYROXINE SODIUM 88 MCG: 88 TABLET ORAL at 09:10

## 2020-11-14 RX ADMIN — DOCUSATE SODIUM 50 MG AND SENNOSIDES 8.6 MG 1 TABLET: 8.6; 5 TABLET, FILM COATED ORAL at 09:11

## 2020-11-14 RX ADMIN — ACETAMINOPHEN 1000 MG: 500 TABLET, FILM COATED ORAL at 09:10

## 2020-11-14 RX ADMIN — OXYCODONE HYDROCHLORIDE 2.5 MG: 5 TABLET ORAL at 18:46

## 2020-11-14 RX ADMIN — AMLODIPINE BESYLATE 2.5 MG: 2.5 TABLET ORAL at 09:10

## 2020-11-14 RX ADMIN — POLYETHYLENE GLYCOL 3350 17 G: 17 POWDER, FOR SOLUTION ORAL at 09:12

## 2020-11-14 RX ADMIN — DOCUSATE SODIUM 50 MG AND SENNOSIDES 8.6 MG 2 TABLET: 8.6; 5 TABLET, FILM COATED ORAL at 21:37

## 2020-11-14 NOTE — PLAN OF CARE
[facility]      OUTPATIENT PHYSICAL THERAPY EVALUATION  PLAN OF TREATMENT FOR OUTPATIENT REHABILITATION  (COMPLETE FOR INITIAL CLAIMS ONLY)  Patient's Last Name, First Name, M.I.  YOB: 1923  Gennaro Walton                           Provider's Name  [facility] Medical Record No.  [id number]                               Onset Date:  11/13/20   Start of Care Date:  11/14/20      Type:     _X_PT   ___OT   ___SLP Medical Diagnosis:  back pain                        PT Diagnosis:  impaired gait   Visits from SOC:  1   _________________________________________________________________________________  Plan of Treatment/Functional Goals    Planned Interventions: gait training, strengthening, transfer training     Goals: See Physical Therapy Goals on Care Plan in [employer] electronic health record.    Therapy Frequency: Daily  Predicted Duration of Therapy Intervention: 2days  _________________________________________________________________________________    I CERTIFY THE NEED FOR THESE SERVICES FURNISHED UNDER        THIS PLAN OF TREATMENT AND WHILE UNDER MY CARE     (Physician co-signature of this document indicates review and certification of the therapy plan).                Certification date from: 11/14/20, Certification date to: 11/16/20    Referring Physician: Savage Collazo MD            Initial Assessment        See Physical Therapy evaluation dated 11/14/20 in Epic electronic health record.

## 2020-11-14 NOTE — PROVIDER NOTIFICATION
Paged hospitalist: Tylenol is not managing her pain. Can we get a lower dose of oxycodone 2.5 mg. Tramadol does not help per pt's daughter, Caron.       @ [phone]: Pagegallito hospitalist, Need an order for OT evaluation per PT. Thanks

## 2020-11-14 NOTE — PROGRESS NOTES
Observation goals PRIOR TO DISCHARGE     Comments: -diagnostic tests and consults completed and resulted : Not Met  -vital signs normal or at patient baseline : Met  -tolerating oral intake to maintain hydration : Partially Met  -adequate pain control on oral analgesics : Partially Met, Scheduled tylenol  -returns to baseline functional status : Not Met, up with assist of 2/gb/walker to [facility].  -safe disposition plan has been identified : Not Met  Nurse to notify provider when observation goals have been met and patient is ready for discharge.        Patient transferred from ED @ 1830. Alert to self, [place], no hearing aids. Lidocaine patch on R rib cage. Denies chest pain or SOB. Incontinent @ times. Will continue to monitor.

## 2020-11-14 NOTE — PROGRESS NOTES
OBS GOALS     -diagnostic tests and consults completed and resulted NOT MET  -vital signs normal or at patient baseline MET  -tolerating oral intake to maintain hydration PARTIALLY MET  -adequate pain control on oral analgesics MET  -returns to baseline functional status NOT MET  -safe disposition plan has been identified NOT MET

## 2020-11-14 NOTE — PLAN OF CARE
Date/Time 11/13/2020 [id number]    Trauma/Ortho/Medical (Choose one) Medical    Diagnosis: Back pain  POD#: NA  Mental Status: A & O x 1, hx of dementia  Activity/dangle: A x 1 GB/W up to [facility]  Diet: Regular  Pain: Denies  Cortez/Voiding: Incontinent at times  Tele/Restraints/Iso: NA  02/LDA: R PIV SL  D/C Date: TBD  Other Info: [place]. Lidocaine patch removed 0720. Will continue to monitor.

## 2020-11-14 NOTE — PROGRESS NOTES
[facility]    Medicine Progress Note - Hospitalist Service       Date of Admission:  11/13/2020  Assessment & Plan      Gennaro Walton is a pleasant 89 year old woman with a history of (L) breast cancer, HTN, hypothyroidism, bilateral hip fractures, previous thoracolumbar compression fractures and possible dementia, who was sent to the ED today from her AL facility for evaluation of new back pain.  Current problems include:     Back/rib pain with movement of the waist and/or torso.  Has marked tenderness over Right mid-postero-lateral ribs. Denies pain while at rest.   Hx of thoraco-lumbar compression fractures  - No acute right rib fracture is appreciated on XR   - CT did demonstrate multiple thoracolumbar vertebral body compressions that are unchanged, though per ortho could possibly have a new fracture, regardless they would not recommend intervention.   - continue PRN tylenol; add low dose PRN oxycodone  - trial of Amol Gonzalez and/or heating pad  - PT, may need higher level of care     Intertrigo, possible candidal infection   Itchy red area noted around patient's belly button within skin fold.   - Ketoconazole cream daily     HTN Presented with hypertensive urgency. BPs now improved.   - resume home losartan and amlodipine  - add PRN clonidine and hydralazine     Severe malnutrition 2/2 chronic illness   Low albumin  - Nutrition consult   - Vanilla Shakes BID     Deconditioning and frailty; multifactorial.  - PT eval.  - LSW to consult; may need more hands-on support at her current facility.     Hypothyroidism.  - resume PTA synthroid.     Diet: Combination Diet Regular Diet Adult; 2 gm NA Diet  Snacks/Supplements Adult: Other; Vanilla shakes; Between Meals    DVT Prophylaxis: Ambulate every shift  Cortez Catheter: not present  Code Status: No CPR- Do NOT Intubate           Disposition Plan   Expected discharge: Tomorrow, recommended to TBD once adequate pain management/ tolerating PO  medications and safe disposition plan/ TCU bed available.  Entered: Shantelle Beal MD 11/14/2020, 1:58 PM       The patient's care was discussed with the Patient and Patient's Family.    Shantelle Beal MD  Hospitalist Service  [facility]  Contact information available via [facility] Paging/Directory    ______________________________________________________________________    Interval History   Gennaro is going okay this morning. At present, she denies any back pain, though her daughter reports that it seems to only bother her when she is moving around. She is thirsty, but otherwise has no concerns.     Data reviewed today: I reviewed all medications, new labs and imaging results over the last 24 hours. I personally reviewed the XR image(s) showing see below.    Physical Exam   Vital Signs: Temp: 97.8  F (36.6  C) Temp src: Oral BP: (!) 144/80 Pulse: 67   Resp: 16 SpO2: 91 % O2 Device: None (Room air) Oxygen Delivery: 1 LPM  Weight: 97 lbs 0 oz  General Appearance: Awake, alert, NAD   Respiratory: no increase work of breathing, CTAB  Cardiovascular: RRR, no murmurs, no edema   GI: soft non-distended, non-tender   Skin: redness around bellybutton within skin fold of stomach   MSK: Thin, no point tenderness to vertebral bodies     Data   Recent Labs   Lab 11/14/20  0714 11/13/20  1215   WBC 5.9 5.9   HGB 12.4 12.8    101*    227    140   POTASSIUM 3.5 3.7   CHLORIDE 102 107   CO2 28 28   BUN 9 8   CR 0.51* 0.49*   ANIONGAP 6 5   LORETA 8.8 9.1   GLC 85 85   ALBUMIN  --  3.2*   PROTTOTAL  --  6.2*   BILITOTAL  --  0.5   ALKPHOS  --  78   ALT  --  14   AST  --  16   TROPI  --  <0.015     Recent Results (from the past 24 hour(s))   CT Aortic Survey w Contrast    Narrative    CT AORTIC SURVEY WITH CONTRAST   11/13/2020 2:21 PM     HISTORY: Acute onset back pain. Hypertension.    TECHNIQUE: 80 mL Isovue-370 IV were administered. Precontrast images  were obtained through the  chest. After contrast administration,  volumetric helical sections were acquired in the arterial phase from  the thoracic inlet to the ischial tuberosities. Coronal images were  also reconstructed. Radiation dose for this scan was reduced using  automated exposure control, adjustment of the mA and/or kV according  to patient size, or iterative reconstruction technique.    COMPARISON: CT of the chest, abdomen, and pelvis performed 1/7/2020.    FINDINGS:   VASCULATURE: Atherosclerotic calcification of the thoracoabdominal  aorta and coronary arteries. Tortuous thoracoabdominal aorta. Ectasia  of the ascending thoracic aorta measures up to 3.8 cm in diameter. No  evidence for aortic dissection. Cardiac enlargement.    LUNGS AND PLEURA: No pleural effusions. Scattered calcified granulomas  in both lungs are unchanged. Mild scarring and/or fibrosis about the  periphery of both lower lungs.    MEDIASTINUM/AXILLAE: No enlarged lymph nodes are identified in the  chest. No pericardial effusion. Left breast implant is again noted.    HEPATOBILIARY: Unremarkable. No hepatic masses are seen.    PANCREAS: Normal.    SPLEEN: Normal.    ADRENAL GLANDS: A 2.6 cm indeterminate left adrenal nodule is  unchanged.    KIDNEYS/BLADDER: Unremarkable. No hydronephrosis.    BOWEL: Scattered colonic diverticulosis. No bowel obstruction. No  convincing evidence for colitis or diverticulitis. The appendix is not  visualized; however, no significant inflammatory change is seen within  the expected region of the appendix.    PELVIC ORGANS: Unremarkable.    LYMPH NODES: No enlarged lymph nodes are identified in the abdomen or  pelvis.    ADDITIONAL FINDINGS: None.    MUSCULOSKELETAL: Postoperative changes of intramedullary ilan and hip  screw placement in both proximal femurs. Degenerative changes are  noted throughout the thoracolumbar spine. Thoracic kyphosis. Multiple  thoracolumbar vertebral body compressions have a similar appearance to  the  previous exam.      Impression    IMPRESSION:   1.  No evidence for aortic aneurysm or dissection.  2.  Ectasia of the ascending thoracic aorta measures 3.8 cm in  diameter, and is not significantly changed.  3.  Scattered colonic diverticulosis, without convincing evidence for  diverticulitis.  4.  Thoracic kyphosis and multiple thoracolumbar vertebral body  compressions are unchanged.    MONSTER BETTENCOURT MD   XR Ribs & Chest Right G/E 3 Views    Narrative    RIBS AND CHEST RIGHT THREE VIEWS   11/14/2020 8:38 AM     HISTORY:  Please evaluate. Pain emanating from right posterior/mid rib  area; tender over posterolateral mid ribcage.     COMPARISON: Chest 1/31/2020    FINDINGS: The degree of cardiac enlargement is similar. Again there is  bilateral shoulder arthropathy. Surgical changes in the right chest.  Aortic tortuosity. Osteopenia.      Impression    IMPRESSION: No acute right rib fracture is appreciated. No  pneumothorax.    ANNEL LUGO MD     Medications       acetaminophen  1,000 mg Oral TID     amLODIPine  2.5 mg Oral Daily     ketoconazole   Topical Daily     levothyroxine  88 mcg Oral Daily     losartan  50 mg Oral Daily     multivitamin, therapeutic  1 tablet Oral Daily     polyethylene glycol  17 g Oral Daily     senna-docusate  1 tablet Oral BID    Or     senna-docusate  2 tablet Oral BID     sodium chloride (PF)  3 mL Intracatheter Q8H     vitamin D3  50 mcg Oral Daily

## 2020-11-14 NOTE — PROGRESS NOTES
CLINICAL NUTRITION SERVICES  -  ASSESSMENT NOTE      Recommendations Ordered by Registered Dietitian (RD):   - Re-weigh for accuracy   - Vanilla shakes BID between meals   - Thera-Vit-M daily for supplementation    Malnutrition:   % Weight Loss:  Weight loss does not meet criteria for malnutrition at this time (re-weigh for accuracy)   % Intake:  </= 75% for >/= 1 month (severe malnutrition)  Subcutaneous Fat Loss:  Overt severe losses   Muscle Loss:  Overt severe losses  Fluid Retention:  None noted    Malnutrition Diagnosis: Severe malnutrition  In Context of:  Chronic illness or disease        REASON FOR ASSESSMENT  Gennaro Walton is a 89 year old female seen by Registered Dietitian for Provider Order - Low albumin; concerned about nutritional risk      NUTRITION HISTORY  - Information obtained from patient and daughter at beside  - Resides in half-way where meals are provided for her TID  - Eats </=50% meals at baseline, slowly declining in PO as she ages  - Daughter has been unable to visit pt while in her [facility], but denies recent visual wt loss or fat/muscle losses   - Pt feels she usually weighs between 100-109 lbs   - Does not utilize protein supplements regularly   - Suspect patient chronically consumes <75% estimated needs daily     - Information from chart  - PMH: L breast cancer, HTN, hypothyroidism, bilateral hip fractures, previous thoracolumbar compression fractures and possible dementia  - No known food allergies       CURRENT NUTRITION ORDERS  Diet Order:     Low Na     Current Intake/Tolerance:  Patient had a breakfast tray at bedside and only had a couple bites each of the pancake and omelet. She appears uncomfortable and requesting readjustments.  We discussed protein supplements available and patient requests vanilla ice cream shakes daily       NUTRITION FOCUSED PHYSICAL ASSESSMENT FOR DIAGNOSING MALNUTRITION)  Yes         Observed:    Muscle wasting (refer to documentation in Malnutrition section)  "and Subcutaneous fat loss (refer to documentation in Malnutrition section) (cachexia vs sarcopenia)     Obtained from Chart/Interdisciplinary Team:  Frail and thin  No edema     ANTHROPOMETRICS  Height: 5' 3\"   Weight: 97 lbs 0 oz ?reported wt   Body mass index is 17.18 kg/m .  Weight Status:  Underweight BMI <18.5  IBW: 52.3 kg   % IBW: 84%  Weight History: No recent wts to evaluate. Order reweigh to confirm potential wt loss vs wt maintenance   Wt Readings from Last 20 Encounters:   11/13/20 44 kg (97 lb)   03/10/20 44.8 kg (98 lb 12.8 oz)   03/02/20 44.8 kg (98 lb 12.8 oz)   02/27/20 44.3 kg (97 lb 9.6 oz)   02/21/20 44.5 kg (98 lb 3.2 oz)   02/13/20 46.6 kg (102 lb 12.8 oz)   02/07/20 46.2 kg (101 lb 12.8 oz)   02/06/20 46.8 kg (103 lb 3.2 oz)   02/04/20 45.2 kg (99 lb 10.4 oz)   01/08/20 44.7 kg (98 lb 8.7 oz)   11/06/19 44.5 kg (98 lb)   08/27/19 46.3 kg (102 lb)   08/19/19 46.3 kg (102 lb)   08/13/19 46.3 kg (102 lb)   08/09/19 46.3 kg (102 lb)   08/06/19 46.3 kg (102 lb)   08/05/19 46.3 kg (102 lb)   08/01/19 47.6 kg (105 lb)   08/01/19 47.6 kg (105 lb)   07/31/19 45.4 kg (100 lb)       LABS  Labs reviewed  Albumin 3.2 (L) - not reliable indicator of malnutrition given tendency to fluctuate with inflammation, fluid shifts, etc   No result for Pre Albumin   Lab Results   Component Value Date    URINEKETONE Negative 11/13/2020     Recent Labs   Lab 11/14/20  0714 11/13/20  1215   GLC 85 85       MEDICATIONS  Medications reviewed  Vitamin D3 50 mcg    ASSESSED NUTRITION NEEDS PER APPROVED PRACTICE GUIDELINES:    Dosing Weight 44 kg   Estimated Energy Needs: 3199-4492 kcals (30-35 Kcal/Kg)  Justification: Underweight  Estimated Protein Needs: 53-66 grams protein (1.2-1.5 gm/kg)  Justification: Repletion  Estimated Fluid Needs: 3055-3169  mL (1 mL/Kcal)  Justification: maintenance    MALNUTRITION:  % Weight Loss:  Weight loss does not meet criteria for malnutrition at this time (re-weigh for accuracy)   % Intake: "  </= 75% for >/= 1 month (severe malnutrition)  Subcutaneous Fat Loss:  Overt severe losses   Muscle Loss:  Overt severe losses  Fluid Retention:  None noted    Malnutrition Diagnosis: Severe malnutrition  In Context of:  Chronic illness or disease    NUTRITION DIAGNOSIS:  Malnutrition related to chronic decreased oral intake 2/2 decreasing appetite w/ advanced age as evidenced by intakes <75% of estimated needs for >3 months, chronic low wt (BMI <18) and e/o fat/muscle wasting       NUTRITION INTERVENTIONS  Recommendations / Nutrition Prescription  Consider liberalizing diet to regular to allow for more options in the 89 y/o patient with a poor appetite  Homemade vanilla shakes BID   MVI/M daily       Implementation  Nutrition education: Per Provider order if indicated. Provided brief education on increasing calories and protein in the diet   Medical Food Supplement and Multivitamin/Mineral: as above      Nutrition Goals  Pt will consume at least 50% of meals and supplements BID       MONITORING AND EVALUATION:  Progress towards goals will be monitored and evaluated per protocol and Practice Guidelines        Shefali Morfin RD, LD  Clinical Dietitian

## 2020-11-14 NOTE — PROGRESS NOTES
Observation goals PRIOR TO DISCHARGE     Comments: -diagnostic tests and consults completed and resulted PARTIALLY MET, PT/OT pending  -vital signs normal or at patient baseline MET  -tolerating oral intake to maintain hydration PARTIALLY MET  -adequate pain control on oral analgesics PARTIALLY MET, oxycodone available   -returns to baseline functional status NOT MET  -safe disposition plan has been identified NOT MET  Nurse to notify provider when observation goals have been met and patient is ready for discharge.

## 2020-11-14 NOTE — PROGRESS NOTES
11/14/20 1402   Quick Adds   Quick Adds Certification   Type of Visit Initial PT Evaluation       Present   (daughter present for eval provided home set-up info)   Living Environment   People in home facility resident   Current Living Arrangements assisted living   Home Accessibility no concerns   Living Environment Comments long-term: meals, medication, shower assist, bathroom assist. IND for walking with FWW.  daughter reports prior to COVID pt did not always need help, but she thinks she has been getting more help recently but has not been able to enter the facility to have updated picture   Self-Care   Usual Activity Tolerance moderate   Current Activity Tolerance fair   Equipment Currently Used at Home walker, rolling;wheelchair, manual  (does not self propel WC, uses when out of DEVIN)   Disability/Function   Hearing Difficulty or Deaf yes   Patient's preferred means of communication verbal   Describe hearing loss bilateral hearing loss   Use of hearing assistive devices bilateral hearing aids   Wear Glasses or Blind yes   Vision Management glasses   Concentrating, Remembering or Making Decisions Difficulty yes   Walking or Climbing Stairs Difficulty yes   Walking or Climbing Stairs ambulation difficulty, requires equipment   Dressing/Bathing Difficulty yes   Dressing/Bathing bathing difficulty, assistance 1 person;dressing difficulty, assistance 1 person   Toileting yes   Toileting Assistance toileting difficulty, assistance 1 person   Doing Errands Independently Difficulty (such as shopping) no   Fall history within last six months no   Change in Functional Status Since Onset of Current Illness/Injury yes   General Information   Onset of Illness/Injury or Date of Surgery 11/13/20   Referring Physician Savage Collazo MD   Patient/Family Therapy Goals Statement (PT) return to [facility]   Pertinent History of Current Problem (include personal factors and/or comorbidities that impact the POC) Pt  was sent to the ED today from her AL facility for evaluation of new back pain; with a history of (L) breast cancer, HTN, hypothyroidism, bilateral hip fractures, previous thoracolumbar compression fractures and possible dementia   Existing Precautions/Restrictions fall   Cognition   Orientation Status (Cognition) oriented to;person;time   Affect/Mental Status (Cognition) WFL;confused   Follows Commands (Cognition) WFL;over 90% accuracy   Cognitive Status Comments pt unable to recall why she was sent to hospital, but upon reminding her she indicates that yes she does remember that   Pain Assessment   Patient Currently in Pain Yes, see Vital Sign flowsheet   Integumentary/Edema   Integumentary/Edema no deficits were identifed   Posture    Posture Kyphosis   Range of Motion (ROM)   ROM Comment LEs WFL B   Strength   Strength Comments grossly decreased strength, takes increased time to stand up and has notable functional fatigue   Bed Mobility   Comment (Bed Mobility) Pt transfer supine>sit with SBA using elevated HOB, transfers sit>supine with CGA-Marc for LEs.   Transfers   Transfer Safety Comments CGA for sit<>stand transfers with cues for safe hand placement   Gait/Stairs (Locomotion)   Council Level (Gait) contact guard   Assistive Device (Gait) walker, front-wheeled   Distance in Feet (Required for LE Total Joints) 40   Pattern (Gait) step-to   Deviations/Abnormal Patterns (Gait) ursula decreased;gait speed decreased;stride length decreased   Comment (Gait/Stairs) Pt reports fatigue and mild pain with ambulation   Balance   Balance Comments pt requires UE support for safe standing balance   Sensory Examination   Sensory Perception WNL   Clinical Impression   Criteria for Skilled Therapeutic Intervention yes, treatment indicated   PT Diagnosis (PT) impaired gait   Influenced by the following impairments weakness, impaired balance, kyphosis, pain   Functional limitations due to impairments decreased IND and  safety with functional moblility d/t above deficits   Clinical Presentation Stable/Uncomplicated   Clinical Decision Making (Complexity) low complexity   Therapy Frequency (PT) Daily   Predicted Duration of Therapy Intervention (days/wks) 2days   Planned Therapy Interventions (PT) gait training;strengthening;transfer training   Risk & Benefits of therapy have been explained evaluation/treatment results reviewed;care plan/treatment goals reviewed;participants voiced agreement with care plan;participants included;patient;daughter   PT Discharge Planning    PT Discharge Recommendation (DC Rec) home with home care physical therapy;home with assist   PT Rationale for DC Rec Pt has limited activity tolerance and decreased mobility and IND from baseline which would benefit from continued skilled therapy in the IP and HH settings.   PT Brief overview of current status  Pt transfer supine>sit with SBA using elevated HOB, transfers sit>supine with CGA-Marc for LEs, CGA for sit<>stand transfers, CGA for ambulation with FWW x40ft   Therapy Certification   Start of care date 11/14/20   Certification date from 11/14/20   Certification date to 11/16/20   Medical Diagnosis back pain   Total Evaluation Time   Total Evaluation Time (Minutes) 20

## 2020-11-14 NOTE — CONSULTS
Has thoracic pain. Ct demonstrates severe kyphosis with multiple compression fractures. She certainly may have a new fracture, not well defined on the CT. However, even if it were identified by MRI or bone scan there would be no treatment indicated, Try to mobilize with PT as tolerated.

## 2020-11-14 NOTE — PROGRESS NOTES
Observation goals PRIOR TO DISCHARGE     Comments: -diagnostic tests and consults completed and resulted  PARTIALLY MET, OT eval pending  -vital signs normal or at patient baseline MET  -tolerating oral intake to maintain hydration PARTIALLY MET  -adequate pain control on oral analgesics PARTIALLY MET  -returns to baseline functional status NOT MET  -safe disposition plan has been identified NOT MET  Nurse to notify provider when observation goals have been met and patient is ready for discharge.

## 2020-11-15 ENCOUNTER — APPOINTMENT (OUTPATIENT)
Dept: OCCUPATIONAL THERAPY | Facility: CLINIC | Age: 85
DRG: 542 | End: 2020-11-15
Attending: PHYSICIAN ASSISTANT
Payer: MEDICARE

## 2020-11-15 ENCOUNTER — APPOINTMENT (OUTPATIENT)
Dept: PHYSICAL THERAPY | Facility: CLINIC | Age: 85
DRG: 542 | End: 2020-11-15
Payer: MEDICARE

## 2020-11-15 PROCEDURE — 97530 THERAPEUTIC ACTIVITIES: CPT | Mod: GP

## 2020-11-15 PROCEDURE — 250N000013 HC RX MED GY IP 250 OP 250 PS 637: Performed by: PHYSICIAN ASSISTANT

## 2020-11-15 PROCEDURE — 97165 OT EVAL LOW COMPLEX 30 MIN: CPT | Mod: GO

## 2020-11-15 PROCEDURE — 99232 SBSQ HOSP IP/OBS MODERATE 35: CPT | Performed by: PHYSICIAN ASSISTANT

## 2020-11-15 PROCEDURE — 97535 SELF CARE MNGMENT TRAINING: CPT | Mod: GO

## 2020-11-15 PROCEDURE — 250N000013 HC RX MED GY IP 250 OP 250 PS 637: Performed by: STUDENT IN AN ORGANIZED HEALTH CARE EDUCATION/TRAINING PROGRAM

## 2020-11-15 PROCEDURE — 120N000001 HC R&B MED SURG/OB

## 2020-11-15 PROCEDURE — 250N000013 HC RX MED GY IP 250 OP 250 PS 637: Performed by: INTERNAL MEDICINE

## 2020-11-15 PROCEDURE — 97530 THERAPEUTIC ACTIVITIES: CPT | Mod: GO

## 2020-11-15 PROCEDURE — G0378 HOSPITAL OBSERVATION PER HR: HCPCS

## 2020-11-15 RX ORDER — LIDOCAINE 4 G/G
2 PATCH TOPICAL
Status: DISCONTINUED | OUTPATIENT
Start: 2020-11-15 | End: 2020-11-16 | Stop reason: HOSPADM

## 2020-11-15 RX ADMIN — Medication 50 MCG: at 08:18

## 2020-11-15 RX ADMIN — OXYCODONE HYDROCHLORIDE 2.5 MG: 5 TABLET ORAL at 09:28

## 2020-11-15 RX ADMIN — DOCUSATE SODIUM 50 MG AND SENNOSIDES 8.6 MG 1 TABLET: 8.6; 5 TABLET, FILM COATED ORAL at 08:19

## 2020-11-15 RX ADMIN — DOCUSATE SODIUM 50 MG AND SENNOSIDES 8.6 MG 2 TABLET: 8.6; 5 TABLET, FILM COATED ORAL at 21:12

## 2020-11-15 RX ADMIN — LEVOTHYROXINE SODIUM 88 MCG: 88 TABLET ORAL at 08:20

## 2020-11-15 RX ADMIN — POLYETHYLENE GLYCOL 3350 17 G: 17 POWDER, FOR SOLUTION ORAL at 08:18

## 2020-11-15 RX ADMIN — MICONAZOLE NITRATE: 20 CREAM TOPICAL at 08:33

## 2020-11-15 RX ADMIN — AMLODIPINE BESYLATE 2.5 MG: 2.5 TABLET ORAL at 08:21

## 2020-11-15 RX ADMIN — ACETAMINOPHEN 1000 MG: 500 TABLET, FILM COATED ORAL at 08:18

## 2020-11-15 RX ADMIN — LOSARTAN POTASSIUM 50 MG: 50 TABLET, FILM COATED ORAL at 08:19

## 2020-11-15 RX ADMIN — MICONAZOLE NITRATE: 20 CREAM TOPICAL at 21:13

## 2020-11-15 RX ADMIN — THERA TABS 1 TABLET: TAB at 08:19

## 2020-11-15 RX ADMIN — MICONAZOLE NITRATE: 20 CREAM TOPICAL at 03:08

## 2020-11-15 RX ADMIN — LIDOCAINE 2 PATCH: 560 PATCH PERCUTANEOUS; TOPICAL; TRANSDERMAL at 15:32

## 2020-11-15 RX ADMIN — OXYCODONE HYDROCHLORIDE 2.5 MG: 5 TABLET ORAL at 22:18

## 2020-11-15 RX ADMIN — ACETAMINOPHEN 1000 MG: 500 TABLET, FILM COATED ORAL at 21:12

## 2020-11-15 RX ADMIN — ACETAMINOPHEN 1000 MG: 500 TABLET, FILM COATED ORAL at 15:32

## 2020-11-15 ASSESSMENT — ACTIVITIES OF DAILY LIVING (ADL)
ADLS_ACUITY_SCORE: 27
ADLS_ACUITY_SCORE: 26

## 2020-11-15 NOTE — PROGRESS NOTES
Observation goals PRIOR TO DISCHARGE     Comments: -diagnostic tests and consults completed and resulted PARTIALLY MET, OT pending  -vital signs normal or at patient baseline PARTIALLY MET, (see flowsheet)  -tolerating oral intake to maintain hydration PARTIALLY MET  -adequate pain control on oral analgesics MET  -returns to baseline functional status PARTIALLY MET  -safe disposition plan has been identified NOT MET  Nurse to notify provider when observation goals have been met and patient is ready for discharge.

## 2020-11-15 NOTE — PLAN OF CARE
Observation Goals:  -diagnostic tests and consults completed and resulted, NOT MET  -vital signs normal or at patient baseline, NOT MET, pt is on 1L NC  -tolerating oral intake to maintain hydration , MET  -adequate pain control on oral analgesics, MET  -returns to baseline functional status, NOT MET  -safe disposition plan has been identified, NOT MET    A/O to self. VSS, on 1 L NC to keep sats above 90%. A of 2. T&R q2h. 2gm NA diet.

## 2020-11-15 NOTE — UTILIZATION REVIEW
[facility] Utilization Review  Admission Status; Secondary Review Determination     Admission Date: 11/13/2020  9:41 AM      Under the authority of the Utilization Management Committee, the utilization review process indicated a secondary review on the above patient.  The review outcome is based on review of the medical records, discussions with staff, and applying clinical experience noted on the date of the review.        (X)      Inpatient Status Appropriate - This patient's medical care is consistent with medical management for inpatient care and reasonable inpatient medical practice.          RATIONALE FOR DETERMINATION   Gennaro Walton is a 89 year old female with past medical history of breast cancer, hypertension, prior tobacco lumbar compression fractures, possible dementia, who presented to the emergency room with complaints of new moderate to severe back pain.  Initial imaging shows multiple spinal compression fractures, possibly a few new.  She is registered to observation for pain management, PT/OT, orthopedic consultation and safe disposition planning.  Orthopedic surgery recommends conservative management given advanced age and high risk surgical candidate.  Cares are rendered complex due to advanced age, dementia, frailty, multiple compression fractures and risk of fall and injury.  She is on judicious pain regimen due to advanced age but continues to have suboptimal pain control and plan is to make further adjustments and start PT/OT.  She remains high risk for adverse outcome including worsening pain, falls and injury.  I discussed plan of care with the primary team, Smiley Powell PA-C who plans on pain regimen adjustment, PT/OT, and safe disposition planning with anticipated length of stay more than 2 midnights, it is reasonable to advance to inpatient status.           The definitions of Inpatient Status and Observation Status used in making the determination above are those  provided in the CMS Coverage Manual, Chapter 1 and Chapter 6, section 70.4.      Sincerely,       Sernea Fuentes MD, MS  Physician Advisor  Utilization Review-[place]    Phone: [phone]

## 2020-11-15 NOTE — PROGRESS NOTES
[facility]    Medicine Progress Note - Hospitalist Service       Date of Admission:  11/13/2020  Assessment & Plan   Gennaro Walton is a pleasant 89 year old woman with a history of (L) breast cancer, HTN, hypothyroidism, bilateral hip fractures, previous thoracolumbar compression fractures and possible dementia, who was admitted to [facility] on 11/13/20 from her AL facility for evaluation of new back pain.       Back/rib pain with movement of the waist and/or torso.  Has marked tenderness over Right mid-postero-lateral ribs. Denies pain while at rest.   Hx of thoraco-lumbar compression fractures  - No acute right rib fracture is appreciated on XR   - CT did demonstrate multiple thoracolumbar vertebral body compressions that are unchanged, though per ortho could possibly have a new fracture, regardless they would not recommend intervention.   - Continue PRN tylenol; add low dose PRN oxycodone  - Trial of Amol Gonzalez and/or heating pad/ice  - Pain still a challenge 11/15, try lidocaine patches, continue APAP  - PT consulted recommending home with home care and home PT however OT recommending TCU and family would like TCU for adequate rehab potential  - Frequent IS use  - Change to inpatient status given pain is continually an issue and adjusting medications     Intertrigo, possible candidal infection   Itchy red area noted around patient's belly button within skin fold.   - Ketoconazole cream daily     HTN   Presented with hypertensive urgency. BPs now improved.   - resume home losartan and amlodipine  - add PRN clonidine and hydralazine     Severe malnutrition 2/2 chronic illness   Low albumin  - Nutrition consult   - Anna MCLEOD     Deconditioning and frailty; multifactorial.  - PT eval.  - LSW to consult; may need more hands-on support at her current facility.     Hypothyroidism.  - resume PTA synthroid.    Diet: Combination Diet Regular Diet Adult; 2 gm NA Diet  Snacks/Supplements Adult:  Other; Vanilla shakes; Between Meals    DVT Prophylaxis: Ambulate every shift  Cortez Catheter: not present  Code Status: No CPR- Do NOT Intubate      Disposition Plan   Expected discharge: 1-2 days, recommended to transitional care unit once adequate pain management/ tolerating PO medications and safe disposition plan/ TCU bed available.  Entered: Smiley Powell PA-C 11/15/2020, 2:17 PM       The patient's care was discussed with the Attending Physician, Dr. Crisostomo, Bedside Nurse, Patient and Patient's Family.    Smiley Powell (Сергей)SHANICE  Hospitalist Service  [facility]    ______________________________________________________________________    Interval History   Seen and examined. Continues with pain with movement and positioning still an issue, adjusting pain medications. No N/V. Taking PO. Worked with PT.     Data reviewed today: I reviewed all medications, new labs and imaging results over the last 24 hours. I personally reviewed no images or EKG's today.    Physical Exam   Vital Signs: Temp: 97.7  F (36.5  C) Temp src: Oral BP: (!) 152/69 Pulse: 52   Resp: 20(Simultaneous filing. User may not have seen previous data.) SpO2: 94 % O2 Device: Nasal cannula Oxygen Delivery: 2 LPM  Weight: 100 lbs 12 oz    General: Awake, alert, frail elderly woman who appears stated age. Looks comfortable sitting up in bed. No acute distress.  HEENT: Normocephalic, atraumatic. Extraocular movements intact.   Respiratory: Clear to auscultation bilaterally, no rales, wheezing, or rhonchi.  Cardiovascular: Regular rate and rhythm, +S1 and S2, no murmur auscultated. No peripheral edema.   Gastrointestinal: Soft, non-tender, non-distended. Bowel sounds present.  Skin: Warm, dry. No obvious rashes or lesions on exposed skin. Dorsalis pedis pulses palpable bilaterally.  Musculoskeletal: Back pain with movement and bed positioning. Full strength to upper and lower extremities.  Neurologic: AAO x3.  Cranial nerves 2-12 grossly intact, normal strength and sensation.  Psychiatric: Appropriate mood and affect. No obvious anxiety or depression.    Data   Recent Labs   Lab 11/14/20  0714 11/13/20  1215   WBC 5.9 5.9   HGB 12.4 12.8    101*    227    140   POTASSIUM 3.5 3.7   CHLORIDE 102 107   CO2 28 28   BUN 9 8   CR 0.51* 0.49*   ANIONGAP 6 5   LORETA 8.8 9.1   GLC 85 85   ALBUMIN  --  3.2*   PROTTOTAL  --  6.2*   BILITOTAL  --  0.5   ALKPHOS  --  78   ALT  --  14   AST  --  16   TROPI  --  <0.015     No results found for this or any previous visit (from the past 24 hour(s)).  Medications       acetaminophen  1,000 mg Oral TID     amLODIPine  2.5 mg Oral Daily     levothyroxine  88 mcg Oral Daily     lidocaine  2 patch Transdermal Q24H     lidocaine   Transdermal Q8H     losartan  50 mg Oral Daily     miconazole   Topical BID     multivitamin, therapeutic  1 tablet Oral Daily     polyethylene glycol  17 g Oral Daily     senna-docusate  1 tablet Oral BID    Or     senna-docusate  2 tablet Oral BID     sodium chloride (PF)  3 mL Intracatheter Q8H     vitamin D3  50 mcg Oral Daily

## 2020-11-15 NOTE — PLAN OF CARE
Patient is disoriented to time and situation, redirectable. Up with assist of 2/gb/walker to BSC, voiding, incontinent @ times. Denies chest pain or SOB. Pain managed with scheduled tylenol, oxycodone available per pt's daughter request. Will continue to monitor.

## 2020-11-15 NOTE — CONSULTS
Care Management Initial Consult    General Information  Assessment completed with: Patient;Children;Other, (dtriley horan, Nurse Lorraine Bowman at [facility])  Type of CM/SW Visit: Initial Assessment  Primary Care Provider verified and updated as needed: Yes   Readmission within the last 30 days: no previous admission in last 30 days      Reason for Consult: care coordination/care conference;discharge planning  Advance Care Planning:       General Information Comments: (Lives [facility], needs TCU vs [facility] and more services)    Communication Assessment  Patient's communication style: spoken language (English or Bilingual)    Hearing Difficulty or Deaf: yes   Wear Glasses or Blind: yes    Cognitive  Cognitive/Neuro/Behavioral: .WDL except  Level of Consciousness: (confused, very pleasant)  Arousal Level: opens eyes spontaneously  Orientation: person;place  Mood/Behavior: calm;cooperative  Best Language: 0 - No aphasia  Speech: slow;clear    Living Environment:   People in home:       Current living Arrangements: assisted living      Able to return to prior arrangements: other (see comments)  Living Arrangement Comments: (needs to check with [facility] RN on Monday)    Family/Social Support:  Care provided by: self;other (see comments)  Provides care for: (meds set up meals, bathing and toileting help at [facility])     Children        asya Horan is support and involved although unable to visit since CoVid 19 restrictions  Description of Support System: Involved         Current Resources:   Skilled Home Care Services:  [facility] can take pt if need [facility]  Community Resources:    Equipment currently used at home: walker, rolling;wheelchair, manual  Supplies currently used at home:      Employment/Financial:  Employment Status:     NA     Financial Concerns:   none identified          Lifestyle & Psychosocial Needs:        Socioeconomic History     Marital status:      Spouse name: Not on file     Number of children: Not on file     Years of education:  Not on file     Highest education level: Not on file     Tobacco Use     Smoking status: Never Smoker     Smokeless tobacco: Never Used   Substance and Sexual Activity     Alcohol use: No     Frequency: Never     Drug use: No       Functional Status:  Prior to admission patient needed assistance:   Bathing and toiletinng             Mental Health Status: pleasant          Chemical Dependency Status:                Values/Beliefs:  Spiritual, Cultural Beliefs, Mormonism Practices, Values that affect care:                 Additional Information:  Writer informed by  that pt is going home  With HC today--no orders or notes in Epic.  Identified [facility] as [facility] agency for SNV, PT,  OT.  Later in the day after OT saw patient and TCU was recommended.  Writer spoke with Lorraine at [facility].  Lorraine stated there is not  a nurse present until  Monday, unsure about the ability to provide assist of 2.  Caron called to see if they have TCU program and was not able to get a definite answer.  Writer spoke with bedside nurse and  pts dtr Caron.  Patient will remain in the hospital over night, CC will check with [facility] RN in the morning to see if they have TCU or if they are able to provide assist of 2 for all mobility.     Will cancel [facility] if pt goes with TCU.  TCU referrals  By [facility] to;  1.)  [facility] [address] and 2.) [facility] [address] [facility]. Per Caron's request.    Kaye Monique, NAVYA FORRESTER [facility]  Inpatient Care Coordinator  M: [phone]    Kaye Monique RN

## 2020-11-15 NOTE — PROVIDER NOTIFICATION
Paged hospitalist; Need order for OT evaluation per PT. Thanks.     @ 1425: paged hospitalist. Patient's daughter feels that pt is not safe to go back to her place.     @1430: Received call back, pt will stay overnight, sent referral for TCU. Message relayed to CC and pt's daughter Caron. Also, updated pt is on oxygen 2LNC while sleeping.

## 2020-11-15 NOTE — DISCHARGE INSTRUCTIONS
HOMECARE NOTE:   Your doctor has ordered home care to help you after your hospital stay.  The staff will contact you to schedule your first visit.  This service will be provided by [facility].  If you have any question, or have not received a call within 48 hours of discharge, please call them at [phone].  *please see homecare quality ratings for all homecares in your area at www.medicare.gov

## 2020-11-15 NOTE — PROGRESS NOTES
11/15/20 1351   Quick Adds   Type of Visit Initial Occupational Therapy Evaluation   Living Environment   People in home facility resident   Current Living Arrangements assisted living   Home Accessibility no concerns   Living Environment Comments Pt lives in a room alone at an assisted living facility. Pt does not have to use stairs. Pt has a walk-in shower w/shower chair and grab bars   Self-Care   Usual Activity Tolerance moderate   Current Activity Tolerance poor   Equipment Currently Used at Home walker, rolling;wheelchair, manual   Activity/Exercise/Self-Care Comment At baseline, pt was indep. in oral-self cares,dressing, g/h, and  functional mobility per daughter report, Pt. has A w/meals, medicine, showering/toileting from asssited living facility.    Disability/Function   Hearing Difficulty or Deaf yes   Patient's preferred means of communication verbal   Describe hearing loss bilateral hearing loss   Use of hearing assistive devices bilateral hearing aids   Wear Glasses or Blind yes   Vision Management glasses   Concentrating, Remembering or Making Decisions Difficulty yes   Concentration Management Daughter present    Difficulty Communicating no   Difficulty Eating/Swallowing no   Walking or Climbing Stairs Difficulty yes   Walking or Climbing Stairs ambulation difficulty, requires equipment   Dressing/Bathing Difficulty yes   Dressing/Bathing bathing difficulty, assistance 1 person   Toileting yes   Toileting Assistance toileting difficulty, assistance 1 person   Doing Errands Independently Difficulty (such as shopping) no   Fall history within last six months no   Change in Functional Status Since Onset of Current Illness/Injury yes   General Information   Onset of Illness/Injury or Date of Surgery 11/13/20   Referring Physician Smiley Powell PA-C   Additional Occupational Profile Info/Pertinent History of Current Problem Gennaro Walton is a pleasant 89 year old woman with a history of  "(L) breast cancer, HTN, hypothyroidism, bilateral hip fractures, previous thoracolumbar compression fractures and possible dementia, who was sent to the ED today from her AL facility for evaluation of new back pain.   Existing Precautions/Restrictions fall;spinal   Limitations/Impairments safety/cognitive   Cognitive Status Examination   Orientation Status person   Cognitive Status Comments Pt was oriented to self, but not time or place. Daughter gently reorienting pt upon OT arrival.    Visual Perception   Visual Impairment/Limitations WFL  (glasses)   Sensory   Sensory Quick Adds Pain   Sensory Comments Pt reported no pain while rested, but 10/10 when moving   Pain Assessment   Patient Currently in Pain Yes, see Vital Sign flowsheet   Posture   Posture protracted shoulders;kyphosis   Range of Motion Comprehensive   General Range of Motion upper extremity range of motion deficits identified   Comment, General Range of Motion Pt stated she has decreased shoulder flexion, external/internal rotation at baseline 2\" past shoulder injuries   Strength Comprehensive (MMT)   Comment, General Manual Muscle Testing (MMT) Assessment Grasp WFL; shoulder, biceps, triceps NT 2\" spinal precautions and rib pain   Bed Mobility   Bed Mobility supine-sit   Supine-Sit [place] (Bed Mobility) moderate assist (50% patient effort)   Activities of Daily Living   BADL Assessment/Intervention upper body dressing;lower body dressing;grooming;toileting   Upper Body Dressing Assessment/Training   [place] Level (Upper Body Dressing) moderate assist (50% patient effort)   Lower Body Dressing Assessment/Training   [place] Level (Lower Body Dressing) maximum assist (25% patient effort)   Grooming Assessment/Training   [place] Level (Grooming) minimum assist (75% patient effort)   Position (Grooming) supported sitting   Toileting   [place] Level (Toileting) assist of 2;minimum assist (75% patient effort)   Clinical Impression "   Criteria for Skilled Therapeutic Interventions Met (OT) yes   OT Diagnosis Dec independence and safety w/ADLs and functional mobility   OT Problem List-Impairments impacting ADL problems related to;activity tolerance impaired;balance;cognition;range of motion (ROM);pain   Assessment of Occupational Performance 1-3 Performance Deficits   Identified Performance Deficits Dec independence and safety w/toileting, g/h, oral-self cares, dressing, and functional mobility   Planned Therapy Interventions (OT) ADL retraining;progressive activity/exercise   Clinical Decision Making Complexity (OT) low complexity   Therapy Frequency (OT) 3x/week   Predicted Duration of Therapy 7 days   Risks and Benefits of Treatment have been explained. Yes   Patient, Family & other staff in agreement with plan of care Yes   OT Discharge Planning    OT Discharge Recommendation (DC Rec) Transitional Care Facility   OT Rationale for DC Rec Pt is below baseline and would benefit from continued therapy to increase independence and safety w/ADLS and functional mobility. Daughter concerned about support at assisted living facility may not need pt's current needs.    Total Evaluation Time (Minutes)   Total Evaluation Time (Minutes) 8

## 2020-11-16 ENCOUNTER — DOCUMENTATION ONLY (OUTPATIENT)
Dept: OTHER | Facility: CLINIC | Age: 85
End: 2020-11-16

## 2020-11-16 VITALS
DIASTOLIC BLOOD PRESSURE: 69 MMHG | HEART RATE: 56 BPM | RESPIRATION RATE: 16 BRPM | TEMPERATURE: 97.7 F | OXYGEN SATURATION: 94 % | WEIGHT: 100.75 LBS | SYSTOLIC BLOOD PRESSURE: 129 MMHG | BODY MASS INDEX: 17.85 KG/M2

## 2020-11-16 PROCEDURE — 99238 HOSP IP/OBS DSCHRG MGMT 30/<: CPT | Performed by: HOSPITALIST

## 2020-11-16 PROCEDURE — 250N000013 HC RX MED GY IP 250 OP 250 PS 637: Performed by: INTERNAL MEDICINE

## 2020-11-16 PROCEDURE — 250N000013 HC RX MED GY IP 250 OP 250 PS 637: Performed by: STUDENT IN AN ORGANIZED HEALTH CARE EDUCATION/TRAINING PROGRAM

## 2020-11-16 PROCEDURE — 250N000013 HC RX MED GY IP 250 OP 250 PS 637: Performed by: PHYSICIAN ASSISTANT

## 2020-11-16 RX ORDER — OXYCODONE HYDROCHLORIDE 5 MG/1
2.5 TABLET ORAL EVERY 8 HOURS PRN
Qty: 10 TABLET | Refills: 0 | Status: ON HOLD | OUTPATIENT
Start: 2020-11-16 | End: 2020-11-30

## 2020-11-16 RX ORDER — LIDOCAINE 4 G/G
2 PATCH TOPICAL EVERY MORNING
Qty: 15 PATCH | Refills: 0 | DISCHARGE
Start: 2020-11-16 | End: 2022-02-07

## 2020-11-16 RX ORDER — MICONAZOLE NITRATE 20 MG/G
CREAM TOPICAL 2 TIMES DAILY
Qty: 4 G | Refills: 0 | DISCHARGE
Start: 2020-11-16 | End: 2020-11-23

## 2020-11-16 RX ADMIN — DOCUSATE SODIUM 50 MG AND SENNOSIDES 8.6 MG 2 TABLET: 8.6; 5 TABLET, FILM COATED ORAL at 08:25

## 2020-11-16 RX ADMIN — POLYETHYLENE GLYCOL 3350 17 G: 17 POWDER, FOR SOLUTION ORAL at 08:28

## 2020-11-16 RX ADMIN — LOSARTAN POTASSIUM 50 MG: 50 TABLET, FILM COATED ORAL at 08:24

## 2020-11-16 RX ADMIN — ACETAMINOPHEN 1000 MG: 500 TABLET, FILM COATED ORAL at 08:25

## 2020-11-16 RX ADMIN — AMLODIPINE BESYLATE 2.5 MG: 2.5 TABLET ORAL at 08:24

## 2020-11-16 RX ADMIN — MICONAZOLE NITRATE: 20 CREAM TOPICAL at 08:32

## 2020-11-16 RX ADMIN — LIDOCAINE 2 PATCH: 560 PATCH PERCUTANEOUS; TOPICAL; TRANSDERMAL at 08:25

## 2020-11-16 RX ADMIN — LEVOTHYROXINE SODIUM 88 MCG: 88 TABLET ORAL at 08:24

## 2020-11-16 RX ADMIN — Medication 50 MCG: at 08:25

## 2020-11-16 RX ADMIN — OXYCODONE HYDROCHLORIDE 2.5 MG: 5 TABLET ORAL at 05:43

## 2020-11-16 RX ADMIN — THERA TABS 1 TABLET: TAB at 08:25

## 2020-11-16 ASSESSMENT — ACTIVITIES OF DAILY LIVING (ADL)
ADLS_ACUITY_SCORE: 26

## 2020-11-16 NOTE — PLAN OF CARE
Disoriented to place and situation, forgetful. VSS, on 1L NC. Pain managed with scheduled tylenol and PRN oxycodone x2. T&R q2h. A of 2. Incontinent of urine/up to BSC. Regular diet.

## 2020-11-16 NOTE — PLAN OF CARE
A/OX2,forgetful.Up with 2 assist/walker.Voiding per BSC.Turned and repositioned Q 2hrs.Room air.Tolerating regular diet.Pt is discharging to TCU at 1300.All belongings sent with pt.

## 2020-11-16 NOTE — PROGRESS NOTES
Care Management Follow Up    Length of Stay (days): 1    Expected Discharge Date: 11/16/20     Concerns to be Addressed:       Patient plan of care discussed at interdisciplinary rounds: Yes    Anticipated Discharge Disposition:  Pt will [facility] on 11/16     Anticipated Discharge Services:  [facility] RN PT OT  Anticipated Discharge DME:      Patient/family educated on Medicare website which has current facility and service quality ratings:  Pt and dtr are ware  Education Provided on the Discharge Plan:  yes  Patient/Family in Agreement with the Plan:  yes    Referrals Placed by CM/SW:  [facility] [place] ref sent.  [facility] arranged with [facility]  Private pay costs discussed: private room/amenity fees  Discussed with Caron; additional  Services from [facility] will need to be paid out of pocket and she would be responsible to make arrangements with [facility].  Per Caron a Medivan ride will be needed for transport as she does not feel it would be safe for Gennaro to get in and out of her car (which is really low) with the current back issues.  Explained $75.00 base ride plus addition $5.00 per mile cost. Marilou verbalized understanding of info discussed.     NAVYA Ernst  [facility]  Inpatient Care Coordinator  M: [phone]  Additional Information:  Writer spoke with NAVYA Rosenbaum at [facility] this morning.  Robi confirmed they are able to provide assist of 2 for all mobility.  Writer confirmed will also need [facility] PT OT and SNV and this has been arranged with [facility]. Ride set for 1 PM today         Kaye Monique RN

## 2020-11-16 NOTE — PLAN OF CARE
Patient is disoriented to place and situation, [place], forgetful, redirectable. Up with assist of 2/gb/walker to BSC, voiding, incontinent @ times. Pain managed with scheduled tylenol, lidocaine patch x2 and oxycodone. Denies chest pain or SOB, on 1-2LNC while sleeping, hospitalist aware. Possibly discharging tomorrow. Will continue to monitor.

## 2020-11-17 NOTE — PLAN OF CARE
Occupational Therapy Discharge Summary    Reason for therapy discharge:    Discharged to transitional care facility.    Progress towards therapy goal(s). See goals on Care Plan in [facility] electronic health record for goal details.  Goals not met.  Barriers to achieving goals:   discharge from facility.    Therapy recommendation(s):    Continued therapy is recommended.  Rationale/Recommendations:  at TCU to improve ind/safety w/ ADL's and functional mobility.

## 2020-11-17 NOTE — DISCHARGE SUMMARY
[facility]  Hospitalist Discharge Summary      Date of Admission:  11/13/2020  Date of Discharge:  11/16/2020  1:13 PM  Discharging Provider: Fei Worthy MD      Discharge Diagnoses   Acute on chronic back and right posterior rib pain  Hx thoracolumbar compression fractures  Intertrigo  HTN  Severe malnutrition  Physical deconditioning  Hypothyroidism    Follow-ups Needed After Discharge   Follow-up Appointments     Follow-up and recommended labs and tests       Follow up with primary care provider, Mady Hagen, within 7 days for   hospital follow- up.  No follow up labs or test are needed.           Discharge Disposition   Discharged to assisted living  Condition at discharge: Stable    Hospital Course   Gennaro Walton is a pleasant 89 year old woman with a history of (L) breast cancer, HTN, hypothyroidism, bilateral hip fractures, previous thoracolumbar compression fractures and possible dementia, who was admitted to [facility] on 11/13/20 from her AL facility for evaluation of new back pain.       Acute on chronic back/posterior right rib pain   Hx of thoraco-lumbar compression fractures  Highest suspicion for acute compression fracture.  CT demonstrated multiple thoracolumbar vertebral body compressions that are unchanged, though per ortho consult could possibly have a new fracture.  No acute right rib fracture appreciated on XR.  Her pain was ultimately controlled on scheduled tylenol, lidocaine patches and low dose prn oxycodone.  Note that daughter reports her mother is very sensitive to narcotics and should be used judiciously and weaned as quickly as possible.     Intertrigo, possible candidal infection   Continue ketoconazole cream x7 days.     HTN   Continue PTA losartan and amlodipine.     Severe malnutrition   Has had <75% oral intake for >1 month with subcutaneous fat and muscle loss on exam.  Nutrition consulted, recommend supplemental Vanilla Shakes BID.     Physical  deconditioning  Will discharge back to assisted living with home PT/OT.      Hypothyroidism.  Continue PTA synthroid.      Consultations This Hospital Stay   CARE MANAGEMENT / SOCIAL WORK IP CONSULT  PHYSICAL THERAPY ADULT IP CONSULT  NUTRITION SERVICES ADULT IP CONSULT  ORTHOPEDIC SURGERY IP CONSULT  ORTHOPEDIC SURGERY IP CONSULT  CARE MANAGEMENT / SOCIAL WORK IP CONSULT  OCCUPATIONAL THERAPY ADULT IP CONSULT    Code Status   No CPR- Do NOT Intubate    Time Spent on this Encounter   I, Fei Worthy MD, personally saw the patient today and spent less than or equal to 30 minutes discharging this patient.       Fei Worthy MD  [place] [address] [facility]  [address] [place] [address]  Phone: [phone]  ______________________________________________________________________    Physical Exam   Vital Signs:                    Weight: 100 lbs 12 oz  General Appearance: Thin elderly female in NAD  Respiratory: lungs CTAB, no wheezes or crackles, no tachypnea  Cardiovascular: RRR, normal s1/s2 without murmur  GI: abdomen soft, normal bowel sounds  Other: Alert and appropriate, cranial nerves grossly intact        Primary Care Physician   Mady Hagen    Discharge Orders      Home Care PT Referral for Hospital Discharge      Home Care OT Referral for Hospital Discharge      Home care nursing referral      Reason for your hospital stay    You were admitted for back and rib pain which appears to be musculoskeletal in nature.     Follow-up and recommended labs and tests     Follow up with primary care provider, Mady Hagen, within 7 days for hospital follow- up.  No follow up labs or test are needed.     Activity    Your activity upon discharge: activity as tolerated     MD face to face encounter    Documentation of Face to Face and Certification for Home Health Services    I certify that patient: Gennaro Walton is under my care and that I, or a nurse practitioner or physician's  assistant working with me, had a face-to-face encounter that meets the physician face-to-face encounter requirements with this patient on: 11/16/2020.    This encounter with the patient was in whole, or in part, for the following medical condition, which is the primary reason for home health care: acute on chronic back pain likely due to compression fracture.    I certify that, based on my findings, the following services are medically necessary home health services: Nursing, Occupational Therapy and Physical Therapy.    My clinical findings support the need for the above services because: Nurse is needed: To assess pain control, activity level and home safety after changes in medications or other medical regimen.., Occupational Therapy Services are needed to assess and treat cognitive ability and address ADL safety due to impairment in gait, balance, endurance. and Physical Therapy Services are needed to assess and treat the following functional impairments: gait, balance, endurance.    Further, I certify that my clinical findings support that this patient is homebound (i.e. absences from home require considerable and taxing effort and are for medical reasons or [employer] services or infrequently or of short duration when for other reasons) because: Requires assistance of another person or specialized equipment to access medical services because patient: Has prohibitive pain during ambulation. and Requires supervision of another for safe transfer...    Based on the above findings. I certify that this patient is confined to the home and needs intermittent skilled nursing care, physical therapy and/or speech therapy.  The patient is under my care, and I have initiated the establishment of the plan of care.  This patient will be followed by a physician who will periodically review the plan of care.  Physician/Provider to provide follow up care: Mady Hagen    Attending hospital physician (the Medicare certified  PECOS provider): Fei Worthy MD  Physician Signature: See electronic signature associated with these discharge orders.  Date: 11/16/2020     No CPR- Do NOT Intubate     Diet    Follow this diet upon discharge:       Snacks/Supplements Adult: Other; Vanilla shakes; Between Meals      Combination Diet Regular Diet Adult; 2 gm NA Diet       Significant Results and Procedures   Most Recent 3 CBC's:  Recent Labs   Lab Test 11/14/20  0714 11/13/20  1215 02/10/20  1115   WBC 5.9 5.9 7.6   HGB 12.4 12.8 9.1*    101* 103*    227 374     Most Recent 3 BMP's:  Recent Labs   Lab Test 11/14/20  0714 11/13/20  1215 02/10/20  1115    140 136   POTASSIUM 3.5 3.7 3.5   CHLORIDE 102 107 105   CO2 28 28 28   BUN 9 8 10   CR 0.51* 0.49* 0.53   ANIONGAP 6 5 3   LORETA 8.8 9.1 8.4*   GLC 85 85 95   ,   Results for orders placed or performed during the hospital encounter of 11/13/20   CT Aortic Survey w Contrast    Narrative    CT AORTIC SURVEY WITH CONTRAST   11/13/2020 2:21 PM     HISTORY: Acute onset back pain. Hypertension.    TECHNIQUE: 80 mL Isovue-370 IV were administered. Precontrast images  were obtained through the chest. After contrast administration,  volumetric helical sections were acquired in the arterial phase from  the thoracic inlet to the ischial tuberosities. Coronal images were  also reconstructed. Radiation dose for this scan was reduced using  automated exposure control, adjustment of the mA and/or kV according  to patient size, or iterative reconstruction technique.    COMPARISON: CT of the chest, abdomen, and pelvis performed 1/7/2020.    FINDINGS:   VASCULATURE: Atherosclerotic calcification of the thoracoabdominal  aorta and coronary arteries. Tortuous thoracoabdominal aorta. Ectasia  of the ascending thoracic aorta measures up to 3.8 cm in diameter. No  evidence for aortic dissection. Cardiac enlargement.    LUNGS AND PLEURA: No pleural effusions. Scattered calcified granulomas  in both lungs  are unchanged. Mild scarring and/or fibrosis about the  periphery of both lower lungs.    MEDIASTINUM/AXILLAE: No enlarged lymph nodes are identified in the  chest. No pericardial effusion. Left breast implant is again noted.    HEPATOBILIARY: Unremarkable. No hepatic masses are seen.    PANCREAS: Normal.    SPLEEN: Normal.    ADRENAL GLANDS: A 2.6 cm indeterminate left adrenal nodule is  unchanged.    KIDNEYS/BLADDER: Unremarkable. No hydronephrosis.    BOWEL: Scattered colonic diverticulosis. No bowel obstruction. No  convincing evidence for colitis or diverticulitis. The appendix is not  visualized; however, no significant inflammatory change is seen within  the expected region of the appendix.    PELVIC ORGANS: Unremarkable.    LYMPH NODES: No enlarged lymph nodes are identified in the abdomen or  pelvis.    ADDITIONAL FINDINGS: None.    MUSCULOSKELETAL: Postoperative changes of intramedullary ilan and hip  screw placement in both proximal femurs. Degenerative changes are  noted throughout the thoracolumbar spine. Thoracic kyphosis. Multiple  thoracolumbar vertebral body compressions have a similar appearance to  the previous exam.      Impression    IMPRESSION:   1.  No evidence for aortic aneurysm or dissection.  2.  Ectasia of the ascending thoracic aorta measures 3.8 cm in  diameter, and is not significantly changed.  3.  Scattered colonic diverticulosis, without convincing evidence for  diverticulitis.  4.  Thoracic kyphosis and multiple thoracolumbar vertebral body  compressions are unchanged.    MONSTER BETTENCOURT MD   XR Ribs & Chest Right G/E 3 Views    Narrative    RIBS AND CHEST RIGHT THREE VIEWS   11/14/2020 8:38 AM     HISTORY:  Please evaluate. Pain emanating from right posterior/mid rib  area; tender over posterolateral mid ribcage.     COMPARISON: Chest 1/31/2020    FINDINGS: The degree of cardiac enlargement is similar. Again there is  bilateral shoulder arthropathy. Surgical changes in the right  chest.  Aortic tortuosity. Osteopenia.      Impression    IMPRESSION: No acute right rib fracture is appreciated. No  pneumothorax.    ANNEL LUGO MD       Discharge Medications   Discharge Medication List as of 11/16/2020 12:38 PM      START taking these medications    Details   miconazole (MICATIN) 2 % external cream Apply topically 2 times daily for 7 days To umbilicusDisp-4 g, R-0Transitional      oxyCODONE (ROXICODONE) 5 MG tablet Take 0.5 tablets (2.5 mg) by mouth every 8 hours as needed for moderate to severe pain, Disp-10 tablet, R-0, Local Print         CONTINUE these medications which have CHANGED    Details   Lidocaine (LIDOCARE) 4 % Patch Place 2 patches onto the skin every morning To prevent lidocaine toxicity, patient should be patch free for 12 hrs daily. To right shoulder, right posterior ribs. Remove in the eveningDisp-15 patch, R-0Transitional         CONTINUE these medications which have NOT CHANGED    Details   !! acetaminophen (TYLENOL) 500 MG tablet Take 1,000 mg by mouth every 6 hours as needed for mild pain See scheduled doses also. Max 4000mg in 24 hours, Historical      !! acetaminophen (TYLENOL) 500 MG tablet Take 2 tablets (1,000 mg) by mouth 3 times daily, No Print Out      amLODIPine (NORVASC) 5 MG tablet Take 2.5 mg by mouth daily 1/2 x 5mg tablet = 2.5mg, Historical      aspirin (ASA) 325 MG EC tablet Take 325 mg by mouth daily as needed (back pain), Historical      calcium carbonate (TUMS) 500 MG chewable tablet Take 1 chew tab by mouth daily , Historical      docusate sodium (COLACE) 100 MG capsule Take 100 mg by mouth 2 times daily as needed for constipation, Historical      levothyroxine (SYNTHROID/LEVOTHROID) 88 MCG tablet Take 88 mcg by mouth daily, Historical      losartan (COZAAR) 50 MG tablet Take 50 mg by mouth daily, Historical      oxybutynin ER (DITROPAN-XL) 10 MG 24 hr tablet Take 1 tablet (10 mg) by mouth At Bedtime, TransitionalIf urinating without difficulty then  start August 2nd.      polyethylene glycol (MIRALAX/GLYCOLAX) packet Take 17 g by mouth daily, No Print Out      sennosides (SENOKOT) 8.6 MG tablet Take 1 tablet by mouth daily as needed for constipation, Historical      vitamin D3 (CHOLECALCIFEROL) 2000 units tablet Take 1 tablet by mouth daily, Historical       !! - Potential duplicate medications found. Please discuss with provider.        Allergies   Allergies   Allergen Reactions     Sulfacetamide Hives     Tramadol      Lethargic per patient's daughter Caron.

## 2020-11-25 ENCOUNTER — APPOINTMENT (OUTPATIENT)
Dept: GENERAL RADIOLOGY | Facility: CLINIC | Age: 85
End: 2020-11-25
Attending: EMERGENCY MEDICINE
Payer: MEDICARE

## 2020-11-25 ENCOUNTER — HOSPITAL ENCOUNTER (OUTPATIENT)
Facility: CLINIC | Age: 85
Setting detail: OBSERVATION
Discharge: NURSING, REHAB OR LONG-TERM CARE | End: 2020-11-30
Attending: EMERGENCY MEDICINE
Payer: MEDICARE

## 2020-11-25 DIAGNOSIS — S32.010A CLOSED COMPRESSION FRACTURE OF THORACOLUMBAR VERTEBRA, INITIAL ENCOUNTER (H): ICD-10-CM

## 2020-11-25 DIAGNOSIS — S22.080A CLOSED COMPRESSION FRACTURE OF THORACOLUMBAR VERTEBRA, INITIAL ENCOUNTER (H): ICD-10-CM

## 2020-11-25 DIAGNOSIS — S22.080A COMPRESSION FRACTURE OF T12 VERTEBRA, INITIAL ENCOUNTER (H): ICD-10-CM

## 2020-11-25 LAB
ANION GAP SERPL CALCULATED.3IONS-SCNC: 4 MMOL/L (ref 3–14)
BASOPHILS # BLD AUTO: 0 10E9/L (ref 0–0.2)
BASOPHILS NFR BLD AUTO: 0.3 %
BUN SERPL-MCNC: 14 MG/DL (ref 7–30)
CALCIUM SERPL-MCNC: 9.5 MG/DL (ref 8.5–10.1)
CHLORIDE SERPL-SCNC: 100 MMOL/L (ref 94–109)
CO2 SERPL-SCNC: 29 MMOL/L (ref 20–32)
CREAT SERPL-MCNC: 0.71 MG/DL (ref 0.52–1.04)
DIFFERENTIAL METHOD BLD: ABNORMAL
EOSINOPHIL # BLD AUTO: 0.2 10E9/L (ref 0–0.7)
EOSINOPHIL NFR BLD AUTO: 2.5 %
ERYTHROCYTE [DISTWIDTH] IN BLOOD BY AUTOMATED COUNT: 13.3 % (ref 10–15)
GFR SERPL CREATININE-BSD FRML MDRD: 71 ML/MIN/{1.73_M2}
GLUCOSE SERPL-MCNC: 91 MG/DL (ref 70–99)
HCT VFR BLD AUTO: 41.9 % (ref 35–47)
HGB BLD-MCNC: 14.2 G/DL (ref 11.7–15.7)
IMM GRANULOCYTES # BLD: 0 10E9/L (ref 0–0.4)
IMM GRANULOCYTES NFR BLD: 0.3 %
LYMPHOCYTES # BLD AUTO: 1.5 10E9/L (ref 0.8–5.3)
LYMPHOCYTES NFR BLD AUTO: 20 %
MCH RBC QN AUTO: 34.1 PG (ref 26.5–33)
MCHC RBC AUTO-ENTMCNC: 33.9 G/DL (ref 31.5–36.5)
MCV RBC AUTO: 101 FL (ref 78–100)
MONOCYTES # BLD AUTO: 0.8 10E9/L (ref 0–1.3)
MONOCYTES NFR BLD AUTO: 10.7 %
NEUTROPHILS # BLD AUTO: 5 10E9/L (ref 1.6–8.3)
NEUTROPHILS NFR BLD AUTO: 66.2 %
NRBC # BLD AUTO: 0 10*3/UL
NRBC BLD AUTO-RTO: 0 /100
PLATELET # BLD AUTO: 319 10E9/L (ref 150–450)
POTASSIUM SERPL-SCNC: 4.3 MMOL/L (ref 3.4–5.3)
RBC # BLD AUTO: 4.17 10E12/L (ref 3.8–5.2)
SODIUM SERPL-SCNC: 133 MMOL/L (ref 133–144)
WBC # BLD AUTO: 7.5 10E9/L (ref 4–11)

## 2020-11-25 PROCEDURE — 96374 THER/PROPH/DIAG INJ IV PUSH: CPT

## 2020-11-25 PROCEDURE — C9803 HOPD COVID-19 SPEC COLLECT: HCPCS

## 2020-11-25 PROCEDURE — G0378 HOSPITAL OBSERVATION PER HR: HCPCS

## 2020-11-25 PROCEDURE — 250N000013 HC RX MED GY IP 250 OP 250 PS 637: Mod: GY | Performed by: INTERNAL MEDICINE

## 2020-11-25 PROCEDURE — U0003 INFECTIOUS AGENT DETECTION BY NUCLEIC ACID (DNA OR RNA); SEVERE ACUTE RESPIRATORY SYNDROME CORONAVIRUS 2 (SARS-COV-2) (CORONAVIRUS DISEASE [COVID-19]), AMPLIFIED PROBE TECHNIQUE, MAKING USE OF HIGH THROUGHPUT TECHNOLOGIES AS DESCRIBED BY CMS-2020-01-R: HCPCS | Performed by: EMERGENCY MEDICINE

## 2020-11-25 PROCEDURE — 85025 COMPLETE CBC W/AUTO DIFF WBC: CPT | Performed by: EMERGENCY MEDICINE

## 2020-11-25 PROCEDURE — 99219 PR INITIAL OBSERVATION CARE,LEVEL II: CPT | Performed by: INTERNAL MEDICINE

## 2020-11-25 PROCEDURE — 99285 EMERGENCY DEPT VISIT HI MDM: CPT | Mod: 25

## 2020-11-25 PROCEDURE — 72070 X-RAY EXAM THORAC SPINE 2VWS: CPT

## 2020-11-25 PROCEDURE — 80048 BASIC METABOLIC PNL TOTAL CA: CPT | Performed by: EMERGENCY MEDICINE

## 2020-11-25 PROCEDURE — 250N000011 HC RX IP 250 OP 636: Performed by: EMERGENCY MEDICINE

## 2020-11-25 RX ORDER — ONDANSETRON 2 MG/ML
4 INJECTION INTRAMUSCULAR; INTRAVENOUS EVERY 6 HOURS PRN
Status: DISCONTINUED | OUTPATIENT
Start: 2020-11-25 | End: 2020-11-30 | Stop reason: HOSPADM

## 2020-11-25 RX ORDER — LEVOTHYROXINE SODIUM 88 UG/1
88 TABLET ORAL DAILY
Status: DISCONTINUED | OUTPATIENT
Start: 2020-11-26 | End: 2020-11-30 | Stop reason: HOSPADM

## 2020-11-25 RX ORDER — ONDANSETRON 4 MG/1
4 TABLET, ORALLY DISINTEGRATING ORAL EVERY 6 HOURS PRN
Status: DISCONTINUED | OUTPATIENT
Start: 2020-11-25 | End: 2020-11-30 | Stop reason: HOSPADM

## 2020-11-25 RX ORDER — NALOXONE HYDROCHLORIDE 0.4 MG/ML
0.4 INJECTION, SOLUTION INTRAMUSCULAR; INTRAVENOUS; SUBCUTANEOUS
Status: DISCONTINUED | OUTPATIENT
Start: 2020-11-25 | End: 2020-11-30 | Stop reason: HOSPADM

## 2020-11-25 RX ORDER — AMOXICILLIN 250 MG
2 CAPSULE ORAL 2 TIMES DAILY
Status: DISCONTINUED | OUTPATIENT
Start: 2020-11-25 | End: 2020-11-30 | Stop reason: HOSPADM

## 2020-11-25 RX ORDER — ACETAMINOPHEN 500 MG
1000 TABLET ORAL 3 TIMES DAILY
Status: DISCONTINUED | OUTPATIENT
Start: 2020-11-25 | End: 2020-11-30 | Stop reason: HOSPADM

## 2020-11-25 RX ORDER — POLYETHYLENE GLYCOL 3350 17 G/17G
17 POWDER, FOR SOLUTION ORAL DAILY
Status: DISCONTINUED | OUTPATIENT
Start: 2020-11-26 | End: 2020-11-30 | Stop reason: HOSPADM

## 2020-11-25 RX ORDER — AMOXICILLIN 250 MG
1 CAPSULE ORAL 2 TIMES DAILY
Status: DISCONTINUED | OUTPATIENT
Start: 2020-11-25 | End: 2020-11-30 | Stop reason: HOSPADM

## 2020-11-25 RX ORDER — HYDRALAZINE HYDROCHLORIDE 20 MG/ML
5 INJECTION INTRAMUSCULAR; INTRAVENOUS EVERY 6 HOURS PRN
Status: DISCONTINUED | OUTPATIENT
Start: 2020-11-25 | End: 2020-11-30 | Stop reason: HOSPADM

## 2020-11-25 RX ORDER — LOSARTAN POTASSIUM 50 MG/1
50 TABLET ORAL DAILY
Status: DISCONTINUED | OUTPATIENT
Start: 2020-11-26 | End: 2020-11-30 | Stop reason: HOSPADM

## 2020-11-25 RX ORDER — LIDOCAINE 4 G/G
1-2 PATCH TOPICAL
Status: DISCONTINUED | OUTPATIENT
Start: 2020-11-26 | End: 2020-11-30 | Stop reason: HOSPADM

## 2020-11-25 RX ORDER — NALOXONE HYDROCHLORIDE 0.4 MG/ML
0.2 INJECTION, SOLUTION INTRAMUSCULAR; INTRAVENOUS; SUBCUTANEOUS
Status: DISCONTINUED | OUTPATIENT
Start: 2020-11-25 | End: 2020-11-30 | Stop reason: HOSPADM

## 2020-11-25 RX ORDER — KETOROLAC TROMETHAMINE 15 MG/ML
15 INJECTION, SOLUTION INTRAMUSCULAR; INTRAVENOUS ONCE
Status: COMPLETED | OUTPATIENT
Start: 2020-11-25 | End: 2020-11-25

## 2020-11-25 RX ORDER — LIDOCAINE 40 MG/G
CREAM TOPICAL
Status: DISCONTINUED | OUTPATIENT
Start: 2020-11-25 | End: 2020-11-30 | Stop reason: HOSPADM

## 2020-11-25 RX ORDER — AMLODIPINE BESYLATE 2.5 MG/1
2.5 TABLET ORAL DAILY
Status: DISCONTINUED | OUTPATIENT
Start: 2020-11-26 | End: 2020-11-30 | Stop reason: HOSPADM

## 2020-11-25 RX ORDER — OXYBUTYNIN CHLORIDE 10 MG/1
10 TABLET, EXTENDED RELEASE ORAL AT BEDTIME
Status: DISCONTINUED | OUTPATIENT
Start: 2020-11-25 | End: 2020-11-30 | Stop reason: HOSPADM

## 2020-11-25 RX ADMIN — KETOROLAC TROMETHAMINE 15 MG: 15 INJECTION, SOLUTION INTRAMUSCULAR; INTRAVENOUS at 12:09

## 2020-11-25 RX ADMIN — OXYCODONE HYDROCHLORIDE 2.5 MG: 5 TABLET ORAL at 18:49

## 2020-11-25 RX ADMIN — OXYBUTYNIN CHLORIDE 10 MG: 10 TABLET, EXTENDED RELEASE ORAL at 21:30

## 2020-11-25 RX ADMIN — DOCUSATE SODIUM 50 MG AND SENNOSIDES 8.6 MG 1 TABLET: 8.6; 5 TABLET, FILM COATED ORAL at 21:30

## 2020-11-25 RX ADMIN — ACETAMINOPHEN 1000 MG: 500 TABLET, FILM COATED ORAL at 21:30

## 2020-11-25 ASSESSMENT — ENCOUNTER SYMPTOMS
VOMITING: 0
APPETITE CHANGE: 0
COUGH: 0
NUMBNESS: 0
BACK PAIN: 1
NAUSEA: 0
DIARRHEA: 0
FEVER: 0
HALLUCINATIONS: 1
DYSURIA: 0

## 2020-11-25 ASSESSMENT — MIFFLIN-ST. JEOR: SCORE: 823.6

## 2020-11-25 NOTE — ED NOTES
Bed: ED12  Expected date:   Expected time:   Means of arrival:   Comments:  E2  89 F back pain/no covid  1141

## 2020-11-25 NOTE — H&P
[facility]    History and Physical - Hospitalist Service       Date of Admission:  11/25/2020    Assessment & Plan   Gennaro Walton is a 89 year old female with history of cognitive impairment possible dementia, hypertension, breast cancer, thoracolumbar compression fracture who is presenting in today for evaluation of acute on chronic back pain.      Acute on chronic back pain  Multilevel chronic thoracolumbar compression fracture, subacute fracture at the T12 level  Recent hospital admission from 11/13-11/18 for acute on chronic back pain.  At that time, she was evaluated by orthopedics as well and could not rule out acute on chronic compression fracture, recommended PT and mobilization.  Now returning with episode of worsening back pain.  It is unclear if it has been worse every day or just had an episode of severe back pain.  X-ray of the thoracic spine today shows multilevel thoracic compression fractures, T12 compression fracture demonstrating worsening height loss compared to prior exam, probable evolution of subacute fracture.    - Continue with Tylenol 1000 mg 3 times daily  -Continue Lidoderm patch with morning application  -Continue oxycodone on low-dose 2.5 mg every 8 hours as needed, per discussion with daughter, patient status to be sensitive to narcotic pain medication and has tolerated this to this point  -Consult orthopedic surgery, question if would benefit from brace  -Monitor mental status with narcotic pain medication  -Bowel regimen  -PT consult  -Social work consult for discharge planning, daughter wants discharge to TCU since patient needs more care than what she is actually getting at her assisted living facility    Hypertension  Blood pressure is quite elevated in the emergency room and on arrival was 186/92.  -Resume prior to admission amlodipine, losartan 50 mg daily  -Pain control with pain important at this point  -will have hydralazine 5 mg as needed  available for systolic blood pressure greater than 180    Hypothyroidism  Resume prior to admission levothyroxine severe malnutrition    Severe malnutrition   she was evaluated by dietitian during her last hospital admission.       Diet:   Regular diet   DVT Prophylaxis: Ambulate every shift  Cortez Catheter: not present  Code Status:  DNR/DNI reviewed POLST and confirmed with patient's daughter         Disposition Plan   Expected discharge: 1-2 days, recommended to transitional care unit once pain control,ortho eval and tcu placement.  Entered: Tegan Peoples MD 11/25/2020, 3:14 PM     The patient's care was discussed with the Patient and Patient's Family.    Tegan Peoples MD  [facility]  Contact information available via [facility] Paging/Directory      ______________________________________________________________________    Chief Complaint   Back pain    History is obtained from the patient, limited history from patient and is poor historian due to memory issues  Chart review, discussion with emergency room physician on also with daughter.    History of Present Illness   Gennaro Walton is a 89 year old female with history of cognitive impairment possible dementia, hypertension, breast cancer, thoracolumbar compression fracture who is presenting in today for evaluation of acute on chronic back pain.      Patient was recently in the hospital from 11/13-11/16/2020 due to acute on chronic back pain and possible acute on chronic compression fracture.  She was discharged on scheduled Tylenol, Lidoderm patch and low-dose oxycodone 2.5 mg every 8 hours as needed due to sensitivity to him narcotic medications.      Per report, EMS was called to her assisted living facility this morning due to worsening back pain.  It appears as she was doing PT with some improvement in mobility but continue to need oxycodone.  Patient otherwise denies fall.  Patient does not recall if she had increasing  back pain earlier today.  She reports she is currently comfortable.  She denies nausea, vomiting, abdominal pain, chest pain, fever, chills or shortness of breath.    Also discussed with patient's daughter over the phone and reported that her mother appeared to be having a lot of pain this morning when she called to talk to her mother over the phone.  Per daughter, patient was also tolerating the low-dose oxycodone 2.5 mg at the assisted living facility and at times she was getting additional 2.5 mg after an hour of the first dose if pain did not improve.  Daughter  would like patient discharged to TCU rather than her [facility].    In the ED, initial vitals notable for elevated blood pressure at 186/92.  BMP and CBC are in the normal range.  She is given Toradol x1 dose.  X-ray of the thoracic spine shows multilevel thoracic vertebral body compression fracture T8, T9, T12.  T12 compression fracture demonstrates worsening height loss compared to the prior exam, probably evolution of subacute fracture.  Lumbar compression fracture also present, not appreciably changed.  Given worsening pain and possible need for placement, admission for observation requested.     Review of Systems    The 10 point Review of Systems is negative other than noted in the HPI.    Past Medical History    I have reviewed this patient's medical history and updated it with pertinent information if needed.   Past Medical History:   Diagnosis Date     Breast cancer (H)      CKD (chronic kidney disease) stage 3, GFR 30-59 ml/min (H)      Cognitive impairment      Hypertension      Hypothyroid      Osteoporosis        Past Surgical History   I have reviewed this patient's surgical history and updated it with pertinent information if needed.  Past Surgical History:   Procedure Laterality Date     ------------OTHER-------------      Left mastectomy     ------------OTHER-------------      Cataract surgery     OPEN REDUCTION INTERNAL FIXATION FEMUR MIDSHAFT  Left 7/26/2019    Procedure: LEFT INTERTROCHANTERIC FEMUR FRACTURE OPEN REDUCTION AND INTERNAL FIXATION;  Surgeon: Chris Oseguera MD;  Location:  OR     OPEN REDUCTION INTERNAL FIXATION HIP NAILING Right 2/1/2020    Procedure: OPEN REDUCTION INTERNAL FIXATION RIGHT INTERTROCHANTERIC FEMUR FRACTURE;  Surgeon: Catalino Casper MD;  Location:  OR       Social History   I have reviewed this patient's social history and updated it with pertinent information if needed.  Social History     Tobacco Use     Smoking status: Never Smoker     Smokeless tobacco: Never Used   Substance Use Topics     Alcohol use: No     Frequency: Never     Drug use: No       Family History       Brother with history of coronary artery disease  Father hypertension and coronary artery disease    Prior to Admission Medications   Prior to Admission Medications   Prescriptions Last Dose Informant Patient Reported? Taking?   Lidocaine (LIDOCARE) 4 % Patch 11/25/2020 at am  No Yes   Sig: Place 2 patches onto the skin every morning To prevent lidocaine toxicity, patient should be patch free for 12 hrs daily. To right shoulder, right posterior ribs. Remove in the evening   Menthol, Topical Analgesic, (BIOFREEZE) 4 % GEL  at prn  Yes Yes   Sig: Externally apply topically every 4 hours as needed (R hip and other painful areas)   acetaminophen (TYLENOL) 500 MG tablet 11/25/2020 at am  No Yes   Sig: Take 2 tablets (1,000 mg) by mouth 3 times daily   acetaminophen (TYLENOL) 500 MG tablet   Yes No   Sig: Take 1,000 mg by mouth every 6 hours as needed for mild pain See scheduled doses also. Max 4000mg in 24 hours   amLODIPine (NORVASC) 5 MG tablet 11/25/2020 at am Nursing Home Yes Yes   Sig: Take 2.5 mg by mouth daily    calcium carbonate (TUMS) 500 MG chewable tablet 11/25/2020 at am Nursing Home Yes Yes   Sig: Take 1 chew tab by mouth daily    levothyroxine (SYNTHROID/LEVOTHROID) 88 MCG tablet 11/25/2020 at am Nursing Home Yes Yes   Sig: Take 88 mcg  by mouth daily   losartan (COZAAR) 50 MG tablet 11/25/2020 at am Nursing Home Yes Yes   Sig: Take 50 mg by mouth daily   oxyCODONE (ROXICODONE) 5 MG tablet 11/25/2020 at prn  No Yes   Sig: Take 0.5 tablets (2.5 mg) by mouth every 8 hours as needed for moderate to severe pain   Patient taking differently: Take 2.5-5 mg by mouth every 8 hours as needed for moderate to severe pain Start with 2.5 mg, can give additional 2.5 mg after 45 minutes if pain isn't relieved.   oxybutynin ER (DITROPAN-XL) 10 MG 24 hr tablet 11/24/2020 at  Nursing Home No Yes   Sig: Take 1 tablet (10 mg) by mouth At Bedtime   polyethylene glycol (MIRALAX/GLYCOLAX) packet 11/25/2020 at am Nursing Home No Yes   Sig: Take 17 g by mouth daily   sennosides (SENOKOT) 8.6 MG tablet  at prn  Yes Yes   Sig: Take 2 tablets by mouth daily as needed for constipation    vitamin D3 (CHOLECALCIFEROL) 2000 units tablet 11/25/2020 at am Nursing Home Yes Yes   Sig: Take 1 tablet by mouth daily      Facility-Administered Medications: None     Allergies   Allergies   Allergen Reactions     Sulfacetamide Hives     Tramadol      Lethargic per patient's daughter Caron.        Physical Exam   Vital Signs: Temp: 97.7  F (36.5  C) Temp src: Oral BP: (!) 179/80 Pulse: 63   Resp: 18 SpO2: 94 % O2 Device: None (Room air)    Weight: 100 lbs 0 oz    General Appearance: Alert, awake, oriented to place, month and current president  HEENT: Normocephalic, atraumatic, oral mucosa is moist, no pharyngeal erythema or exudates, hard of hearing  Respiratory: Clear to auscultation bilaterally, no wheezing  Cardiovascular: Regular rate and rhythm,   GI: Soft and nontender no lower extremity edema  Skin: Warm and dry, chronic skin discoloration bilateral shins  Musculoskeletal: No obvious knee joint effusion or swelling noted, some tenderness on the lower thoracic spine and paraspinal muscles  Neurologic: Alert, oriented and moving all extremities  Psychiatric: Mood and affect appear  normal    Data   Data reviewed today: I reviewed all medications, new labs and imaging results over the last 24 hours. I personally reviewed the X-ray of lumbar spine image(s) showing As mentioned above..    Recent Labs   Lab 11/25/20  1204   WBC 7.5   HGB 14.2   *         POTASSIUM 4.3   CHLORIDE 100   CO2 29   BUN 14   CR 0.71   ANIONGAP 4   LORETA 9.5   GLC 91     Recent Results (from the past 24 hour(s))   XR Thoracic Spine 2 Views    Narrative    THORACIC SPINE TWO VIEWS  11/25/2020 12:51 PM     HISTORY: Pain.    COMPARISON: Chest CT 11/13/2020      Impression    IMPRESSION: Multilevel thoracic vertebral body compression fractures  are present (T8, T9, and T12). T12 compression fracture demonstrates  worsened height loss compared to the prior exam, probably evolution of  subacute fracture. Lumbar compression fractures are also present, not  appreciably changed. Alignment is unchanged with accentuation of the  normal thoracic kyphosis and dextroconvex curvature. Diffuse osseous  demineralization is present.    MARIAN CLIFTON MD

## 2020-11-25 NOTE — ED PROVIDER NOTES
History     Chief Complaint:  Back Pain    HPI  Gennaro Walton is a 89 year old female with a history of multiple thoracolumbar compression fractures, deconditioning, malnutrition, and HTN who presents for evaluation of mid back pain. The patient is a poor historian due to dementia. I was able to review her chart and she was admitted to [facility] from 11/13-11/16 for acute on chronic back pain. CT at that time showed multiple thoracolumbar vertebral body compressions that are unchanged, though per ortho consult could possibly have been a new fracture. There was no rib fracture seen on XR. Her pain was controlled in the hospital with Tylenol, lidocaine patches, and low does oxycodone. Today, EMS was called to the patient's assisted living facility as her back pain was worsening. She has been doing PT with some improvement in mobility but continues to need the oxycodone, last given 5mg 20 minutes prior to arrival by her care staff. The patient denies any falls or other trauma to her back. Per EMS the patient has also been struggling with some hallucinations recently, unclear if this is due to the oxycodone. She is eating and drinking ok with no dysuria, nausea, vomiting, or diarrhea. The patient also denies any cough, fever, or any numbness in her legs/back.       Allergies:  Sulfacetamide  Tramadol    Medications:    Amlodipine   Aspirin   Colace  Levothyroxine   Losartan   Oxybutynin   Oxycodone   Miralax  Senokot    Past Medical History:    Compression fracture of lumbar spine  Comminuted left intertroch hip fract  Proximal left humerus fracture  Benign essential hypertension  Osteoporosis  Traumatic SAH   Acute blood loss anemia  Delirium  Closed nondisplaced intertrochanteric fracture of left femur   CKD   Urge incontinence of urine  Cognitive impairment  Debility  Urinary tract infection   Pressure injury of back  Tear of right rotator cuff  Chronic pain of both shoulders  Onychomycosis  Breast  "cancer  Hypothyroidism     Past Surgical History:    Bilateral mastectomy  Internal fixation femur midshaft left  Right hip surgery    Family History:    History reviewed. No pertinent family history.     Social History:  The patient arrives alone  Smoking: never smoker  Alcohol use: no  PCP: Mady Hagen  Marital Status:  [5]      Review of Systems   Constitutional: Negative for appetite change and fever.   Respiratory: Negative for cough.    Gastrointestinal: Negative for diarrhea, nausea and vomiting.   Genitourinary: Negative for dysuria.   Musculoskeletal: Positive for back pain.   Neurological: Negative for numbness.   Psychiatric/Behavioral: Positive for hallucinations.   All other systems reviewed and are negative.      Physical Exam     Patient Vitals for the past 24 hrs:   BP Temp Temp src Pulse Resp SpO2 Height Weight   11/25/20 1200 (!) 179/80 -- -- 63 -- 94 % -- --   11/25/20 1146 (!) 186/92 97.7  F (36.5  C) Oral 57 18 96 % 1.626 m (5' 4\") 45.4 kg (100 lb)     Physical Exam    Physical Exam   Constitutional:  Frail elderly female, very hard of hearing with dementia. In distress secondary to pain.   HENT:   Mouth/Throat:   Oropharynx is clear and moist.   Eyes:    Conjunctivae normal and EOM are normal. Pupils are equal, round, and reactive to light.   Neck:    Normal range of motion.   Cardiovascular: Normal rate, regular rhythm and normal heart sounds.  Exam reveals no gallop and no friction rub.  No murmur heard.  Pulmonary/Chest:  Effort normal and breath sounds normal. Patient has no wheezes. Patient has no rales.   Abdominal:   Soft. Bowel sounds are normal. Patient exhibits no mass. There is no tenderness. There is no rebound and no guarding.   Musculoskeletal:   The patient has significant kyphosis of the back. She had a lidocaine patch in the middle of her back which was removed for examination. There was no underlying erythema, ecchymosis, no gross bony deformities.  She indicates " diffuse mild to low back pain.  Neurological:   Patient is alert and oriented to person. Dementia. No cranial nerve deficit or sensory deficit. GCS 15  Skin:   Skin is warm and dry. No rash noted. No erythema.   Psychiatric:   Patient has a normal mood    Emergency Department Course     Imaging:  Radiology findings were communicated with the patient who voiced understanding of the findings.    XR Thoracic spine 3 views   IMPRESSION: Multilevel thoracic vertebral body compression fractures   are present (T8, T9, and T12). T12 compression fracture demonstrates   worsened height loss compared to the prior exam, probably evolution of   subacute fracture. Lumbar compression fractures are also present, not   appreciably changed. Alignment is unchanged with accentuation of the   normal thoracic kyphosis and dextroconvex curvature. Diffuse osseous   demineralization is present  Reading per radiology    XR Lumbar Spine 2-3 views  Pending   Reading per radiology    Laboratory:  Laboratory findings were communicated with the patient who voiced understanding of the findings.    CBC: (WBC 7.5, HGB 14.2, )   BMP: Glucose 91, o/w WNL (Creatinine: 0.71)    COVID-19 PCR Nasopharyngeal swab in progress    Interventions:  1209 Toradol, 15 mg, IV    Emergency Department Course:  Past medical records, nursing notes, and vitals reviewed.  1144: I performed an exam of the patient and obtained history, as documented above.     IV was inserted and blood was drawn for laboratory testing, results above.    The patient was sent for an XR while in the emergency department, findings above    1405: I rechecked the patient. Explained findings to patient.    1424 I called the patient's daughter and left her a message to call me back.     1500 I rechecked the patient     I personally reviewed the laboratory and imaging results with the Patient and daughter and answered all related questions prior to admission.     Findings and plan explained  to the Patient who consents to admission.     1445: Discussed the patient with Dr. Peoples, who will admit the patient to an observation bed for further monitoring, evaluation, and treatment.  Impression & Plan      Medical Decision Making:  Gennaro Walton is a 89 year old female who is brought in to the emergency department from her assisted living facility for worsening back pain.  I did review her charts from a couple weeks ago when she was here and was noted to have multiple compression fractures of unclear age but orthopedics thought 1 of these might be acute.  She has not had any falls or recent illness since her discharge.  She has a significant kyphosis which is likely contributing to her chronic back pain but I did an x-ray of the thoracic spine and she appears to have a worsening T12 compression fracture, the other ones are stable.  X-ray of her lumbar spine was also but there was no appreciable change in these compression fractures.  Basic blood work was also obtained showing no evidence of acute metabolic derangement, no acute anemia.    Asymptomatic Covid swab was also obtained and is pending. At this time she will be brought in for pain control and TCU placement. I will admit to Dr. Peoples. I did speak with the patient's daughter and updated her with the plan.     Diagnosis:    ICD-10-CM    1. Compression fracture of T12 vertebra, initial encounter (H)  S22.080A     T8, T9       Disposition:   Admitted to observation.      Scribe Disclosure:  Irais JUNIOR, am serving as a scribe at 11:44 AM on 11/25/2020 to document services personally performed by Bhavna Huertas MD based on my observations and the provider's statements to me.    [facility] EMERGENCY DEPT     Bhavna Huertas MD  11/27/20 3614

## 2020-11-25 NOTE — ED NOTES
[facility]  ED Nurse Handoff Report    ED Chief complaint: Back Pain      ED Diagnosis:   Final diagnoses:   None       Code Status: DNR / DNI    Allergies:   Allergies   Allergen Reactions     Sulfacetamide Hives     Tramadol      Lethargic per patient's daughter Caron.        Patient Story: Pt has had increase in back pain. Recent admission for this and was found to have compression fractures. Lives at [facility] and is receiving services but cares needed are more than they are able to provide at this time.   Focused Assessment:  Pt is [place] and has dementia per daughter's report but has been able to answer orientation questions correctly. Administered toradol 15mg IV with some relief of pain. Pt received oxycontin 5mg PO prior to arrival in ED by EMS. Pt pleasant and cooperative. No attempts to self transfer.     Treatments and/or interventions provided: Back xrays, Labs, monitoring    Patient's response to treatments and/or interventions: Tolerated well  Labs Ordered and Resulted from Time of ED Arrival Up to the Time of Departure from the ED   CBC WITH PLATELETS DIFFERENTIAL - Abnormal; Notable for the following components:       Result Value     (*)     MCH 34.1 (*)     All other components within normal limits   BASIC METABOLIC PANEL   COVID-19 VIRUS (CORONAVIRUS) BY PCR   ROUTINE UA WITH MICROSCOPIC   PERIPHERAL IV CATHETER     XR Thoracic Spine 2 Views   Preliminary Result   IMPRESSION: Multilevel thoracic vertebral body compression fractures   are present (T8, T9, and T12). T12 compression fracture demonstrates   worsened height loss compared to the prior exam, probably evolution of   subacute fracture. Lumbar compression fractures are also present, not   appreciably changed. Alignment is unchanged with accentuation of the   normal thoracic kyphosis and dextroconvex curvature. Diffuse osseous   demineralization is present.      Lumbar spine XR, 2-3 views    (Results Pending)  "        To be done/followed up on inpatient unit:  continue plan of care, need UA still at time of note    Does this patient have any cognitive concerns?: Baseline dementia    Activity level - Baseline/Home:  Stand with assist x2  Activity Level - Current:   Stand with assist x2    Patient's Preferred language: English   Needed?: No    Isolation: None  Infection: Not Applicable  Patient tested for COVID 19 prior to admission: YES  Bariatric?: No    Vital Signs:   Vitals:    11/25/20 1146 11/25/20 1200   BP: (!) 186/92 (!) 179/80   Pulse: 57 63   Resp: 18    Temp: 97.7  F (36.5  C)    TempSrc: Oral    SpO2: 96% 94%   Weight: 45.4 kg (100 lb)    Height: 1.626 m (5' 4\")        Cardiac Rhythm:     Was the PSS-3 completed:   Yes  What interventions are required if any?               Family Comments: Avail by phone, Caron MARAVILLA brochure/video discussed/provided to patient/family: No              Name of person given brochure if not patient: n/a              Relationship to patient: n/a    For the majority of the shift this patient's behavior was [place].   Behavioral interventions performed were Frequent rounding.    ED NURSE PHONE NUMBER: *[phone]         "

## 2020-11-25 NOTE — PROGRESS NOTES
RECEIVING UNIT ED HANDOFF REVIEW    ED Nurse Handoff Report was reviewed by: Alisha Boswell RN on November 25, 2020 at 4:25 PM

## 2020-11-25 NOTE — ED TRIAGE NOTES
Pt from [facility] [address] [facility] where she has been having increased mid back pain without relief from prescribed medications.

## 2020-11-25 NOTE — PHARMACY-ADMISSION MEDICATION HISTORY
Pharmacy Medication History  Admission medication history interview status for the 11/25/2020  admission is complete. See EPIC admission navigator for prior to admission medications     Medication history sources: MAR ([facility])  Medication history source reliability: Good  Adherence assessment: Good    Medication reconciliation completed by provider prior to medication history? No    Time spent in this activity: 15 minutes       Prior to Admission medications    Medication Sig Last Dose Taking? Auth Provider   acetaminophen (TYLENOL) 500 MG tablet Take 2 tablets (1,000 mg) by mouth 3 times daily 11/25/2020 at am Yes Savage Menjivar APRN CNP   amLODIPine (NORVASC) 5 MG tablet Take 2.5 mg by mouth daily  11/25/2020 at am Yes Unknown, Entered By History   calcium carbonate (TUMS) 500 MG chewable tablet Take 1 chew tab by mouth daily  11/25/2020 at am Yes Reported, Patient   levothyroxine (SYNTHROID/LEVOTHROID) 88 MCG tablet Take 88 mcg by mouth daily 11/25/2020 at am Yes Unknown, Entered By History   Lidocaine (LIDOCARE) 4 % Patch Place 2 patches onto the skin every morning To prevent lidocaine toxicity, patient should be patch free for 12 hrs daily. To right shoulder, right posterior ribs. Remove in the evening 11/25/2020 at am Yes Fei Worhty MD   losartan (COZAAR) 50 MG tablet Take 50 mg by mouth daily 11/25/2020 at am Yes Unknown, Entered By History   Menthol, Topical Analgesic, (BIOFREEZE) 4 % GEL Externally apply topically every 4 hours as needed (R hip and other painful areas)  at prn Yes Unknown, Entered By History   oxybutynin ER (DITROPAN-XL) 10 MG 24 hr tablet Take 1 tablet (10 mg) by mouth At Bedtime 11/24/2020 at hs Yes Fei Marshall MD   oxyCODONE (ROXICODONE) 5 MG tablet Take 0.5 tablets (2.5 mg) by mouth every 8 hours as needed for moderate to severe pain  Patient taking differently: Take 2.5-5 mg by mouth every 8 hours as needed for moderate to severe pain Start with 2.5 mg,  can give additional 2.5 mg after 45 minutes if pain isn't relieved. 11/25/2020 at prn Yes Fei Worthy MD   polyethylene glycol (MIRALAX/GLYCOLAX) packet Take 17 g by mouth daily 11/25/2020 at am Yes Savage Menjivar APRN CNP   sennosides (SENOKOT) 8.6 MG tablet Take 2 tablets by mouth daily as needed for constipation   at prn Yes Unknown, Entered By History   vitamin D3 (CHOLECALCIFEROL) 2000 units tablet Take 1 tablet by mouth daily 11/25/2020 at am Yes Unknown, Entered By History   acetaminophen (TYLENOL) 500 MG tablet Take 1,000 mg by mouth every 6 hours as needed for mild pain See scheduled doses also. Max 4000mg in 24 hours   Unknown, Entered By History     Steffi Schofield, PharmD, BCCCP

## 2020-11-26 ENCOUNTER — APPOINTMENT (OUTPATIENT)
Dept: PHYSICAL THERAPY | Facility: CLINIC | Age: 85
End: 2020-11-26
Attending: INTERNAL MEDICINE
Payer: MEDICARE

## 2020-11-26 ENCOUNTER — APPOINTMENT (OUTPATIENT)
Dept: GENERAL RADIOLOGY | Facility: CLINIC | Age: 85
End: 2020-11-26
Attending: PHYSICIAN ASSISTANT
Payer: MEDICARE

## 2020-11-26 LAB
SARS-COV-2 RNA SPEC QL NAA+PROBE: NOT DETECTED
SPECIMEN SOURCE: NORMAL

## 2020-11-26 PROCEDURE — 97162 PT EVAL MOD COMPLEX 30 MIN: CPT | Mod: GP | Performed by: PHYSICAL THERAPIST

## 2020-11-26 PROCEDURE — 99224 PR SUBSEQUENT OBSERVATION CARE,LEVEL I: CPT | Performed by: PHYSICIAN ASSISTANT

## 2020-11-26 PROCEDURE — 71046 X-RAY EXAM CHEST 2 VIEWS: CPT

## 2020-11-26 PROCEDURE — G0378 HOSPITAL OBSERVATION PER HR: HCPCS

## 2020-11-26 PROCEDURE — 250N000013 HC RX MED GY IP 250 OP 250 PS 637: Mod: GY | Performed by: INTERNAL MEDICINE

## 2020-11-26 RX ADMIN — ACETAMINOPHEN 1000 MG: 500 TABLET, FILM COATED ORAL at 08:05

## 2020-11-26 RX ADMIN — OXYCODONE HYDROCHLORIDE 2.5 MG: 5 TABLET ORAL at 12:25

## 2020-11-26 RX ADMIN — LIDOCAINE 2 PATCH: 560 PATCH PERCUTANEOUS; TOPICAL; TRANSDERMAL at 08:04

## 2020-11-26 RX ADMIN — OXYCODONE HYDROCHLORIDE 2.5 MG: 5 TABLET ORAL at 20:49

## 2020-11-26 RX ADMIN — OXYBUTYNIN CHLORIDE 10 MG: 10 TABLET, EXTENDED RELEASE ORAL at 21:02

## 2020-11-26 RX ADMIN — LOSARTAN POTASSIUM 50 MG: 50 TABLET, FILM COATED ORAL at 08:05

## 2020-11-26 RX ADMIN — DOCUSATE SODIUM 50 MG AND SENNOSIDES 8.6 MG 1 TABLET: 8.6; 5 TABLET, FILM COATED ORAL at 21:02

## 2020-11-26 RX ADMIN — OXYCODONE HYDROCHLORIDE 2.5 MG: 5 TABLET ORAL at 03:40

## 2020-11-26 RX ADMIN — LEVOTHYROXINE SODIUM 88 MCG: 88 TABLET ORAL at 08:05

## 2020-11-26 RX ADMIN — DOCUSATE SODIUM 50 MG AND SENNOSIDES 8.6 MG 2 TABLET: 8.6; 5 TABLET, FILM COATED ORAL at 08:04

## 2020-11-26 RX ADMIN — ACETAMINOPHEN 1000 MG: 500 TABLET, FILM COATED ORAL at 21:02

## 2020-11-26 RX ADMIN — AMLODIPINE BESYLATE 2.5 MG: 2.5 TABLET ORAL at 08:04

## 2020-11-26 RX ADMIN — ACETAMINOPHEN 1000 MG: 500 TABLET, FILM COATED ORAL at 17:52

## 2020-11-26 NOTE — PROGRESS NOTES
[facility]      OUTPATIENT PHYSICAL THERAPY EVALUATION  PLAN OF TREATMENT FOR OUTPATIENT REHABILITATION  (COMPLETE FOR INITIAL CLAIMS ONLY)  Patient's Last Name, First Name, M.I.  YOB: 1923  Gennaro Walton                           Provider's Name  [facility] Medical Record No.  [id number]                               Onset Date:  11/25/20   Start of Care Date:  11/26/20      Type:     _X_PT   ___OT   ___SLP Medical Diagnosis:  compression fx                         PT Diagnosis:  impaired gait   Visits from SOC:  1   _________________________________________________________________________________  Plan of Treatment/Functional Goals    Planned Interventions: balance training, bed mobility training, gait training, strengthening, transfer training     Goals: See Physical Therapy Goals on Care Plan in [facility] electronic health record.    Therapy Frequency: 5x/week  Predicted Duration of Therapy Intervention: 1-2 days  _________________________________________________________________________________    I CERTIFY THE NEED FOR THESE SERVICES FURNISHED UNDER        THIS PLAN OF TREATMENT AND WHILE UNDER MY CARE     (Physician co-signature of this document indicates review and certification of the therapy plan).                Certification date from: 11/26/20, Certification date to: 11/26/20    Referring Physician: Dr. Peoples            Initial Assessment        See Physical Therapy evaluation dated 11/26/20 in Epic electronic health record.

## 2020-11-26 NOTE — CONSULTS
Neurosurgery Spine Consult   [facility]      89 year old female DNR/DNI admitted with complaint of chronic back pain. She has a history of cognitive impairment with possible dementia, hypertension, breast cancer and multiple thoracolumbar spine fractures. She was recently admitted from Nov 13-18 for acute back pain. She has had recent increase in pain.    X-ray - Multilevel thoracic vertebral body compression fractures  are present (T8, T9, and T12). T12 compression fracture demonstrates  worsened height loss compared to the prior exam, probably evolution of  subacute fracture. Lumbar compression fractures are also present, not  appreciably changed. Alignment is unchanged with accentuation of the  normal thoracic kyphosis and dextroconvex curvature. Diffuse osseous  demineralization is present.    A/P: The patient has significant hyperkyphosis of the thoracic spine with multiple fractures at T8, T9 and T12 with some progression from the previous CT. She is not a surgical candidate and because of her hyperkyphosis I do not feel that bracing is an option. I would recommend chronic pain management evaluation. There are not many viable options for intervention in my opinion.      Karel Brunner MD, MS, FAANS  Neurosurgeon  [facility]  [phone] (office)  [phone] (Care Coordinator)

## 2020-11-26 NOTE — PROGRESS NOTES
[facility]  Hospitalist Progress Note  Date of Service (when I saw the patient): 11/26/2020    Summary:  Gennaro Walton is a 89 year old year old female who has a PMH significant for cognitive impairment possible dementia, hypertension, breast cancer, and known thoracolumbar compression fracture. Pt was admitted to [facility] on 11/25/2020 after presenting for evaluation of acute on chronic back pain.  The following problems were addressed during her hospitalization:    Assessment & Plan:     Acute on chronic back pain  Multilevel chronic thoracolumbar compression fracture, subacute fracture at the T12 level  Recent hospital admission from 11/13-11/18 for acute on chronic back pain.  At that time, she was evaluated by orthopedics as well and could not rule out acute on chronic compression fracture, recommended PT and mobilization.  Now returning with episode of worsening back pain.  It is unclear if it has been worse every day or just had an episode of severe back pain.  X-ray of the thoracic spine today shows multilevel thoracic compression fractures, T12 compression fracture demonstrating worsening height loss compared to prior exam, probable evolution of subacute fracture.    -Continue with Tylenol 1000 mg 3 times daily  -Continue Lidoderm patch with morning application  -Continue oxycodone on low-dose 2.5 mg every 8 hours as needed, per discussion with daughter upon admission, patient is sensitive to narcotic pain medication and has tolerated this to this point  -Ortho spine surgery consulted, no surgical intervention or brace options,  recommend continued medical management including chronic pain management  -Monitor mental status with narcotic pain medication  -Bowel regimen  -PT consulted, await recommendations  -Social work consulted for discharge planning, daughter wants discharge to TCU since patient needs more care than what she is actually getting at her  assisted living facility. Patient open to TCU placement     Hypertension  Blood pressure was quite elevated in the emergency room and on arrival was 186/92. Improved overnight.  -Resume prior to admission amlodipine, losartan 50 mg daily  -Pain control important for blood pressure control at this point  -Hydralazine 5 mg as needed available for systolic blood pressure greater than 180     Hypothyroidism  -Continue prior to admission levothyroxine severe malnutrition     Severe malnutrition   She was evaluated by dietitian during her last hospital admission.   -Encourage oral intake. Pt asking for oatmeal and juice, so this has been ordered      DVT Prophylaxis: Pneumatic Compression Devices  Code Status: No CPR- Do NOT Intubate  Disposition: Expected discharge in 1-2 days once pain controlled and safe disposition in place.    The patient's care was discussed with the Attending Physician, Dr. Marques, Bedside Nurse, Care Coordinator/, and Patient.    Loraine Fofana PA-C      Interval History   Patient found resting in bed. Patient says her back pain is 3/10 at rest, is 8/10 with movement. It took her a while to find a comfortable position, but now is quite comfortable at rest. Her main concern is that she's hungry- would like oatmeal with milk and juice for breakfast, which was ordered for her. She denies fevers, chills, nausea, vomiting, chest pain, headache, or confusion. She endorses some difficulty breathing deeply as deep breaths causes the back pain to flare, but it's not bad. She is amenable to TCU placement. We await PT evaluation and recommendations, but anticipate TCU placement. Called daughter to update, but it went to voicemail, so I left her a voicemail message.     -Data reviewed today: I reviewed all new labs and imaging results over the last 24 hours. I personally reviewed no images or EKG's today.    Physical Exam   Temp: (P) 98  F (36.7  C) Temp src: Oral BP: (!) 155/71 Pulse: (P) 50    Resp: (P) 18 SpO2: (P) 94 % O2 Device: (P) None (Room air)    Vitals:    11/25/20 1146   Weight: 45.4 kg (100 lb)     Vital Signs with Ranges  Temp:  [97.4  F (36.3  C)-98  F (36.7  C)] (P) 98  F (36.7  C)  Pulse:  [50-73] (P) 50  Resp:  [16-18] (P) 18  BP: (108-186)/(60-92) 155/71  SpO2:  [92 %-96 %] (P) 94 %  I/O last 3 completed shifts:  In: 240 [P.O.:240]  Out: -     Constitutional: alert, oriented to person and place, no acute distress, cachectic  Eyes: No scleral icterus or injection  ENT: Normocephalic, atraumatic  Respiratory: No secondary muscle use or labored breathing. Lungs clear to auscultation bilaterally without wheezes or rhonchi   Cardiovascular: RRR without murmur  GI: Abdomen soft, non-tender to palpation, non-distended.  Bowel sounds active  MSK: able to move extremities on command without new weakness, plantarflexion and dorsiflexion 5/5  Skin/Integumen: warm, dry, in tact without rash or hives  Neurologic: Alert, oriented and moving all extremities  Psychiatric: Mood and affect appear normal    Medications       acetaminophen  1,000 mg Oral TID     amLODIPine  2.5 mg Oral Daily     levothyroxine  88 mcg Oral Daily     lidocaine  1-2 patch Transdermal Q24H     lidocaine   Transdermal Q8H     losartan  50 mg Oral Daily     oxybutynin ER  10 mg Oral At Bedtime     polyethylene glycol  17 g Oral Daily     senna-docusate  1 tablet Oral BID    Or     senna-docusate  2 tablet Oral BID     sodium chloride (PF)  3 mL Intracatheter Q8H       Data   Recent Labs   Lab 11/25/20  1204   WBC 7.5   HGB 14.2   *         POTASSIUM 4.3   CHLORIDE 100   CO2 29   BUN 14   CR 0.71   ANIONGAP 4   LORETA 9.5   GLC 91       Recent Results (from the past 24 hour(s))   XR Thoracic Spine 2 Views    Narrative    THORACIC SPINE TWO VIEWS  11/25/2020 12:51 PM     HISTORY: Pain.    COMPARISON: Chest CT 11/13/2020      Impression    IMPRESSION: Multilevel thoracic vertebral body compression fractures  are  present (T8, T9, and T12). T12 compression fracture demonstrates  worsened height loss compared to the prior exam, probably evolution of  subacute fracture. Lumbar compression fractures are also present, not  appreciably changed. Alignment is unchanged with accentuation of the  normal thoracic kyphosis and dextroconvex curvature. Diffuse osseous  demineralization is present.    MARIAN CLIFTON MD

## 2020-11-26 NOTE — PLAN OF CARE
Care Management Initial Consult    General Information  Assessment completed with:  ,  Liu Reardon     Primary Care Provider verified and updated as needed:     Readmission within the last 30 days:           Advance Care Planning:            Communication Assessment  Patient's communication style: spoken language (English or Bilingual)    Hearing Difficulty or Deaf: yes   Wear Glasses or Blind: yes    Cognitive  Cognitive/Neuro/Behavioral: .WDL except  Level of Consciousness: alert     Orientation: disoriented to;time;situation             Living Environment:   People in home:       Current living Arrangements: assisted living      Able to return to prior arrangements:         Family/Social Support:  Care provided by:  [facility]  Provides care for:  Self             Description of Support System:    Daughter       Current Resources:   Skilled Home Care Services:    Community Resources:    Equipment currently used at home: walker, rolling  Supplies currently used at home:      Employment/Financial:  Employment Status:     N/A     Financial Concerns:   N/A          Lifestyle & Psychosocial Needs:        Socioeconomic History    Marital status:      Spouse name: Not on file    Number of children: Not on file    Years of education: Not on file    Highest education level: Not on file     Tobacco Use    Smoking status: Never Smoker    Smokeless tobacco: Never Used   Substance and Sexual Activity    Alcohol use: No     Frequency: Never    Drug use: No       Functional Status:  Prior to admission patient needed assistance: [facility]             Mental Health Status: N/A          Chemical Dependency Status: N/A                Values/Beliefs:  Spiritual, Cultural Beliefs, [employer] Practices, Values that affect care:                 Additional Information:  Daughter lives near [facility] and would like a referral for TCU sent there.  Daughter would also like a referral sent to [facility], as pt has been there previously and daughter  would like to know pt is nearby even if she cannot see her.  Referrals sent.     Paola Wagner, [facility] Gould HospitalSW

## 2020-11-26 NOTE — PROVIDER NOTIFICATION
"Loraine BELLO paged, \"Pt c/o SOB and noted crackles in RLL when listening laterally. Bilateral anterior lung sounds and lateral L clear. Can you order portable chest xray?\"    ADDENDUM: Order placed by PA for chest xray.  "

## 2020-11-26 NOTE — PROGRESS NOTES
11/26/20 1143   Quick Adds   Type of Visit Initial PT Evaluation   Living Environment   People in home facility resident   Current Living Arrangements assisted living   Home Accessibility no concerns   Self-Care   Usual Activity Tolerance moderate   Current Activity Tolerance poor   Regular Exercise No   Equipment Currently Used at Home walker, rolling   Activity/Exercise/Self-Care Comment Pt is poor historian so PLOF unclear. Appears pt used walker, had assist for bathing.   Disability/Function   Concentrating, Remembering or Making Decisions Difficulty yes   Walking or Climbing Stairs Difficulty yes   Walking or Climbing Stairs ambulation difficulty, requires equipment;transferring difficulty, requires equipment   Change in Functional Status Since Onset of Current Illness/Injury yes   General Information   Onset of Illness/Injury or Date of Surgery 11/25/20   Referring Physician Dr. Peoples   Patient/Family Therapy Goals Statement (PT) Pt has confusion about rehab but seems agreeable.,   Pertinent History of Current Problem (include personal factors and/or comorbidities that impact the POC) Pt is a 89 year old year old female who has a PMH significant for cognitive impairment possible dementia, hypertension, breast cancer, and known thoracolumbar compression fracture. Pt was admitted to [facility] on 11/25/2020 after presenting for evaluation of acute on chronic back pain. Pt admitted to hospital 11/13-18 and went back to [facility] with homecare. readmit for increased pain   Existing Precautions/Restrictions fall   Cognition   Orientation Status (Cognition) oriented to;person;time   Cognitive Status Comments pt think she is in [place], [place]   Pain Assessment   Patient Currently in Pain Yes, see Vital Sign flowsheet   Posture    Posture Protracted shoulders;Kyphosis   Range of Motion (ROM)   ROM Comment Decreased UE due to pain, funcitonal LE   Strength   Strength Comments generalized weakness but  antigravity strength   Bed Mobility   Comment (Bed Mobility) supine to sit: MaxA of one   Transfers   Transfer Safety Comments sit to stand: CGA of one with use of walker for support   Gait/Stairs (Locomotion)   Comment (Gait/Stairs) Pt took 2-3 side steps along bed with Marc. Increased pain   Balance   Balance Comments requires AD and support   Sensory Examination   Sensory Perception patient reports no sensory changes   Clinical Impression   Criteria for Skilled Therapeutic Intervention yes, treatment indicated   PT Diagnosis (PT) impaired gait   Influenced by the following impairments pain, weakness   Functional limitations due to impairments increased assist with mobility compared to baseline   Clinical Presentation Evolving/Changing   Clinical Presentation Rationale   (clinical judgment)   Clinical Decision Making (Complexity) low complexity   Therapy Frequency (PT) 5x/week   Predicted Duration of Therapy Intervention (days/wks) 1-2 days   Planned Therapy Interventions (PT) balance training;bed mobility training;gait training;strengthening;transfer training   Risk & Benefits of therapy have been explained evaluation/treatment results reviewed;care plan/treatment goals reviewed;participants voiced agreement with care plan;patient   PT Discharge Planning    PT Discharge Recommendation (DC Rec) Transitional Care Facility   PT Rationale for DC Rec Pt's mobility is significantly impaired by pain. Pt has greatest difficulty with bed mobility requiring maxA of 1-2. Pt can stand iwth CGA an use walker for only a couple of side steps due to pain. Pt's level of assist cannot be managed by DEVIN so pt will need TCU to get care and maximize functional independence.   Therapy Certification   Start of care date 11/26/20   Certification date from 11/26/20   Certification date to 11/26/20   Medical Diagnosis compression fx    Total Evaluation Time   Total Evaluation Time (Minutes) 20

## 2020-11-26 NOTE — PLAN OF CARE
Nursing shift note  Pt. Here from a compression fracture of T12 vertebra. A&O. CMS intact. Bowel sounds audible, passing flatus, tolerating regular diet. Voiding adequately. VSS. Up with 1-2/GB/W. C/o back pain, decreased with oxycodone 2.5 mgx1. Pt scoring green on the Aggression Stop Light Tool. Plan to discharge is pending ortho,PT, and social work evaluations.     Behavior & Aggression Tool color:  green    Pt's belongings:   [place], pillow, clothing, watch, cell phone, glasses      Home medications: none

## 2020-11-26 NOTE — PLAN OF CARE
Nursing shift note  Pt. Here with a compression fracture of T12 vertebra. A&O Forget full at times but able to reorient. CMS intact. Bowel sounds audible, passing flatus, tolerating regular diet. Voiding adequately. VSS. Up with 1-2/GB/W. C/o back pain,  managed with oxycodone 2.5 mgx1.  Discharge is pending  Plan to have ortho,PT, and social work evaluations today       Behavior & Aggression Tool color:  Green    Pt's belongings:   (Belongings from admission day present in pt's room)                Home medications:   (N/A)

## 2020-11-27 PROCEDURE — 250N000013 HC RX MED GY IP 250 OP 250 PS 637: Performed by: INTERNAL MEDICINE

## 2020-11-27 PROCEDURE — 99225 PR SUBSEQUENT OBSERVATION CARE,LEVEL II: CPT | Performed by: PHYSICIAN ASSISTANT

## 2020-11-27 PROCEDURE — 99207 PR NO CHARGE LOS: CPT | Performed by: INTERNAL MEDICINE

## 2020-11-27 PROCEDURE — G0378 HOSPITAL OBSERVATION PER HR: HCPCS

## 2020-11-27 RX ADMIN — OXYBUTYNIN CHLORIDE 10 MG: 10 TABLET, EXTENDED RELEASE ORAL at 22:16

## 2020-11-27 RX ADMIN — DOCUSATE SODIUM 50 MG AND SENNOSIDES 8.6 MG 2 TABLET: 8.6; 5 TABLET, FILM COATED ORAL at 20:24

## 2020-11-27 RX ADMIN — OXYCODONE HYDROCHLORIDE 2.5 MG: 5 TABLET ORAL at 20:24

## 2020-11-27 RX ADMIN — LEVOTHYROXINE SODIUM 88 MCG: 88 TABLET ORAL at 08:40

## 2020-11-27 RX ADMIN — OXYCODONE HYDROCHLORIDE 2.5 MG: 5 TABLET ORAL at 05:59

## 2020-11-27 RX ADMIN — ACETAMINOPHEN 1000 MG: 500 TABLET, FILM COATED ORAL at 08:40

## 2020-11-27 RX ADMIN — OXYCODONE HYDROCHLORIDE 2.5 MG: 5 TABLET ORAL at 13:13

## 2020-11-27 RX ADMIN — LOSARTAN POTASSIUM 50 MG: 50 TABLET, FILM COATED ORAL at 08:40

## 2020-11-27 RX ADMIN — AMLODIPINE BESYLATE 2.5 MG: 2.5 TABLET ORAL at 08:40

## 2020-11-27 RX ADMIN — LIDOCAINE 1 PATCH: 560 PATCH PERCUTANEOUS; TOPICAL; TRANSDERMAL at 08:32

## 2020-11-27 RX ADMIN — ACETAMINOPHEN 1000 MG: 500 TABLET, FILM COATED ORAL at 16:01

## 2020-11-27 RX ADMIN — ACETAMINOPHEN 1000 MG: 500 TABLET, FILM COATED ORAL at 22:16

## 2020-11-27 NOTE — PROGRESS NOTES
[facility]    Hospitalist Progress Note    Date of Service (when I saw the patient): 11/27/2020    Assessment & Plan   Gennaro Walton is a 89 year old year old female who has a PMH significant for cognitive impairment, possible dementia, hypertension, breast cancer, and known thoracolumbar compression fracture. Pt was admitted to [facility] on 11/25/2020 after presenting for evaluation of acute on chronic back pain.       Acute on chronic back pain  Multilevel chronic thoracolumbar compression fracture, subacute fracture at the T12 level  Recent hospital admission from 11/13-11/18 for acute on chronic back pain.  At that time, she was evaluated by orthopedics as well and could not rule out acute on chronic compression fracture, recommended PT and mobilization.  Now returns with episode of worsening back pain.  It is unclear if it has been worse every day or just had an episode of severe back pain.  She states it comes and goes.  X-ray of the thoracic spine today shows multilevel thoracic compression fractures, T12 compression fracture demonstrating worsening height loss compared to prior exam, probable evolution of subacute fracture.    -Ortho spine surgery consulted, no surgical intervention or brace options,  recommend continued medical management including chronic pain management  -Continue with Tylenol 1000 mg 3 times daily  -Continue Lidoderm patch with morning application  -Continue oxycodone on low-dose 2.5 mg every 8 hours as needed, per discussion with daughter upon admission, patient is sensitive to narcotic pain medication and has tolerated this to this point  -Monitor mental status with narcotic pain medication  -Bowel regimen  -PT consulted, recommend TCU  -Social work consulted for discharge planning, daughter wants discharge to TCU since patient needs more care than what she is actually getting at her assisted living facility. Patient open to TCU  placement.  Discussed with social work 11/27, referrals sent and awaiting placement.     Hypertension  Blood pressure was quite elevated in the emergency room and on arrival was 186/92.   Blood pressures have since improved  -Continue PTA amlodipine 2.5 mg daily, losartan 50 mg daily  -Pain control important for blood pressure control at this point  -Hydralazine 5 mg as needed available for SBP >180     Hypothyroidism  -Continue PTA levothyroxine severe malnutrition     Severe malnutrition   She was evaluated by dietitian during her last hospital admission.   -Encourage oral intake. Pt asking for oatmeal and juice, so this has been ordered        DVT Prophylaxis: Pneumatic Compression Devices  Code Status: No CPR- Do NOT Intubate  Disposition: Expected discharge in 1-2 days to TCU when placement obtained.  Social work is involved.     The patient's care was discussed with attending physician, patient, bedside nurse, and care coordinator/social work.    Edilson Casillas    Interval History   Patient lying in bed on my arrival.  Denies any pain presently, but states it comes and goes.  Reports generalized weakness and fatigue.  She is denying any fever, abdominal pain, nausea, vomiting, chest pain, shortness of breath.    -Data reviewed today: I reviewed all new labs and imaging results over the last 24 hours.    Physical Exam   Temp: 97.9  F (36.6  C) Temp src: Oral BP: 128/65 Pulse: 54   Resp: 16 SpO2: 94 % O2 Device: None (Room air)    Vitals:    11/25/20 1146   Weight: 45.4 kg (100 lb)     Vital Signs with Ranges  Temp:  [97.8  F (36.6  C)-98.1  F (36.7  C)] 97.9  F (36.6  C)  Pulse:  [53-84] 54  Resp:  [16-18] 16  BP: (123-174)/(65-75) 128/65  SpO2:  [91 %-94 %] 94 %  I/O last 3 completed shifts:  In: 100 [P.O.:100]  Out: 100 [Urine:100]    Constitutional: Elderly and frail-appearing female, cachectic.  Alert, oriented to person, place, situation.  Cooperative, lying in bed in NAD.  Respiratory:  Lungs CTAB.   No crackles, wheezes, or rhonchi, no labored breathing.  Cardiovascular:  Heart RRR, no murmur, no edema.  GI:  Abdomen soft, NT/ND and with normoactive BS  Skin/Integumen:  Warm, dry, non-diaphoretic.  MSK: CMS x4 intact.  Limbs atraumatic.    Medications       acetaminophen  1,000 mg Oral TID     amLODIPine  2.5 mg Oral Daily     levothyroxine  88 mcg Oral Daily     lidocaine  1-2 patch Transdermal Q24H     lidocaine   Transdermal Q8H     losartan  50 mg Oral Daily     oxybutynin ER  10 mg Oral At Bedtime     polyethylene glycol  17 g Oral Daily     senna-docusate  1 tablet Oral BID    Or     senna-docusate  2 tablet Oral BID     sodium chloride (PF)  3 mL Intracatheter Q8H       Data   Recent Labs   Lab 11/25/20  1204   WBC 7.5   HGB 14.2   *         POTASSIUM 4.3   CHLORIDE 100   CO2 29   BUN 14   CR 0.71   ANIONGAP 4   LORETA 9.5   GLC 91       Recent Results (from the past 24 hour(s))   XR Chest 2 Views    Narrative    CHEST TWO VIEWS 11/26/2020 3:14 PM     HISTORY: Shortness of breath    COMPARISON: November 14, 2020       Impression    IMPRESSION: There are no acute infiltrates. The cardiac silhouette is  not enlarged. Pulmonary vasculature is unremarkable.    LUPILLO REDDY MD

## 2020-11-27 NOTE — UTILIZATION REVIEW
Continued stay Observation    Concurrent stay review; Secondary Review Determination    Under the authority of the Utilization Management Committee, the utilization review process indicated a secondary review on the above patient. The review outcome is based on review of the medical records, discussions with staff, and applying clinical experience noted on the date of the review.    (x) Observation Status Appropriate - Concurrent stay review    RATIONALE FOR DETERMINATION    Gennaro Walton is a 89 year old female admitted to observation status on 11/25/2020 for back pain.  She has known thoracolumbar compression fractures.  Ortho consulted and she is not a candidate for surgery or, due to her hyperkyphosis, for back brace.  She is tolerating po meds and presently awaiting TCU placement.    Patient is clinically improving and there is no clear indication to change patient's status to inpatient. The severity of illness, intensity of service provided, expected LOS and risk for adverse outcome make the care appropriate for observation.        The information on this document is developed by the utilization review team in order for the business office to ensure compliance. This only denotes the appropriateness of proper admission status and does not reflect the quality of care rendered.    The definitions of Inpatient Status and Observation Status used in making the determination above are those provided in the CMS Coverage Manual, Chapter 1 and Chapter 6, section 70.4.    Sincerely,    Pepper Britt MD  Utilization Review   Physician Advisor  [facility].

## 2020-11-27 NOTE — PLAN OF CARE
Nursing shift note  Pt here with several thoracic and lumbar compression fractures resulting in unmanaged pain. A&Ox2-3, forgetful. CMS intact, generalized weakness. VSS, BP on LLE. Regular diet, thin liquids. Takes pills whole. Up with A2 w/ GB and walker to [facility] or using bedpan. Tylenol and oxycodone for pain. Lidocaine patches X2 in place on spine. Pt scoring green on the Aggression Stop Light Tool. Plan to continue pain management and PT. Discharge anticipated to TCU.  Pt's belongings:     Cell phone, glasses, watch, blanket, pillow, and clothing in pt room.    Home medications:   None.

## 2020-11-27 NOTE — PLAN OF CARE
Nursing shift note  Pt. Here with a compression fracture of T12,T9&T8 vertebra. A&O 2-3 Forget full at times but able to reorient.c/o severe back pain managed with tylenol and oxycodone 2.5 mg x2 .CMS intact. Bowel sounds audible, passing flatus, tolerating regular diet. Voiding adequately using bed pan incontinent at times.  Up with 1-2/GB/W to [facility].  Discharge is pending to TCU referrals sent.    Behavior & Aggression Tool color:  Green    Pt's belongings:  be logins from admission present in the room     Home medications:   (N/A)

## 2020-11-27 NOTE — PLAN OF CARE
Nursing shift note  Pt here with acute on chronic back pain, known thoracolumbar compression fracture, found to have progression in T12, T8,T9 vertebra compression fractures, not a surgical candidate, significant hyperkyphosis brace not beneficial per NSG, here for pain management. Pt A&O x3, forgetful, confused to place. VSS, on RA. LS diminished clear. CMS intact, generalized weakness. Pain managed with Lidoderm patch, scheduled Tylenol, warm packs and PRN Oxycodone. Sever pain with T&R, performed slowing, A2. Voiding, bedpan, adequate output, incont at times. +BS, passing flatus, refused senna and Miralax, patient stated she had BM yesterday. Reg diet, poor to fair appetite, assist with feeding. Takes pills whole with water. Pt scoring green on Aggression Stop Light Tool. TCU a time of discharge, pending placement    Behavior & Aggression Tool color:   Green     Pt's belongings:   (Belongings from admission day present in pt's room)                Home medications: (N)

## 2020-11-27 NOTE — PROGRESS NOTES
Pt here with back pain/ T9-T12 compression fracture/ not surgical candidate . A&Ox3 forgetful. Neuros intact, generalized weakness. VSS on RA. Regular diet, thin liquids. Takes pills whole. Up with A2 GBW. C/O back pain decreased with oxycodone. Pt scoring green on the Aggression Stop Light Tool. Plan  To discharge to TCU pending placement.

## 2020-11-28 ENCOUNTER — APPOINTMENT (OUTPATIENT)
Dept: PHYSICAL THERAPY | Facility: CLINIC | Age: 85
End: 2020-11-28
Payer: MEDICARE

## 2020-11-28 PROCEDURE — G0378 HOSPITAL OBSERVATION PER HR: HCPCS

## 2020-11-28 PROCEDURE — 99224 PR SUBSEQUENT OBSERVATION CARE,LEVEL I: CPT | Performed by: PHYSICIAN ASSISTANT

## 2020-11-28 PROCEDURE — 250N000013 HC RX MED GY IP 250 OP 250 PS 637: Mod: GY | Performed by: INTERNAL MEDICINE

## 2020-11-28 PROCEDURE — 97530 THERAPEUTIC ACTIVITIES: CPT | Mod: GP

## 2020-11-28 RX ADMIN — ACETAMINOPHEN 1000 MG: 500 TABLET, FILM COATED ORAL at 09:34

## 2020-11-28 RX ADMIN — LEVOTHYROXINE SODIUM 88 MCG: 88 TABLET ORAL at 09:34

## 2020-11-28 RX ADMIN — DOCUSATE SODIUM 50 MG AND SENNOSIDES 8.6 MG 2 TABLET: 8.6; 5 TABLET, FILM COATED ORAL at 20:13

## 2020-11-28 RX ADMIN — LOSARTAN POTASSIUM 50 MG: 50 TABLET, FILM COATED ORAL at 09:34

## 2020-11-28 RX ADMIN — ACETAMINOPHEN 1000 MG: 500 TABLET, FILM COATED ORAL at 21:07

## 2020-11-28 RX ADMIN — AMLODIPINE BESYLATE 2.5 MG: 2.5 TABLET ORAL at 09:34

## 2020-11-28 RX ADMIN — OXYCODONE HYDROCHLORIDE 2.5 MG: 5 TABLET ORAL at 12:44

## 2020-11-28 RX ADMIN — OXYCODONE HYDROCHLORIDE 2.5 MG: 5 TABLET ORAL at 04:37

## 2020-11-28 RX ADMIN — DOCUSATE SODIUM 50 MG AND SENNOSIDES 8.6 MG 1 TABLET: 8.6; 5 TABLET, FILM COATED ORAL at 09:34

## 2020-11-28 RX ADMIN — LIDOCAINE 1 PATCH: 560 PATCH PERCUTANEOUS; TOPICAL; TRANSDERMAL at 09:34

## 2020-11-28 RX ADMIN — OXYCODONE HYDROCHLORIDE 2.5 MG: 5 TABLET ORAL at 21:07

## 2020-11-28 RX ADMIN — ACETAMINOPHEN 1000 MG: 500 TABLET, FILM COATED ORAL at 15:32

## 2020-11-28 RX ADMIN — OXYBUTYNIN CHLORIDE 10 MG: 10 TABLET, EXTENDED RELEASE ORAL at 21:08

## 2020-11-28 RX ADMIN — POLYETHYLENE GLYCOL 3350 17 G: 17 POWDER, FOR SOLUTION ORAL at 12:44

## 2020-11-28 NOTE — PROGRESS NOTES
Care Management Follow Up    Length of Stay (days): 0    Expected Discharge Date: 11/30/20(TCU)     Concerns to be Addressed:       Patient plan of care discussed at interdisciplinary rounds: Yes    Anticipated Discharge Disposition:       Anticipated Discharge Services:    Anticipated Discharge DME:      Patient/family educated on Medicare website which has current facility and service quality ratings:    Education Provided on the Discharge Plan:    Patient/Family in Agreement with the Plan:      Referrals Placed by CM/SW:    Private pay costs discussed: private room/amenity fees and insurance costs out of pocket expenses    Additional Information:  [facility] do not have beds available until December 3rd or 4th. PRITESH spoke with patients daughter to update and obtain TCU's choices. Reviewed TCU list on Medicare.gov and compared ratings with patient's daughter. Per request, PRITESH sent referral to [facility].       VICKI Hobbs

## 2020-11-28 NOTE — PROGRESS NOTES
[facility]  Hospitalist Progress Note  Date of Service (when I saw the patient): 11/28/2020    Summary:  Gennaro Walton is a 89 year old year old female who has a PMH significant for cognitive impairment, possible dementia, hypertension, breast cancer, and known thoracolumbar compression fracture. Pt was admitted to [facility] on 11/25/2020 after presenting for evaluation of acute on chronic back pain.       Acute on chronic back pain  Multilevel chronic thoracolumbar compression fracture, subacute fracture at the T12 level  Recent hospital admission from 11/13-11/18 for acute on chronic back pain.  At that time, she was evaluated by orthopedics as well and could not rule out acute on chronic compression fracture, recommended PT and mobilization.  Now returns with episode of worsening back pain.  It is unclear if it has been worse every day or just had an episode of severe back pain.  She states it comes and goes.  X-ray of the thoracic spine today shows multilevel thoracic compression fractures, T12 compression fracture demonstrating worsening height loss compared to prior exam, probable evolution of subacute fracture.    -Ortho spine surgery consulted, no surgical intervention or brace options,  recommend continued medical management including chronic pain management  -Continue with Tylenol 1000 mg 3 times daily  -Continue Lidoderm patch with morning application  -Continue oxycodone on low-dose 2.5 mg every 8 hours as needed, per discussion with daughter upon admission, patient is sensitive to narcotic pain medication and has tolerated this to this point  -Monitor mental status with narcotic pain medication  -Bowel regimen ordered, BM 11/27  -PT consulted, recommend TCU  -Social work consulted for discharge planning, daughter wants discharge to TCU since patient needs more care than what she is actually getting at her assisted living facility. Patient open to TCU  placement.  Discussed with social work 11/28, referrals sent and awaiting placement. Called ema Reardon 11/28 and updated. All questions answered.      Hypertension  Blood pressure was quite elevated in the emergency room and on arrival was 186/92.   Blood pressures have since improved and are acceptable  -Continue PTA amlodipine 2.5 mg daily, losartan 50 mg daily  -Pain control important for blood pressure control at this point  -Hydralazine 5 mg as needed available for SBP >180     Hypothyroidism  -Continue PTA levothyroxine severe malnutrition     Severe malnutrition   She was evaluated by dietitian during her last hospital admission.   -Encourage oral intake      DVT Prophylaxis: Pneumatic Compression Devices  Code Status: No CPR- Do NOT Intubate  Disposition: Expected discharge in 0-2 days to TCU when placement obtained.  Social work is involved.    The patient's care was discussed with the Attending Physician, Dr. Sloan, Bedside Nurse, Care Coordinator/, and Patient.    Loraine Fofana PA-C      Interval History   Patient finishing physical therapy session upon my arrival. Pt having significant back pain and right shoulder pain from the activity. Patient returned to supine position in bed with help and then more comfortable with pain 5/10. Very tired this morning and wanting to rest. No other complaints or concerns. Denies fevers, chills, nausea, vomiting, chest pain, difficulty breathing, headache, confusion, or abdominal pain. Called ema Reardon and updated 11/28.     -Data reviewed today: I reviewed all new labs and imaging results over the last 24 hours. I personally reviewed no images or EKG's today.    Physical Exam   Temp: 97.7  F (36.5  C) Temp src: Oral BP: 137/71 Pulse: 58   Resp: 16 SpO2: 93 % O2 Device: None (Room air)    Vitals:    11/25/20 1146   Weight: 45.4 kg (100 lb)     Vital Signs with Ranges  Temp:  [97.5  F (36.4  C)-98  F (36.7  C)] 97.7  F (36.5  C)  Pulse:  [51-64]  58  Resp:  [16-18] 16  BP: (118-137)/(63-71) 137/71  SpO2:  [92 %-94 %] 93 %  I/O last 3 completed shifts:  In: 903 [P.O.:900; I.V.:3]  Out: 750 [Urine:750]    Constitutional: Elderly and frail-appearing female, cachectic.  Alert, oriented to person, place, situation.  Cooperative, lying in bed in NAD.  Respiratory:  Lungs CTAB.  No crackles, wheezes, or rhonchi, no labored breathing.  Cardiovascular:  Heart RRR, no murmur, no edema.  GI:  Abdomen soft, NT/ND and with normoactive BS  Skin/Integumen:  Warm, dry, non-diaphoretic.  MSK: CMS x4 intact.  Limbs atraumatic.    Medications       acetaminophen  1,000 mg Oral TID     amLODIPine  2.5 mg Oral Daily     levothyroxine  88 mcg Oral Daily     lidocaine  1-2 patch Transdermal Q24H     lidocaine   Transdermal Q8H     losartan  50 mg Oral Daily     oxybutynin ER  10 mg Oral At Bedtime     polyethylene glycol  17 g Oral Daily     senna-docusate  1 tablet Oral BID    Or     senna-docusate  2 tablet Oral BID     sodium chloride (PF)  3 mL Intracatheter Q8H       Data   Recent Labs   Lab 11/25/20  1204   WBC 7.5   HGB 14.2   *         POTASSIUM 4.3   CHLORIDE 100   CO2 29   BUN 14   CR 0.71   ANIONGAP 4   LORETA 9.5   GLC 91       No results found for this or any previous visit (from the past 24 hour(s)).

## 2020-11-28 NOTE — PLAN OF CARE
Nursing shift note  Pt in with a T9 - T12 compression fracture, not a surgical candidate. A&Ox4, forgetful at times. CMS assessment showing 5/5 strength in all extremities, 2+ pulses bilaterally and pt denies N/T. VSS on RA. Tele NSR. Up with A2 using GB and walker. Voiding adequately, uses bed pan C/o back pain, decreased with warm packs, scheduled tylenol and PRN oxycodone. Pt scoring green on the Aggression Stop Light Tool. Discharge plan pending TCU placement.  Behavior & Aggression Tool color:  Green    Pt's belongings:   (Belongings from admission day present in pt's room)

## 2020-11-28 NOTE — PROGRESS NOTES
Pt here with back pain/ [id number] compression fracture/ not surgical candidate . A&Ox3 forgetful. Neuros intact, generalized weakness. VSS on RA. Regular diet, thin liquids. Takes pills whole. Up with A1-2 GBW. C/O back pain decreased with oxycodone. Pt scoring green on the Aggression Stop Light Tool. Plan  To discharge to TCU pending placement.    Belongings: present in the room.

## 2020-11-28 NOTE — PLAN OF CARE
Nursing shift note  Patient here with a T12 fracture. A&Ox3-4, some difficulty with location. CMS intact. Bowel sounds audible, passing flatus, tolerating regular diet. Some assistance with feeding. Incontinent at times. Used bedside commode and bedpan. VSS. Lidocaine patch on middle back. Up with 2/GB/W. C/o back pain, decreased with oxycodone PRN. Pt scoring green on the Aggression Stop Light Tool. Plan to discharge to a TCU.     Behavior & Aggression Tool color:  green    Pt's belongings:  cell phone/electronics, glasses, watch, blanket, pillow, clothing      Home medications: none

## 2020-11-29 PROCEDURE — 250N000013 HC RX MED GY IP 250 OP 250 PS 637: Performed by: INTERNAL MEDICINE

## 2020-11-29 PROCEDURE — 99224 PR SUBSEQUENT OBSERVATION CARE,LEVEL I: CPT | Performed by: PHYSICIAN ASSISTANT

## 2020-11-29 PROCEDURE — 99207 PR NO CHARGE LOS: CPT | Performed by: INTERNAL MEDICINE

## 2020-11-29 PROCEDURE — G0378 HOSPITAL OBSERVATION PER HR: HCPCS

## 2020-11-29 RX ADMIN — LOSARTAN POTASSIUM 50 MG: 50 TABLET, FILM COATED ORAL at 08:30

## 2020-11-29 RX ADMIN — POLYETHYLENE GLYCOL 3350 17 G: 17 POWDER, FOR SOLUTION ORAL at 08:30

## 2020-11-29 RX ADMIN — ACETAMINOPHEN 1000 MG: 500 TABLET, FILM COATED ORAL at 16:09

## 2020-11-29 RX ADMIN — DOCUSATE SODIUM 50 MG AND SENNOSIDES 8.6 MG 2 TABLET: 8.6; 5 TABLET, FILM COATED ORAL at 08:30

## 2020-11-29 RX ADMIN — OXYCODONE HYDROCHLORIDE 2.5 MG: 5 TABLET ORAL at 10:08

## 2020-11-29 RX ADMIN — OXYBUTYNIN CHLORIDE 10 MG: 10 TABLET, EXTENDED RELEASE ORAL at 21:09

## 2020-11-29 RX ADMIN — OXYCODONE HYDROCHLORIDE 2.5 MG: 5 TABLET ORAL at 18:56

## 2020-11-29 RX ADMIN — ACETAMINOPHEN 1000 MG: 500 TABLET, FILM COATED ORAL at 08:30

## 2020-11-29 RX ADMIN — ACETAMINOPHEN 1000 MG: 500 TABLET, FILM COATED ORAL at 21:09

## 2020-11-29 RX ADMIN — LEVOTHYROXINE SODIUM 88 MCG: 88 TABLET ORAL at 08:30

## 2020-11-29 RX ADMIN — AMLODIPINE BESYLATE 2.5 MG: 2.5 TABLET ORAL at 08:30

## 2020-11-29 RX ADMIN — DOCUSATE SODIUM 50 MG AND SENNOSIDES 8.6 MG 1 TABLET: 8.6; 5 TABLET, FILM COATED ORAL at 21:09

## 2020-11-29 RX ADMIN — LIDOCAINE 2 PATCH: 560 PATCH PERCUTANEOUS; TOPICAL; TRANSDERMAL at 08:30

## 2020-11-29 NOTE — PROGRESS NOTES
[facility]  Hospitalist Progress Note  Date of Service (when I saw the patient): 11/29/2020    Summary:  Gennaro Walton is a 89 year old year old female who has a PMH significant for cognitive impairment, possible dementia, hypertension, breast cancer, and known thoracolumbar compression fracture. Pt was admitted to [facility] on 11/25/2020 after presenting for evaluation of acute on chronic back pain.       Acute on chronic back pain  Multilevel chronic thoracolumbar compression fracture, subacute fracture at the T12 level  Recent hospital admission from 11/13-11/18 for acute on chronic back pain.  At that time, she was evaluated by orthopedics as well and could not rule out acute on chronic compression fracture, recommended PT and mobilization.  Now returns with episode of worsening back pain.  It is unclear if it has been worse every day or just had an episode of severe back pain.  She states it comes and goes.  X-ray of the thoracic spine today shows multilevel thoracic compression fractures, T12 compression fracture demonstrating worsening height loss compared to prior exam, probable evolution of subacute fracture.    -Ortho spine surgery consulted, no surgical intervention or brace options,  recommend continued medical management including chronic pain management  -Continue with Tylenol 1000 mg 3 times daily  -Continue Lidoderm patch with morning application  -Continue oxycodone on low-dose 2.5 mg every 8 hours as needed, per discussion with daughter upon admission, patient is sensitive to narcotic pain medication and has tolerated this to this point  -Monitor mental status with narcotic pain medication  -Bowel regimen ordered, BM 11/28  -PT consulted, recommend TCU  -Social work consulted for discharge planning, daughter wants discharge to TCU since patient needs more care than what she is actually getting at her assisted living facility. Patient open to TCU  placement.  Discussed with social work 11/28, referrals sent and awaiting placement. Called daughter Caron 11/28 and updated. All questions answered.      Hypertension  Blood pressure was quite elevated in the emergency room and on arrival was 186/92.   Blood pressures have since improved and are acceptable  -Continue PTA amlodipine 2.5 mg daily, losartan 50 mg daily  -Pain control important for blood pressure control at this point  -Hydralazine 5 mg as needed available for SBP >180     Hypothyroidism  -Continue PTA levothyroxine     Severe malnutrition   She was evaluated by dietitian during her last hospital admission.  -Encourage oral intake  -Likes oatmeal, says she will eat it at any time. Asked nursing to order it for her      DVT Prophylaxis: Pneumatic Compression Devices  Code Status: No CPR- Do NOT Intubate  Disposition: Expected discharge in 0-2 days to TCU when placement obtained.  Social work is involved.    The patient's care was discussed with the Attending Physician, Dr. Sloan, Bedside Nurse, Care Coordinator/, and Patient.    Loraine Fofana PA-C      Interval History   Pt found resting very comfortably in bed. Pain very well-controlled at rest, only complaint is need to void. She's very comfortable and interactive otherwise. Denies fevers, chills, nausea, vomiting, chest pain, difficulty breathing, headache, confusion, or abdominal pain.    -Data reviewed today: I reviewed all new labs and imaging results over the last 24 hours. I personally reviewed no images or EKG's today.    Physical Exam   Temp: 98  F (36.7  C) Temp src: Oral BP: (!) 154/73 Pulse: 58   Resp: 16 SpO2: 92 % O2 Device: None (Room air)    Vitals:    11/25/20 1146   Weight: 45.4 kg (100 lb)     Vital Signs with Ranges  Temp:  [97.6  F (36.4  C)-98.2  F (36.8  C)] 98  F (36.7  C)  Pulse:  [50-66] 58  Resp:  [16-18] 16  BP: (110-154)/(60-73) 154/73  SpO2:  [91 %-94 %] 92 %  I/O last 3 completed shifts:  In: -   Out: 400  [Urine:400]    Constitutional: Elderly and frail-appearing female, cachectic, no acute distress  Eyes: No scleral icterus or injection  ENT: Normocephalic, atraumatic  Respiratory: No secondary muscle use or labored breathing. Lungs clear to auscultation bilaterally without wheezes or rhonchi   Cardiovascular: RRR without murmur  GI: Abdomen soft, non-tender to palpation, non-distended.  Bowel sounds active  MSK: able to move extremities on command without new weakness, pain controlled at rest  Skin/Integumen: warm, dry, in tact without rash or hives  Psych: pleasant, interactive, answers questions appropriately    Medications       acetaminophen  1,000 mg Oral TID     amLODIPine  2.5 mg Oral Daily     levothyroxine  88 mcg Oral Daily     lidocaine  1-2 patch Transdermal Q24H     lidocaine   Transdermal Q8H     losartan  50 mg Oral Daily     oxybutynin ER  10 mg Oral At Bedtime     polyethylene glycol  17 g Oral Daily     senna-docusate  1 tablet Oral BID    Or     senna-docusate  2 tablet Oral BID     sodium chloride (PF)  3 mL Intracatheter Q8H       Data   Recent Labs   Lab 11/25/20  1204   WBC 7.5   HGB 14.2   *         POTASSIUM 4.3   CHLORIDE 100   CO2 29   BUN 14   CR 0.71   ANIONGAP 4   LORETA 9.5   GLC 91       No results found for this or any previous visit (from the past 24 hour(s)).

## 2020-11-29 NOTE — PLAN OF CARE
Nursing shift note  Pt here with a T9 - T12 compression fracture, not a surgical candidate. Alert, disoriented to situation, forgetful at times. CMS assessment showing 4/5 strength in all extremities, 2+ pulses bilaterally, denies N/T, generalized weakness. VSS on RA ex: HTN. Up with A2 using GB and walker. C/o back pain, decreased with warm packs, scheduled tylenol - PRN oxycodone available. Takes pills with applesauce. Currently saline locked. Using bed pan to void. Pt scoring green on the Aggression Stop Light Tool. Discharge plan pending TCU placement.    Behavior & Aggression Tool color:  Green    Pt's belongings:   (Belongings from admission day present in pt's room)

## 2020-11-29 NOTE — PROGRESS NOTES
Care Management Follow Up    Length of Stay (days): 0    Expected Discharge Date: 11/29/20(TCU)     Concerns to be Addressed:       Patient plan of care discussed at interdisciplinary rounds: Yes    Anticipated Discharge Disposition:  TCU     Anticipated Discharge Services:    Anticipated Discharge DME:      Patient/family educated on Medicare website which has current facility and service quality ratings:    Education Provided on the Discharge Plan:    Patient/Family in Agreement with the Plan:      Referrals Placed by CM/SW:  [facility]st choice is [facility].   Private pay costs discussed: private room/amenity fees and transportation costs    Additional Information:  [facility] are currently not reviewing referrals today. PRITESH spoke with patient's daughter Caron to update.      VICKI Hobbs

## 2020-11-30 ENCOUNTER — APPOINTMENT (OUTPATIENT)
Dept: PHYSICAL THERAPY | Facility: CLINIC | Age: 85
End: 2020-11-30
Payer: MEDICARE

## 2020-11-30 VITALS
HEART RATE: 65 BPM | TEMPERATURE: 98.1 F | DIASTOLIC BLOOD PRESSURE: 72 MMHG | SYSTOLIC BLOOD PRESSURE: 134 MMHG | BODY MASS INDEX: 17.07 KG/M2 | OXYGEN SATURATION: 94 % | HEIGHT: 64 IN | RESPIRATION RATE: 16 BRPM | WEIGHT: 100 LBS

## 2020-11-30 LAB
ANION GAP SERPL CALCULATED.3IONS-SCNC: 6 MMOL/L (ref 3–14)
BUN SERPL-MCNC: 13 MG/DL (ref 7–30)
CALCIUM SERPL-MCNC: 9.2 MG/DL (ref 8.5–10.1)
CHLORIDE SERPL-SCNC: 103 MMOL/L (ref 94–109)
CO2 SERPL-SCNC: 26 MMOL/L (ref 20–32)
CREAT SERPL-MCNC: 0.59 MG/DL (ref 0.52–1.04)
ERYTHROCYTE [DISTWIDTH] IN BLOOD BY AUTOMATED COUNT: 13.5 % (ref 10–15)
GFR SERPL CREATININE-BSD FRML MDRD: 77 ML/MIN/{1.73_M2}
GLUCOSE SERPL-MCNC: 122 MG/DL (ref 70–99)
HCT VFR BLD AUTO: 39.9 % (ref 35–47)
HGB BLD-MCNC: 13.3 G/DL (ref 11.7–15.7)
MCH RBC QN AUTO: 33.7 PG (ref 26.5–33)
MCHC RBC AUTO-ENTMCNC: 33.3 G/DL (ref 31.5–36.5)
MCV RBC AUTO: 101 FL (ref 78–100)
PLATELET # BLD AUTO: 313 10E9/L (ref 150–450)
POTASSIUM SERPL-SCNC: 4.4 MMOL/L (ref 3.4–5.3)
RBC # BLD AUTO: 3.95 10E12/L (ref 3.8–5.2)
SODIUM SERPL-SCNC: 135 MMOL/L (ref 133–144)
WBC # BLD AUTO: 7.7 10E9/L (ref 4–11)

## 2020-11-30 PROCEDURE — 250N000013 HC RX MED GY IP 250 OP 250 PS 637: Mod: GY | Performed by: INTERNAL MEDICINE

## 2020-11-30 PROCEDURE — 99207 PR CDG-CODE CATEGORY CHANGED: CPT | Performed by: INTERNAL MEDICINE

## 2020-11-30 PROCEDURE — G0378 HOSPITAL OBSERVATION PER HR: HCPCS

## 2020-11-30 PROCEDURE — 80048 BASIC METABOLIC PNL TOTAL CA: CPT | Performed by: PHYSICIAN ASSISTANT

## 2020-11-30 PROCEDURE — 99217 PR OBSERVATION CARE DISCHARGE: CPT | Performed by: INTERNAL MEDICINE

## 2020-11-30 PROCEDURE — 97530 THERAPEUTIC ACTIVITIES: CPT | Mod: GP

## 2020-11-30 PROCEDURE — 36415 COLL VENOUS BLD VENIPUNCTURE: CPT | Performed by: PHYSICIAN ASSISTANT

## 2020-11-30 PROCEDURE — 85027 COMPLETE CBC AUTOMATED: CPT | Performed by: PHYSICIAN ASSISTANT

## 2020-11-30 RX ORDER — AMOXICILLIN 250 MG
1 CAPSULE ORAL 2 TIMES DAILY
DISCHARGE
Start: 2020-11-30 | End: 2020-12-23 | Stop reason: DRUGHIGH

## 2020-11-30 RX ORDER — OXYCODONE HYDROCHLORIDE 5 MG/1
2.5 TABLET ORAL EVERY 8 HOURS PRN
Qty: 15 TABLET | Refills: 0 | Status: ON HOLD | OUTPATIENT
Start: 2020-11-30 | End: 2022-02-11

## 2020-11-30 RX ADMIN — LOSARTAN POTASSIUM 50 MG: 50 TABLET, FILM COATED ORAL at 11:24

## 2020-11-30 RX ADMIN — ACETAMINOPHEN 1000 MG: 500 TABLET, FILM COATED ORAL at 15:42

## 2020-11-30 RX ADMIN — AMLODIPINE BESYLATE 2.5 MG: 2.5 TABLET ORAL at 11:25

## 2020-11-30 RX ADMIN — OXYCODONE HYDROCHLORIDE 2.5 MG: 5 TABLET ORAL at 11:24

## 2020-11-30 RX ADMIN — POLYETHYLENE GLYCOL 3350 17 G: 17 POWDER, FOR SOLUTION ORAL at 11:24

## 2020-11-30 RX ADMIN — ACETAMINOPHEN 1000 MG: 500 TABLET, FILM COATED ORAL at 08:28

## 2020-11-30 RX ADMIN — LEVOTHYROXINE SODIUM 88 MCG: 88 TABLET ORAL at 08:28

## 2020-11-30 RX ADMIN — LIDOCAINE 2 PATCH: 560 PATCH PERCUTANEOUS; TOPICAL; TRANSDERMAL at 08:28

## 2020-11-30 NOTE — PROGRESS NOTES
Physical Therapy Discharge Summary    Reason for therapy discharge:    Discharged to transitional care facility.    Progress towards therapy goal(s). See goals on Care Plan in [facility] electronic health record for goal details.  Goals not met.  Barriers to achieving goals:   discharge from facility.    Therapy recommendation(s):    Continued therapy is recommended.  Rationale/Recommendations:   .        11/30/20 1055   Signing Clinician's Name / Credentials   Signing clinician's name / credentials Brittany Dressler, PT   Quick Adds   Rehab Discipline PT   Therapeutic Activity   Minutes of Treatment 15 minutes   Symptoms Noted During/After Treatment Increased pain   Treatment Detail PT: Greg bed mob - less pain with logroll with rail and technique cues, Supervision sitting balance with UE use and shoulders guarding noted, CGA sit-stands, pivots & 8' to chair with RW with safety cues - severe pain, ceased. Increased time for positioning for comfort. RN reached for pain med reassessment as well as possibility of spinal stab for pain mangaement (even an ab binder might offer some support/comfort). Pt edu with teach back on LAQs for sustaining leg strength on her own outside of PT.    PT Discharge Planning    PT Discharge Recommendation (DC Rec) Transitional Care Facility   PT Rationale for DC Rec Pain limiting given subacute on chronic compression fractures, working with RN on pain meds and possible spinal stab (even an ab binder might help.)   PT Brief overview of current status  Greg bed mob, Supervision sitting balance, CGA sit-stands, pivots & 8' to chair with RW - severe pain, ceased.    Additional Documentation   PT Plan PT: Mobility progressions as pain allows.    Total Session Time   Total Session Time (minutes) 15 minutes

## 2020-11-30 NOTE — PLAN OF CARE
Nursing shift note  Pt here with T12 fracture. A&Ox3, disoriented to place. Forgetful. Neuros intact. VSS. Regular diet, thin liquids. Takes pills whole. Up with 2/GB/W. Incontinent at times, up to bedside commode or bedpan. 1 BM today. Pain in back, decreased with oxycodone PRN and hot packs. Pt scoring green on the Aggression Stop Light Tool. Plan to discharge pending.     Behavior & Aggression Tool color:  green    Pt's belongings:   remains with patient      Home medications: none

## 2020-11-30 NOTE — PROGRESS NOTES
Patient left the floor in stable condition via [facility] stretcher. Discharge package provided.  Belongings: clothing,glassess, cell phone, watch.

## 2020-11-30 NOTE — PLAN OF CARE
Nursing shift note  Pt here with a chronic thoracolumbar compression fracture and subacute fracture at the T12 level, not a surgical candidate. Alert, disoriented to place and forgetful at times. CMS assessment showing 4/5 strength in all extremities, 2+ pulses bilaterally, denies N/T, generalized weakness. VSS on RA. Up with A2 using GB and walker. C/o back pain, decreased with warm packs and repositioning, PRN oxycodone available. Takes pills with applesauce. PIV SL. Using bed pan to void. Pt scoring green on the Aggression Stop Light Tool. Discharge plan pending TCU placement.  Behavior & Aggression Tool color:  Green    Pt's belongings:   (Belongings from admission day present in pt's room)

## 2020-11-30 NOTE — PLAN OF CARE
Pt A&Ox2-3, forgetful at times. VSS on RA. CMS intact. Voiding via bedpan, incontinent at times. Turn sided to sided. Pain managed with scheduled tylenol and repositioning. IV SL. Tolerating regular diet. Discharge pending tcu placement.   Behavior: green  Pt belongings remain with pt.  Will continue to monitor.

## 2020-11-30 NOTE — DISCHARGE SUMMARY
[facility]  Hospitalist Discharge Summary      Date of Admission:  11/25/2020  Date of Discharge:  11/30/2020  Discharging Provider: Liliana Sloan MD      Discharge Diagnoses   Acute on chronic back pain  Multilevel chronic thoracolumbar compression fracture, subacute fracture at the T12 level  Hypertension  Hypothyroidism  Severe malnutrition     Follow-ups Needed After Discharge   Follow-up Appointments     Follow Up and recommended labs and tests      Follow up with Nursing home physician.  No follow up labs or test are   needed.           Unresulted Labs Ordered in the Past 30 Days of this Admission     No orders found from 10/26/2020 to 11/26/2020.          Discharge Disposition   Discharged to short-term care facility  Condition at discharge: Stable      Hospital Course   Gennaro Walton is a 89 year old year old female who has a PMH significant for cognitive impairment, possible dementia, hypertension, breast cancer, and known thoracolumbar compression fracture. Pt was admitted to [facility] on 11/25/2020 after presenting for evaluation of acute on chronic back pain.       Acute on chronic back pain  Multilevel chronic thoracolumbar compression fracture, subacute fracture at the T12 level  Recent hospital admission from 11/13-11/18 for acute on chronic back pain.  At that time, she was evaluated by orthopedics as well and could not rule out acute on chronic compression fracture, recommended PT and mobilization.  Now returns with episode of worsening back pain.  It is unclear if it has been worse every day or just had an episode of severe back pain.  She states it comes and goes.  X-ray of the thoracic spine today shows multilevel thoracic compression fractures, T12 compression fracture demonstrating worsening height loss compared to prior exam, probable evolution of subacute fracture.    ? Ortho spine surgery consulted, no surgical intervention or brace  options,  recommend continued medical management including chronic pain management  ? Continue with Tylenol 1000 mg 3 times daily  ? Continue Lidoderm patch with morning application  ? Continue oxycodone on low-dose 2.5 mg every 8 hours as needed, per discussion with daughter upon admission, patient is sensitive to narcotic pain medication and has tolerated this to this point  ? Monitor mental status with narcotic pain medication  ? Bowel regimen ordered, BM 11/28  ? PT consulted, recommend TCU, patient and family agree     Hypertension  Blood pressure was quite elevated in the emergency room and on arrival was 186/92.   Blood pressures have since improved and are acceptable  ? Continue PTA amlodipine 2.5 mg daily, losartan 50 mg daily  ? Pain control important to achieve blood pressure control at this point     Hypothyroidism  ? Continue PTA levothyroxine     Severe malnutrition   She was evaluated by dietitian during her last hospital admission.  ? Encourage oral intake  ? Likes oatmeal, says she will eat it at any time. Asked nursing to order it for her    Consultations This Hospital Stay   CARE MANAGEMENT / SOCIAL WORK IP CONSULT  PHYSICAL THERAPY ADULT IP CONSULT  ORTHOPEDIC SURGERY IP CONSULT  PHYSICAL THERAPY ADULT IP CONSULT  OCCUPATIONAL THERAPY ADULT IP CONSULT    Code Status   No CPR- Do NOT Intubate    Time Spent on this Encounter   I, Liliana Sloan MD, personally saw the patient today and spent greater than 30 minutes discharging this patient.       Liliana Sloan MD  [address] STRO[facility]  [facility] [place] [address]  Phone: [phone]  ______________________________________________________________________    Physical Exam   Vital Signs: Temp: 98.1  F (36.7  C) Temp src: Oral BP: 134/72 Pulse: 65   Resp: 16 SpO2: 94 % O2 Device: None (Room air)    Weight: 100 lbs 0 oz  I saw and examined the patient on the date of discharge.      Primary Care Physician    Mady Hagen    Discharge Orders      General info for SNF    Length of Stay Estimate: Short Term Care: Estimated # of Days <30  Condition at Discharge: Improving  Level of care:skilled   Rehabilitation Potential: Excellent  Admission H&P remains valid and up-to-date: Yes  Recent Chemotherapy: N/A  Use Nursing Home Standing Orders: Yes     Mantoux instructions    Give two-step Mantoux (PPD) Per Facility Policy Yes     Reason for your hospital stay    You have a compression fracture of one of the vertebrae of your back.     Follow Up and recommended labs and tests    Follow up with Nursing home physician.  No follow up labs or test are needed.     Activity - Up with nursing assistance     No CPR- Do NOT Intubate     Physical Therapy Adult Consult    Evaluate and treat as clinically indicated.    Reason:  Deconditioning, impaired gait     Occupational Therapy Adult Consult    Evaluate and treat as clinically indicated.    Reason:  deconditioning     Advance Diet as Tolerated    Follow this diet upon discharge: Orders Placed This Encounter      Snacks/Supplements Adult: Boost Plus; With Meals      Snacks/Supplements Adult: Magic Cup; With Meals      Regular Diet Adult       Significant Results and Procedures   Most Recent 3 CBC's:  Recent Labs   Lab Test 11/30/20  1240 11/25/20  1204 11/14/20  0714   WBC 7.7 7.5 5.9   HGB 13.3 14.2 12.4   * 101* 100    319 220     Most Recent 3 BMP's:  Recent Labs   Lab Test 11/30/20  1240 11/25/20  1204 11/14/20  0714    133 136   POTASSIUM 4.4 4.3 3.5   CHLORIDE 103 100 102   CO2 26 29 28   BUN 13 14 9   CR 0.59 0.71 0.51*   ANIONGAP 6 4 6   LORETA 9.2 9.5 8.8   * 91 85   ,   Results for orders placed or performed during the hospital encounter of 11/25/20   XR Thoracic Spine 2 Views    Narrative    THORACIC SPINE TWO VIEWS  11/25/2020 12:51 PM     HISTORY: Pain.    COMPARISON: Chest CT 11/13/2020      Impression    IMPRESSION: Multilevel thoracic vertebral  body compression fractures  are present (T8, T9, and T12). T12 compression fracture demonstrates  worsened height loss compared to the prior exam, probably evolution of  subacute fracture. Lumbar compression fractures are also present, not  appreciably changed. Alignment is unchanged with accentuation of the  normal thoracic kyphosis and dextroconvex curvature. Diffuse osseous  demineralization is present.    MARIAN CLIFTON MD   XR Chest 2 Views    Narrative    CHEST TWO VIEWS 11/26/2020 3:14 PM     HISTORY: Shortness of breath    COMPARISON: November 14, 2020       Impression    IMPRESSION: There are no acute infiltrates. The cardiac silhouette is  not enlarged. Pulmonary vasculature is unremarkable.    LUPILLO REDDY MD       Discharge Medications   Current Discharge Medication List      START taking these medications    Details   senna-docusate (SENOKOT-S/PERICOLACE) 8.6-50 MG tablet Take 1 tablet by mouth 2 times daily  Qty:      Associated Diagnoses: Compression fracture of T12 vertebra, initial encounter (H)         CONTINUE these medications which have CHANGED    Details   oxyCODONE (ROXICODONE) 5 MG tablet Take 0.5 tablets (2.5 mg) by mouth every 8 hours as needed for moderate to severe pain  Qty: 15 tablet, Refills: 0    Associated Diagnoses: Compression fracture of T12 vertebra, initial encounter (H)         CONTINUE these medications which have NOT CHANGED    Details   !! acetaminophen (TYLENOL) 500 MG tablet Take 2 tablets (1,000 mg) by mouth 3 times daily    Associated Diagnoses: Acute post-operative pain      amLODIPine (NORVASC) 5 MG tablet Take 2.5 mg by mouth daily       calcium carbonate (TUMS) 500 MG chewable tablet Take 1 chew tab by mouth daily       levothyroxine (SYNTHROID/LEVOTHROID) 88 MCG tablet Take 88 mcg by mouth daily      Lidocaine (LIDOCARE) 4 % Patch Place 2 patches onto the skin every morning To prevent lidocaine toxicity, patient should be patch free for 12 hrs daily. To right  shoulder, right posterior ribs. Remove in the evening  Qty: 15 patch, Refills: 0    Associated Diagnoses: Closed compression fracture of thoracolumbar vertebra, initial encounter (H)      losartan (COZAAR) 50 MG tablet Take 50 mg by mouth daily      Menthol, Topical Analgesic, (BIOFREEZE) 4 % GEL Externally apply topically every 4 hours as needed (R hip and other painful areas)      oxybutynin ER (DITROPAN-XL) 10 MG 24 hr tablet Take 1 tablet (10 mg) by mouth At Bedtime    Comments: If urinating without difficulty then start August 2nd.  Associated Diagnoses: Urinary frequency      polyethylene glycol (MIRALAX/GLYCOLAX) packet Take 17 g by mouth daily    Associated Diagnoses: Left displaced femoral neck fracture (H)      sennosides (SENOKOT) 8.6 MG tablet Take 2 tablets by mouth daily as needed for constipation       vitamin D3 (CHOLECALCIFEROL) 2000 units tablet Take 1 tablet by mouth daily      !! acetaminophen (TYLENOL) 500 MG tablet Take 1,000 mg by mouth every 6 hours as needed for mild pain See scheduled doses also. Max 4000mg in 24 hours       !! - Potential duplicate medications found. Please discuss with provider.        Allergies   Allergies   Allergen Reactions     Sulfacetamide Hives     Tramadol      Lethargic per patient's daughter Caron.

## 2020-11-30 NOTE — PLAN OF CARE
Chronic back pain/compression fracture - non surgical. A&O. Neuros/CMS  - body stiffness, needs encouragement, patient moving all extremities slowly/spontaneous, difficult to assess due to pain. BM overnight/poor appetite - boost shake assisted with intake/encouraged.  VSS, room air. Up with 1-2 assist/bedrest with 2 to boost & reposition/gait belt & walker utilized. C/o middle/lower right hip & back discomfort, some relief with extra strength tylenol scheduled/oxycodone/lidocaine/cold packs. Pt scoring green on the Aggression Stop Light Tool. Discharging to TCU this afternoon at 1600 via HE transport.    Belongings - glasses, cell phone, pen, clothing, watch.

## 2020-11-30 NOTE — PROGRESS NOTES
Care Management Follow Up    Length of Stay (days): 0    Expected Discharge Date: 11/29/20(TCU)     Concerns to be Addressed:     N/A  Patient plan of care discussed at interdisciplinary rounds: Yes    Anticipated Discharge Disposition:  [facility]     Anticipated Discharge Services:    Anticipated Discharge DME:      Patient/family educated on Medicare website which has current facility and service quality ratings:  Yes  Education Provided on the Discharge Plan:  Yes  Patient/Family in Agreement with the Plan:  YEs    Referrals Placed by CM/SW:  Yes  Private pay costs discussed: Possible transport costs, if applicable, accepted by dtr    Additional Information:    Dtr had preferred [facility]CU. SW left  asking for call back.  PRITESH received a call from [facility] who has a bed for pt today. Dtr updated and has decided on [facility]. Pt may require a stretcher due to pain/inability to sit due to vertebral fracture/Trunk weakness/instability.      VICKI Morales   [facility]  [phone]    UPDATE@1112: PRITESH set up [facility] stretcher for: 4pm due to trunkweakness/pain, inability to sit. PCS form completed, faxed to [facility] and placed on front of chart. PRITESH updated [facility]. Awaiting orders.     UPDATE@1867: [facility] calls back; can also take pt today; Dtr aware but continues to choose [facility].

## 2020-11-30 NOTE — PROGRESS NOTES
Care Management Discharge Note    Discharge Date: 11/30/20  Expected Time of Departure: 4pm(4pm)    Discharge Disposition: Transitional Care    Discharge Services:      Discharge DME:      Discharge Transportation: health plan transportation    Private pay costs discussed: transportation costs. Dtr accepts any charges for stretcher, if applicable    PAS Confirmation Code: [id number]  Patient/family educated on Medicare website which has current facility and service quality ratings: yes    Education Provided on the Discharge Plan:  Per RN  Persons Notified of Discharge Plans: Yes  Patient/Family in Agreement with the Plan: yes    Handoff Referral Completed: Yes    Additional Information:    Orders, script and PAS were faxed to facility. No further SW interventions anticipated at this time.    PAS-RR    D: Per DHS regulation, SW completed and submitted PAS-RR to [place] [employer] via the Senior [employer] Line.  PAS-RR confirmation # is : [id number]    P: Further questions may be directed to Senior [employer] Line at #[phone], option #4 for PAS-RR staff.        VICKI Morales   [facility]  [phone]

## 2020-12-01 ENCOUNTER — NURSING HOME VISIT (OUTPATIENT)
Dept: GERIATRICS | Facility: CLINIC | Age: 85
End: 2020-12-01
Payer: MEDICARE

## 2020-12-01 VITALS
TEMPERATURE: 97.3 F | WEIGHT: 100 LBS | HEART RATE: 54 BPM | SYSTOLIC BLOOD PRESSURE: 106 MMHG | DIASTOLIC BLOOD PRESSURE: 46 MMHG | RESPIRATION RATE: 20 BRPM | HEIGHT: 63 IN | OXYGEN SATURATION: 92 % | BODY MASS INDEX: 17.72 KG/M2

## 2020-12-01 DIAGNOSIS — I10 ESSENTIAL HYPERTENSION: ICD-10-CM

## 2020-12-01 DIAGNOSIS — K59.01 SLOW TRANSIT CONSTIPATION: ICD-10-CM

## 2020-12-01 DIAGNOSIS — M40.205 KYPHOSIS OF THORACOLUMBAR REGION, UNSPECIFIED KYPHOSIS TYPE: ICD-10-CM

## 2020-12-01 DIAGNOSIS — S32.010D CLOSED COMPRESSION FRACTURE OF THORACOLUMBAR VERTEBRA WITH ROUTINE HEALING, SUBSEQUENT ENCOUNTER: Primary | ICD-10-CM

## 2020-12-01 DIAGNOSIS — M80.00XD AGE-RELATED OSTEOPOROSIS WITH CURRENT PATHOLOGICAL FRACTURE WITH ROUTINE HEALING, SUBSEQUENT ENCOUNTER: ICD-10-CM

## 2020-12-01 DIAGNOSIS — R53.81 PHYSICAL DECONDITIONING: ICD-10-CM

## 2020-12-01 DIAGNOSIS — R41.89 COGNITIVE IMPAIRMENT: ICD-10-CM

## 2020-12-01 DIAGNOSIS — N18.31 STAGE 3A CHRONIC KIDNEY DISEASE (H): ICD-10-CM

## 2020-12-01 DIAGNOSIS — N39.41 URGE INCONTINENCE OF URINE: ICD-10-CM

## 2020-12-01 DIAGNOSIS — S22.080D CLOSED COMPRESSION FRACTURE OF THORACOLUMBAR VERTEBRA WITH ROUTINE HEALING, SUBSEQUENT ENCOUNTER: Primary | ICD-10-CM

## 2020-12-01 DIAGNOSIS — Z71.89 ADVANCED DIRECTIVES, COUNSELING/DISCUSSION: ICD-10-CM

## 2020-12-01 DIAGNOSIS — E46 PROTEIN-CALORIE MALNUTRITION, UNSPECIFIED SEVERITY (H): ICD-10-CM

## 2020-12-01 PROCEDURE — 99310 SBSQ NF CARE HIGH MDM 45: CPT | Performed by: NURSE PRACTITIONER

## 2020-12-01 ASSESSMENT — MIFFLIN-ST. JEOR: SCORE: 807.73

## 2020-12-01 NOTE — PROGRESS NOTES
[facility]  INITIAL VISIT NOTE  December 2, 2020    PRIMARY CARE PROVIDER AND CLINIC:  Mady Hagen [address] / [place] [address]    CHIEF COMPLAINT:  Hospital follow-up/Initial visit    HPI:    Gennaro Walton is a 89 year old  (9/8/1923) female who was seen at [facility] on December 2, 2020 for an initial visit.     Medical history is notable for memory impairment, breast cancer, hypertension, hypothyroidism, and osteoporosis.    Summary of hospital course:  Patient was hospitalized at [facility] from November 25 through November 30, 2020 after presenting for evaluation of acute on chronic mid back pain.  Imaging studies revealed multiple thoracic vertebral body compression fractures including T8, T9, and T12 as well as multiple lumbar compression fractures.  T12 compression fracture demonstrated worsening height loss compared to the prior exam on November 13, 2020 indicative of evolution of subacute fracture.  Patient was evaluated by neurosurgery and was not deemed a surgical candidate.  Also because of her hyperkyphosis, bracing was not recommended.  Neurosurgery recommended continued medical pain management.  TCU was recommended by therapies. BPs were elevated on hospital admission and improved with better pain control.     Patient is admitted to this facility for medical management, nursing care, and rehab.     Today's visit:  Patient was seen in her room, while sitting in a chair.  She is frail appearing.  She complains of moderate-severe neck pain radiating down to her thoracic back.  However, she appears comfortable.  She denies fever, chills, chest pain, dyspnea, palpitation, nausea, vomiting, abdominal pain, or urinary symptoms.  She reports a bowel movement earlier today.    CODE STATUS:   DNR / DNI    PAST MEDICAL HISTORY:   Dementia  Left breast cancer, status post mastectomy with reconstruction in  1985  Hypertension  Hypothyroidism  Osteoporosis  Fall with traumatic subarachnoid hemorrhage and left hip fracture in 2019   Fall with right hip fracture in 2020  Chronic bilateral shoulder pain with advanced right shoulder rotator cuff arthropathy and full-thickness tear of the supraspinatus and infraspinatus tendons, noted on MRI on 2020  Multiple thoracic and lumbar compression fractures  Urge incontinence  Diverticulum of esophagus  Insomnia  Left adrenal nodule, likely adenoma, measuring 2.6 cm, noted on CT angiogram on 2020  Severe malnutrition    Past Medical History:   Diagnosis Date     Breast cancer (H)      CKD (chronic kidney disease) stage 3, GFR 30-59 ml/min (H)      Cognitive impairment      Hypertension      Hypothyroid      Osteoporosis        PAST SURGICAL HISTORY:   Past Surgical History:   Procedure Laterality Date     ------------OTHER-------------      Left mastectomy     ------------OTHER-------------      Cataract surgery     OPEN REDUCTION INTERNAL FIXATION FEMUR MIDSHAFT Left 2019    Procedure: LEFT INTERTROCHANTERIC FEMUR FRACTURE OPEN REDUCTION AND INTERNAL FIXATION;  Surgeon: Chris Oseguera MD;  Location:  OR     OPEN REDUCTION INTERNAL FIXATION HIP NAILING Right 2020    Procedure: OPEN REDUCTION INTERNAL FIXATION RIGHT INTERTROCHANTERIC FEMUR FRACTURE;  Surgeon: Catalino Casper MD;  Location:  OR       FAMILY HISTORY:   Father  of heart attack at age of 46.  Mother probably had parotid gland tumor. Daughter had vaginal cancer.     SOCIAL HISTORY:  Patient is .  She resides in [facility] facility.  She uses a walker for ambulation.      Social History     Tobacco Use     Smoking status: Never Smoker     Smokeless tobacco: Never Used   Substance Use Topics     Alcohol use: No     Frequency: Never       MEDICATIONS:  Current Outpatient Medications   Medication Sig Dispense Refill     acetaminophen (TYLENOL)  500 MG tablet Take 1,000 mg by mouth every 6 hours as needed for mild pain See scheduled doses also. Max 4000mg in 24 hours       acetaminophen (TYLENOL) 500 MG tablet Take 2 tablets (1,000 mg) by mouth 3 times daily       amLODIPine (NORVASC) 5 MG tablet Take 2.5 mg by mouth daily        calcium carbonate (TUMS) 500 MG chewable tablet Take 1 chew tab by mouth daily        levothyroxine (SYNTHROID/LEVOTHROID) 88 MCG tablet Take 88 mcg by mouth daily       Lidocaine (LIDOCARE) 4 % Patch Place 2 patches onto the skin every morning To prevent lidocaine toxicity, patient should be patch free for 12 hrs daily. To right shoulder, right posterior ribs. Remove in the evening 15 patch 0     losartan (COZAAR) 50 MG tablet Take 50 mg by mouth daily       Menthol, Topical Analgesic, (BIOFREEZE) 4 % GEL Externally apply topically every 4 hours as needed (R hip and other painful areas)       oxybutynin ER (DITROPAN-XL) 10 MG 24 hr tablet Take 1 tablet (10 mg) by mouth At Bedtime       oxyCODONE (ROXICODONE) 5 MG tablet Take 0.5 tablets (2.5 mg) by mouth every 8 hours as needed for moderate to severe pain 15 tablet 0     polyethylene glycol (MIRALAX/GLYCOLAX) packet Take 17 g by mouth daily       senna-docusate (SENOKOT-S/PERICOLACE) 8.6-50 MG tablet Take 1 tablet by mouth 2 times daily       sennosides (SENOKOT) 8.6 MG tablet Take 2 tablets by mouth daily as needed for constipation        vitamin D3 (CHOLECALCIFEROL) 2000 units tablet Take 1 tablet by mouth daily         ALLERGIES:  Allergies   Allergen Reactions     Sulfacetamide Hives     Tramadol      Lethargic per patient's daughter Caron.        ROS:  10 point ROS was attempted but was limited, given patient's underlying cognitive impairment. It was reviewed as much as possible as outlined in HPI.    PHYSICAL EXAM:  Vital signs were reviewed in the chart.  Blood pressure 146/74, heart rate 84, respiratory rate 20, temperature 97.8  F, oxygen saturation 94%, weight 100  LBS  Gen: Weak and frail appearing  HEENT: Normocephalic; oropharynx clear  Card: Normal S1, S2, RRR  Resp: Lungs clear to auscultation bilaterally  GI: Abdomen soft, non-tender, non-distended, +BS  Ext: No LE edema  Neuro: CX II-XII grossly intact; ROM in all four extremities grossly intact  Psych: Alert and oriented x2-3; normal affect  Skin: No acute rash    LABORATORY/IMAGING DATA:    Most Recent 3 CBC's:  Recent Labs   Lab Test 11/30/20  1240 11/25/20  1204 11/14/20  0714   WBC 7.7 7.5 5.9   HGB 13.3 14.2 12.4   * 101* 100    319 220     Most Recent 3 BMP's:  Recent Labs   Lab Test 11/30/20  1240 11/25/20  1204 11/14/20  0714    133 136   POTASSIUM 4.4 4.3 3.5   CHLORIDE 103 100 102   CO2 26 29 28   BUN 13 14 9   CR 0.59 0.71 0.51*   ANIONGAP 6 4 6   LORETA 9.2 9.5 8.8   * 91 85     Most Recent Cholesterol Panel:No lab results found.  Most Recent TSH and T4:No lab results found.  Most Recent Hemoglobin A1c:No lab results found.    ASSESSMENT/PLAN:  Acute on chronic back pain,  Multiple chronic thoracolumbar compression fractures,  Subacute T12 fracture,  Thoracolumbar kyphosis,  Age related osteoporosis,  Physical deconditioning.  Not a candidate for surgical intervention or bracing.  Patient complains of moderate to severe pain, but clinically appears comfortable.  Plan:  Continue pain management with scheduled acetaminophen, lidocaine patch, and as needed oxycodone  PT/OT evaluation and therapy  Adjust pain regimen as indicated    Protein-calorie malnutrition, unspecified severity.   Weight is unchanged from her TCU stay earlier this year.   Plan:   Dietician to evaluate the patient    Hypertension.  Blood pressure is at times mildly elevated likely pain mediated.  Plan:  Optimize pain management  Increase PTA amlodipine 2.5 mg p.o. daily and losartan 50 mg p.o. daily  Continue to monitor blood pressure     Hypothyroidism.  Last TSH was 4.17 in January 2020.  Plan:  Continue PTA  levothyroxine 88 mcg p.o. daily  Repeat TSH in January 2021     Urinary urge incontinence.  Plan:  Continue PTA oxybutynin ER 10 mg p.o. at bedtime     Slow transit constipation.  Plan:  Continue bowel regimen     Dementia.  She had SLUMS 10/30 and CPT 4.5/5.6 during TCU stay in 2/2020  Plan:  Implement standard precautionary measures for prevention of delirium  Staff to assist with daily care and mobility      Orders written by provider at facility:  None.        Electronically signed by:  Benigno Joyner MD

## 2020-12-01 NOTE — PROGRESS NOTES
[facility]  PRIMARY CARE PROVIDER AND CLINIC:  Mady Hagen MD, [address] / [place] [address]  Chief Complaint   Patient presents with     Hospital F/U     [facility] Record Number:  [id number]  Place of Service where encounter took place:  [facility] [[id number]]    Gennaro Walton  is a 89 year old  (9/8/1923), admitted to the above facility from  [facility]. Hospital stay 11/25/2020 through 11/30/2020.  Admitted to this facility for  rehab, medical management and nursing care.    HPI:    HPI information obtained from: facility chart records, facility staff, patient report and [facility] Epic chart review and daughter Caron Walton.     Brief Summary of Hospital Course:   She has a medical history significant for HTN, hypothyroidism, CKD stage 3,  osteoporosis, multiple compression fractures, breast cancer in 1985,  cognitive impairment and was hospitalized after presenting to the ED with back pain. Recent hospitalization 11/13-11/16/2020 due to acute on chronic thoracolumbar compression fractures.  She returned to her AL, but pain worsened and she was no longer able to manage. No recent falls or trauma. XR thoracic spine 11/25/2020  showed thoracic vertebral compression fractures at T8, T9 and T12. The T12 compression fracture demonstrated worsened height loss, probable evolution of a subacute fracture. Lumbar compression fractures were noted and unchanged. Ortho Spine consulted and recommended pain management. Tylenol, lidocaine patch and low dose oxycodone were started. BPs were elevated on hospital admission and improved with better pain control.     Updates on Status Since Skilled nursing Admission:   Reports fatigue and weakness. Comfortable when in bed, but reports pain is severe with movement. Fair appetite. Denies GI symptoms and had a BM today. She is a fair historian, pleasant and conversant.     CODE  STATUS/ADVANCE DIRECTIVES DISCUSSION:   DNR / DNI  Patient's living condition: lives in an assisted living facility, [facility]. She receives med management, meals and assist with toileting and bathing.   ALLERGIES: Sulfacetamide and Tramadol  PAST MEDICAL HISTORY:  has a past medical history of Breast cancer (H), CKD (chronic kidney disease) stage 3, GFR 30-59 ml/min, Cognitive impairment, Hypertension, Hypothyroid, and Osteoporosis. She also has no past medical history of Complication of anesthesia, Difficult intubation, Malignant hyperthermia, Motion sickness, or PONV (postoperative nausea and vomiting).  PAST SURGICAL HISTORY:   has a past surgical history that includes Open Reduction Internal Fixation Femur Midshaft (Left, 7/26/2019); ------------other-------------; ------------other-------------; and Open reduction internal fixation hip nailing (Right, 2/1/2020).  FAMILY HISTORY: family history is not on file.  SOCIAL HISTORY:   reports that she has never smoked. She has never used smokeless tobacco. She reports that she does not drink alcohol or use drugs.    Post Discharge Medication Reconciliation Status: discharge medications reconciled, continue medications without change    Current Outpatient Medications   Medication Sig Dispense Refill     acetaminophen (TYLENOL) 500 MG tablet Take 2 tablets (1,000 mg) by mouth 3 times daily       amLODIPine (NORVASC) 5 MG tablet Take 2.5 mg by mouth daily        calcium carbonate (TUMS) 500 MG chewable tablet Take 1 chew tab by mouth daily        levothyroxine (SYNTHROID/LEVOTHROID) 88 MCG tablet Take 88 mcg by mouth daily       Lidocaine (LIDOCARE) 4 % Patch Place 2 patches onto the skin every morning To prevent lidocaine toxicity, patient should be patch free for 12 hrs daily. To right shoulder, right posterior ribs. Remove in the evening 15 patch 0     losartan (COZAAR) 50 MG tablet Take 50 mg by mouth daily       Menthol, Topical Analgesic, (BIOFREEZE) 4 %  "GEL Externally apply topically every 4 hours as needed (R hip and other painful areas)       oxybutynin ER (DITROPAN-XL) 10 MG 24 hr tablet Take 1 tablet (10 mg) by mouth At Bedtime       oxyCODONE (ROXICODONE) 5 MG tablet Take 0.5 tablets (2.5 mg) by mouth every 8 hours as needed for moderate to severe pain 15 tablet 0     polyethylene glycol (MIRALAX/GLYCOLAX) packet Take 17 g by mouth daily       senna-docusate (SENOKOT-S/PERICOLACE) 8.6-50 MG tablet Take 1 tablet by mouth 2 times daily       sennosides (SENOKOT) 8.6 MG tablet Take 2 tablets by mouth daily as needed for constipation        vitamin D3 (CHOLECALCIFEROL) 2000 units tablet Take 1 tablet by mouth daily           ROS:  4 point ROS including Respiratory, CV, GI and , other than that noted in the HPI,  is negative    Vitals:  /46   Pulse 54   Temp 97.3  F (36.3  C)   Resp 20   Ht 1.6 m (5' 3\")   Wt 45.4 kg (100 lb)   SpO2 92%   BMI 17.71 kg/m    Exam:  GENERAL APPEARANCE:  Alert, thin, frail appearing  ENT:  [place]  EYES:  EOM normal, conjunctiva and lids normal  NECK:  No adenopathy,masses or thyromegaly  RESP:  respiratory effort and palpation of chest normal, lungs clear to auscultation , no respiratory distress  CV:  Palpation and auscultation of heart done , regular rate and rhythm, no murmur,  no edema, +2 pedal pulses  ABDOMEN:  normal bowel sounds, soft, nontender, no distension, no  masses  M/S:   in bed. CAPMO with good strength. Kyphotic   SKIN:  no rashes or open areas  PSYCH:  oriented to self, place, situation, insight and judgement impaired, memory impaired , affect and mood normal    Lab/Diagnostic data:  Recent labs in [facility] reviewed by me today.     ASSESSMENT / PLAN:  (S22.080D,  S32.010D) Closed compression fracture of thoracolumbar vertebra with routine healing, subsequent encounter  (primary encounter diagnosis)  (M80.00XD) Age-related osteoporosis with current pathological fracture with routine healing, subsequent " encounter  (M40.205) Kyphosis of thoracolumbar region, unspecified kyphosis type  Comment: multiple compression fractures with subacute T12 fracture. Pain appears to be slowly improving, though remains severe with activity.   Plan: continue tylenol, lidocaine patch, low dose oxycodone-monitor for adverse effects. Therapies.     (E46) Protein-calorie malnutrition, unspecified severity (H)  Comment: weight is unchanged from her tcu stay earlier this year. No acute GI symptoms or trouble chewing/swallowing   Plan: dietician to consult for supplements     (N18.31) Stage 3a chronic kidney disease  Comment: renal function at baseline with creatinine 0.59   Plan: follow BMP     (I10) Essential hypertension  Comment: BPs were elevated last night at 164/75 and 178/69. BP is 106/42 with HR 54 this am. Pain likely contributing to elevated readings   Plan: continue amlodipine, losartan. Monitor VS and adjust meds as indicated. Avoid hypotension due to advanced age and fall risk.     (N39.41) Urge incontinence of urine  Comment: no s/s of infection   Plan: continue oxybutynin     (K59.01) Slow transit constipation  Comment: managed   Plan: continue bowel regimen     (R41.89) Cognitive impairment  Comment: appears to have moderate to severe deficits. She had SLUMS 10/30 and CPT 4.5/5.6 during tcu stay in 2/2020. Scores are consistent with dementia   Plan: cognitive testing per therapies. Staff to assist with cares.     (R53.81) Physical deconditioning  Comment: due to acute injury and multiple comorbidities. She receives help with toileting and bathing at her AL, is independent with dressing and mobility at baseline   Plan: PHYSICAL THERAPY/OT. Goal is to return to her AL with higher level of care.     (Z71.89) Advanced directives, counseling/discussion  Comment: healthcare directive reviewed with daughter who confirms DNR/DNI  Plan: POLST completed and orders updated .        Total time spent with patient visit at the skilled  nursing facility was 35 min including patient visit, review of past records and phone call to patient contact. Greater than 50% of total time spent with counseling and coordinating care due to establishing care at the facility by: coordinating admission orders, medication orders, pain management and plan of care with facility staff. Counseling patient and daughter re: reason for hospitalization and treatment, results of inpatient imaging and labs, pain management, plan of care and expected duration of tcu stay, discussed potential need for higher level of services in AL and advanced directive with patient's daughter.     Electronically signed by:  RITA Kilgore CNP

## 2020-12-02 ENCOUNTER — NURSING HOME VISIT (OUTPATIENT)
Dept: GERIATRICS | Facility: CLINIC | Age: 85
End: 2020-12-02
Payer: MEDICARE

## 2020-12-02 ENCOUNTER — HOSPITAL LABORATORY (OUTPATIENT)
Dept: OTHER | Facility: CLINIC | Age: 85
End: 2020-12-02

## 2020-12-02 VITALS
OXYGEN SATURATION: 94 % | HEART RATE: 84 BPM | DIASTOLIC BLOOD PRESSURE: 74 MMHG | HEIGHT: 63 IN | WEIGHT: 100 LBS | BODY MASS INDEX: 17.72 KG/M2 | RESPIRATION RATE: 20 BRPM | SYSTOLIC BLOOD PRESSURE: 146 MMHG | TEMPERATURE: 97.7 F

## 2020-12-02 DIAGNOSIS — E03.9 HYPOTHYROIDISM, UNSPECIFIED TYPE: ICD-10-CM

## 2020-12-02 DIAGNOSIS — I10 ESSENTIAL HYPERTENSION: ICD-10-CM

## 2020-12-02 DIAGNOSIS — E46 PROTEIN-CALORIE MALNUTRITION, UNSPECIFIED SEVERITY (H): ICD-10-CM

## 2020-12-02 DIAGNOSIS — M40.205 KYPHOSIS OF THORACOLUMBAR REGION, UNSPECIFIED KYPHOSIS TYPE: ICD-10-CM

## 2020-12-02 DIAGNOSIS — R53.81 PHYSICAL DECONDITIONING: ICD-10-CM

## 2020-12-02 DIAGNOSIS — F03.90 DEMENTIA WITHOUT BEHAVIORAL DISTURBANCE, UNSPECIFIED DEMENTIA TYPE: ICD-10-CM

## 2020-12-02 DIAGNOSIS — S32.010D CLOSED COMPRESSION FRACTURE OF THORACOLUMBAR VERTEBRA WITH ROUTINE HEALING, SUBSEQUENT ENCOUNTER: ICD-10-CM

## 2020-12-02 DIAGNOSIS — N39.41 URGE INCONTINENCE OF URINE: ICD-10-CM

## 2020-12-02 DIAGNOSIS — M54.6 ACUTE MIDLINE THORACIC BACK PAIN: Primary | ICD-10-CM

## 2020-12-02 DIAGNOSIS — K59.01 SLOW TRANSIT CONSTIPATION: ICD-10-CM

## 2020-12-02 DIAGNOSIS — M80.00XD AGE-RELATED OSTEOPOROSIS WITH CURRENT PATHOLOGICAL FRACTURE WITH ROUTINE HEALING, SUBSEQUENT ENCOUNTER: ICD-10-CM

## 2020-12-02 DIAGNOSIS — S22.080D CLOSED COMPRESSION FRACTURE OF THORACOLUMBAR VERTEBRA WITH ROUTINE HEALING, SUBSEQUENT ENCOUNTER: ICD-10-CM

## 2020-12-02 PROCEDURE — 99306 1ST NF CARE HIGH MDM 50: CPT | Mod: AI | Performed by: INTERNAL MEDICINE

## 2020-12-02 ASSESSMENT — MIFFLIN-ST. JEOR: SCORE: 807.73

## 2020-12-03 LAB
SARS-COV-2 RNA SPEC QL NAA+PROBE: NOT DETECTED
SPECIMEN SOURCE: NORMAL

## 2020-12-04 ENCOUNTER — NURSING HOME VISIT (OUTPATIENT)
Dept: GERIATRICS | Facility: CLINIC | Age: 85
End: 2020-12-04
Payer: MEDICARE

## 2020-12-04 VITALS
DIASTOLIC BLOOD PRESSURE: 73 MMHG | BODY MASS INDEX: 17.72 KG/M2 | HEART RATE: 66 BPM | WEIGHT: 100 LBS | RESPIRATION RATE: 18 BRPM | TEMPERATURE: 98.2 F | OXYGEN SATURATION: 95 % | SYSTOLIC BLOOD PRESSURE: 135 MMHG | HEIGHT: 63 IN

## 2020-12-04 DIAGNOSIS — N18.31 STAGE 3A CHRONIC KIDNEY DISEASE (H): ICD-10-CM

## 2020-12-04 DIAGNOSIS — E46 PROTEIN-CALORIE MALNUTRITION, UNSPECIFIED SEVERITY (H): ICD-10-CM

## 2020-12-04 DIAGNOSIS — R41.89 COGNITIVE IMPAIRMENT: ICD-10-CM

## 2020-12-04 DIAGNOSIS — K59.01 SLOW TRANSIT CONSTIPATION: ICD-10-CM

## 2020-12-04 DIAGNOSIS — I10 ESSENTIAL HYPERTENSION: ICD-10-CM

## 2020-12-04 DIAGNOSIS — R53.81 PHYSICAL DECONDITIONING: ICD-10-CM

## 2020-12-04 DIAGNOSIS — S22.080D CLOSED COMPRESSION FRACTURE OF THORACOLUMBAR VERTEBRA WITH ROUTINE HEALING, SUBSEQUENT ENCOUNTER: Primary | ICD-10-CM

## 2020-12-04 DIAGNOSIS — S32.010D CLOSED COMPRESSION FRACTURE OF THORACOLUMBAR VERTEBRA WITH ROUTINE HEALING, SUBSEQUENT ENCOUNTER: Primary | ICD-10-CM

## 2020-12-04 DIAGNOSIS — M40.205 KYPHOSIS OF THORACOLUMBAR REGION, UNSPECIFIED KYPHOSIS TYPE: ICD-10-CM

## 2020-12-04 DIAGNOSIS — M80.00XD AGE-RELATED OSTEOPOROSIS WITH CURRENT PATHOLOGICAL FRACTURE WITH ROUTINE HEALING, SUBSEQUENT ENCOUNTER: ICD-10-CM

## 2020-12-04 PROCEDURE — 99309 SBSQ NF CARE MODERATE MDM 30: CPT | Performed by: NURSE PRACTITIONER

## 2020-12-04 ASSESSMENT — MIFFLIN-ST. JEOR: SCORE: 807.73

## 2020-12-04 NOTE — PROGRESS NOTES
"[facility]  [facility] Record Number:  [id number]  Place of Service where encounter took place:  [facility] [[id number]]  Chief Complaint   Patient presents with     Nursing Home Acute       HPI:    Gennaro Walton  is a 89 year old (9/8/1923), who is being seen today for an episodic care visit.  HPI information obtained from: facility chart records, facility staff, patient report and [facility] chart review.  She came to this facility 11/30/2020 for short term rehab and medical management following hospitalization after presenting to the ED 11/25/2020 with back pain. Recent hospitalization 11/13-11/16/2020 due to acute on chronic thoracolumbar compression fractures.  She returned to her AL, but pain worsened and she was no longer able to manage. No recent falls or trauma. XR thoracic spine 11/25/2020  showed thoracic vertebral compression fractures at T8, T9 and T12. The T12 compression fracture demonstrated worsened height loss, probable evolution of a subacute fracture. Lumbar compression fractures were noted and unchanged. Ortho Spine consulted and recommended pain management. Tylenol, lidocaine patch and low dose oxycodone were started. BPs were elevated on hospital admission and improved with better pain control.        Today's concern is:     Closed compression fracture of thoracolumbar vertebra with routine healing, subsequent encounter-reports pain is \"a little better.\" Has been sitting up in a wheelchair for breakfast but needs  to lie down due to pain and fatigue.   Age-related osteoporosis with current pathological fracture with routine healing, subsequent encounter  Kyphosis of thoracolumbar region, unspecified kyphosis type  Protein-calorie malnutrition, unspecified severity (H)-reports good appetite  Stage 3a chronic kidney disease  Essential hypertension-BPs: 156/63, 176/72, 172/80  HR: 54-84   Slow transit constipation-reports regular BMs  Cognitive " "impairment-responds to questions appropriately. Fair historian  Physical deconditioning-standing for transfers. Requires assist of 1 with cares.     Past Medical and Surgical History reviewed in Epic today.    MEDICATIONS:    Current Outpatient Medications   Medication Sig Dispense Refill     acetaminophen (TYLENOL) 500 MG tablet Take 2 tablets (1,000 mg) by mouth 3 times daily       amLODIPine (NORVASC) 5 MG tablet Take 5 mg by mouth daily Hold for SBP <120       calcium carbonate (TUMS) 500 MG chewable tablet Take 1 chew tab by mouth daily        levothyroxine (SYNTHROID/LEVOTHROID) 88 MCG tablet Take 88 mcg by mouth daily       Lidocaine (LIDOCARE) 4 % Patch Place 2 patches onto the skin every morning To prevent lidocaine toxicity, patient should be patch free for 12 hrs daily. To right shoulder, right posterior ribs. Remove in the evening 15 patch 0     losartan (COZAAR) 50 MG tablet Take 50 mg by mouth daily       Menthol, Topical Analgesic, (BIOFREEZE) 4 % GEL Externally apply topically every 4 hours as needed (R hip and other painful areas)       oxybutynin ER (DITROPAN-XL) 10 MG 24 hr tablet Take 1 tablet (10 mg) by mouth At Bedtime       oxyCODONE (ROXICODONE) 5 MG tablet Take 0.5 tablets (2.5 mg) by mouth every 8 hours as needed for moderate to severe pain 15 tablet 0     polyethylene glycol (MIRALAX/GLYCOLAX) packet Take 17 g by mouth daily       senna-docusate (SENOKOT-S/PERICOLACE) 8.6-50 MG tablet Take 1 tablet by mouth 2 times daily       sennosides (SENOKOT) 8.6 MG tablet Take 2 tablets by mouth daily as needed for constipation        vitamin D3 (CHOLECALCIFEROL) 2000 units tablet Take 1 tablet by mouth daily           REVIEW OF SYSTEMS:  4 point ROS including Respiratory, CV, GI and , other than that noted in the HPI,  is negative    Objective:  /73   Pulse 66   Temp 98.2  F (36.8  C)   Resp 18   Ht 1.6 m (5' 3\")   Wt 45.4 kg (100 lb)   SpO2 95%   BMI 17.71 kg/m    Exam:  GENERAL " APPEARANCE:  Alert, thin, frail appearing  ENT:  [place]  EYES:  EOM normal, conjunctiva and lids normal  NECK:  No adenopathy,masses or thyromegaly  RESP:  respiratory effort and palpation of chest normal, lungs clear to auscultation , no respiratory distress  CV:  Palpation and auscultation of heart done , regular rate and rhythm, no murmur,  no edema, +2 pedal pulses  ABDOMEN: soft, nontender, no distension, no  masses  M/S:  up in wheelchair. CAMPO with good strength. Kyphotic   SKIN:  no rashes or open areas  PSYCH:  oriented to self, place, situation, insight and judgement impaired, memory impaired , affect and mood normal    Labs:   Recent labs in [facility] reviewed by me today.     ASSESSMENT/PLAN:  (S22.080D,  S32.010D) Closed compression fracture of thoracolumbar vertebra with routine healing, subsequent encounter  (primary encounter diagnosis)  (M80.00XD) Age-related osteoporosis with current pathological fracture with routine healing, subsequent encounter  (M40.205) Kyphosis of thoracolumbar region, unspecified kyphosis type  Comment: pain gradually improving. Poor activity tolerance   Plan: continue tylenol, lidocaine patch, low dose oxycodone-monitor for adverse effects. Therapies.     (E46) Protein-calorie malnutrition, unspecified severity (H)  Comment: weight is unchanged from her tcu stay earlier this year. No acute GI symptoms or trouble chewing/swallowing   Plan: continue nutritional supplements     (N18.31) Stage 3a chronic kidney disease  Comment: renal function at baseline with creatinine 0.59   Plan: [facility] 12/7/2020    (I10) Essential hypertension  Comment: BPs remain elevated   Plan: increase amlodipine to 5 mg daily, hold for SBP <120. Continue losartan. Avoid hypotension due to advanced age and fall risk.     (K59.01) Slow transit constipation  Comment: managed   Plan: continue bowel regimen    (R41.89) Cognitive impairment  Comment: moderate to severe deficits, consistent with dementia    Plan: staff  to assist with cares     (R53.81) Physical deconditioning  Comment: slow progress in therapies   Plan: continue PHYSICAL THERAPY/OT. Goal is to return to her AL with higher level of care       Electronically signed by:  RITA Kilgore CNP

## 2020-12-07 ENCOUNTER — HOSPITAL LABORATORY (OUTPATIENT)
Dept: OTHER | Facility: CLINIC | Age: 85
End: 2020-12-07

## 2020-12-07 LAB
ANION GAP SERPL CALCULATED.3IONS-SCNC: 5 MMOL/L (ref 3–14)
BUN SERPL-MCNC: 11 MG/DL (ref 7–30)
CALCIUM SERPL-MCNC: 8.8 MG/DL (ref 8.5–10.1)
CHLORIDE SERPL-SCNC: 95 MMOL/L (ref 94–109)
CO2 SERPL-SCNC: 26 MMOL/L (ref 20–32)
CREAT SERPL-MCNC: 0.62 MG/DL (ref 0.52–1.04)
GFR SERPL CREATININE-BSD FRML MDRD: 75 ML/MIN/{1.73_M2}
GLUCOSE SERPL-MCNC: 100 MG/DL (ref 70–99)
POTASSIUM SERPL-SCNC: 4.1 MMOL/L (ref 3.4–5.3)
SODIUM SERPL-SCNC: 126 MMOL/L (ref 133–144)

## 2020-12-08 ENCOUNTER — NURSING HOME VISIT (OUTPATIENT)
Dept: GERIATRICS | Facility: CLINIC | Age: 85
End: 2020-12-08
Payer: MEDICARE

## 2020-12-08 VITALS
SYSTOLIC BLOOD PRESSURE: 171 MMHG | OXYGEN SATURATION: 91 % | BODY MASS INDEX: 16.3 KG/M2 | HEART RATE: 53 BPM | RESPIRATION RATE: 18 BRPM | DIASTOLIC BLOOD PRESSURE: 61 MMHG | WEIGHT: 92 LBS | HEIGHT: 63 IN | TEMPERATURE: 97.5 F

## 2020-12-08 DIAGNOSIS — S22.080D CLOSED COMPRESSION FRACTURE OF THORACOLUMBAR VERTEBRA WITH ROUTINE HEALING, SUBSEQUENT ENCOUNTER: Primary | ICD-10-CM

## 2020-12-08 DIAGNOSIS — N18.31 STAGE 3A CHRONIC KIDNEY DISEASE (H): ICD-10-CM

## 2020-12-08 DIAGNOSIS — I10 ESSENTIAL HYPERTENSION: ICD-10-CM

## 2020-12-08 DIAGNOSIS — R41.89 COGNITIVE IMPAIRMENT: ICD-10-CM

## 2020-12-08 DIAGNOSIS — M40.205 KYPHOSIS OF THORACOLUMBAR REGION, UNSPECIFIED KYPHOSIS TYPE: ICD-10-CM

## 2020-12-08 DIAGNOSIS — K59.01 SLOW TRANSIT CONSTIPATION: ICD-10-CM

## 2020-12-08 DIAGNOSIS — S32.010D CLOSED COMPRESSION FRACTURE OF THORACOLUMBAR VERTEBRA WITH ROUTINE HEALING, SUBSEQUENT ENCOUNTER: Primary | ICD-10-CM

## 2020-12-08 DIAGNOSIS — E46 PROTEIN-CALORIE MALNUTRITION, UNSPECIFIED SEVERITY (H): ICD-10-CM

## 2020-12-08 DIAGNOSIS — M80.00XD AGE-RELATED OSTEOPOROSIS WITH CURRENT PATHOLOGICAL FRACTURE WITH ROUTINE HEALING, SUBSEQUENT ENCOUNTER: ICD-10-CM

## 2020-12-08 DIAGNOSIS — R53.81 PHYSICAL DECONDITIONING: ICD-10-CM

## 2020-12-08 PROCEDURE — 99309 SBSQ NF CARE MODERATE MDM 30: CPT | Performed by: NURSE PRACTITIONER

## 2020-12-08 ASSESSMENT — MIFFLIN-ST. JEOR: SCORE: 771.44

## 2020-12-08 NOTE — PROGRESS NOTES
[facility]  [place] [facility] Record Number:  [id number]  Place of Service where encounter took place:  [facility] [[id number]]  Chief Complaint   Patient presents with     Nursing Home Acute       HPI:    Gennaro Walton  is a 89 year old (9/8/1923), who is being seen today for an episodic care visit.  HPI information obtained from: facility chart records, facility staff, patient report and [facility] chart review.   She came to this facility 11/30/2020 for short term rehab and medical management following hospitalization after presenting to the ED 11/25/2020 with back pain. Recent hospitalization 11/13-11/16/2020 due to acute on chronic thoracolumbar compression fractures.  She returned to her AL, but pain worsened and she was no longer able to manage. No recent falls or trauma. XR thoracic spine 11/25/2020  showed thoracic vertebral compression fractures at T8, T9 and T12. The T12 compression fracture demonstrated worsened height loss, probable evolution of a subacute fracture. Lumbar compression fractures were noted and unchanged. Ortho Spine consulted and recommended pain management. Tylenol, lidocaine patch and low dose oxycodone were started. BPs were elevated on hospital admission and improved with better pain control.       Today's concern is:     Closed compression fracture of thoracolumbar vertebra with routine healing, subsequent encounter-reports  pain remains severe with activity, but is more comfortable sitting in the chair and in bed.   Age-related osteoporosis with current pathological fracture with routine healing, subsequent encounter  Kyphosis of thoracolumbar region, unspecified kyphosis type  Protein-calorie malnutrition, unspecified severity (H)-reports good appetite. Denies GI symptoms.   Stage 3a chronic kidney disease  Essential hypertension-BPs: 171/61, 185/88, 167/67  HR: 53-66   Slow transit constipation-reports regular BMs   Cognitive  impairment-pleasant and talkative. Poor historian. SLUMS 13/30, safety questionnaire 11.5/22   Physical deconditioning-ambulating several feet with walker and contact guard assist. Requires assist with of 1 with cares.     Past Medical and Surgical History reviewed in Epic today.    MEDICATIONS:    Current Outpatient Medications   Medication Sig Dispense Refill     acetaminophen (TYLENOL) 500 MG tablet Take 2 tablets (1,000 mg) by mouth 3 times daily       amLODIPine (NORVASC) 5 MG tablet Take 5 mg by mouth daily Hold for SBP <120       calcium carbonate (TUMS) 500 MG chewable tablet Take 1 chew tab by mouth daily        levothyroxine (SYNTHROID/LEVOTHROID) 88 MCG tablet Take 88 mcg by mouth daily       Lidocaine (LIDOCARE) 4 % Patch Place 2 patches onto the skin every morning To prevent lidocaine toxicity, patient should be patch free for 12 hrs daily. To right shoulder, right posterior ribs. Remove in the evening 15 patch 0     losartan (COZAAR) 50 MG tablet Take 100 mg by mouth daily        Menthol, Topical Analgesic, (BIOFREEZE) 4 % GEL Externally apply topically every 4 hours as needed (R hip and other painful areas)       oxybutynin ER (DITROPAN-XL) 10 MG 24 hr tablet Take 1 tablet (10 mg) by mouth At Bedtime       oxyCODONE (ROXICODONE) 5 MG tablet Take 0.5 tablets (2.5 mg) by mouth every 8 hours as needed for moderate to severe pain 15 tablet 0     polyethylene glycol (MIRALAX/GLYCOLAX) packet Take 17 g by mouth daily       senna-docusate (SENOKOT-S/PERICOLACE) 8.6-50 MG tablet Take 1 tablet by mouth 2 times daily       sennosides (SENOKOT) 8.6 MG tablet Take 2 tablets by mouth daily as needed for constipation        vitamin D3 (CHOLECALCIFEROL) 2000 units tablet Take 1 tablet by mouth daily           REVIEW OF SYSTEMS:  4 point ROS including Respiratory, CV, GI and , other than that noted in the HPI,  is negative    Objective:  BP (!) 171/61   Pulse 53   Temp 97.5  F (36.4  C)   Resp 18   Ht 1.6 m (5'  "3\")   Wt 41.7 kg (92 lb)   SpO2 91%   BMI 16.30 kg/m    Exam:  GENERAL APPEARANCE:  Alert, thin, frail appearing  ENT:  [place]  EYES:  EOM normal, conjunctiva and lids normal  NECK:  No adenopathy,masses or thyromegaly  RESP:  respiratory effort and palpation of chest normal, lungs clear to auscultation , no respiratory distress  CV:  Palpation and auscultation of heart done , regular rate and rhythm, no murmur,  no edema, +2 pedal pulses  ABDOMEN: soft, nontender, no distension, no  masses  M/S:  up in wheelchair. CAMPO with good strength. Kyphotic   SKIN:  no rashes or open areas  PSYCH:  oriented to self, place, situation, insight and judgement impaired, memory impaired , affect and mood normal    Labs:   Recent labs in [facility] reviewed by me today.     ASSESSMENT/PLAN:  (S22.080D,  S32.010D) Closed compression fracture of thoracolumbar vertebra with routine healing, subsequent encounter  (primary encounter diagnosis)  (M80.00XD) Age-related osteoporosis with current pathological fracture with routine healing, subsequent encounter  (M40.205) Kyphosis of thoracolumbar region, unspecified kyphosis type  Comment: pain and activity tolerance slowly improving   Plan:continue tylenol, lidocaine patch, low dose oxycodone-monitor for adverse effects. Therapies.         (E46) Protein-calorie malnutrition, unspecified severity (H)  Comment: weight unchanged and oral intake appears adequate   Plan: continue nutritional supplements     (N18.31) Stage 3a chronic kidney disease  Comment: renal function is at baseline with creatinine 0.62 on 12/7/2020. Na was low at 126, previous levels were normal. No signs of volume overload and she does not drink significant amounts of water.   Plan: BMP 12/14/2020     (I10) Essential hypertension  Comment: amlodipine was increased 12/4/2020 without improvement in BPs  Plan: increase losartan to 100 mg daily. Continue amlodipine 5 mg daily. Monitor VS. Avoid hypotension due to advanced age and " fall risk.     (K59.01) Slow transit constipation  Comment: managed   Plan: continue bowel regimen     (R41.89) Cognitive impairment  Comment: moderate to severe deficits, consistent with dementia    Plan: staff to assist with cares     (R53.81) Physical deconditioning  Comment: slowly progressing in therapies   Plan: continue PHYSICAL THERAPY/OT. Goal is to return to [facility] with increased services.       Electronically signed by:  RITA Kilgore CNP

## 2020-12-09 ENCOUNTER — HOSPITAL LABORATORY (OUTPATIENT)
Dept: OTHER | Facility: CLINIC | Age: 85
End: 2020-12-09

## 2020-12-10 LAB
SARS-COV-2 RNA SPEC QL NAA+PROBE: NOT DETECTED
SPECIMEN SOURCE: NORMAL

## 2020-12-14 ENCOUNTER — HOSPITAL LABORATORY (OUTPATIENT)
Dept: OTHER | Facility: CLINIC | Age: 85
End: 2020-12-14

## 2020-12-14 LAB
ANION GAP SERPL CALCULATED.3IONS-SCNC: 5 MMOL/L (ref 3–14)
BUN SERPL-MCNC: 13 MG/DL (ref 7–30)
CALCIUM SERPL-MCNC: 9.1 MG/DL (ref 8.5–10.1)
CHLORIDE SERPL-SCNC: 104 MMOL/L (ref 94–109)
CO2 SERPL-SCNC: 30 MMOL/L (ref 20–32)
CREAT SERPL-MCNC: 0.56 MG/DL (ref 0.52–1.04)
GFR SERPL CREATININE-BSD FRML MDRD: 78 ML/MIN/{1.73_M2}
GLUCOSE SERPL-MCNC: 71 MG/DL (ref 70–99)
HGB BLD-MCNC: 11.6 G/DL (ref 11.7–15.7)
POTASSIUM SERPL-SCNC: 4.5 MMOL/L (ref 3.4–5.3)
SODIUM SERPL-SCNC: 139 MMOL/L (ref 133–144)

## 2020-12-15 ENCOUNTER — NURSING HOME VISIT (OUTPATIENT)
Dept: GERIATRICS | Facility: CLINIC | Age: 85
End: 2020-12-15
Payer: MEDICARE

## 2020-12-15 VITALS
DIASTOLIC BLOOD PRESSURE: 65 MMHG | RESPIRATION RATE: 20 BRPM | TEMPERATURE: 98.2 F | BODY MASS INDEX: 15.94 KG/M2 | SYSTOLIC BLOOD PRESSURE: 145 MMHG | HEART RATE: 53 BPM | WEIGHT: 90 LBS | OXYGEN SATURATION: 94 %

## 2020-12-15 DIAGNOSIS — N18.31 STAGE 3A CHRONIC KIDNEY DISEASE (H): ICD-10-CM

## 2020-12-15 DIAGNOSIS — R41.89 COGNITIVE IMPAIRMENT: ICD-10-CM

## 2020-12-15 DIAGNOSIS — S32.010D CLOSED COMPRESSION FRACTURE OF THORACOLUMBAR VERTEBRA WITH ROUTINE HEALING, SUBSEQUENT ENCOUNTER: Primary | ICD-10-CM

## 2020-12-15 DIAGNOSIS — M40.205 KYPHOSIS OF THORACOLUMBAR REGION, UNSPECIFIED KYPHOSIS TYPE: ICD-10-CM

## 2020-12-15 DIAGNOSIS — I10 ESSENTIAL HYPERTENSION: ICD-10-CM

## 2020-12-15 DIAGNOSIS — S22.080D CLOSED COMPRESSION FRACTURE OF THORACOLUMBAR VERTEBRA WITH ROUTINE HEALING, SUBSEQUENT ENCOUNTER: Primary | ICD-10-CM

## 2020-12-15 DIAGNOSIS — E46 PROTEIN-CALORIE MALNUTRITION, UNSPECIFIED SEVERITY (H): ICD-10-CM

## 2020-12-15 DIAGNOSIS — R53.81 PHYSICAL DECONDITIONING: ICD-10-CM

## 2020-12-15 DIAGNOSIS — M80.00XD AGE-RELATED OSTEOPOROSIS WITH CURRENT PATHOLOGICAL FRACTURE WITH ROUTINE HEALING, SUBSEQUENT ENCOUNTER: ICD-10-CM

## 2020-12-15 DIAGNOSIS — K59.01 SLOW TRANSIT CONSTIPATION: ICD-10-CM

## 2020-12-15 PROCEDURE — 99309 SBSQ NF CARE MODERATE MDM 30: CPT | Performed by: NURSE PRACTITIONER

## 2020-12-15 NOTE — PROGRESS NOTES
[facility]  [place] [facility] Record Number:  [id number]  Place of Service where encounter took place:  [facility] [[id number]]  Chief Complaint   Patient presents with     RECHECK       HPI:    Gennaro Walton  is a 89 year old (9/8/1923), who is being seen today for an episodic care visit.  HPI information obtained from: facility chart records, facility staff, patient report and [facility] chart review.  She came to this facility 11/30/2020 for short term rehab and medical management following hospitalization after presenting to the ED 11/25/2020 with back pain. Recent hospitalization 11/13-11/16/2020 due to acute on chronic thoracolumbar compression fractures.  She returned to her AL, but pain worsened and she was no longer able to manage. No recent falls or trauma. XR thoracic spine 11/25/2020  showed thoracic vertebral compression fractures at T8, T9 and T12. The T12 compression fracture demonstrated worsened height loss, probable evolution of a subacute fracture. Lumbar compression fractures were noted and unchanged. Ortho Spine consulted and recommended pain management. Tylenol, lidocaine patch and low dose oxycodone were started. BPs were elevated on hospital admission and improved with better pain control.      Today's concern is:     Closed compression fracture of thoracolumbar vertebra with routine healing, subsequent encounter-just got back in bed after working with therapy and reports pain is severe, but less now that she's lying down.   Age-related osteoporosis with current pathological fracture with routine healing, subsequent encounter  Kyphosis of thoracolumbar region, unspecified kyphosis type  Protein-calorie malnutrition, unspecified severity (H)-reports her appetite is good. Denies GI symptoms   Stage 3a chronic kidney disease  Essential hypertension-BPs: 145/65, 155/70, 160/73, 121/60  HR: 53-78   Slow transit constipation-no BM for 2 days, but feels  she may have one today . No nausea or abdominal pain   Cognitive impairment-poor historian, pleasantly confused. SLUMS 13/30, safety questionnaire 11.5/22   Physical deconditioning-ambulating short distances with walker and contact guard assist. Requires assist of 1 with cares.     Past Medical and Surgical History reviewed in Epic today.    MEDICATIONS:    Current Outpatient Medications   Medication Sig Dispense Refill     acetaminophen (TYLENOL) 500 MG tablet Take 2 tablets (1,000 mg) by mouth 3 times daily       amLODIPine (NORVASC) 5 MG tablet Take 5 mg by mouth daily Hold for SBP <120       calcium carbonate (TUMS) 500 MG chewable tablet Take 1 chew tab by mouth daily        levothyroxine (SYNTHROID/LEVOTHROID) 88 MCG tablet Take 88 mcg by mouth daily       Lidocaine (LIDOCARE) 4 % Patch Place 2 patches onto the skin every morning To prevent lidocaine toxicity, patient should be patch free for 12 hrs daily. To right shoulder, right posterior ribs. Remove in the evening 15 patch 0     losartan (COZAAR) 50 MG tablet Take 100 mg by mouth daily        Menthol, Topical Analgesic, (BIOFREEZE) 4 % GEL Externally apply topically every 4 hours as needed (R hip and other painful areas)       oxybutynin ER (DITROPAN-XL) 10 MG 24 hr tablet Take 1 tablet (10 mg) by mouth At Bedtime       oxyCODONE (ROXICODONE) 5 MG tablet Take 0.5 tablets (2.5 mg) by mouth every 8 hours as needed for moderate to severe pain 15 tablet 0     polyethylene glycol (MIRALAX/GLYCOLAX) packet Take 17 g by mouth daily       senna-docusate (SENOKOT-S/PERICOLACE) 8.6-50 MG tablet Take 1 tablet by mouth 2 times daily       sennosides (SENOKOT) 8.6 MG tablet Take 2 tablets by mouth daily as needed for constipation        vitamin D3 (CHOLECALCIFEROL) 2000 units tablet Take 1 tablet by mouth daily           REVIEW OF SYSTEMS:  4 point ROS including Respiratory, CV, GI and , other than that noted in the HPI,  is negative    Objective:  BP (!) 145/65    Pulse 53   Temp 98.2  F (36.8  C)   Resp 20   Wt 40.8 kg (90 lb)   SpO2 94%   BMI 15.94 kg/m    Exam:  GENERAL APPEARANCE:  Alert, thin, frail appearing  ENT:  [place]  EYES:  EOM normal, conjunctiva and lids normal  NECK:  No adenopathy,masses or thyromegaly  RESP:  respiratory effort and palpation of chest normal, lungs clear to auscultation , no respiratory distress  CV:  Palpation and auscultation of heart done , regular rate and rhythm, no murmur,  no edema, +2 pedal pulses  ABDOMEN: soft, nontender, no distension, no  masses  M/S:  in bed.  CAMPO with good strength. Kyphotic   SKIN:  no rashes or open areas  PSYCH:  oriented to self, place, situation, insight and judgement impaired, memory impaired , affect and mood normal      Labs:   Recent labs in [facility] reviewed by me today.     ASSESSMENT / PLAN:  (S22.080D,  S32.010D) Closed compression fracture of thoracolumbar vertebra with routine healing, subsequent encounter  (primary encounter diagnosis)  (M80.00XD) Age-related osteoporosis with current pathological fracture with routine healing, subsequent encounter  (M40.205) Kyphosis of thoracolumbar region, unspecified kyphosis type  Comment: pain severe at times, but overall, appears to be improving. Poor activity tolerance.   Plan: continue tylenol, lidocaine patch, low dose oxycodone-monitor for adverse effects. Therapies.     (E46) Protein-calorie malnutrition, unspecified severity (H)  Comment: weight stable at 90-92 lbs.   Plan: continue nutritional supplements     (N18.31) Stage 3a chronic kidney disease  Comment: creatinine normal at 0.56 on 12/14/2020  Plan: follow BMP. Avoid nephrotoxins     (I10) Essential hypertension  Comment: amlodipine and losartan have been increased since tcu admission with improvement   Plan: continue losartan, amlodipine. Monitor VS. Avoid hypotension due to advanced age and fall risk.    (K59.01) Slow transit constipation  Comment: no BM X 2 days.   Plan: continue bowel  regimen. Suppository prn     (R41.89) Cognitive impairment  Comment: moderate to severe deficits, consistent with dementia. She appears to be at baseline    Plan: staff to assist with cares    (R53.81) Physical deconditioning  Comment: slow progress in therapies   Plan: continue PHYSICAL THERAPY/OT. Goal is to return to [facility] with increased services.       Electronically signed by:  RITA Kilgore CNP

## 2020-12-16 ENCOUNTER — HOSPITAL LABORATORY (OUTPATIENT)
Dept: OTHER | Facility: CLINIC | Age: 85
End: 2020-12-16

## 2020-12-17 LAB
SARS-COV-2 RNA SPEC QL NAA+PROBE: NOT DETECTED
SPECIMEN SOURCE: NORMAL

## 2020-12-21 VITALS
HEART RATE: 58 BPM | SYSTOLIC BLOOD PRESSURE: 110 MMHG | BODY MASS INDEX: 14.17 KG/M2 | DIASTOLIC BLOOD PRESSURE: 67 MMHG | OXYGEN SATURATION: 95 % | WEIGHT: 80 LBS | RESPIRATION RATE: 20 BRPM | TEMPERATURE: 97.5 F

## 2020-12-22 ENCOUNTER — NURSING HOME VISIT (OUTPATIENT)
Dept: GERIATRICS | Facility: CLINIC | Age: 85
End: 2020-12-22
Payer: MEDICARE

## 2020-12-22 ENCOUNTER — HOSPITAL LABORATORY (OUTPATIENT)
Dept: OTHER | Facility: CLINIC | Age: 85
End: 2020-12-22

## 2020-12-22 DIAGNOSIS — S22.080D CLOSED COMPRESSION FRACTURE OF THORACOLUMBAR VERTEBRA WITH ROUTINE HEALING, SUBSEQUENT ENCOUNTER: Primary | ICD-10-CM

## 2020-12-22 DIAGNOSIS — I10 ESSENTIAL HYPERTENSION: ICD-10-CM

## 2020-12-22 DIAGNOSIS — M40.205 KYPHOSIS OF THORACOLUMBAR REGION, UNSPECIFIED KYPHOSIS TYPE: ICD-10-CM

## 2020-12-22 DIAGNOSIS — S32.010D CLOSED COMPRESSION FRACTURE OF THORACOLUMBAR VERTEBRA WITH ROUTINE HEALING, SUBSEQUENT ENCOUNTER: Primary | ICD-10-CM

## 2020-12-22 DIAGNOSIS — N18.31 STAGE 3A CHRONIC KIDNEY DISEASE (H): ICD-10-CM

## 2020-12-22 DIAGNOSIS — E46 PROTEIN-CALORIE MALNUTRITION, UNSPECIFIED SEVERITY (H): ICD-10-CM

## 2020-12-22 DIAGNOSIS — K59.01 SLOW TRANSIT CONSTIPATION: ICD-10-CM

## 2020-12-22 DIAGNOSIS — R53.81 PHYSICAL DECONDITIONING: ICD-10-CM

## 2020-12-22 DIAGNOSIS — M80.00XD AGE-RELATED OSTEOPOROSIS WITH CURRENT PATHOLOGICAL FRACTURE WITH ROUTINE HEALING, SUBSEQUENT ENCOUNTER: ICD-10-CM

## 2020-12-22 DIAGNOSIS — R41.89 COGNITIVE IMPAIRMENT: ICD-10-CM

## 2020-12-22 PROCEDURE — 99309 SBSQ NF CARE MODERATE MDM 30: CPT | Performed by: NURSE PRACTITIONER

## 2020-12-22 NOTE — PROGRESS NOTES
"[facility]  [place] [facility] Record Number:  [id number]  Place of Service where encounter took place:  [facility] [[id number]]  Chief Complaint   Patient presents with     RECHECK       HPI:    Gennaro Walton  is a 89 year old (9/8/1923), who is being seen today for an episodic care visit.  HPI information obtained from: facility chart records, facility staff, patient report and [facility] chart review.  She came to this facility 11/30/2020 for short term rehab and medical management following hospitalization after presenting to the ED 11/25/2020 with back pain. Recent hospitalization 11/13-11/16/2020 due to acute on chronic thoracolumbar compression fractures.  She returned to her AL, but pain worsened and she was no longer able to manage. No recent falls or trauma. XR thoracic spine 11/25/2020  showed thoracic vertebral compression fractures at T8, T9 and T12. The T12 compression fracture demonstrated worsened height loss, probable evolution of a subacute fracture. Lumbar compression fractures were noted and unchanged. Ortho Spine consulted and recommended pain management. Tylenol, lidocaine patch and low dose oxycodone were started. BPs were elevated on hospital admission and improved with better pain control.      Today's concern is:     Closed compression fracture of thoracolumbar vertebra with routine healing, subsequent encounter-she's sitting up in a wheelchair and reports back pain is \"ok\" as long as she's supported with a pillow.   Age-related osteoporosis with current pathological fracture with routine healing, subsequent encounter  Kyphosis of thoracolumbar region, unspecified kyphosis type  Protein-calorie malnutrition, unspecified severity (H)-appetite is fair.  Stage 3a chronic kidney disease  Essential hypertension-BPs: 110/67, 121/58, 153/79  HR: 54-72   Slow transit constipation-bowel meds were changed to prn after she developed diarrhea. Reports no loose " stools yesterday or today  Cognitive impairment-pleasantly confused. SLUMS 13/30, safety questionnaire 11.5/22   Physical deconditioning-ambulating 30-50 ft with walker and contact guard assist. Requires contact guard to min assist with cares.     Past Medical and Surgical History reviewed in Epic today.    MEDICATIONS:    Current Outpatient Medications   Medication Sig Dispense Refill     polyethylene glycol (MIRALAX) 17 g packet Take 17 g by mouth daily as needed for constipation       senna-docusate (SENOKOT-S/PERICOLACE) 8.6-50 MG tablet Take 1 tablet by mouth 2 times daily as needed for constipation       acetaminophen (TYLENOL) 500 MG tablet Take 2 tablets (1,000 mg) by mouth 3 times daily       amLODIPine (NORVASC) 5 MG tablet Take 5 mg by mouth daily Hold for SBP <120       calcium carbonate (TUMS) 500 MG chewable tablet Take 1 chew tab by mouth daily        levothyroxine (SYNTHROID/LEVOTHROID) 88 MCG tablet Take 88 mcg by mouth daily       Lidocaine (LIDOCARE) 4 % Patch Place 2 patches onto the skin every morning To prevent lidocaine toxicity, patient should be patch free for 12 hrs daily. To right shoulder, right posterior ribs. Remove in the evening 15 patch 0     losartan (COZAAR) 50 MG tablet Take 100 mg by mouth daily        Menthol, Topical Analgesic, (BIOFREEZE) 4 % GEL Externally apply topically every 4 hours as needed (R hip and other painful areas)       oxybutynin ER (DITROPAN-XL) 10 MG 24 hr tablet Take 1 tablet (10 mg) by mouth At Bedtime       oxyCODONE (ROXICODONE) 5 MG tablet Take 0.5 tablets (2.5 mg) by mouth every 8 hours as needed for moderate to severe pain 15 tablet 0     vitamin D3 (CHOLECALCIFEROL) 2000 units tablet Take 1 tablet by mouth daily           REVIEW OF SYSTEMS:  4 point ROS including Respiratory, CV, GI and , other than that noted in the HPI,  is negative    Objective:  /67   Pulse 58   Temp 97.5  F (36.4  C)   Resp 20   Wt 36.3 kg (80 lb)   SpO2 95%   BMI  14.17 kg/m    Exam:  GENERAL APPEARANCE:  Alert, thin, frail appearing  ENT:  [place]  EYES:  EOM normal, conjunctiva and lids normal  NECK:  No adenopathy,masses or thyromegaly  RESP:  respiratory effort and palpation of chest normal, lungs clear to auscultation , no respiratory distress  CV:  Palpation and auscultation of heart done , regular rate and rhythm, no murmur,  no edema, +2 pedal pulses  ABDOMEN: soft, nontender, no distension, no  masses  M/S:  wheelchair. CAMPO with good strength. Kyphotic   SKIN:  no rashes or open areas  PSYCH:  oriented to self, place, situation, insight and judgement impaired, memory impaired , affect and mood normal      Labs:   Recent labs in [facility] reviewed by me today.     ASSESSMENT / PLAN:  (S22.080D,  S32.010D) Closed compression fracture of thoracolumbar vertebra with routine healing, subsequent encounter  (primary encounter diagnosis)  (M80.00XD) Age-related osteoporosis with current pathological fracture with routine healing, subsequent encounter  (M40.205) Kyphosis of thoracolumbar region, unspecified kyphosis type  Comment: pain gradually improving   Plan: continue tylenol, lidocaine patch, low dose oxycodone-monitor for adverse effects. Continue therapies.     (E46) Protein-calorie malnutrition, unspecified severity (H)  Comment: most recent recorded weight is 80 lbs, which is down 10 lbs.Appetite appears about the same.   Plan: continue nutritional supplements. Discussed with the dietician who will follow up    (N18.31) Stage 3a chronic kidney disease  Comment: creatinine normal at 0.56 on 12/14/2020  Plan: follow BMP. Avoid nephrotoxins     (I10) Essential hypertension  Comment: amlodipine and losartan have been increased since tcu admission with improvement   Plan: continue losartan, amlodipine. Monitor VS. Avoid hypotension due to advanced age and fall risk    (K59.01) Slow transit constipation  Comment: loose stools due to bowel meds, improved after meds changed to prn    Plan: continue senna bid prn, miralax daily prn     (R41.89) Cognitive impairment  Comment: moderate to severe deficits, consistent with dementia. She appears to be at baseline    Plan: staff to assist with cares    (R53.81) Physical deconditioning  Comment: slowly progressing in therapies   Plan: continue PHYSICAL THERAPY/OT. Goal is to return to her AL with services.       Electronically signed by:  RITA Kilgore CNP

## 2020-12-23 LAB
SARS-COV-2 RNA SPEC QL NAA+PROBE: NOT DETECTED
SPECIMEN SOURCE: NORMAL

## 2020-12-23 RX ORDER — POLYETHYLENE GLYCOL 3350 17 G/17G
17 POWDER, FOR SOLUTION ORAL DAILY
COMMUNITY

## 2020-12-23 RX ORDER — AMOXICILLIN 250 MG
2 CAPSULE ORAL DAILY PRN
COMMUNITY

## 2020-12-28 ENCOUNTER — DISCHARGE SUMMARY NURSING HOME (OUTPATIENT)
Dept: GERIATRICS | Facility: CLINIC | Age: 85
End: 2020-12-28
Payer: MEDICARE

## 2020-12-28 VITALS
TEMPERATURE: 97.5 F | HEART RATE: 55 BPM | OXYGEN SATURATION: 94 % | BODY MASS INDEX: 14.7 KG/M2 | WEIGHT: 83 LBS | RESPIRATION RATE: 18 BRPM

## 2020-12-28 DIAGNOSIS — S22.080D CLOSED COMPRESSION FRACTURE OF THORACOLUMBAR VERTEBRA WITH ROUTINE HEALING, SUBSEQUENT ENCOUNTER: Primary | ICD-10-CM

## 2020-12-28 DIAGNOSIS — N18.31 STAGE 3A CHRONIC KIDNEY DISEASE (H): ICD-10-CM

## 2020-12-28 DIAGNOSIS — M80.00XD AGE-RELATED OSTEOPOROSIS WITH CURRENT PATHOLOGICAL FRACTURE WITH ROUTINE HEALING, SUBSEQUENT ENCOUNTER: ICD-10-CM

## 2020-12-28 DIAGNOSIS — I10 ESSENTIAL HYPERTENSION: ICD-10-CM

## 2020-12-28 DIAGNOSIS — E46 PROTEIN-CALORIE MALNUTRITION, UNSPECIFIED SEVERITY (H): ICD-10-CM

## 2020-12-28 DIAGNOSIS — R41.89 COGNITIVE IMPAIRMENT: ICD-10-CM

## 2020-12-28 DIAGNOSIS — S32.010D CLOSED COMPRESSION FRACTURE OF THORACOLUMBAR VERTEBRA WITH ROUTINE HEALING, SUBSEQUENT ENCOUNTER: Primary | ICD-10-CM

## 2020-12-28 DIAGNOSIS — K59.01 SLOW TRANSIT CONSTIPATION: ICD-10-CM

## 2020-12-28 DIAGNOSIS — R53.81 PHYSICAL DECONDITIONING: ICD-10-CM

## 2020-12-28 DIAGNOSIS — M40.205 KYPHOSIS OF THORACOLUMBAR REGION, UNSPECIFIED KYPHOSIS TYPE: ICD-10-CM

## 2020-12-28 PROCEDURE — 99316 NF DSCHRG MGMT 30 MIN+: CPT | Performed by: NURSE PRACTITIONER

## 2020-12-28 NOTE — PROGRESS NOTES
Documentation of Face-to-Face and Certification for Home Health Services     Patient: Gennaro Walton   YOB: 1923  MR Number: [id number]  Today's Date: 12/28/2020    I certify that patient: Gennaro Walton is under my care and that I, or a nurse practitioner or physician's assistant working with me, had a face-to-face encounter that meets the physician face-to-face encounter requirements with this patient on: 12/28/2020.    This encounter with the patient was in whole, or in part, for the following medical condition, which is the primary reason for home health care: compression fracture     I certify that, based on my findings, the following services are medically necessary home health services: Nursing, Occupational Therapy, Physical Therapy and HHA.    My clinical findings support the need for the above services because: Nurse is needed: To assess VS, pain control, weight after changes in medications or other medical regimen., To provide assessment and oversight required in the home to assure adherence to the medical plan due to: risk for rehospitalization, complex medication regimen. and To provide caregiver training to assist with: med management.., Occupational Therapy Services are needed to assess and treat cognitive ability and address ADL safety due to impairment in functional status and cognition . and Physical Therapy Services are needed to assess and treat the following functional impairments: gait instability, limited endurance.    Further, I certify that my clinical findings support that this patient is homebound (i.e. absences from home require considerable and taxing effort and are for medical reasons or [facility] services or infrequently or of short duration when for other reasons) because: Requires assistance of another person or specialized equipment to access medical services because patient: Has prohibitive pain during ambulation., Is prone to wander/get lost without  assistance., Is unable to exit home safely on own due to: high fall risk, cognitive defictis., Is unable to operate assistive equipment on their own., Is unable to walk greater than 80 feet without rest., Range of motion limitations prevents ability to exit home safely. and Requires supervision of another for safe transfer...    Based on the above findings. I certify that this patient is confined to the home and needs intermittent skilled nursing care, physical therapy and/or speech therapy.  The patient is under my care, and I have initiated the establishment of the plan of care.  This patient will be followed by a physician who will periodically review the plan of care.  Physician/Provider to provide follow up care: Mady Hagen    Responsible Medicare certified PECOS Physician: Dr.Mohammad Anya MD, signing F2F and only signing for initial order. Please send all follow up questions and concerns or needed follow up signatures to the PCP, who [facility] has on file as:  Mady Hagen.  Physician Signature: See electronic signature associated with these discharge orders.  Date: 12/28/2020

## 2020-12-28 NOTE — PROGRESS NOTES
[facility] DISCHARGE SUMMARY  PATIENT'S NAME: Gennaro Walton  YOB: 1923  MEDICAL RECORD NUMBER:  [id number]  Place of Service where encounter took place:  [facility] [[id number]]    PRIMARY CARE PROVIDER AND CLINIC RESPONSIBLE AFTER TRANSFER:   Mady Hagen MD, [address] / [place] [address]    Non-[facility] Provider     Transferring providers: Kvng Altamirano, RITA BOLIVAR, Benigno Joyner MD  Recent Hospitalization/ED:  [facility] stay 11/25/20 to 11/30/20.  Date of SNF Admission: November / 30 / 2020  Date of SNF (anticipated) Discharge: December / 29 / 2020  Discharged to: previous assisted living, [facility]   Cognitive Scores: SLUMS: 13/30 and Safety Questionnaire: 11.5/20  DME: she has wheelchair and walker    CODE STATUS/ADVANCE DIRECTIVES DISCUSSION:  DNR / DNI   ALLERGIES: Sulfacetamide and Tramadol    DISCHARGE DIAGNOSIS/NURSING FACILITY COURSE:   She came to this facility 11/30/2020 for short term rehab and medical management following hospitalization after presenting to the ED 11/25/2020 with back pain. Recent hospitalization 11/13-11/16/2020 due to acute on chronic thoracolumbar compression fractures.  She returned to her AL, but pain worsened and she was no longer able to manage. No recent falls or trauma. XR thoracic spine 11/25/2020  showed thoracic vertebral compression fractures at T8, T9 and T12. The T12 compression fracture demonstrated worsened height loss, probable evolution of a subacute fracture. Lumbar compression fractures were noted and unchanged. Ortho Spine consulted and recommended pain management. Tylenol, lidocaine patch and low dose oxycodone were started.     She has worked with PHYSICAL THERAPY and OT with fairly good progress. Ambulating 30-80 ft with walker and stand by assist. Requires stand by to min assist with transfers and cares. High fall risk.   ASSESSMENT /  PLAN:  (S22.080D,  S32.010D) Closed compression fracture of thoracolumbar vertebra with routine healing, subsequent encounter  (primary encounter diagnosis)  (M80.00XD) Age-related osteoporosis with current pathological fracture with routine healing, subsequent encounter  (M40.205) Kyphosis of thoracolumbar region, unspecified kyphosis type  Comment: pain and mobility improving. She has reached max potential in therapies and is medically stable for discharge. Oxycodone is being used less than once a day and she is tolerating this without side effect.   Plan: discharge back to her AL where she receives high level services. Continue tylenol, lidocaine patch, low dose oxycodone. Home care services and follow up as below.     (E46) Protein-calorie malnutrition, unspecified severity (H)  Comment: most recent weight is 83 lbs, which is down 7-9 lbs. Appetite is fair. No GI symptoms and no swallow issues   Plan: continue nutritional supplements     (N18.31) Stage 3a chronic kidney disease  Comment: creatinine normal at 0.56 on 12/14/2020  Plan: follow up labs per PCP.  Avoid nephrotoxins     (I10) Essential hypertension  Comment: amlodipine and losartan have been increased with improvement. Recent BPs: 167/69, 130/68, 122/71  HR: 6076   Plan: continue losartan and amlodipine. Avoid hypotension due to advanced age and fall risk.     (K59.01) Slow transit constipation  Comment: managed   Plan: continue bowel regimen     (R41.89) Cognitive impairment  Comment: moderate to severe deficits, consistent with dementia. Cognition appears to be at baseline    Plan: staff to assist with cares     (R53.81) Physical deconditioning  Comment: progress in therapies as above   Plan: home therapies for ongoing gait training, strengthening and ADL safety       Past Medical History:  has a past medical history of Breast cancer (H), CKD (chronic kidney disease) stage 3, GFR 30-59 ml/min, Cognitive impairment, Hypertension, Hypothyroid, and  Osteoporosis. She also has no past medical history of Complication of anesthesia, Difficult intubation, Malignant hyperthermia, Motion sickness, or PONV (postoperative nausea and vomiting).    Discharge Medications:    Current Outpatient Medications   Medication Sig Dispense Refill     acetaminophen (TYLENOL) 500 MG tablet Take 2 tablets (1,000 mg) by mouth 3 times daily       amLODIPine (NORVASC) 5 MG tablet Take 5 mg by mouth daily Hold for SBP <120       calcium carbonate (TUMS) 500 MG chewable tablet Take 1 chew tab by mouth daily        levothyroxine (SYNTHROID/LEVOTHROID) 88 MCG tablet Take 88 mcg by mouth daily       Lidocaine (LIDOCARE) 4 % Patch Place 2 patches onto the skin every morning To prevent lidocaine toxicity, patient should be patch free for 12 hrs daily. To right shoulder, right posterior ribs. Remove in the evening 15 patch 0     losartan (COZAAR) 50 MG tablet Take 100 mg by mouth daily        Menthol, Topical Analgesic, (BIOFREEZE) 4 % GEL Externally apply topically every 4 hours as needed (R hip and other painful areas)       oxybutynin ER (DITROPAN-XL) 10 MG 24 hr tablet Take 1 tablet (10 mg) by mouth At Bedtime       oxyCODONE (ROXICODONE) 5 MG tablet Take 0.5 tablets (2.5 mg) by mouth every 8 hours as needed for moderate to severe pain 15 tablet 0     polyethylene glycol (MIRALAX) 17 g packet Take 17 g by mouth daily as needed for constipation       senna-docusate (SENOKOT-S/PERICOLACE) 8.6-50 MG tablet Take 1 tablet by mouth 2 times daily as needed for constipation       vitamin D3 (CHOLECALCIFEROL) 2000 units tablet Take 1 tablet by mouth daily         Medication Changes/Rationale:     Amlodipine increased 12/4/2020    Losartan increased 12/8/2020    Bowel regimen adjusted     Controlled medications sent with patient:   Medication: oxycodone , 20 tabs given to patient at the time of discharge to take home     ROS:   4 point ROS including Respiratory, CV, GI and , other than that noted  in the HPI,  is negative    Physical Exam:   Vitals: BP (P) 132/64   Pulse 55   Temp 97.5  F (36.4  C)   Resp 18   Wt 37.6 kg (83 lb)   SpO2 94%   BMI 14.70 kg/m    BMI= Body mass index is 14.7 kg/m .  Limited exam due to COVID-19 precautions   GENERAL APPEARANCE:  Alert, thin, frail appearing  ENT:  [place]  EYES:  EOM normal, conjunctiva and lids normal  RESP:  no respiratory distress  CV:    no edema   M/S:  wheelchair. CAMPO with good strength. Kyphotic   SKIN:  no visible rashes or open areas  PSYCH:  oriented to self, place, situation, insight and judgement impaired, memory impaired , affect and mood normal      SNF labs: Labs done in SNF are in [facility]. Please refer to them using [employer] Everywhere.    DISCHARGE PLAN:    Follow up labs: per PCP     Medical Follow Up:      Follow up with primary care provider within 1-2  weeks    MTM referral needed and placed by this provider: No     Discharge Services: Home Care:  Occupational Therapy, Physical Therapy, Registered Nurse, Home Health Aide and From:  [facility]          TOTAL DISCHARGE TIME:   Greater than 30 minutes  Electronically signed by:  RITA Kilgore CNP

## 2020-12-29 ENCOUNTER — HOSPITAL LABORATORY (OUTPATIENT)
Dept: OTHER | Facility: CLINIC | Age: 85
End: 2020-12-29

## 2020-12-30 LAB
SARS-COV-2 RNA SPEC QL NAA+PROBE: NOT DETECTED
SPECIMEN SOURCE: NORMAL

## 2021-04-25 ENCOUNTER — HEALTH MAINTENANCE LETTER (OUTPATIENT)
Age: 86
End: 2021-04-25

## 2021-10-10 ENCOUNTER — HEALTH MAINTENANCE LETTER (OUTPATIENT)
Age: 86
End: 2021-10-10

## 2021-11-03 ENCOUNTER — APPOINTMENT (OUTPATIENT)
Dept: CT IMAGING | Facility: CLINIC | Age: 86
End: 2021-11-03
Attending: EMERGENCY MEDICINE
Payer: MEDICARE

## 2021-11-03 ENCOUNTER — HOSPITAL ENCOUNTER (EMERGENCY)
Facility: CLINIC | Age: 86
Discharge: HOME | End: 2021-11-03
Attending: EMERGENCY MEDICINE | Admitting: EMERGENCY MEDICINE
Payer: MEDICARE

## 2021-11-03 ENCOUNTER — APPOINTMENT (OUTPATIENT)
Dept: GENERAL RADIOLOGY | Facility: CLINIC | Age: 86
End: 2021-11-03
Attending: EMERGENCY MEDICINE
Payer: MEDICARE

## 2021-11-03 VITALS
SYSTOLIC BLOOD PRESSURE: 184 MMHG | WEIGHT: 92 LBS | TEMPERATURE: 97.2 F | BODY MASS INDEX: 15.33 KG/M2 | RESPIRATION RATE: 16 BRPM | HEIGHT: 65 IN | HEART RATE: 55 BPM | DIASTOLIC BLOOD PRESSURE: 81 MMHG | OXYGEN SATURATION: 95 %

## 2021-11-03 DIAGNOSIS — S92.344A CLOSED NONDISPLACED FRACTURE OF FOURTH METATARSAL BONE OF RIGHT FOOT, INITIAL ENCOUNTER: ICD-10-CM
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
## 2021-11-03 DIAGNOSIS — M79.671 RIGHT FOOT PAIN: ICD-10-CM

## 2021-11-03 DIAGNOSIS — S92.324A NONDISPLACED FRACTURE OF SECOND METATARSAL BONE, RIGHT FOOT, INITIAL ENCOUNTER FOR CLOSED FRACTURE: Primary | ICD-10-CM

## 2021-11-03 DIAGNOSIS — S09.90XA CLOSED HEAD INJURY, INITIAL ENCOUNTER: ICD-10-CM

## 2021-11-03 DIAGNOSIS — S00.91XA ABRASION OF HEAD, INITIAL ENCOUNTER: ICD-10-CM

## 2021-11-03 DIAGNOSIS — M25.571 ACUTE RIGHT ANKLE PAIN: ICD-10-CM

## 2021-11-03 PROCEDURE — 28470 CLTX METATARSAL FX WO MNP EA: CPT | Mod: RT

## 2021-11-03 PROCEDURE — 99285 EMERGENCY DEPT VISIT HI MDM: CPT | Mod: 25

## 2021-11-03 PROCEDURE — 73630 X-RAY EXAM OF FOOT: CPT | Mod: RT

## 2021-11-03 PROCEDURE — 70450 CT HEAD/BRAIN W/O DYE: CPT | Mod: ME

## 2021-11-03 PROCEDURE — 250N000013 HC RX MED GY IP 250 OP 250 PS 637: Performed by: EMERGENCY MEDICINE

## 2021-11-03 PROCEDURE — 73610 X-RAY EXAM OF ANKLE: CPT | Mod: RT

## 2021-11-03 RX ORDER — IBUPROFEN 400 MG/1
400 TABLET, FILM COATED ORAL ONCE
Status: COMPLETED | OUTPATIENT
Start: 2021-11-03 | End: 2021-11-03

## 2021-11-03 RX ORDER — ACETAMINOPHEN 500 MG
1000 TABLET ORAL ONCE
Status: COMPLETED | OUTPATIENT
Start: 2021-11-03 | End: 2021-11-03

## 2021-11-03 RX ADMIN — ACETAMINOPHEN 1000 MG: 500 TABLET, FILM COATED ORAL at 20:48

## 2021-11-03 RX ADMIN — IBUPROFEN 400 MG: 400 TABLET ORAL at 23:05

## 2021-11-03 ASSESSMENT — ENCOUNTER SYMPTOMS
JOINT SWELLING: 1
ABDOMINAL PAIN: 0
ARTHRALGIAS: 1
VOMITING: 0
FEVER: 0
COUGH: 0
BACK PAIN: 0
HEADACHES: 0
DIARRHEA: 0
SHORTNESS OF BREATH: 0

## 2021-11-03 ASSESSMENT — MIFFLIN-ST. JEOR: SCORE: 798.19

## 2021-11-04 ENCOUNTER — TRANSFERRED RECORDS (OUTPATIENT)
Dept: GENERAL RADIOLOGY | Facility: CLINIC | Age: 86
End: 2021-11-04
Payer: MEDICARE

## 2021-11-04 NOTE — ED TRIAGE NOTES
Pt BIBA. She was trying to get to the phone at her assisted living and tripped. She had no loss of consciousness. She is c/o right foot pain and has an abrasion left forehead.

## 2021-11-04 NOTE — ED NOTES
Call has been placed to patients assisted living ([facility])  to give report. Message left. [phone] Waiting for return call.

## 2021-11-04 NOTE — DISCHARGE INSTRUCTIONS
Make sure to elevate your right foot to help with swelling at nighttime.      You will need full assistance for ambulation at home, including transit to and from the bathroom, getting out of bed, moving around at home, any activity of daily living.    Make an appointment with [facility] for follow up regarding your right foot fractures in the next week!

## 2021-11-04 NOTE — ED NOTES
Bed: ED09  Expected date:   Expected time:   Means of arrival:   Comments:  [address] [address] 89F right foot pain after fall eta 1958

## 2021-11-04 NOTE — ED PROVIDER NOTES
History   Chief Complaint:  Fall     HPI   Gennaro Walton is a 89 year old female with history of osteoporosis, hip fracture, and hypertension who presents with a mechanical fall.  Per EMS and the patient, the patient had a mechanical fall earlier this evening around 1930 where she was getting out of bed and slid to the floor and hit her head on the floor.  She did not lose consciousness and remembers the fall, and notes that she was down for about 30 minutes before someone heard her calling.  On arrival, she endorses right ankle, foot, and toe pain and swelling.  The patient's daughter indicates that this right foot pain is their main indicator for coming into the ED this evening.  She had no chest pain or shortness of breath before or after the fall.  No fever, cough, headache abdominal pain, vomiting, diarrhea, low back pain.  Patient is usually given Tylenol around 1900 at her assisted living, however the patient's daughter was unsure if she had gotten any today.  No blood thinners.  She notes that she did have trouble walking afterwards, however the patient's daughter mentions that the patient has had trouble walking for the past 6 months and has been using furniture to get around, as well as a wheelchair.    Review of Systems   Constitutional: Negative for fever.   Respiratory: Negative for cough and shortness of breath.    Cardiovascular: Negative for chest pain.   Gastrointestinal: Negative for abdominal pain, diarrhea and vomiting.   Musculoskeletal: Positive for arthralgias and joint swelling. Negative for back pain.   Neurological: Negative for headaches.   All other systems reviewed and are negative.    Allergies:  Sulfacetamide  Tramadol    Medications:  Norvasc  Synthroid  Losartan  Miralax  Senokot    Past Medical History:    Breast cancer  CKD stage 3  Cognitive impairment  Hypertension  Hypothyroidism  Osteoporosis  Chronic pain bilateral shoulders  Hip fracture   Falls    Past Surgical History:   "  Left mastectomy  Cataract surgery  Open reduction internal fixation left femur  Open reduction internal fixation right femur     Social History:  Presents to ED via EMS with daughter  PCP: Mady Hagen    Physical Exam     Patient Vitals for the past 24 hrs:   BP Temp Temp src Pulse Resp SpO2 Height Weight   11/03/21 2145 -- -- -- -- -- 95 % -- --   11/03/21 2017 (!) 174/80 97.2  F (36.2  C) Oral 55 16 95 % 1.651 m (5' 5\") 41.7 kg (92 lb)       Physical Exam  General: Alert, appears elderly and frail . Cooperative.     In mild distress  HEENT:  Head:  Abrasion to left forehead  Ears:  External ears are normal  Mouth/Throat:  Oropharynx is without erythema or exudate and mucous membranes are moist.   Eyes:   Conjunctivae normal and EOM are normal. No scleral icterus.  CV:  Normal rate, regular rhythm, normal heart sounds and radial pulses are 2+ and symmetric.  No murmur.  Resp:  Breath sounds are clear bilaterally    Non-labored, no retractions or accessory muscle use  GI:  Abdomen is soft, no distension, no tenderness. No rebound or guarding.  No CVA tenderness bilaterally  MS:  Normal range of motion. No edema.    Back atraumatic.    No midline cervical, thoracic, or lumbar tenderness    Right Leg:    Thigh:     Normal    Knee:     Normal; there is no effusion    Proximal fibula:   Normal and non-tender    Tibia:     Long shaft of the tibia is normal    [place]-lateral malleolar ligaments: Swollen and tender    Medial collateral (Deltoid) ligament: Normal    Lateral malleolus:   Mildly-tender    Medial Malleolus:   Non-tender      Achilles tendon:   Normal, intact    5th MT base:    Normal and non-tender    Foot bones:    Mild tenderness to distal metatarsals    Capillary Refill:   Normal.    Sensation:    Foot and ankle sensation are normal  Skin:  Warm and dry.  Abrasion to left forehead  Neuro: Alert. Normal strength.  GCS: 15  Psych:  Normal mood and affect.    Emergency Department Course   Imaging:  Foot  " XR, G/E 3 views, right   Final Result   IMPRESSION:       1.  Severe demineralization.       2.  Acute transverse fractures at the distal metaphyses of the second and fourth metatarsals. No definite third or fifth metatarsal fracture.       3.  There is an impacted deformity of the proximal metaphysis of the proximal phalanx, which is age indeterminant. Some sclerosis in this location and the lack of a cortical break suggests that this might be subacute, but correlation is recommended with    acute pain and tenderness.      4.  Additional age indeterminate but possibly acute minimal impaction fracture in the proximal metaphysis of the great toe metatarsal.      5.  Moderate degenerative arthritic changes are present at the first MTP joint.      6.  Normal joint alignment. No fracture in the midfoot or hindfoot. Moderate soft tissue swelling throughout the forefoot.      Ankle XR, G/E 3 views, right   Final Result   IMPRESSION: Soft tissue swelling throughout the ankle. Normal joint alignment. Severe bony demineralization. No displaced fracture. No nondisplaced fracture visualized.      CT Head w/o Contrast   Final Result   IMPRESSION:   1.  No CT evidence for acute intracranial abnormality.   2.  Volume loss and presumed chronic microvascular ischemic changes as above.        Procedures  None    Emergency Department Course:  Reviewed:  2008 I reviewed the patient's nursing notes, vitals, past medical records, Care Everywhere.     Assessments/Consults:  2010 I performed an exam as documented above.  ED Course as of Nov 03 2213   Wed Nov 03, 2021 2139 I rechecked the patient and reevaluated their symptoms.          Interventions:  Medications   acetaminophen (TYLENOL) tablet 1,000 mg (1,000 mg Oral Given 11/3/21 2048)     Disposition:  The patient was discharged to home.     Impression & Plan     Medical Decision Making:  Gennaro Walton is a 89 year old female with a history of osteoporosis and hypertension who  presents after a fall at home.  Patient notes swelling and pain to the right ankle and foot.  She also hit her head with an abrasion to the left forehead.  CT imaging of the head negative for acute intracranial process.  X-ray imaging of the right ankle appears normal although there is some soft tissue swelling concerning for possible sprain.  X-ray imaging of the right foot shows a acute versus subacute distal second and fourth metatarsal fracture.  These findings are consistent with her physical examination and pain this evening.  She was placed in a postoperative boot for protection and immobilization of the right foot.  As she is quite frail she will require full assist at her assisted living facility.  In discussion with the facility and the patient's daughter at bedside it does appear that they would be able to help provide her hourly cares and patient also will be wearing a pendant, where she can seek additional assistance as needed.  I was initially hesitant to send her back to her assisted living facility as it does seem that she would likely require full cares at this time.  Due to the fact that she now has hourly checks from her assisted living facility, the facility feels comfortable taking the patient back this evening; I think it is reasonable to trial going back to her assisted facility this evening.  I do think it will be important for the patient to follow-up with [facility] orthopedics for reassessment in the next 1 week.  Additionally, she will likely require physical therapy as she continues to recover from these acute fractures this evening.  Tylenol and NSAIDs as needed for pain control.  We discussed at length that opiates would be unsafe for the patient as they have caused her to have significant clinical side effects historically.  Daughter in agreement with above plan for discharge back to assisted living facility this evening.  Patient transferred by wheelchair van back to her assisted  living facility this evening.    Diagnosis:    ICD-10-CM    1. Nondisplaced fracture of second metatarsal bone, right foot, initial encounter for closed fracture  S92.324A    2. Abrasion of head, initial encounter  S00.91XA    3. Fall, initial encounter  W19.XXXA    4. Closed head injury, initial encounter  S09.90XA    5. Right foot pain  M79.671    6. Acute right ankle pain  M25.571    7. Closed nondisplaced fracture of fourth metatarsal bone of right foot, initial encounter  S92.344A      Discharge Medications:  New Prescriptions    No medications on file     Scribe Disclosure:  Eliazar JUNIOR, am serving as a scribe at 8:12 PM on 11/3/2021 to document services personally performed by Umang Verdugo MD based on my observations and the provider's statements to me.      Umang Verdugo MD  11/04/21 0055

## 2022-02-07 ENCOUNTER — APPOINTMENT (OUTPATIENT)
Dept: GENERAL RADIOLOGY | Facility: CLINIC | Age: 87
DRG: 481 | End: 2022-02-07
Attending: EMERGENCY MEDICINE
Payer: MEDICARE

## 2022-02-07 ENCOUNTER — HOSPITAL ENCOUNTER (INPATIENT)
Facility: CLINIC | Age: 87
LOS: 4 days | Discharge: NURSING, REHAB OR LONG-TERM CARE | DRG: 481 | End: 2022-02-11
Attending: EMERGENCY MEDICINE | Admitting: HOSPITALIST
Payer: MEDICARE
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
## 2022-02-07 DIAGNOSIS — R53.81 DEBILITY: ICD-10-CM

## 2022-02-07 DIAGNOSIS — S72.8X1A OTHER CLOSED FRACTURE OF RIGHT FEMUR, UNSPECIFIED PORTION OF FEMUR, INITIAL ENCOUNTER (H): ICD-10-CM

## 2022-02-07 DIAGNOSIS — S72.91XD CLOSED FRACTURE OF RIGHT FEMUR WITH ROUTINE HEALING, UNSPECIFIED FRACTURE MORPHOLOGY, UNSPECIFIED PORTION OF FEMUR, SUBSEQUENT ENCOUNTER: Primary | ICD-10-CM

## 2022-02-07 LAB
ALBUMIN SERPL-MCNC: 3.6 G/DL (ref 3.4–5)
ALP SERPL-CCNC: 114 U/L (ref 40–150)
ALT SERPL W P-5'-P-CCNC: 15 U/L (ref 0–50)
ANION GAP SERPL CALCULATED.3IONS-SCNC: 6 MMOL/L (ref 3–14)
AST SERPL W P-5'-P-CCNC: 13 U/L (ref 0–45)
BASOPHILS # BLD AUTO: 0 10E3/UL (ref 0–0.2)
BASOPHILS NFR BLD AUTO: 0 %
BILIRUB SERPL-MCNC: 0.3 MG/DL (ref 0.2–1.3)
BUN SERPL-MCNC: 13 MG/DL (ref 7–30)
CALCIUM SERPL-MCNC: 8.5 MG/DL (ref 8.5–10.1)
CHLORIDE BLD-SCNC: 98 MMOL/L (ref 94–109)
CK SERPL-CCNC: 30 U/L (ref 30–225)
CO2 SERPL-SCNC: 28 MMOL/L (ref 20–32)
CREAT SERPL-MCNC: 0.53 MG/DL (ref 0.52–1.04)
EOSINOPHIL # BLD AUTO: 0.1 10E3/UL (ref 0–0.7)
EOSINOPHIL NFR BLD AUTO: 1 %
ERYTHROCYTE [DISTWIDTH] IN BLOOD BY AUTOMATED COUNT: 13.1 % (ref 10–15)
GFR SERPL CREATININE-BSD FRML MDRD: 83 ML/MIN/1.73M2
GLUCOSE BLD-MCNC: 138 MG/DL (ref 70–99)
HCT VFR BLD AUTO: 33.3 % (ref 35–47)
HGB BLD-MCNC: 10.8 G/DL (ref 11.7–15.7)
HOLD SPECIMEN: NORMAL
IMM GRANULOCYTES # BLD: 0 10E3/UL
IMM GRANULOCYTES NFR BLD: 1 %
LYMPHOCYTES # BLD AUTO: 0.8 10E3/UL (ref 0.8–5.3)
LYMPHOCYTES NFR BLD AUTO: 10 %
MCH RBC QN AUTO: 34 PG (ref 26.5–33)
MCHC RBC AUTO-ENTMCNC: 32.4 G/DL (ref 31.5–36.5)
MCV RBC AUTO: 105 FL (ref 78–100)
MONOCYTES # BLD AUTO: 0.9 10E3/UL (ref 0–1.3)
MONOCYTES NFR BLD AUTO: 11 %
NEUTROPHILS # BLD AUTO: 6.7 10E3/UL (ref 1.6–8.3)
NEUTROPHILS NFR BLD AUTO: 77 %
NRBC # BLD AUTO: 0 10E3/UL
NRBC BLD AUTO-RTO: 0 /100
PLATELET # BLD AUTO: 222 10E3/UL (ref 150–450)
POTASSIUM BLD-SCNC: 3.6 MMOL/L (ref 3.4–5.3)
PROT SERPL-MCNC: 6.7 G/DL (ref 6.8–8.8)
RBC # BLD AUTO: 3.18 10E6/UL (ref 3.8–5.2)
SARS-COV-2 RNA RESP QL NAA+PROBE: NEGATIVE
SODIUM SERPL-SCNC: 132 MMOL/L (ref 133–144)
WBC # BLD AUTO: 8.5 10E3/UL (ref 4–11)

## 2022-02-07 PROCEDURE — 73502 X-RAY EXAM HIP UNI 2-3 VIEWS: CPT

## 2022-02-07 PROCEDURE — 85025 COMPLETE CBC W/AUTO DIFF WBC: CPT | Performed by: EMERGENCY MEDICINE

## 2022-02-07 PROCEDURE — 96374 THER/PROPH/DIAG INJ IV PUSH: CPT

## 2022-02-07 PROCEDURE — 99285 EMERGENCY DEPT VISIT HI MDM: CPT

## 2022-02-07 PROCEDURE — 120N000001 HC R&B MED SURG/OB

## 2022-02-07 PROCEDURE — 99223 1ST HOSP IP/OBS HIGH 75: CPT | Mod: AI | Performed by: HOSPITALIST

## 2022-02-07 PROCEDURE — 73562 X-RAY EXAM OF KNEE 3: CPT | Mod: RT

## 2022-02-07 PROCEDURE — 87635 SARS-COV-2 COVID-19 AMP PRB: CPT | Performed by: EMERGENCY MEDICINE

## 2022-02-07 PROCEDURE — 82550 ASSAY OF CK (CPK): CPT | Performed by: EMERGENCY MEDICINE

## 2022-02-07 PROCEDURE — C9803 HOPD COVID-19 SPEC COLLECT: HCPCS

## 2022-02-07 PROCEDURE — 250N000011 HC RX IP 250 OP 636: Performed by: EMERGENCY MEDICINE

## 2022-02-07 PROCEDURE — 93005 ELECTROCARDIOGRAM TRACING: CPT

## 2022-02-07 PROCEDURE — 82310 ASSAY OF CALCIUM: CPT | Performed by: EMERGENCY MEDICINE

## 2022-02-07 PROCEDURE — 36415 COLL VENOUS BLD VENIPUNCTURE: CPT | Performed by: EMERGENCY MEDICINE

## 2022-02-07 RX ORDER — HYDROMORPHONE HYDROCHLORIDE 1 MG/ML
0.25 INJECTION, SOLUTION INTRAMUSCULAR; INTRAVENOUS; SUBCUTANEOUS EVERY 10 MIN PRN
Status: DISCONTINUED | OUTPATIENT
Start: 2022-02-07 | End: 2022-02-08

## 2022-02-07 RX ORDER — CHLORHEXIDINE GLUCONATE ORAL RINSE 1.2 MG/ML
15 SOLUTION DENTAL 2 TIMES DAILY
COMMUNITY
End: 2022-04-14

## 2022-02-07 RX ORDER — ACETAMINOPHEN 500 MG
1000 TABLET ORAL DAILY PRN
COMMUNITY

## 2022-02-07 RX ORDER — SODIUM FLUORIDE 0.9 MG/ML
60 MOUTHWASH DENTAL AT BEDTIME
Status: ON HOLD | COMMUNITY
End: 2022-02-11

## 2022-02-07 RX ORDER — LIDOCAINE 4 G/G
1 PATCH TOPICAL EVERY MORNING
COMMUNITY

## 2022-02-07 RX ORDER — CARBOXYMETHYLCELLULOSE SODIUM 5 MG/ML
2 SOLUTION/ DROPS OPHTHALMIC 4 TIMES DAILY
COMMUNITY

## 2022-02-07 RX ADMIN — HYDROMORPHONE HYDROCHLORIDE 0.25 MG: 1 INJECTION, SOLUTION INTRAMUSCULAR; INTRAVENOUS; SUBCUTANEOUS at 22:57

## 2022-02-07 ASSESSMENT — ACTIVITIES OF DAILY LIVING (ADL)
ADLS_ACUITY_SCORE: 12
ADLS_ACUITY_SCORE: 12

## 2022-02-07 ASSESSMENT — ENCOUNTER SYMPTOMS
ABDOMINAL PAIN: 0
MYALGIAS: 1
BACK PAIN: 0
ARTHRALGIAS: 1

## 2022-02-07 ASSESSMENT — MIFFLIN-ST. JEOR: SCORE: 733.5

## 2022-02-08 ENCOUNTER — ANESTHESIA (OUTPATIENT)
Dept: SURGERY | Facility: CLINIC | Age: 87
DRG: 481 | End: 2022-02-08
Payer: MEDICARE

## 2022-02-08 ENCOUNTER — ANESTHESIA EVENT (OUTPATIENT)
Dept: SURGERY | Facility: CLINIC | Age: 87
DRG: 481 | End: 2022-02-08
Payer: MEDICARE

## 2022-02-08 ENCOUNTER — APPOINTMENT (OUTPATIENT)
Dept: GENERAL RADIOLOGY | Facility: CLINIC | Age: 87
DRG: 481 | End: 2022-02-08
Attending: HOSPITALIST
Payer: MEDICARE

## 2022-02-08 LAB
ABO/RH(D): NORMAL
ANION GAP SERPL CALCULATED.3IONS-SCNC: 4 MMOL/L (ref 3–14)
ANTIBODY SCREEN: NEGATIVE
ATRIAL RATE - MUSE: 65 BPM
BASOPHILS # BLD AUTO: 0 10E3/UL (ref 0–0.2)
BASOPHILS NFR BLD AUTO: 0 %
BLD PROD TYP BPU: NORMAL
BLOOD COMPONENT TYPE: NORMAL
BUN SERPL-MCNC: 10 MG/DL (ref 7–30)
CALCIUM SERPL-MCNC: 8.2 MG/DL (ref 8.5–10.1)
CHLORIDE BLD-SCNC: 103 MMOL/L (ref 94–109)
CO2 SERPL-SCNC: 26 MMOL/L (ref 20–32)
CODING SYSTEM: NORMAL
CREAT SERPL-MCNC: 0.45 MG/DL (ref 0.52–1.04)
CROSSMATCH: NORMAL
DIASTOLIC BLOOD PRESSURE - MUSE: NORMAL MMHG
EOSINOPHIL # BLD AUTO: 0.2 10E3/UL (ref 0–0.7)
EOSINOPHIL NFR BLD AUTO: 2 %
ERYTHROCYTE [DISTWIDTH] IN BLOOD BY AUTOMATED COUNT: 13.1 % (ref 10–15)
GFR SERPL CREATININE-BSD FRML MDRD: 86 ML/MIN/1.73M2
GLUCOSE BLD-MCNC: 92 MG/DL (ref 70–99)
HCT VFR BLD AUTO: 31.1 % (ref 35–47)
HGB BLD-MCNC: 10.3 G/DL (ref 11.7–15.7)
IMM GRANULOCYTES # BLD: 0 10E3/UL
IMM GRANULOCYTES NFR BLD: 1 %
INTERPRETATION ECG - MUSE: NORMAL
LYMPHOCYTES # BLD AUTO: 1 10E3/UL (ref 0.8–5.3)
LYMPHOCYTES NFR BLD AUTO: 15 %
MCH RBC QN AUTO: 33.7 PG (ref 26.5–33)
MCHC RBC AUTO-ENTMCNC: 33.1 G/DL (ref 31.5–36.5)
MCV RBC AUTO: 102 FL (ref 78–100)
MONOCYTES # BLD AUTO: 0.8 10E3/UL (ref 0–1.3)
MONOCYTES NFR BLD AUTO: 12 %
NEUTROPHILS # BLD AUTO: 4.5 10E3/UL (ref 1.6–8.3)
NEUTROPHILS NFR BLD AUTO: 70 %
NRBC # BLD AUTO: 0 10E3/UL
NRBC BLD AUTO-RTO: 0 /100
P AXIS - MUSE: 57 DEGREES
PLATELET # BLD AUTO: 199 10E3/UL (ref 150–450)
POTASSIUM BLD-SCNC: 3.7 MMOL/L (ref 3.4–5.3)
PR INTERVAL - MUSE: 182 MS
QRS DURATION - MUSE: 128 MS
QT - MUSE: 402 MS
QTC - MUSE: 418 MS
R AXIS - MUSE: -33 DEGREES
RBC # BLD AUTO: 3.06 10E6/UL (ref 3.8–5.2)
SODIUM SERPL-SCNC: 133 MMOL/L (ref 133–144)
SPECIMEN EXPIRATION DATE: NORMAL
SYSTOLIC BLOOD PRESSURE - MUSE: NORMAL MMHG
T AXIS - MUSE: 114 DEGREES
UNIT ABO/RH: NORMAL
UNIT NUMBER: NORMAL
UNIT STATUS: NORMAL
UNIT TYPE ISBT: 1700
VENTRICULAR RATE- MUSE: 65 BPM
WBC # BLD AUTO: 6.4 10E3/UL (ref 4–11)

## 2022-02-08 PROCEDURE — 360N000084 HC SURGERY LEVEL 4 W/ FLUORO, PER MIN: Performed by: ORTHOPAEDIC SURGERY

## 2022-02-08 PROCEDURE — 250N000009 HC RX 250: Performed by: ORTHOPAEDIC SURGERY

## 2022-02-08 PROCEDURE — 258N000003 HC RX IP 258 OP 636: Performed by: PHYSICIAN ASSISTANT

## 2022-02-08 PROCEDURE — 36415 COLL VENOUS BLD VENIPUNCTURE: CPT | Performed by: HOSPITALIST

## 2022-02-08 PROCEDURE — 250N000011 HC RX IP 250 OP 636: Performed by: NURSE ANESTHETIST, CERTIFIED REGISTERED

## 2022-02-08 PROCEDURE — 99232 SBSQ HOSP IP/OBS MODERATE 35: CPT | Performed by: STUDENT IN AN ORGANIZED HEALTH CARE EDUCATION/TRAINING PROGRAM

## 2022-02-08 PROCEDURE — 710N000009 HC RECOVERY PHASE 1, LEVEL 1, PER MIN: Performed by: ORTHOPAEDIC SURGERY

## 2022-02-08 PROCEDURE — 370N000017 HC ANESTHESIA TECHNICAL FEE, PER MIN: Performed by: ORTHOPAEDIC SURGERY

## 2022-02-08 PROCEDURE — 250N000013 HC RX MED GY IP 250 OP 250 PS 637: Performed by: HOSPITALIST

## 2022-02-08 PROCEDURE — C1713 ANCHOR/SCREW BN/BN,TIS/BN: HCPCS | Performed by: ORTHOPAEDIC SURGERY

## 2022-02-08 PROCEDURE — 80048 BASIC METABOLIC PNL TOTAL CA: CPT | Performed by: HOSPITALIST

## 2022-02-08 PROCEDURE — 86923 COMPATIBILITY TEST ELECTRIC: CPT | Performed by: ORTHOPAEDIC SURGERY

## 2022-02-08 PROCEDURE — 250N000009 HC RX 250: Performed by: NURSE ANESTHETIST, CERTIFIED REGISTERED

## 2022-02-08 PROCEDURE — 86901 BLOOD TYPING SEROLOGIC RH(D): CPT | Performed by: INTERNAL MEDICINE

## 2022-02-08 PROCEDURE — 85025 COMPLETE CBC W/AUTO DIFF WBC: CPT | Performed by: HOSPITALIST

## 2022-02-08 PROCEDURE — 250N000025 HC SEVOFLURANE, PER MIN: Performed by: ORTHOPAEDIC SURGERY

## 2022-02-08 PROCEDURE — 120N000001 HC R&B MED SURG/OB

## 2022-02-08 PROCEDURE — 271N000001 HC OR GENERAL SUPPLY NON-STERILE: Performed by: ORTHOPAEDIC SURGERY

## 2022-02-08 PROCEDURE — 999N000141 HC STATISTIC PRE-PROCEDURE NURSING ASSESSMENT: Performed by: ORTHOPAEDIC SURGERY

## 2022-02-08 PROCEDURE — 250N000011 HC RX IP 250 OP 636: Performed by: HOSPITALIST

## 2022-02-08 PROCEDURE — 272N000001 HC OR GENERAL SUPPLY STERILE: Performed by: ORTHOPAEDIC SURGERY

## 2022-02-08 PROCEDURE — 250N000011 HC RX IP 250 OP 636: Performed by: PHYSICIAN ASSISTANT

## 2022-02-08 PROCEDURE — C1769 GUIDE WIRE: HCPCS | Performed by: ORTHOPAEDIC SURGERY

## 2022-02-08 PROCEDURE — 258N000003 HC RX IP 258 OP 636: Performed by: HOSPITALIST

## 2022-02-08 PROCEDURE — 250N000013 HC RX MED GY IP 250 OP 250 PS 637: Performed by: PHYSICIAN ASSISTANT

## 2022-02-08 PROCEDURE — 36415 COLL VENOUS BLD VENIPUNCTURE: CPT | Performed by: INTERNAL MEDICINE

## 2022-02-08 PROCEDURE — 999N000179 XR SURGERY CARM FLUORO LESS THAN 5 MIN W STILLS

## 2022-02-08 PROCEDURE — 258N000003 HC RX IP 258 OP 636: Performed by: NURSE ANESTHETIST, CERTIFIED REGISTERED

## 2022-02-08 PROCEDURE — 250N000013 HC RX MED GY IP 250 OP 250 PS 637: Performed by: STUDENT IN AN ORGANIZED HEALTH CARE EDUCATION/TRAINING PROGRAM

## 2022-02-08 PROCEDURE — 0QSB04Z REPOSITION RIGHT LOWER FEMUR WITH INTERNAL FIXATION DEVICE, OPEN APPROACH: ICD-10-PCS | Performed by: ORTHOPAEDIC SURGERY

## 2022-02-08 PROCEDURE — 250N000011 HC RX IP 250 OP 636: Performed by: ORTHOPAEDIC SURGERY

## 2022-02-08 DEVICE — IMPLANTABLE DEVICE: Type: IMPLANTABLE DEVICE | Site: LEG | Status: FUNCTIONAL

## 2022-02-08 DEVICE — IMP SCR SYN OPTILINK LOK VA CAN 5.0X70MM 42.231.670: Type: IMPLANTABLE DEVICE | Site: LEG | Status: FUNCTIONAL

## 2022-02-08 DEVICE — IMP SCR SYN CORTEX 4.5X36MM ST TI 414.836: Type: IMPLANTABLE DEVICE | Site: LEG | Status: FUNCTIONAL

## 2022-02-08 DEVICE — IMP SCR SYN OPTILINK LOK VA CAN 5.0X75MM 42.231.675: Type: IMPLANTABLE DEVICE | Site: LEG | Status: FUNCTIONAL

## 2022-02-08 RX ORDER — BUPIVACAINE HYDROCHLORIDE 5 MG/ML
INJECTION, SOLUTION PERINEURAL PRN
Status: DISCONTINUED | OUTPATIENT
Start: 2022-02-08 | End: 2022-02-08 | Stop reason: HOSPADM

## 2022-02-08 RX ORDER — SODIUM CHLORIDE, SODIUM LACTATE, POTASSIUM CHLORIDE, CALCIUM CHLORIDE 600; 310; 30; 20 MG/100ML; MG/100ML; MG/100ML; MG/100ML
INJECTION, SOLUTION INTRAVENOUS CONTINUOUS PRN
Status: DISCONTINUED | OUTPATIENT
Start: 2022-02-08 | End: 2022-02-08

## 2022-02-08 RX ORDER — ACETAMINOPHEN 325 MG/1
975 TABLET ORAL ONCE
Status: CANCELLED | OUTPATIENT
Start: 2022-02-08 | End: 2022-02-08

## 2022-02-08 RX ORDER — CEFAZOLIN SODIUM/WATER 2 G/20 ML
2 SYRINGE (ML) INTRAVENOUS
Status: COMPLETED | OUTPATIENT
Start: 2022-02-08 | End: 2022-02-08

## 2022-02-08 RX ORDER — PROPOFOL 10 MG/ML
INJECTION, EMULSION INTRAVENOUS PRN
Status: DISCONTINUED | OUTPATIENT
Start: 2022-02-08 | End: 2022-02-08

## 2022-02-08 RX ORDER — ASPIRIN 81 MG/1
81 TABLET ORAL 2 TIMES DAILY
Status: DISCONTINUED | OUTPATIENT
Start: 2022-02-09 | End: 2022-02-12 | Stop reason: HOSPADM

## 2022-02-08 RX ORDER — AMOXICILLIN 250 MG
2 CAPSULE ORAL DAILY PRN
Status: DISCONTINUED | OUTPATIENT
Start: 2022-02-08 | End: 2022-02-12 | Stop reason: HOSPADM

## 2022-02-08 RX ORDER — ONDANSETRON 2 MG/ML
4 INJECTION INTRAMUSCULAR; INTRAVENOUS EVERY 6 HOURS PRN
Status: DISCONTINUED | OUTPATIENT
Start: 2022-02-08 | End: 2022-02-12 | Stop reason: HOSPADM

## 2022-02-08 RX ORDER — ONDANSETRON 4 MG/1
4 TABLET, ORALLY DISINTEGRATING ORAL EVERY 30 MIN PRN
Status: DISCONTINUED | OUTPATIENT
Start: 2022-02-08 | End: 2022-02-08 | Stop reason: HOSPADM

## 2022-02-08 RX ORDER — TRANEXAMIC ACID 10 MG/ML
1 INJECTION, SOLUTION INTRAVENOUS CONTINUOUS
Status: DISCONTINUED | OUTPATIENT
Start: 2022-02-08 | End: 2022-02-08 | Stop reason: HOSPADM

## 2022-02-08 RX ORDER — ONDANSETRON 4 MG/1
4 TABLET, ORALLY DISINTEGRATING ORAL EVERY 6 HOURS PRN
Status: DISCONTINUED | OUTPATIENT
Start: 2022-02-08 | End: 2022-02-08

## 2022-02-08 RX ORDER — LOSARTAN POTASSIUM 100 MG/1
100 TABLET ORAL DAILY
Status: DISCONTINUED | OUTPATIENT
Start: 2022-02-08 | End: 2022-02-12 | Stop reason: HOSPADM

## 2022-02-08 RX ORDER — NEOSTIGMINE METHYLSULFATE 1 MG/ML
VIAL (ML) INJECTION PRN
Status: DISCONTINUED | OUTPATIENT
Start: 2022-02-08 | End: 2022-02-08

## 2022-02-08 RX ORDER — ONDANSETRON 4 MG/1
4 TABLET, ORALLY DISINTEGRATING ORAL EVERY 6 HOURS PRN
Status: DISCONTINUED | OUTPATIENT
Start: 2022-02-08 | End: 2022-02-12 | Stop reason: HOSPADM

## 2022-02-08 RX ORDER — OXYCODONE HYDROCHLORIDE 5 MG/1
5 TABLET ORAL EVERY 4 HOURS PRN
Status: CANCELLED | OUTPATIENT
Start: 2022-02-08

## 2022-02-08 RX ORDER — BISACODYL 10 MG
10 SUPPOSITORY, RECTAL RECTAL DAILY PRN
Status: DISCONTINUED | OUTPATIENT
Start: 2022-02-08 | End: 2022-02-08

## 2022-02-08 RX ORDER — AMOXICILLIN 250 MG
1 CAPSULE ORAL 2 TIMES DAILY
Status: DISCONTINUED | OUTPATIENT
Start: 2022-02-08 | End: 2022-02-12 | Stop reason: HOSPADM

## 2022-02-08 RX ORDER — LIDOCAINE HYDROCHLORIDE 20 MG/ML
INJECTION, SOLUTION INFILTRATION; PERINEURAL PRN
Status: DISCONTINUED | OUTPATIENT
Start: 2022-02-08 | End: 2022-02-08

## 2022-02-08 RX ORDER — ACETAMINOPHEN 325 MG/1
650 TABLET ORAL EVERY 6 HOURS PRN
Status: DISCONTINUED | OUTPATIENT
Start: 2022-02-08 | End: 2022-02-12 | Stop reason: HOSPADM

## 2022-02-08 RX ORDER — CEFAZOLIN SODIUM/WATER 2 G/20 ML
2 SYRINGE (ML) INTRAVENOUS SEE ADMIN INSTRUCTIONS
Status: DISCONTINUED | OUTPATIENT
Start: 2022-02-08 | End: 2022-02-08 | Stop reason: HOSPADM

## 2022-02-08 RX ORDER — NALOXONE HYDROCHLORIDE 0.4 MG/ML
0.4 INJECTION, SOLUTION INTRAMUSCULAR; INTRAVENOUS; SUBCUTANEOUS
Status: DISCONTINUED | OUTPATIENT
Start: 2022-02-08 | End: 2022-02-12 | Stop reason: HOSPADM

## 2022-02-08 RX ORDER — AMLODIPINE BESYLATE 5 MG/1
5 TABLET ORAL DAILY
Status: DISCONTINUED | OUTPATIENT
Start: 2022-02-08 | End: 2022-02-12 | Stop reason: HOSPADM

## 2022-02-08 RX ORDER — SODIUM CHLORIDE, SODIUM LACTATE, POTASSIUM CHLORIDE, CALCIUM CHLORIDE 600; 310; 30; 20 MG/100ML; MG/100ML; MG/100ML; MG/100ML
INJECTION, SOLUTION INTRAVENOUS CONTINUOUS
Status: DISCONTINUED | OUTPATIENT
Start: 2022-02-08 | End: 2022-02-08 | Stop reason: HOSPADM

## 2022-02-08 RX ORDER — ONDANSETRON 2 MG/ML
INJECTION INTRAMUSCULAR; INTRAVENOUS PRN
Status: DISCONTINUED | OUTPATIENT
Start: 2022-02-08 | End: 2022-02-08

## 2022-02-08 RX ORDER — POLYETHYLENE GLYCOL 3350 17 G/17G
17 POWDER, FOR SOLUTION ORAL DAILY
Status: DISCONTINUED | OUTPATIENT
Start: 2022-02-09 | End: 2022-02-12 | Stop reason: HOSPADM

## 2022-02-08 RX ORDER — OLANZAPINE 2.5 MG/1
2.5 TABLET, FILM COATED ORAL
Status: DISCONTINUED | OUTPATIENT
Start: 2022-02-08 | End: 2022-02-12 | Stop reason: HOSPADM

## 2022-02-08 RX ORDER — ONDANSETRON 2 MG/ML
4 INJECTION INTRAMUSCULAR; INTRAVENOUS EVERY 6 HOURS PRN
Status: DISCONTINUED | OUTPATIENT
Start: 2022-02-08 | End: 2022-02-08

## 2022-02-08 RX ORDER — HYDRALAZINE HYDROCHLORIDE 20 MG/ML
2.5-5 INJECTION INTRAMUSCULAR; INTRAVENOUS EVERY 10 MIN PRN
Status: DISCONTINUED | OUTPATIENT
Start: 2022-02-08 | End: 2022-02-08 | Stop reason: HOSPADM

## 2022-02-08 RX ORDER — DIMENHYDRINATE 50 MG/ML
25 INJECTION, SOLUTION INTRAMUSCULAR; INTRAVENOUS
Status: DISCONTINUED | OUTPATIENT
Start: 2022-02-08 | End: 2022-02-08 | Stop reason: HOSPADM

## 2022-02-08 RX ORDER — CEFAZOLIN SODIUM 1 G/3ML
1 INJECTION, POWDER, FOR SOLUTION INTRAMUSCULAR; INTRAVENOUS EVERY 8 HOURS
Status: COMPLETED | OUTPATIENT
Start: 2022-02-08 | End: 2022-02-09

## 2022-02-08 RX ORDER — FENTANYL CITRATE 50 UG/ML
INJECTION, SOLUTION INTRAMUSCULAR; INTRAVENOUS PRN
Status: DISCONTINUED | OUTPATIENT
Start: 2022-02-08 | End: 2022-02-08

## 2022-02-08 RX ORDER — ACETAMINOPHEN 325 MG/1
650 TABLET ORAL EVERY 4 HOURS PRN
Status: DISCONTINUED | OUTPATIENT
Start: 2022-02-11 | End: 2022-02-08

## 2022-02-08 RX ORDER — LIDOCAINE 40 MG/G
CREAM TOPICAL
Status: DISCONTINUED | OUTPATIENT
Start: 2022-02-08 | End: 2022-02-12 | Stop reason: HOSPADM

## 2022-02-08 RX ORDER — FENTANYL CITRATE 0.05 MG/ML
50 INJECTION, SOLUTION INTRAMUSCULAR; INTRAVENOUS EVERY 5 MIN PRN
Status: DISCONTINUED | OUTPATIENT
Start: 2022-02-08 | End: 2022-02-08 | Stop reason: HOSPADM

## 2022-02-08 RX ORDER — NALOXONE HYDROCHLORIDE 0.4 MG/ML
0.2 INJECTION, SOLUTION INTRAMUSCULAR; INTRAVENOUS; SUBCUTANEOUS
Status: DISCONTINUED | OUTPATIENT
Start: 2022-02-08 | End: 2022-02-12 | Stop reason: HOSPADM

## 2022-02-08 RX ORDER — HYDROMORPHONE HCL IN WATER/PF 6 MG/30 ML
0.2 PATIENT CONTROLLED ANALGESIA SYRINGE INTRAVENOUS
Status: DISCONTINUED | OUTPATIENT
Start: 2022-02-08 | End: 2022-02-12 | Stop reason: HOSPADM

## 2022-02-08 RX ORDER — CARBOXYMETHYLCELLULOSE SODIUM 5 MG/ML
2 SOLUTION/ DROPS OPHTHALMIC 4 TIMES DAILY PRN
Status: DISCONTINUED | OUTPATIENT
Start: 2022-02-08 | End: 2022-02-12 | Stop reason: HOSPADM

## 2022-02-08 RX ORDER — LIDOCAINE 40 MG/G
CREAM TOPICAL
Status: DISCONTINUED | OUTPATIENT
Start: 2022-02-08 | End: 2022-02-08

## 2022-02-08 RX ORDER — CHLORHEXIDINE GLUCONATE ORAL RINSE 1.2 MG/ML
15 SOLUTION DENTAL 2 TIMES DAILY
Status: DISCONTINUED | OUTPATIENT
Start: 2022-02-08 | End: 2022-02-12 | Stop reason: HOSPADM

## 2022-02-08 RX ORDER — ONDANSETRON 2 MG/ML
4 INJECTION INTRAMUSCULAR; INTRAVENOUS EVERY 30 MIN PRN
Status: DISCONTINUED | OUTPATIENT
Start: 2022-02-08 | End: 2022-02-08 | Stop reason: HOSPADM

## 2022-02-08 RX ORDER — GLYCOPYRROLATE 0.2 MG/ML
INJECTION, SOLUTION INTRAMUSCULAR; INTRAVENOUS PRN
Status: DISCONTINUED | OUTPATIENT
Start: 2022-02-08 | End: 2022-02-08

## 2022-02-08 RX ORDER — SODIUM CHLORIDE, SODIUM LACTATE, POTASSIUM CHLORIDE, CALCIUM CHLORIDE 600; 310; 30; 20 MG/100ML; MG/100ML; MG/100ML; MG/100ML
INJECTION, SOLUTION INTRAVENOUS CONTINUOUS
Status: DISCONTINUED | OUTPATIENT
Start: 2022-02-08 | End: 2022-02-11

## 2022-02-08 RX ORDER — SODIUM CHLORIDE 9 MG/ML
INJECTION, SOLUTION INTRAVENOUS CONTINUOUS
Status: ACTIVE | OUTPATIENT
Start: 2022-02-08 | End: 2022-02-08

## 2022-02-08 RX ORDER — PROCHLORPERAZINE MALEATE 5 MG
5 TABLET ORAL EVERY 6 HOURS PRN
Status: DISCONTINUED | OUTPATIENT
Start: 2022-02-08 | End: 2022-02-08

## 2022-02-08 RX ORDER — HYDROMORPHONE HCL IN WATER/PF 6 MG/30 ML
0.2 PATIENT CONTROLLED ANALGESIA SYRINGE INTRAVENOUS EVERY 5 MIN PRN
Status: DISCONTINUED | OUTPATIENT
Start: 2022-02-08 | End: 2022-02-08 | Stop reason: HOSPADM

## 2022-02-08 RX ORDER — ACETAMINOPHEN 650 MG/1
650 SUPPOSITORY RECTAL EVERY 6 HOURS PRN
Status: DISCONTINUED | OUTPATIENT
Start: 2022-02-08 | End: 2022-02-12 | Stop reason: HOSPADM

## 2022-02-08 RX ORDER — BISACODYL 10 MG
10 SUPPOSITORY, RECTAL RECTAL DAILY PRN
Status: DISCONTINUED | OUTPATIENT
Start: 2022-02-08 | End: 2022-02-12 | Stop reason: HOSPADM

## 2022-02-08 RX ORDER — OXYBUTYNIN CHLORIDE 10 MG/1
10 TABLET, EXTENDED RELEASE ORAL AT BEDTIME
Status: DISCONTINUED | OUTPATIENT
Start: 2022-02-08 | End: 2022-02-12 | Stop reason: HOSPADM

## 2022-02-08 RX ORDER — TRANEXAMIC ACID 650 MG/1
1950 TABLET ORAL ONCE
Status: CANCELLED | OUTPATIENT
Start: 2022-02-08 | End: 2022-02-08

## 2022-02-08 RX ORDER — ACETAMINOPHEN 325 MG/1
975 TABLET ORAL EVERY 8 HOURS SCHEDULED
Status: COMPLETED | OUTPATIENT
Start: 2022-02-08 | End: 2022-02-11

## 2022-02-08 RX ORDER — LABETALOL HYDROCHLORIDE 5 MG/ML
10 INJECTION, SOLUTION INTRAVENOUS
Status: DISCONTINUED | OUTPATIENT
Start: 2022-02-08 | End: 2022-02-08 | Stop reason: HOSPADM

## 2022-02-08 RX ORDER — LEVOTHYROXINE SODIUM 88 UG/1
88 TABLET ORAL
Status: DISCONTINUED | OUTPATIENT
Start: 2022-02-08 | End: 2022-02-12 | Stop reason: HOSPADM

## 2022-02-08 RX ADMIN — FENTANYL CITRATE 25 MCG: 50 INJECTION, SOLUTION INTRAMUSCULAR; INTRAVENOUS at 12:35

## 2022-02-08 RX ADMIN — SODIUM CHLORIDE, POTASSIUM CHLORIDE, SODIUM LACTATE AND CALCIUM CHLORIDE: 600; 310; 30; 20 INJECTION, SOLUTION INTRAVENOUS at 12:34

## 2022-02-08 RX ADMIN — LOSARTAN POTASSIUM 100 MG: 100 TABLET, FILM COATED ORAL at 17:56

## 2022-02-08 RX ADMIN — Medication 1 G: at 12:36

## 2022-02-08 RX ADMIN — ACETAMINOPHEN 975 MG: 325 TABLET, FILM COATED ORAL at 21:09

## 2022-02-08 RX ADMIN — GLYCOPYRROLATE 0.6 MG: 0.2 INJECTION, SOLUTION INTRAMUSCULAR; INTRAVENOUS at 13:45

## 2022-02-08 RX ADMIN — FENTANYL CITRATE 50 MCG: 50 INJECTION, SOLUTION INTRAMUSCULAR; INTRAVENOUS at 12:43

## 2022-02-08 RX ADMIN — PROPOFOL 40 MG: 10 INJECTION, EMULSION INTRAVENOUS at 12:40

## 2022-02-08 RX ADMIN — PHENYLEPHRINE HYDROCHLORIDE 50 MCG: 10 INJECTION INTRAVENOUS at 13:24

## 2022-02-08 RX ADMIN — FENTANYL CITRATE 25 MCG: 50 INJECTION, SOLUTION INTRAMUSCULAR; INTRAVENOUS at 13:03

## 2022-02-08 RX ADMIN — ROCURONIUM BROMIDE 40 MG: 50 INJECTION, SOLUTION INTRAVENOUS at 12:40

## 2022-02-08 RX ADMIN — ACETAMINOPHEN 975 MG: 325 TABLET, FILM COATED ORAL at 17:55

## 2022-02-08 RX ADMIN — PHENYLEPHRINE HYDROCHLORIDE 50 MCG: 10 INJECTION INTRAVENOUS at 13:27

## 2022-02-08 RX ADMIN — CEFAZOLIN 1 G: 1 INJECTION, POWDER, FOR SOLUTION INTRAMUSCULAR; INTRAVENOUS at 19:56

## 2022-02-08 RX ADMIN — LEVOTHYROXINE SODIUM 88 MCG: 88 TABLET ORAL at 17:56

## 2022-02-08 RX ADMIN — PHENYLEPHRINE HYDROCHLORIDE 100 MCG: 10 INJECTION INTRAVENOUS at 13:39

## 2022-02-08 RX ADMIN — HYDROMORPHONE HYDROCHLORIDE 0.2 MG: 0.2 INJECTION, SOLUTION INTRAMUSCULAR; INTRAVENOUS; SUBCUTANEOUS at 09:20

## 2022-02-08 RX ADMIN — DEXMEDETOMIDINE HYDROCHLORIDE 8 MCG: 100 INJECTION, SOLUTION INTRAVENOUS at 12:50

## 2022-02-08 RX ADMIN — OXYBUTYNIN CHLORIDE 10 MG: 10 TABLET, EXTENDED RELEASE ORAL at 21:10

## 2022-02-08 RX ADMIN — NEOSTIGMINE METHYLSULFATE 3 MG: 1 INJECTION, SOLUTION INTRAVENOUS at 13:45

## 2022-02-08 RX ADMIN — DEXMEDETOMIDINE HYDROCHLORIDE 8 MCG: 100 INJECTION, SOLUTION INTRAVENOUS at 13:00

## 2022-02-08 RX ADMIN — CHLORHEXIDINE GLUCONATE 15 ML: 1.2 SOLUTION ORAL at 19:56

## 2022-02-08 RX ADMIN — PROPOFOL 40 MG: 10 INJECTION, EMULSION INTRAVENOUS at 13:09

## 2022-02-08 RX ADMIN — SODIUM CHLORIDE, PRESERVATIVE FREE: 5 INJECTION INTRAVENOUS at 02:17

## 2022-02-08 RX ADMIN — FENTANYL CITRATE 25 MCG: 50 INJECTION, SOLUTION INTRAMUSCULAR; INTRAVENOUS at 13:06

## 2022-02-08 RX ADMIN — AMLODIPINE BESYLATE 5 MG: 5 TABLET ORAL at 17:56

## 2022-02-08 RX ADMIN — SENNOSIDES AND DOCUSATE SODIUM 1 TABLET: 50; 8.6 TABLET ORAL at 21:10

## 2022-02-08 RX ADMIN — SODIUM CHLORIDE, POTASSIUM CHLORIDE, SODIUM LACTATE AND CALCIUM CHLORIDE: 600; 310; 30; 20 INJECTION, SOLUTION INTRAVENOUS at 17:52

## 2022-02-08 RX ADMIN — TRANEXAMIC ACID 1 G: 1 INJECTION, SOLUTION INTRAVENOUS at 12:44

## 2022-02-08 RX ADMIN — FENTANYL CITRATE 25 MCG: 50 INJECTION, SOLUTION INTRAMUSCULAR; INTRAVENOUS at 12:40

## 2022-02-08 RX ADMIN — HYDROMORPHONE HYDROCHLORIDE 0.2 MG: 0.2 INJECTION, SOLUTION INTRAMUSCULAR; INTRAVENOUS; SUBCUTANEOUS at 03:45

## 2022-02-08 RX ADMIN — LIDOCAINE HYDROCHLORIDE 50 MG: 20 INJECTION, SOLUTION INFILTRATION; PERINEURAL at 12:40

## 2022-02-08 RX ADMIN — MELATONIN 5 MG TABLET 5 MG: at 00:11

## 2022-02-08 RX ADMIN — ONDANSETRON 4 MG: 2 INJECTION INTRAMUSCULAR; INTRAVENOUS at 13:38

## 2022-02-08 ASSESSMENT — ACTIVITIES OF DAILY LIVING (ADL)
ADLS_ACUITY_SCORE: 12
ADLS_ACUITY_SCORE: 21
ADLS_ACUITY_SCORE: 21
ADLS_ACUITY_SCORE: 19
ADLS_ACUITY_SCORE: 21
ADLS_ACUITY_SCORE: 19
ADLS_ACUITY_SCORE: 21
ADLS_ACUITY_SCORE: 19
ADLS_ACUITY_SCORE: 12
ADLS_ACUITY_SCORE: 19
ADLS_ACUITY_SCORE: 12
ADLS_ACUITY_SCORE: 21
ADLS_ACUITY_SCORE: 19

## 2022-02-08 ASSESSMENT — MIFFLIN-ST. JEOR: SCORE: 696.5

## 2022-02-08 NOTE — ANESTHESIA POSTPROCEDURE EVALUATION
Patient: Gennaro Walton    Procedure: Procedure(s):  OPEN REDUCTION INTERNAL FIXATION RIGHT DISTAL FEMUR FRACTURE.       Diagnosis:Fracture of femur, distal (H) [S72.409A]  Diagnosis Additional Information: No value filed.    Anesthesia Type:  General    Note:     Postop Pain Control: Uneventful            Sign Out: Well controlled pain   PONV: No   Neuro/Psych: Uneventful            Sign Out: Acceptable/Baseline neuro status   Airway/Respiratory: Uneventful            Sign Out: Acceptable/Baseline resp. status   CV/Hemodynamics: Uneventful            Sign Out: Acceptable CV status   Other NRE: NONE   DID A NON-ROUTINE EVENT OCCUR? No           Last vitals:  Vitals Value Taken Time   /71 02/08/22 1430   Temp 37.2  C (99  F) 02/08/22 1412   Pulse 60 02/08/22 1433   Resp 10 02/08/22 1433   SpO2 98 % 02/08/22 1433   Vitals shown include unvalidated device data.    Electronically Signed By: Inocencio Callahan MD  February 8, 2022  2:34 PM

## 2022-02-08 NOTE — ED PROVIDER NOTES
History   Chief Complaint:  Fall     The history is provided by the patient and a relative.      Gennaro Walton is a 89 year old female with history of breast cancer, CKD or hypertension who presents with fall. The patient tells us that yesterday evening she suffered a fall in her bedroom at her assisted living facility and is complaining of right knee and hip pain. She believes that someone helped her back into bed, however the patients daughter tells us that the assisted living facility has no record of helping her up after a fall and believes that if she did fall, she would be unable to get up and back into bed on her own. For the pain, the patient took 1/2 tablet of oxycodone which did not seem to give her relief of symptoms. The patient denies abdominal pain or back pain.     To note, the patient also is complaining of right shoulder pain but the daughter tells us that this is chronic.     Review of Systems   Gastrointestinal: Negative for abdominal pain.   Musculoskeletal: Positive for arthralgias and myalgias. Negative for back pain.   All other systems reviewed and are negative.    Allergies:  Sulfacetamide  Tramadol    Medications:  Norvasc  Synthroid  Losartan  Miralax  Senokot    Past Medical History:     Breast cancer  CKD stage 3  Cognitive impairment  Hypertension  Hypothyroidism  Osteoporosis  Chronic pain bilateral shoulders  Hip fracture   Falls  Compression fracture of lumbar vertebra  Proximal left humerus fracture  Acute respiratory failure with hypoxia  Onychomycosis  Compression fracture of T12 vetebra    Chronic GERD  Subarachnoid hemorrhage    Past Surgical History:    Left mastectomy  Cataract surgery  Open reduction internal fixation left femur  Open reduction internal fixation right femur      Family History:    CAD  Vaginal cancer  Hypertension   Brain cancer    Social History:  The patient presents to the ED with her daughter     Physical Exam     Patient Vitals for the past 24 hrs:    BP Temp Temp src Pulse Resp SpO2 Height Weight   02/07/22 2230 (!) 164/76 -- -- 64 -- 90 % -- --   02/07/22 2200 (!) 168/94 -- -- 67 -- 91 % -- --   02/07/22 2005 (!) 179/78 98  F (36.7  C) Temporal 69 16 93 % 1.524 m (5') 43.2 kg (95 lb 3.8 oz)     Physical Exam    General: Alert, No distress. Nontoxic appearance  Head: No signs of trauma.   Mouth/Throat: Oropharynx moist.   Eyes: Conjunctivae are normal. Pupils are equal..   Neck: Normal range of motion.    CV: Appears well perfused.  Resp:No respiratory distress.   MSK: Normal range of motion. No obvious deformity. Swelling of her right knee. Pain with palpation and range of motion. Slight swelling of her right foot, no tenderness with palpation.   Neuro: The patient is alert and interactive. CAMPO. Speech normal. GCS 15  Skin: No lesion or sign of trauma noted.   Psych: normal mood and affect. behavior is normal.     Emergency Department Course   ECG  ECG taken at 2234, ECG read at 2237  Normal sinus rhythm. Left axis deviation. Left ventricular hypertrophy with QRS widening. Cannot rule out septal infarct, age undetermined. T wave abnormality, consider lateral ischemia. Abnormal ECG.    No significant change as compared to prior, dated 11/13/2020.  Rate 65 bpm. AK interval 182 ms. QRS duration 128 ms. QT/QTc 402/418 ms. P-R-T axes 57 -33 114.       Imaging:  XR Knee Right 3 Views   Final Result   IMPRESSION: Impacted distal femoral metaphyseal fracture. Intra-articular extension of the fracture into the trochlear articular surface as seen on the tangential patellar view with approximately 1.2 cm fracture gap. No visible proximal tibia or fibula    fracture. No patella fracture. Degenerative osteoarthritis at the patellofemoral and medial compartments of the right knee. No sizable knee joint effusion. Arterial calcification.      NOTE: ABNORMAL REPORT      THE DICTATION ABOVE DESCRIBES AN ABNORMALITY FOR WHICH FOLLOW-UP IS NEEDED.       XR Pelvis and Hip Right 1  View   Final Result   IMPRESSION: Anatomic alignment right hip. No acute displaced right hip fracture. Healed nailed right proximal femur fracture deformity. No evidence for hardware failure. Chronic healed nailed left proximal femur fracture deformity. Chronic left superior    and inferior pubic rami fracture deformities. Diffuse profound bone demineralization. Mild bilateral hip osteoarthritis. Degenerative change lower lumbar spine.        Report per radiology    Laboratory:  Labs Ordered and Resulted from Time of ED Arrival to Time of ED Departure   COMPREHENSIVE METABOLIC PANEL - Abnormal       Result Value    Sodium 132 (*)     Potassium 3.6      Chloride 98      Carbon Dioxide (CO2) 28      Anion Gap 6      Urea Nitrogen 13      Creatinine 0.53      Calcium 8.5      Glucose 138 (*)     Alkaline Phosphatase 114      AST 13      ALT 15      Protein Total 6.7 (*)     Albumin 3.6      Bilirubin Total 0.3      GFR Estimate 83     CBC WITH PLATELETS AND DIFFERENTIAL - Abnormal    WBC Count 8.5      RBC Count 3.18 (*)     Hemoglobin 10.8 (*)     Hematocrit 33.3 (*)      (*)     MCH 34.0 (*)     MCHC 32.4      RDW 13.1      Platelet Count 222      % Neutrophils 77      % Lymphocytes 10      % Monocytes 11      % Eosinophils 1      % Basophils 0      % Immature Granulocytes 1      NRBCs per 100 WBC 0      Absolute Neutrophils 6.7      Absolute Lymphocytes 0.8      Absolute Monocytes 0.9      Absolute Eosinophils 0.1      Absolute Basophils 0.0      Absolute Immature Granulocytes 0.0      Absolute NRBCs 0.0     CK TOTAL - Normal    CK 30     COVID-19 VIRUS (CORONAVIRUS) BY PCR      Emergency Department Course:    Reviewed:  I reviewed nursing notes, vitals, past medical history, Care Everywhere and [facility]    Assessments:  2005 I obtained history and examined the patient as noted above.   2125 I rechecked the patient and explained findings.     Consults:  2145 I spoke with Dr. Gray, hospitalist, regarding this  patient   2200 I spoke with ck Tao, regarding this patient.    Disposition:  The patient was admitted to the hospital under the care of Dr. Gray.     Impression & Plan     Medical Decision Making:     Gennaro Walton is a 89 year old female who presents for evaluation following a fall.  The fall was clearly mechanical in nature based on description, and no workup was undertaken for etiology, based on the patient's history.  Full examination revealed a swollen painful right knee.  X-Ray imaging showed a distal femur fracture.  In terms of head injury, the patient had no direct injury to the head, is not on any blood thinners, has no headache, and has a non dangerous mechanism, therefore no advanced imaging was undertaken.  The patient will be admitted to the hospitalist service for pain control and orthopedic consultation with possible surgery.  She was given IV Dilaudid for pain.    Diagnosis:    ICD-10-CM    1. Other closed fracture of right femur, unspecified portion of femur, initial encounter (H)  S72.8X1A    2. Fall, initial encounter  W19.XXXA      Scribe Disclosure:  I, Kelechi Workman, am serving as a scribe at 8:06 PM on 2/7/2022 to document services personally performed by Olegario Machuca MD, based on my observations and the provider's statements to me.     I, Tonja Lou, am serving as a scribe on 2/7/2022 at 10:40 PM to personally document services performed by Olegario Machuca MD based on my observations and the provider's statements to me.              Olegario Machuca MD  02/07/22 [id number]

## 2022-02-08 NOTE — ANESTHESIA CARE TRANSFER NOTE
Patient: Gennaro Walton    Procedure: Procedure(s):  OPEN REDUCTION INTERNAL FIXATION RIGHT DISTAL FEMUR FRACTURE.       Diagnosis: Fracture of femur, distal (H) [S72.409A]  Diagnosis Additional Information: No value filed.    Anesthesia Type:   General     Note:    Oropharynx: oropharynx clear of all foreign objects and spontaneously breathing  Level of Consciousness: awake  Oxygen Supplementation: face mask  Level of Supplemental Oxygen (L/min / FiO2): 6  Independent Airway: airway patency satisfactory and stable  Dentition: dentition unchanged  Vital Signs Stable: post-procedure vital signs reviewed and stable  Report to RN Given: handoff report given  Patient transferred to: PACU  Comments: Suctioned, spont resp ,lifts head  > 5 sec. Extubated with immediate exchange, to PACU, report to RN.  Handoff Report: Identifed the Patient, Identified the Reponsible Provider, Reviewed the pertinent medical history, Discussed the surgical course, Reviewed Intra-OP anesthesia mangement and issues during anesthesia, Set expectations for post-procedure period and Allowed opportunity for questions and acknowledgement of understanding      Vitals:  Vitals Value Taken Time   /76 02/08/22 1412   Temp     Pulse 61 02/08/22 1417   Resp 12 02/08/22 1417   SpO2 99 % 02/08/22 1417   Vitals shown include unvalidated device data.    Electronically Signed By: RITA Guaman CRNA  February 8, 2022  2:18 PM

## 2022-02-08 NOTE — ED NOTES
[facility]  ED Nurse Handoff Report    ED Chief complaint: Fall      ED Diagnosis:   Final diagnoses:   None       Code Status: hospitalist to address    Allergies:   Allergies   Allergen Reactions     Sulfacetamide Hives     Tramadol      Lethargic per patient's daughter Caron.        Patient Story: Unwitnessed fall this AM. Pt was back in bed when staff arrived. Pt reported fall to staff. Pt c/o right hip and right shoulder pain, right knee pain. Pt took 1/2 tablet of oxycodone PTA  Focused Assessment:  Right hip, right knee, right shoulder pain. Pt on O2 at 2liters per NC    Treatments and/or interventions provided: see MAR  Patient's response to treatments and/or interventions:     To be done/followed up on inpatient unit:      Does this patient have any cognitive concerns?: Forgetful    Activity level - Baseline/Home:  Stand with assist x2  Activity Level - Current:   Total Care    Patient's Preferred language: English   Needed?: No    Isolation: None  Infection: Not Applicable  Patient tested for COVID 19 prior to admission: YES  Bariatric?: No    Vital Signs:   Vitals:    02/07/22 2005   BP: (!) 179/78   Pulse: 69   Resp: 16   Temp: 98  F (36.7  C)   TempSrc: Temporal   SpO2: 93%   Weight: 43.2 kg (95 lb 3.8 oz)   Height: 1.524 m (5')       Cardiac Rhythm:     Was the PSS-3 completed:   Yes  What interventions are required if any?               Family Comments:   OBS brochure/video discussed/provided to patient/family: Yes              Name of person given brochure if not patient:               Relationship to patient:     For the majority of the shift this patient's behavior was Green.   Behavioral interventions performed were .    ED NURSE PHONE NUMBER: [phone]

## 2022-02-08 NOTE — PLAN OF CARE
VSS on RA. A&O to self. Bed alarm on for safety, pt has not attempted to exit the bed. Turn and repo q2h, pt having a lot of pain w movement. NPO for ortho consult. NS @ . Daniel in place. Care goal discussions to be held this AM w family.

## 2022-02-08 NOTE — PROGRESS NOTES
[facility]    Medicine Progress Note - Hospitalist Service    Date of Admission:  2/7/2022    Assessment & Plan        Gennaro Walton is a markedly pleasant 89 year old woman with past medical history that is most notable for advanced chronic dementia, and prior bilateral hip ORIF, among others; who presents with mechanical fall and is found to have acute impacted distal femoral metaphyseal fracture.     Mechanical fall causing acute impacted distal femoral metaphyseal fracture: Note is made of prior left hip ORIF in 2019, and right hip ORIF and nailing in 2020. More recently, she fell 11/2021 and broke her eight foot which was casted. Overall, mikhail to multiple falls and fractures and progressive, severe dementia, her daughter notes she is essentially wheelchair bound at this point. Her estimated BMI tonight is 18.6.  - Inpatient. NPO. Bedrest. Orthopedics consulted and they discussed with family. Plan to proceed to OR today for ORIF.  - Pain management and dvt ppx per surgery  - Patient complaining of RLQ pain however LFTs normal and suspect pain is from her fracture, continue to monitor for improvement in pain post procedure     Chronic dementia: Noting that her fall preceding her left hip fracture surgery in 2019 was also complicated by traumatic SAH.  - monitor with delirium precautions, prn zyprexa available     Acute hyponatremia: Mild: Na 132. She has reportedly had hyponatremia in the past as well.  -resolved, stop IVF     CKD Stage 3:   - Avoid NSAID's and other nephrotoxins while hospitalized  - stable     Hypertension:   - Resume Norvasc, Cozaar with old parameters     Hypothyroid:   - Resume home Synthroid     Breast cancer: Status post prior left mastectomy. Noted     Rule Out COVID-19 infection  -negative COVID-19 PCR test result  -no current indication for precautions       Diet: NPO per Anesthesia Guidelines for Procedure/Surgery Except for: Meds    DVT Prophylaxis: per  "surgery  Cortez Catheter: Not present  Central Lines: None  Cardiac Monitoring: None  Code Status: No CPR- Do NOT Intubate      Disposition Plan   Expected Discharge: 02/10/2022     Anticipated discharge location:  Awaiting care coordination huddle  Delays:            The patient's care was discussed with the bedside nurse and patient    Kamila Zuleta DO  Hospitalist Service  [facility]  Securely message with the Vocera Web Console (learn more here)  Text page via [email or web address] Paging/Directory         Clinically Significant Risk Factors Present on Admission          # Hypocalcemia: Ca = 8.2 mg/dL (Ref range: 8.5 - 10.1 mg/dL) and/or iCa = N/A on admission, will replace as needed            ______________________________________________________________________    Interval History   Patient appears uncomfortable in dark hospital room. Complaining of RLQ pain near hip. Won't answer any questions just saying \"ow\" discussed with  Nursing at bedside who will update ortho and find out plan    Data reviewed today: I reviewed all medications, new labs and imaging results over the last 24 hours. I personally reviewed XR with periprosthetic fracture    Physical Exam   Vital Signs: Temp: 97.1  F (36.2  C) Temp src: Temporal BP: (!) 176/77 Pulse: 60   Resp: 15 SpO2: 90 % O2 Device: None (Room air)    Weight: 87 lbs 1.31 oz     Constitutional: Uncomfortable, unable to provide hx, thin and frail  Respiratory: Clear to auscultation bilaterally, no crackles or wheezing  Cardiovascular: Regular rate and rhythm, normal S1 and S2, and no murmur noted  GI: Normal bowel sounds,TTP in RLQ  Skin/Integumen: No rashes, no cyanosis, no edema  MSK: joint deformities throughout including knees, and shoulders    Data   Recent Labs   Lab 02/08/22  0539 02/07/22 2012   WBC 6.4 8.5   HGB 10.3* 10.8*   * 105*    222    132*   POTASSIUM 3.7 3.6   CHLORIDE 103 98   CO2 26 28   BUN 10 13   CR 0.45* 0.53 "   ANIONGAP 4 6   LORETA 8.2* 8.5   GLC 92 138*   ALBUMIN  --  3.6   PROTTOTAL  --  6.7*   BILITOTAL  --  0.3   ALKPHOS  --  114   ALT  --  15   AST  --  13     Recent Results (from the past 24 hour(s))   XR Pelvis and Hip Right 1 View    Narrative    EXAM: XR PELVIS AND HIP RIGHT 1 VIEW  LOCATION: [facility]  DATE/TIME: 2/7/2022 8:38 PM    INDICATION: Right hip pain. Fall.  COMPARISON: None available      Impression    IMPRESSION: Anatomic alignment right hip. No acute displaced right hip fracture. Healed nailed right proximal femur fracture deformity. No evidence for hardware failure. Chronic healed nailed left proximal femur fracture deformity. Chronic left superior   and inferior pubic rami fracture deformities. Diffuse profound bone demineralization. Mild bilateral hip osteoarthritis. Degenerative change lower lumbar spine.   XR Knee Right 3 Views    Narrative    EXAM: XR KNEE RIGHT 3 VIEWS  LOCATION: [facility]  DATE/TIME: 2/7/2022 8:38 PM    INDICATION: Fall. Knee pain.  COMPARISON: None.      Impression    IMPRESSION: Impacted distal femoral metaphyseal fracture. Intra-articular extension of the fracture into the trochlear articular surface as seen on the tangential patellar view with approximately 1.2 cm fracture gap. No visible proximal tibia or fibula   fracture. No patella fracture. Degenerative osteoarthritis at the patellofemoral and medial compartments of the right knee. No sizable knee joint effusion. Arterial calcification.    NOTE: ABNORMAL REPORT    THE DICTATION ABOVE DESCRIBES AN ABNORMALITY FOR WHICH FOLLOW-UP IS NEEDED.      Medications     tranexamic acid       tranexamic acid         amLODIPine  5 mg Oral Daily     carboxymethylcellulose PF  2 drop Both Eyes 4x Daily     ceFAZolin  2 g Intravenous See Admin Instructions     chlorhexidine  15 mL Swish & Spit BID     levothyroxine  88 mcg Oral Daily     losartan  100 mg Oral Daily     oxybutynin  ER  10 mg Oral At Bedtime     ropivacaine 300 mg, EPINEPHrine 0.6 mg in saline 100 mL - customizable   INTRA-ARTICULAR On Call to OR     [Auto Hold] sodium chloride (PF)  3 mL Intracatheter Q8H

## 2022-02-08 NOTE — BRIEF OP NOTE
[facility]    Brief Operative Note    Pre-operative diagnosis: Fracture of femur, distal (H) [S72.409A]  Post-operative diagnosis right lateral femoral condyle fracture    Procedure: Procedure(s):  OPEN REDUCTION INTERNAL FIXATION RIGHT DISTAL FEMUR FRACTURE.  Surgeon: Surgeon(s) and Role:     * Med Yousif MD - Primary     * Mandie Desir - Assisting  Anesthesia: General   Estimated Blood Loss: Less than 50 ml    Drains: None  Specimens: * No specimens in log *  Findings:   None.  Complications: None.  Implants:   Implant Name Type Inv. Item Serial No.  Lot No. LRB No. Used Action   syntheses plate 6 hole   [id number]  [id number] 42 05 Right 1 Implanted   IMP SCR SYN CORTEX 4.5X34MM ST .834 - [id number] Metallic [employer]ware/[place] IMP SCR SYN CORTEX 4.5X34MM ST .834 414.834 SYN[employer]ES[employer]STRATEC [id number] 42 05 Right 1 Implanted   IMP SCR SYN CORTEX 4.5X38MM ST .838 - [id number] Metallic Hardware/[place] IMP SCR SYN CORTEX 4.5X38MM ST .838 414.838 [employer] [id number] 42 05 Right 1 Wasted   IMP SCR SYN OPTILINK MAXIMUS VA CAN 5.0X65MM 42.231.665 - [id number] Metallic Hardware/[place] IMP SCR SYN OPTILINK MAXIMUS VA CAN 5.0X65MM 42.231.665 42.231.665 [employer] [id number] 42 05 Right 1 Implanted   IMP SCR SYN OPTILINK MAXIMUS VA CAN 5.0X75MM 42.231.675 - [id number] Metallic Hardware/[place] IMP SCR SYN OPTILINK MAXIMUS VA CAN 5.0X75MM 42.231.675 42.231.875 SY[employer] [id number] 42 05 Right 2 Implanted   42.231.870 synthes   42.231.870  [id number] 42 05 Right 1 Implanted   IMP SCR SYN OPTILNK MAXIMUS VA AARON 5.0X40MM 42.231.640 - [id number] Metallic Hardware/[place] IMP SCR SYN OPTILNK MAXIMUS VA AARON 5.0X40MM 42.231.640 42.231.640 [employer] [id number] 42 05 Right 2 Implanted   IMP SCR SYN CORTEX 4.5X36MM ST .836 - [id number] Metallic Hardware/[place] IMP SCR SYN CORTEX 4.5X36MM ST .836 414.836 [employer] [id number] 42 05 Right 1 Implanted    42.231.670 [facility]   42.231.970  [id number] 42 05 Right 1 Implanted

## 2022-02-08 NOTE — CONSULTS
[facility]    Orthopedic Consultation    Gennaro Walton MRN# [id number]   Age: 89 year old YOB: 1923     Date of Admission:  2/7/2022    Reason for consult: Right distal femur fracture       Requesting physician: Dr. Gray       Level of consult: Consult, follow and place orders           Assessment and Plan:   Assessment:   Right distal femur fracture, fall from standing  Dementia      Plan:   OR today 2/8 for Right distal femur ORIF with Dr. Yousif  NPO   Bed rest  Prefer pure wick over galindo if at all possible  Pain medication as needed, limit narcotics as able  STAT COVID test  Type and cross  Pre-op optimization per hospitalist           Chief Complaint:   Right knee pain         History of Present Illness:   This patient is a 89 year old female who presents with the following condition requiring a hospital admission:    Patient with dementia. Per ED note: The patient tells us that yesterday evening she suffered a fall in her bedroom at her assisted living facility and is complaining of right knee and hip pain. She believes that someone helped her back into bed, however the patients daughter tells us that the assisted living facility has no record of helping her up after a fall and believes that if she did fall, she would be unable to get up and back into bed on her own. For the pain, the patient took 1/2 tablet of oxycodone which did not seem to give her relief of symptoms. The patient denies abdominal pain or back pain.      To note, the patient also is complaining of right shoulder pain but the daughter tells us that this is chronic.     She is not chronically anticoagulated.  She has had left hip IM nail 2019 and right hip IM nail 2020          Past Medical History:     Past Medical History:   Diagnosis Date     Breast cancer (H)      CKD (chronic kidney disease) stage 3, GFR 30-59 ml/min (H)      Cognitive impairment      Compression fracture of T12 vertebra  (H) 2020     Hypertension      Hypothyroid      Osteoporosis              Past Surgical History:     Past Surgical History:   Procedure Laterality Date     ------------OTHER-------------      Left mastectomy     ------------OTHER-------------      Cataract surgery     OPEN REDUCTION INTERNAL FIXATION FEMUR MIDSHAFT Left 7/26/2019    Procedure: LEFT INTERTROCHANTERIC FEMUR FRACTURE OPEN REDUCTION AND INTERNAL FIXATION;  Surgeon: Chris Oseguera MD;  Location:  OR     OPEN REDUCTION INTERNAL FIXATION HIP NAILING Right 2/1/2020    Procedure: OPEN REDUCTION INTERNAL FIXATION RIGHT INTERTROCHANTERIC FEMUR FRACTURE;  Surgeon: Catalino Casper MD;  Location:  OR             Social History:     Social History     Tobacco Use     Smoking status: Never Smoker     Smokeless tobacco: Never Used   Substance Use Topics     Alcohol use: No             Family History:     Family History   Problem Relation Age of Onset     Coronary Artery Disease Father              Immunizations:     VACCINE/DOSE   Diptheria   DPT   DTAP   HBIG   Hepatitis A   Hepatitis B   HIB   Influenza   Measles   Meningococcal   MMR   Mumps   Pneumococcal   Polio   Rubella   Small Pox   TDAP   Varicella   Zoster             Allergies:     Allergies   Allergen Reactions     Sulfacetamide Hives     Tramadol      Lethargic per patient's daughter Caron.              Medications:     Current Facility-Administered Medications   Medication     acetaminophen (TYLENOL) tablet 650 mg    Or     acetaminophen (TYLENOL) Suppository 650 mg     bisacodyl (DULCOLAX) Suppository 10 mg     HYDROmorphone (DILAUDID) injection 0.2 mg     lidocaine (LMX4) cream     lidocaine 1 % 0.1-1 mL     magnesium hydroxide (MILK OF MAGNESIA) suspension 30 mL     melatonin tablet 5 mg     ondansetron (ZOFRAN-ODT) ODT tab 4 mg    Or     ondansetron (ZOFRAN) injection 4 mg     oxyCODONE IR (ROXICODONE) half-tab 2.5-5 mg     sodium chloride (PF) 0.9% PF flush 3 mL     sodium chloride (PF)  0.9% PF flush 3 mL     sodium chloride 0.9% infusion             Review of Systems:   CV: NEGATIVE for chest pain, palpitations or peripheral edema  C: NEGATIVE for fever, chills, change in weight  E/M: NEGATIVE for ear, mouth and throat problems  R: NEGATIVE for significant cough or SOB          Physical Exam:   All vitals have been reviewed  Patient Vitals for the past 24 hrs:   BP Temp Temp src Pulse Resp SpO2 Height Weight   02/08/22 0700 (!) 157/60 98.4  F (36.9  C) Axillary 60 16 90 % -- --   02/08/22 0200 (!) 165/60 97.8  F (36.6  C) Axillary 63 16 96 % 1.524 m (5') 39.5 kg (87 lb 1.3 oz)   02/08/22 0130 (!) 157/78 -- -- 63 -- 92 % -- --   02/08/22 0100 (!) 154/74 -- -- 64 -- 94 % -- --   02/08/22 0030 (!) 168/68 -- -- 65 -- 92 % -- --   02/08/22 0000 (!) 163/73 -- -- 67 -- 96 % -- --   02/07/22 2330 (!) 149/78 -- -- 75 -- 96 % -- --   02/07/22 2300 (!) 159/71 -- -- 65 -- 97 % -- --   02/07/22 2230 (!) 164/76 -- -- 64 -- 90 % -- --   02/07/22 2200 (!) 168/94 -- -- 67 -- 91 % -- --   02/07/22 2005 (!) 179/78 98  F (36.7  C) Temporal 69 16 93 % 1.524 m (5') 43.2 kg (95 lb 3.8 oz)       Intake/Output Summary (Last 24 hours) at 2/8/2022 0944  Last data filed at 2/8/2022 0600  Gross per 24 hour   Intake --   Output 300 ml   Net -300 ml     Patient laying comfortably in bed  No skin tears, lacerations or abrasions over the right knee  Minimal erythema of the surrounding skin.   Bilateral calves are soft, non-tender.  Bilateral lower extremity is NVI.  Sensation intact bilateral lower extremities  Active dorsi and plantar flexion bilaterally  +Dp pulse           Data:   All laboratory data reviewed  Results for orders placed or performed during the hospital encounter of 02/07/22   XR Pelvis and Hip Right 1 View     Status: None    Narrative    EXAM: XR PELVIS AND HIP RIGHT 1 VIEW  LOCATION: [facility]  DATE/TIME: 2/7/2022 8:38 PM    INDICATION: Right hip pain. Fall.  COMPARISON: None  available      Impression    IMPRESSION: Anatomic alignment right hip. No acute displaced right hip fracture. Healed nailed right proximal femur fracture deformity. No evidence for hardware failure. Chronic healed nailed left proximal femur fracture deformity. Chronic left superior   and inferior pubic rami fracture deformities. Diffuse profound bone demineralization. Mild bilateral hip osteoarthritis. Degenerative change lower lumbar spine.   XR Knee Right 3 Views     Status: None    Narrative    EXAM: XR KNEE RIGHT 3 VIEWS  LOCATION: [facility]  DATE/TIME: 2/7/2022 8:38 PM    INDICATION: Fall. Knee pain.  COMPARISON: None.      Impression    IMPRESSION: Impacted distal femoral metaphyseal fracture. Intra-articular extension of the fracture into the trochlear articular surface as seen on the tangential patellar view with approximately 1.2 cm fracture gap. No visible proximal tibia or fibula   fracture. No patella fracture. Degenerative osteoarthritis at the patellofemoral and medial compartments of the right knee. No sizable knee joint effusion. Arterial calcification.    NOTE: ABNORMAL REPORT    THE DICTATION ABOVE DESCRIBES AN ABNORMALITY FOR WHICH FOLLOW-UP IS NEEDED.    Extra Blue Top Tube     Status: None   Result Value Ref Range    Hold Specimen JIC    Extra Red Top Tube     Status: None   Result Value Ref Range    Hold Specimen JIC    Extra Green Top (Lithium Heparin) Tube     Status: None   Result Value Ref Range    Hold Specimen JIC    Extra Purple Top Tube     Status: None   Result Value Ref Range    Hold Specimen JIC    Extra Blood Bank Purple Top Tube     Status: None   Result Value Ref Range    Hold Specimen JIC    Comprehensive metabolic panel     Status: Abnormal   Result Value Ref Range    Sodium 132 (L) 133 - 144 mmol/L    Potassium 3.6 3.4 - 5.3 mmol/L    Chloride 98 94 - 109 mmol/L    Carbon Dioxide (CO2) 28 20 - 32 mmol/L    Anion Gap 6 3 - 14 mmol/L    Urea Nitrogen 13  7 - 30 mg/dL    Creatinine 0.53 0.52 - 1.04 mg/dL    Calcium 8.5 8.5 - 10.1 mg/dL    Glucose 138 (H) 70 - 99 mg/dL    Alkaline Phosphatase 114 40 - 150 U/L    AST 13 0 - 45 U/L    ALT 15 0 - 50 U/L    Protein Total 6.7 (L) 6.8 - 8.8 g/dL    Albumin 3.6 3.4 - 5.0 g/dL    Bilirubin Total 0.3 0.2 - 1.3 mg/dL    GFR Estimate 83 >60 mL/min/1.73m2   CK total     Status: Normal   Result Value Ref Range    CK 30 30 - 225 U/L   CBC with platelets and differential     Status: Abnormal   Result Value Ref Range    WBC Count 8.5 4.0 - 11.0 10e3/uL    RBC Count 3.18 (L) 3.80 - 5.20 10e6/uL    Hemoglobin 10.8 (L) 11.7 - 15.7 g/dL    Hematocrit 33.3 (L) 35.0 - 47.0 %     (H) 78 - 100 fL    MCH 34.0 (H) 26.5 - 33.0 pg    MCHC 32.4 31.5 - 36.5 g/dL    RDW 13.1 10.0 - 15.0 %    Platelet Count 222 150 - 450 10e3/uL    % Neutrophils 77 %    % Lymphocytes 10 %    % Monocytes 11 %    % Eosinophils 1 %    % Basophils 0 %    % Immature Granulocytes 1 %    NRBCs per 100 WBC 0 <1 /100    Absolute Neutrophils 6.7 1.6 - 8.3 10e3/uL    Absolute Lymphocytes 0.8 0.8 - 5.3 10e3/uL    Absolute Monocytes 0.9 0.0 - 1.3 10e3/uL    Absolute Eosinophils 0.1 0.0 - 0.7 10e3/uL    Absolute Basophils 0.0 0.0 - 0.2 10e3/uL    Absolute Immature Granulocytes 0.0 <=0.4 10e3/uL    Absolute NRBCs 0.0 10e3/uL   Asymptomatic COVID-19 Virus (Coronavirus) by PCR Nasopharyngeal     Status: Normal    Specimen: Nasopharyngeal; Swab   Result Value Ref Range    SARS CoV2 PCR Negative Negative    Narrative    Testing was performed using the [place]  SARS-CoV-2 & Influenza A/B Assay on the florence  Felicia  System.  This test should be ordered for the detection of SARS-COV-2 in individuals who meet SARS-CoV-2 clinical and/or epidemiological criteria. Test performance is unknown in asymptomatic patients.  This test is for in vitro diagnostic use under the FDA EUA for laboratories certified under CLIA to perform moderate and/or high complexity testing. This test has not been  FDA cleared or approved.  A negative test does not rule out the presence of PCR inhibitors in the specimen or target RNA in concentration below the limit of detection for the assay. The possibility of a false negative should be considered if the patient's recent exposure or clinical presentation suggests COVID-19.  [facility] are certified under the Clinical Laboratory Improvement Amendments of 1988 (CLIA-88) as qualified to perform moderate and/or high complexity laboratory testing.   Basic metabolic panel     Status: Abnormal   Result Value Ref Range    Sodium 133 133 - 144 mmol/L    Potassium 3.7 3.4 - 5.3 mmol/L    Chloride 103 94 - 109 mmol/L    Carbon Dioxide (CO2) 26 20 - 32 mmol/L    Anion Gap 4 3 - 14 mmol/L    Urea Nitrogen 10 7 - 30 mg/dL    Creatinine 0.45 (L) 0.52 - 1.04 mg/dL    Calcium 8.2 (L) 8.5 - 10.1 mg/dL    Glucose 92 70 - 99 mg/dL    GFR Estimate 86 >60 mL/min/1.73m2   CBC with platelets and differential     Status: Abnormal   Result Value Ref Range    WBC Count 6.4 4.0 - 11.0 10e3/uL    RBC Count 3.06 (L) 3.80 - 5.20 10e6/uL    Hemoglobin 10.3 (L) 11.7 - 15.7 g/dL    Hematocrit 31.1 (L) 35.0 - 47.0 %     (H) 78 - 100 fL    MCH 33.7 (H) 26.5 - 33.0 pg    MCHC 33.1 31.5 - 36.5 g/dL    RDW 13.1 10.0 - 15.0 %    Platelet Count 199 150 - 450 10e3/uL    % Neutrophils 70 %    % Lymphocytes 15 %    % Monocytes 12 %    % Eosinophils 2 %    % Basophils 0 %    % Immature Granulocytes 1 %    NRBCs per 100 WBC 0 <1 /100    Absolute Neutrophils 4.5 1.6 - 8.3 10e3/uL    Absolute Lymphocytes 1.0 0.8 - 5.3 10e3/uL    Absolute Monocytes 0.8 0.0 - 1.3 10e3/uL    Absolute Eosinophils 0.2 0.0 - 0.7 10e3/uL    Absolute Basophils 0.0 0.0 - 0.2 10e3/uL    Absolute Immature Granulocytes 0.0 <=0.4 10e3/uL    Absolute NRBCs 0.0 10e3/uL   EKG 12 lead     Status: None   Result Value Ref Range    Systolic Blood Pressure  mmHg    Diastolic Blood Pressure  mmHg    Ventricular Rate 65 BPM    Atrial  Rate 65 BPM    GA Interval 182 ms    QRS Duration 128 ms     ms    QTc 418 ms    P Axis 57 degrees    R AXIS -33 degrees    T Axis 114 degrees    Interpretation ECG       Sinus rhythm  Left axis deviation  Left ventricular hypertrophy with QRS widening  Cannot rule out Septal infarct (cited on or before 13-NOV-2020)  T wave abnormality, consider lateral ischemia  Abnormal ECG  When compared with ECG of 13-NOV-2020 11:47,  No significant change was found  Confirmed by GENERATED REPORT, COMPUTER ([id number]),  Az Black ([id number]) on 2/8/2022 2:35:54 AM     Adult Type and Screen     Status: None   Result Value Ref Range    ABO/RH(D) B NEG     Antibody Screen Negative Negative    SPECIMEN EXPIRATION DATE [id number]    Prepare red blood cells (unit)     Status: None (Preliminary result)   Result Value Ref Range    CROSSMATCH Compatible     UNIT ABO/RH B Neg     Unit Number [id number]     Unit Status Ready     Blood Component Type Red Blood Cells     Product Code [id number]     CODING SYSTEM [id number]     UNIT TYPE ISBT 1700    [place] Draw     Status: None    Narrative    The following orders were created for panel order [place] Draw.  Procedure                               Abnormality         Status                     ---------                               -----------         ------                     Extra Blue Top Tube[[id number]]                              Final result               Extra Red Top Tube[[id number]]                               Final result               Extra Green Top (Lithium...[[id number]]                      Final result               Extra Purple Top Tube[[id number]]                            Final result               Extra Blood Bank Purple ...[[id number]]                      Final result                 Please view results for these tests on the individual orders.   CBC with platelets + differential     Status: Abnormal    Narrative    The following orders were created for panel  order CBC with platelets + differential.  Procedure                               Abnormality         Status                     ---------                               -----------         ------                     CBC with platelets and d...[[id number]]  Abnormal            Final result                 Please view results for these tests on the individual orders.   CBC with platelets differential     Status: Abnormal    Narrative    The following orders were created for panel order CBC with platelets differential.  Procedure                               Abnormality         Status                     ---------                               -----------         ------                     CBC with platelets and d...[[id number]]  Abnormal            Final result                 Please view results for these tests on the individual orders.   ABO/Rh type and screen     Status: None    Narrative    The following orders were created for panel order ABO/Rh type and screen.  Procedure                               Abnormality         Status                     ---------                               -----------         ------                     Adult Type and Screen[[id number]]                            Final result                 Please view results for these tests on the individual orders.          Attestation:  I have reviewed today's vital signs, notes, medications, labs and imaging with Dr. Yousif.  Amount of time performed on this consult: 30 minutes.    Tonja Hlal PA-C

## 2022-02-08 NOTE — OP NOTE
2/8/2022    [facility]    PREOPERATIVE DIAGNOSIS: right lateral femoral condyle fracture    POSTOPERATIVE DIAGNOSIS: same    PROCEDURE: ORIF right lateral femoral condyle fracture    ANESTHESIA:  General    SURGEON: Dr. Darren Yousif    ASSISTANT: Mandie Desir PA-C    DESCRIPTION OF PROCEDURE:  The patient is positioned supine on the O.R. table. General anesthesia is induced. Prophylactic antibiotics and tranexamic acid are administered. The right lowere extremity is prepped with Chlora prep and draped in customary fashion.    A brief timeout is performed.    The limb is exsanguinated by elevation. A sterile tourniquet is placed and inflated to 250 mm Hg.     A lateral longitudinal distal thigh incision is carried down to the fascia, which is divided in line with the skin incision. The muscle is retracted anteriorly and perforators are cauterized. A elvis retractor is placed. Fracture hematoma is irrigated away. Anatomic reduction is performed and checked with the C-arm.     A Synthes distal femur locking plate is applied. Four screws are placed proximally, and multiple locking screws are placed distally. The most proximal screw holes are left unutilized to avoid creating too much of a stress riser with her gamma hip nail. The entire construct is checked with the C-arm in AP and lateral planes.    The wound is copiously irrigated. The fascia is closed with #0 vicryl, the subcutaneous tissue with #2-0 vicryl, and the skin with skin clips.    A bulky sterile dressing is applied.

## 2022-02-08 NOTE — ED NOTES
Bed: [facility]  Expected date: 2/7/22  Expected time: 7:56 PM  Means of arrival: Ambulance  Comments:  [facility] (med overflow) 89F fall, hip pain

## 2022-02-08 NOTE — H&P
[facility]    History and Physical  Hospitalist       Date of Admission:  2/7/2022  Date of Service (when I saw the patient): 02/07/22    ASSESSMENT  Gennaro Walton is a markedly pleasant 89 year old woman with past medical history that is most notable for advanced chronic dementia, and prior bilateral hip ORIF, among others; who presents with mechanical fall and is found to have acute impacted distal femoral metaphyseal fracture.    PLAN    Mechanical fall causing acute impacted distal femoral metaphyseal fracture: Note is made of prior left hip ORIF in 2019, and right hip ORIF and nailing in 2020. More recently, she fell 11/2021 and broke her eight foot which was casted. Overall, mikhail to multiple falls and fractures and progressive, severe dementia, her daughter notes she is essentially wheelchair bound at this point. Her estimated BMI tonight is 18.6.    -- Inpatient. NPO. Bedrest. Orthopedics consulted. IV narcotics and antiemetics as needed for pain or nausea. 75 ml/hour NS IV fluid ordered overnight.    -- I discussed potentially urgent surgery with her daughter tonight, who is unsure if her mother's care goals would allow for it at this point, vs possible hospice transirion instead. Ms. Walton herself has severe dementia and is unable to participate meaningfully in the conversation. Further care goal discussions to be held this AM.    Rossana-operative Risk Assessment: RCRI 0-1 with Functional Status less than 4 METS going for intermediate risk procedure for CV events. EKG shows NSR with LVH. Would recommend to proceed to urgent surgery (if this is found to be within her care goals) without further cardiac intervention or testing.    Chronic dementia: Noting that her fall preceding her left hip fracture surgery in 2019 was also complicated by traumatic SAH.    -- We discussed that she is at high risk for delirium tonight, and would order low dose Seroquel if she develops concomitant  agitation or hallucinations    Acute hyponatremia: Mild: Na 132. She has reportedly had hyponatremia in the past as well.    -- Repeat Na after gentle IVF given overnight    CKD Stage 3: Avoid NSAID's and other nephrotoxins while hospitalized. Repeat Creatinine in AM.    Recent Labs   Lab Test 02/07/22 2012 12/14/20 0615 12/07/20  1216   CR 0.53 0.56 0.62     Chronic anemia: Macrocytic. Monitor while hospitalized.  Recent Labs   Lab Test 02/07/22 2012 12/14/20 0615 11/30/20  1240   HGB 10.8* 11.6* 13.3     Hypertension: Resume Norvasc, Cozaar when verified    Hypothyroid: Resume home Synthroid when verified.    Breast cancer: Status post prior left mastectomy. Noted    Rule Out COVID-19 infection  This patient was evaluated during a global COVID-19 pandemic, which necessitated consideration that the patient might be at risk for infection with the SARS-CoV-2 virus that causes COVID-19. Applicable protocols for evaluation were followed during the patient's care.   -negative COVID-19 PCR test result  -no current indication for precautions    Chief Complaint   Right hip pain    Unable to obtain a history from the patient due to confusion; history is obtained from her daughter, and the ED physician whom I have spoken with    History of Present Illness   Gennaro Walton is a markedly pleasant 89 year old woman who presents with acute onset pain in the right hip. She has dementia and is unable to recall the events of the last few days. Her daughter is here and says she slipped and fell at her DEVIN last night, causing the pain, which has been constant in onset, ongoing into today. She was reluctant to come to the ED but when staff there could not transition her to the toilet, because this movement exacerbated the pain, and so she was brought in. He daughter adds that her mother has been wheelchair bound essentially all of the time now since 11/2021, when she had a previous fall and broke her right foot. This was casted.  It seems since then there has been an overall decline in physical functioning. Currently, Ms. Tanner appears comfortable at rest in the bed, and otherwise denies chest pain, dyspnea, or any other acute complaints.    In the ED, Blood pressure (!) 179/78, pulse 69, temperature 98  F (36.7  C), temperature source Temporal, resp. rate 16, height 1.524 m (5'), weight 43.2 kg (95 lb 3.8 oz), SpO2 93 %.    CBC and CMP were notable for HGB 10.8, , Na 132, tprot 6.7, glucose 138, otherwise were within the normal reference range.     Recent Results (from the past 24 hour(s))   XR Pelvis and Hip Right 1 View    Narrative    EXAM: XR PELVIS AND HIP RIGHT 1 VIEW  LOCATION: [facility]  DATE/TIME: 2/7/2022 8:38 PM    INDICATION: Right hip pain. Fall.  COMPARISON: None available      Impression    IMPRESSION: Anatomic alignment right hip. No acute displaced right hip fracture. Healed nailed right proximal femur fracture deformity. No evidence for hardware failure. Chronic healed nailed left proximal femur fracture deformity. Chronic left superior   and inferior pubic rami fracture deformities. Diffuse profound bone demineralization. Mild bilateral hip osteoarthritis. Degenerative change lower lumbar spine.   XR Knee Right 3 Views    Narrative    EXAM: XR KNEE RIGHT 3 VIEWS  LOCATION: [facility]  DATE/TIME: 2/7/2022 8:38 PM    INDICATION: Fall. Knee pain.  COMPARISON: None.      Impression    IMPRESSION: Impacted distal femoral metaphyseal fracture. Intra-articular extension of the fracture into the trochlear articular surface as seen on the tangential patellar view with approximately 1.2 cm fracture gap. No visible proximal tibia or fibula   fracture. No patella fracture. Degenerative osteoarthritis at the patellofemoral and medial compartments of the right knee. No sizable knee joint effusion. Arterial calcification.    NOTE: ABNORMAL REPORT    THE DICTATION ABOVE  DESCRIBES AN ABNORMALITY FOR WHICH FOLLOW-UP IS NEEDED.      The case was discussed with Orthopedics in the ED.    PHYSICAL EXAM  Blood pressure (!) 179/78, pulse 69, temperature 98  F (36.7  C), temperature source Temporal, resp. rate 16, height 1.524 m (5'), weight 43.2 kg (95 lb 3.8 oz), SpO2 93 %.  Constitutional: Alert and oriented to person only; no apparent distress; markedly thin and frail  HEENT: normocephalic moist mucus membranes  Respiratory: lungs clear to auscultation bilaterally  Cardiovascular: regular S1 S2  GI: abdomen soft non tender non distended bowel sounds positive  Lymph/Hematologic: no pallor, no cervical lymphadenopathy  Skin: no rash, good turgor  Musculoskeletal: no clubbing, cyanosis or edema  Neurologic: extra-ocular muscles intact; moves all four extremities  Psychiatric: appropriate affect, but limited insight and judgment     DVT Prophylaxis: Pneumatic Compression Devices  Code Status: DNR / DNI, confirmed    Disposition: Expected discharge in 2-4 days.    Lv Gray MD    Past Medical History    I have reviewed this patient's medical history and updated it with pertinent information if needed.   Past Medical History:   Diagnosis Date     Breast cancer (H)      CKD (chronic kidney disease) stage 3, GFR 30-59 ml/min (H)      Cognitive impairment      Compression fracture of T12 vertebra (H) 2020     Hypertension      Hypothyroid      Osteoporosis        Past Surgical History   I have reviewed this patient's surgical history and updated it with pertinent information if needed.  Past Surgical History:   Procedure Laterality Date     ------------OTHER-------------      Left mastectomy     ------------OTHER-------------      Cataract surgery     OPEN REDUCTION INTERNAL FIXATION FEMUR MIDSHAFT Left 7/26/2019    Procedure: LEFT INTERTROCHANTERIC FEMUR FRACTURE OPEN REDUCTION AND INTERNAL FIXATION;  Surgeon: Chris Oseguera MD;  Location:  OR     OPEN REDUCTION INTERNAL FIXATION  HIP NAILING Right 2/1/2020    Procedure: OPEN REDUCTION INTERNAL FIXATION RIGHT INTERTROCHANTERIC FEMUR FRACTURE;  Surgeon: Catalino Casper MD;  Location:  OR       Prior to Admission Medications   Prior to Admission Medications   Prescriptions Last Dose Informant Patient Reported? Taking?   Lidocaine (LIDOCARE) 4 % Patch   No No   Sig: Place 2 patches onto the skin every morning To prevent lidocaine toxicity, patient should be patch free for 12 hrs daily. To right shoulder, right posterior ribs. Remove in the evening   Menthol, Topical Analgesic, (BIOFREEZE) 4 % GEL   Yes No   Sig: Externally apply topically every 4 hours as needed (R hip and other painful areas)   acetaminophen (TYLENOL) 500 MG tablet   No No   Sig: Take 2 tablets (1,000 mg) by mouth 3 times daily   amLODIPine (NORVASC) 5 MG tablet  Nursing Home Yes No   Sig: Take 5 mg by mouth daily Hold for SBP <120   calcium carbonate (TUMS) 500 MG chewable tablet  Nursing Home Yes No   Sig: Take 1 chew tab by mouth daily    levothyroxine (SYNTHROID/LEVOTHROID) 88 MCG tablet  Nursing Home Yes No   Sig: Take 88 mcg by mouth daily   losartan (COZAAR) 50 MG tablet  Nursing Home Yes No   Sig: Take 100 mg by mouth daily    oxyCODONE (ROXICODONE) 5 MG tablet   No No   Sig: Take 0.5 tablets (2.5 mg) by mouth every 8 hours as needed for moderate to severe pain   oxybutynin ER (DITROPAN-XL) 10 MG 24 hr tablet  Nursing Home No No   Sig: Take 1 tablet (10 mg) by mouth At Bedtime   polyethylene glycol (MIRALAX) 17 g packet   Yes No   Sig: Take 17 g by mouth daily as needed for constipation   senna-docusate (SENOKOT-S/PERICOLACE) 8.6-50 MG tablet   Yes No   Sig: Take 1 tablet by mouth 2 times daily as needed for constipation   vitamin D3 (CHOLECALCIFEROL) 2000 units tablet  Nursing Home Yes No   Sig: Take 1 tablet by mouth daily      Facility-Administered Medications: None     Allergies   Allergies   Allergen Reactions     Sulfacetamide Hives     Tramadol       Lethargic per patient's daughter Caron.        Social History   I have reviewed this patient's social history and updated it with pertinent information if needed. Gennaro Walton  reports that she has never smoked. She has never used smokeless tobacco. She reports that she does not drink alcohol and does not use drugs.    Family History   Family history assessed and, except as above, is non-contributory.    Family History   Problem Relation Age of Onset     Coronary Artery Disease Father      Review of Systems   The 10 point Review of Systems is negative other than noted in the HPI or here.     Primary Care Physician   Mady Hagen    Data   Labs Ordered and Resulted from Time of ED Arrival to Time of ED Departure   COMPREHENSIVE METABOLIC PANEL - Abnormal       Result Value    Sodium 132 (*)     Potassium 3.6      Chloride 98      Carbon Dioxide (CO2) 28      Anion Gap 6      Urea Nitrogen 13      Creatinine 0.53      Calcium 8.5      Glucose 138 (*)     Alkaline Phosphatase 114      AST 13      ALT 15      Protein Total 6.7 (*)     Albumin 3.6      Bilirubin Total 0.3      GFR Estimate 83     CBC WITH PLATELETS AND DIFFERENTIAL - Abnormal    WBC Count 8.5      RBC Count 3.18 (*)     Hemoglobin 10.8 (*)     Hematocrit 33.3 (*)      (*)     MCH 34.0 (*)     MCHC 32.4      RDW 13.1      Platelet Count 222      % Neutrophils 77      % Lymphocytes 10      % Monocytes 11      % Eosinophils 1      % Basophils 0      % Immature Granulocytes 1      NRBCs per 100 WBC 0      Absolute Neutrophils 6.7      Absolute Lymphocytes 0.8      Absolute Monocytes 0.9      Absolute Eosinophils 0.1      Absolute Basophils 0.0      Absolute Immature Granulocytes 0.0      Absolute NRBCs 0.0     CK TOTAL - Normal    CK 30     COVID-19 VIRUS (CORONAVIRUS) BY PCR       Data reviewed today:  I personally reviewed the right knee x-ray image(s) showing distal femur fracture.

## 2022-02-08 NOTE — ANESTHESIA PROCEDURE NOTES
Airway       Patient location during procedure: OR       Procedure Start/Stop Times: 2/8/2022 12:42 PM  Staff -        Performed By: CRNA  Consent for Airway        Urgency: elective  Indications and Patient Condition       Indications for airway management: anushka-procedural       Induction type:intravenous       Mask difficulty assessment: 1 - vent by mask    Final Airway Details       Final airway type: endotracheal airway       Successful airway: ETT - single and Oral  Endotracheal Airway Details        ETT size (mm): 7.0       Cuffed: yes       Successful intubation technique: video laryngoscopy       VL Blade Size: Glidescope 3       Grade View of Cords: 1       Adjucts: stylet       Position: Right       Measured from: lips       Secured at (cm): 22       Bite block used: None    Post intubation assessment        Placement verified by: capnometry, equal breath sounds and chest rise        Number of attempts at approach: 1       Number of other approaches attempted: 1       Secured with: commercial tube hussein and pink tape       Ease of procedure: easy       Dentition: Intact and Unchanged

## 2022-02-08 NOTE — PROGRESS NOTES
RECEIVING UNIT ED HANDOFF REVIEW    ED Nurse Handoff Report was reviewed by: Tim Davenport RN on February 8, 2022 at 1:17 AM

## 2022-02-08 NOTE — PROGRESS NOTES
VSS  Except  Htn. Pt disoriented x3 and is unable to rate her pain. Breakthrough right hip pain treated with ice, and prn dilaudid. Ice seems to help. NPO since midnight. Fluids stopped per hospitalist. Spoke with surgeon. Surgery planned for today at 1230.

## 2022-02-08 NOTE — ED TRIAGE NOTES
Unwitnessed fall this AM. Pt was back in bed when staff arrived. Pt c/o right hip and right shoulder pain. Pt took 1/2 tablet of oxycodone PTA

## 2022-02-08 NOTE — PHARMACY-ADMISSION MEDICATION HISTORY
Pharmacy Medication History  Admission medication history interview status for the 2/7/2022  admission is complete. See EPIC admission navigator for prior to admission medications     Location of Interview: Outside patient room but on unit  Medication history sources: [facility] ([facility])    Significant changes made to the medication list:  Added Chlorhexidine and Fluoride rinse.    In the past week, patient estimated taking medication this percent of the time: greater than 90%    Medication reconciliation completed by provider prior to medication history? No    Time spent in this activity: 20 minutes    Prior to Admission medications    Medication Sig Last Dose Taking? Auth Provider   acetaminophen (TYLENOL) 500 MG tablet Take 1,000 mg by mouth daily as needed for mild pain as needed Yes Unknown, Entered By History   acetaminophen (TYLENOL) 500 MG tablet Take 2 tablets (1,000 mg) by mouth 3 times daily 2/7/2022 at Unknown time Yes Savage Menjivar APRN CNP   amLODIPine (NORVASC) 5 MG tablet Take 5 mg by mouth daily Hold for SBP <120 2/7/2022 at Unknown time Yes Unknown, Entered By History   calcium carbonate (TUMS) 500 MG chewable tablet Take 1 chew tab by mouth daily  2/7/2022 at Unknown time Yes Reported, Patient   carboxymethylcellulose PF (REFRESH PLUS) 0.5 % ophthalmic solution Place 2 drops into both eyes 4 times daily 2/7/2022 at Unknown time Yes Unknown, Entered By History   chlorhexidine (CHLORHEXIDINE) 0.12 % solution Swish and spit 15 mLs in mouth 2 times daily 2/7/2022 at am Yes Unknown, Entered By History   levothyroxine (SYNTHROID/LEVOTHROID) 88 MCG tablet Take 88 mcg by mouth daily 2/7/2022 at Unknown time Yes Unknown, Entered By History   Lidocaine (LIDOCARE) 4 % Patch Place 1 patch onto the skin every morning 1 patch on each shoulder  To prevent lidocaine toxicity, patient should be patch free for 12 hrs daily. 2/7/2022 at am Yes Unknown, Entered By History   losartan  (COZAAR) 50 MG tablet Take 100 mg by mouth daily  2/7/2022 at Unknown time Yes Unknown, Entered By History   Menthol, Topical Analgesic, (BIOFREEZE) 4 % GEL Externally apply topically every 4 hours as needed (R hip and other painful areas) as needed Yes Unknown, Entered By History   oxybutynin ER (DITROPAN-XL) 10 MG 24 hr tablet Take 1 tablet (10 mg) by mouth At Bedtime 2/6/2022 at Unknown time Yes Fei Marshall MD   oxyCODONE (ROXICODONE) 5 MG tablet Take 0.5 tablets (2.5 mg) by mouth every 8 hours as needed for moderate to severe pain 2/7/2022 at Unknown time Yes Liliana Sloan MD   polyethylene glycol (MIRALAX) 17 g packet Take 17 g by mouth daily  2/7/2022 at Unknown time Yes Reported, Patient   senna-docusate (SENOKOT-S/PERICOLACE) 8.6-50 MG tablet Take 2 tablets by mouth daily as needed for constipation  as needed Yes Reported, Patient   sodium fluoride dental rinse (PREVIDENT) 0.2 % SOLN solution Apply 60 mLs to affected area At Bedtime 2/6/2022 at Unknown time Yes Unknown, Entered By History   vitamin D3 (CHOLECALCIFEROL) 2000 units tablet Take 1 tablet by mouth daily 2/7/2022 at Unknown time Yes Unknown, Entered By History       The information provided in this note is only as accurate as the sources available at the time of update(s)

## 2022-02-08 NOTE — ANESTHESIA PREPROCEDURE EVALUATION
Anesthesia Pre-Procedure Evaluation    Patient: Gennaro Walton   MRN: [id number] : 1923        Preoperative Diagnosis: Fracture of femur, distal (H) [S72.409A]    Procedure : Procedure(s):  OPEN REDUCTION INTERNAL FIXATION RIGHT DISTAL FEMUR FRACTURE.          Past Medical History:   Diagnosis Date     Breast cancer (H)      CKD (chronic kidney disease) stage 3, GFR 30-59 ml/min (H)      Cognitive impairment      Compression fracture of T12 vertebra (H)      Hypertension      Hypothyroid      Osteoporosis       Past Surgical History:   Procedure Laterality Date     ------------OTHER-------------      Left mastectomy     ------------OTHER-------------      Cataract surgery     OPEN REDUCTION INTERNAL FIXATION FEMUR MIDSHAFT Left 2019    Procedure: LEFT INTERTROCHANTERIC FEMUR FRACTURE OPEN REDUCTION AND INTERNAL FIXATION;  Surgeon: Chris Oseguera MD;  Location: [facility] OR     OPEN REDUCTION INTERNAL FIXATION HIP NAILING Right 2020    Procedure: OPEN REDUCTION INTERNAL FIXATION RIGHT INTERTROCHANTERIC FEMUR FRACTURE;  Surgeon: Catalino Casper MD;  Location: [facility] OR      Allergies   Allergen Reactions     Sulfacetamide Hives     Tramadol      Lethargic per patient's daughter Caron.       Social History     Tobacco Use     Smoking status: Never Smoker     Smokeless tobacco: Never Used   Substance Use Topics     Alcohol use: No      Wt Readings from Last 1 Encounters:   22 39.5 kg (87 lb 1.3 oz)        Anesthesia Evaluation   Pt has had prior anesthetic. Type: General.    No history of anesthetic complications       ROS/MED HX  ENT/Pulmonary:       Neurologic: Comment: Hx of traumatic SAH    (+) dementia,     Cardiovascular:     (+) Dyslipidemia hypertension-----Previous cardiac testing   Echo: Date: Results:    Stress Test: Date: Results:    ECG Reviewed: Date: 22 Results:  SR; LVH with slightly widened QRS  Cath: Date: Results:      METS/Exercise Tolerance:     Hematologic:     (+) anemia,      Musculoskeletal:   (+) arthritis, fracture (Compression), Fracture location: Other Spine and RLE,     GI/Hepatic:       Renal/Genitourinary:     (+) renal disease (Stage 3), type: CRI,     Endo:     (+) thyroid problem, hypothyroidism,     Psychiatric/Substance Use:       Infectious Disease:       Malignancy:       Other:      (+) , H/O Chronic Pain,           OUTSIDE LABS:  CBC:   Lab Results   Component Value Date    WBC 6.4 02/08/2022    WBC 8.5 02/07/2022    HGB 10.3 (L) 02/08/2022    HGB 10.8 (L) 02/07/2022    HCT 31.1 (L) 02/08/2022    HCT 33.3 (L) 02/07/2022     02/08/2022     02/07/2022     BMP:   Lab Results   Component Value Date     02/08/2022     (L) 02/07/2022    POTASSIUM 3.7 02/08/2022    POTASSIUM 3.6 02/07/2022    CHLORIDE 103 02/08/2022    CHLORIDE 98 02/07/2022    CO2 26 02/08/2022    CO2 28 02/07/2022    BUN 10 02/08/2022    BUN 13 02/07/2022    CR 0.45 (L) 02/08/2022    CR 0.53 02/07/2022    GLC 92 02/08/2022     (H) 02/07/2022     COAGS:   Lab Results   Component Value Date    INR 1.03 01/07/2020     POC:   Lab Results   Component Value Date     (H) 07/26/2019     HEPATIC:   Lab Results   Component Value Date    ALBUMIN 3.6 02/07/2022    PROTTOTAL 6.7 (L) 02/07/2022    ALT 15 02/07/2022    AST 13 02/07/2022    ALKPHOS 114 02/07/2022    BILITOTAL 0.3 02/07/2022     OTHER:   Lab Results   Component Value Date    PH 7.34 (L) 07/26/2019    LORETA 8.2 (L) 02/08/2022    MAG 2.2 07/26/2019       Anesthesia Plan    ASA Status:  3   NPO Status:  NPO Appropriate    Anesthesia Type: General.     - Airway: ETT   Induction: Intravenous.   Maintenance: Balanced.        Consents    Anesthesia Plan(s) and associated risks, benefits, and realistic alternatives discussed. Questions answered and patient/representative(s) expressed understanding.    - Discussed:     - Discussed with:  Patient      - Patient is DNR/DNI Status: Yes             Suspend during perioperative  period? Yes.             Agree to: chemical resuscitation, chest compression/defibrillation (If acutely reversible).        Postoperative Care    Pain management: IV analgesics, Multi-modal analgesia.   PONV prophylaxis: Ondansetron (or other 5HT-3)     Comments:                Inocencio Callahan MD

## 2022-02-09 LAB
GLUCOSE BLDC GLUCOMTR-MCNC: 85 MG/DL (ref 70–99)
HGB BLD-MCNC: 9.7 G/DL (ref 11.7–15.7)

## 2022-02-09 PROCEDURE — 36415 COLL VENOUS BLD VENIPUNCTURE: CPT | Performed by: PHYSICIAN ASSISTANT

## 2022-02-09 PROCEDURE — 250N000013 HC RX MED GY IP 250 OP 250 PS 637: Performed by: PHYSICIAN ASSISTANT

## 2022-02-09 PROCEDURE — 250N000011 HC RX IP 250 OP 636: Performed by: PHYSICIAN ASSISTANT

## 2022-02-09 PROCEDURE — 99232 SBSQ HOSP IP/OBS MODERATE 35: CPT | Performed by: STUDENT IN AN ORGANIZED HEALTH CARE EDUCATION/TRAINING PROGRAM

## 2022-02-09 PROCEDURE — 85018 HEMOGLOBIN: CPT | Performed by: PHYSICIAN ASSISTANT

## 2022-02-09 PROCEDURE — 120N000001 HC R&B MED SURG/OB

## 2022-02-09 PROCEDURE — 250N000013 HC RX MED GY IP 250 OP 250 PS 637: Performed by: STUDENT IN AN ORGANIZED HEALTH CARE EDUCATION/TRAINING PROGRAM

## 2022-02-09 PROCEDURE — 999N000147 HC STATISTIC PT IP EVAL DEFER

## 2022-02-09 RX ADMIN — ASPIRIN 81 MG: 81 TABLET, COATED ORAL at 20:04

## 2022-02-09 RX ADMIN — AMLODIPINE BESYLATE 5 MG: 5 TABLET ORAL at 08:34

## 2022-02-09 RX ADMIN — ASPIRIN 81 MG: 81 TABLET, COATED ORAL at 08:32

## 2022-02-09 RX ADMIN — LEVOTHYROXINE SODIUM 88 MCG: 88 TABLET ORAL at 08:34

## 2022-02-09 RX ADMIN — ACETAMINOPHEN 975 MG: 325 TABLET, FILM COATED ORAL at 08:32

## 2022-02-09 RX ADMIN — ACETAMINOPHEN 975 MG: 325 TABLET, FILM COATED ORAL at 21:51

## 2022-02-09 RX ADMIN — SENNOSIDES AND DOCUSATE SODIUM 1 TABLET: 50; 8.6 TABLET ORAL at 08:34

## 2022-02-09 RX ADMIN — POLYETHYLENE GLYCOL 3350 17 G: 17 POWDER, FOR SOLUTION ORAL at 08:34

## 2022-02-09 RX ADMIN — OXYBUTYNIN CHLORIDE 10 MG: 10 TABLET, EXTENDED RELEASE ORAL at 21:51

## 2022-02-09 RX ADMIN — CHLORHEXIDINE GLUCONATE 15 ML: 1.2 SOLUTION ORAL at 08:32

## 2022-02-09 RX ADMIN — SENNOSIDES AND DOCUSATE SODIUM 1 TABLET: 50; 8.6 TABLET ORAL at 20:04

## 2022-02-09 RX ADMIN — CEFAZOLIN 1 G: 1 INJECTION, POWDER, FOR SOLUTION INTRAMUSCULAR; INTRAVENOUS at 05:24

## 2022-02-09 RX ADMIN — LOSARTAN POTASSIUM 100 MG: 100 TABLET, FILM COATED ORAL at 08:34

## 2022-02-09 RX ADMIN — CHLORHEXIDINE GLUCONATE 15 ML: 1.2 SOLUTION ORAL at 20:09

## 2022-02-09 RX ADMIN — ACETAMINOPHEN 975 MG: 325 TABLET, FILM COATED ORAL at 15:01

## 2022-02-09 ASSESSMENT — ACTIVITIES OF DAILY LIVING (ADL)
ADLS_ACUITY_SCORE: 18
ADLS_ACUITY_SCORE: 18
ADLS_ACUITY_SCORE: 22
ADLS_ACUITY_SCORE: 18
ADLS_ACUITY_SCORE: 22
ADLS_ACUITY_SCORE: 18
ADLS_ACUITY_SCORE: 22
ADLS_ACUITY_SCORE: 18
ADLS_ACUITY_SCORE: 21
ADLS_ACUITY_SCORE: 18
ADLS_ACUITY_SCORE: 22
ADLS_ACUITY_SCORE: 18
ADLS_ACUITY_SCORE: 22
ADLS_ACUITY_SCORE: 18
ADLS_ACUITY_SCORE: 22
ADLS_ACUITY_SCORE: 18
ADLS_ACUITY_SCORE: 22
ADLS_ACUITY_SCORE: 18
ADLS_ACUITY_SCORE: 18
ADLS_ACUITY_SCORE: 22

## 2022-02-09 NOTE — PROGRESS NOTES
[facility]    Medicine Progress Note - Hospitalist Service    Date of Admission:  2/7/2022    Assessment & Plan        Gennaro Walton is a markedly pleasant 89 year old woman with past medical history that is most notable for advanced chronic dementia, and prior bilateral hip ORIF, among others; who presents with mechanical fall and is found to have acute impacted distal femoral metaphyseal fracture.     Mechanical fall causing acute impacted distal femoral metaphyseal fracture: s/p ORIF 02/08  - hx of prior left hip ORIF in 2019, and right hip ORIF and nailing in 2020. More recently, she fell 11/2021 and broke her eight foot which was casted. Overall, due to multiple falls and fractures and progressive, severe dementia, her daughter notes she is essentially wheelchair bound at this point. Her estimated BMI tonight is 18.6  - after discussion with family they elected to proceed with surgery 02/08  - Pain management and dvt ppx per surgery  - therapy evaluation today with SW consult     Chronic dementia: Noting that her fall preceding her left hip fracture surgery in 2019 was also complicated by traumatic SAH.  - patient very lethargic throughout the night, retaining urine this morning> delirium precautions in place, attempt to wake patient up today. Likely need to place galindo for ongoing retention in this demented patient who is unable to safely straight cath  - avoid oversedation     Acute hyponatremia: Mild: Na 132. She has reportedly had hyponatremia in the past as well.  -resolved, stop IVF     CKD Stage 3:   - Avoid NSAID's and other nephrotoxins while hospitalized  - stable  - BMP tomorrow     Hypertension:   - Resume Norvasc, Cozaar with old parameters     Hypothyroid:   - Resume home Synthroid     Breast cancer  - Status post prior left mastectomy. Noted     Rule Out COVID-19 infection  -negative COVID-19 PCR test result  -no current indication for precautions       Diet: Advance  Diet as Tolerated: Regular Diet Adult    DVT Prophylaxis: per surgery  Cortez Catheter: PRESENT, indication:    Central Lines: None  Cardiac Monitoring: None  Code Status: No CPR- Do NOT Intubate      Disposition Plan   Expected Discharge: 02/10/2022     Anticipated discharge location:  Awaiting care coordination huddle  Delays:            The patient's care was discussed with the bedside nurse and patient    Kamila Zuleta,   Hospitalist Service  [facility]  Securely message with the Vocera Web Console (learn more here)  Text page via [employer] Paging/Directory         Clinically Significant Risk Factors Present on Admission                  ______________________________________________________________________    Interval History   Patient sleeping upright and does not wake up for me. She appears comfortable. Per nursing she was very lethargic over the night as well but did wake up for them earlier and denied any complaints.     Data reviewed today: I reviewed all medications, new labs and imaging results over the last 24 hours.     Physical Exam   Vital Signs: Temp: 98  F (36.7  C) Temp src: Axillary BP: (!) 147/62 Pulse: 73   Resp: 14 SpO2: (!) 88 % O2 Device: None (Room air) Oxygen Delivery: 1 LPM  Weight: 87 lbs 1.31 oz     Constitutional: Resting NAD, thin and frail  Respiratory: Clear to auscultation bilaterally, no crackles or wheezing  Cardiovascular: Regular rate and rhythm, normal S1 and S2, and no murmur noted  GI: Normal bowel sounds,TTP in RLQ  Skin/Integumen: No rashes, no cyanosis, no edema  MSK: R immobilizer in place, R shoulder joint deformity, L elbow deformity noted    Data   Recent Labs   Lab 02/09/22  0750 02/09/22  0601 02/08/22  0539 02/07/22 2012   WBC  --   --  6.4 8.5   HGB 9.7*  --  10.3* 10.8*   MCV  --   --  102* 105*   PLT  --   --  199 222   NA  --   --  133 132*   POTASSIUM  --   --  3.7 3.6   CHLORIDE  --   --  103 98   CO2  --   --  26 28   BUN  --   --   10 13   CR  --   --  0.45* 0.53   ANIONGAP  --   --  4 6   LORETA  --   --  8.2* 8.5   GLC  --  85 92 138*   ALBUMIN  --   --   --  3.6   PROTTOTAL  --   --   --  6.7*   BILITOTAL  --   --   --  0.3   ALKPHOS  --   --   --  114   ALT  --   --   --  15   AST  --   --   --  13     Recent Results (from the past 24 hour(s))   XR Surgery JUAN Fluoro L/T 5 Min w Stills    Narrative    SURGERY C-ARM FLUORO LESS THAN 5 MINUTES WITH STILLS 2/8/2022 1:40 PM     COMPARISON: X-ray from 2/7/2022.    HISTORY: ORIF right distal femur.    NUMBER OF IMAGES ACQUIRED: 4    VIEWS: Multiple    FLUOROSCOPY TIME: .5 minutes.      Impression    IMPRESSION: ORIF distal femur with lateral sideplate and screws with  fracture in good position.    ABDI VARNER MD         SYSTEM ID:  [id number]     Medications     lactated ringers 75 mL/hr at 02/08/22 1752       acetaminophen  975 mg Oral Q8H FREDERIC     amLODIPine  5 mg Oral Daily     aspirin  81 mg Oral BID     chlorhexidine  15 mL Swish & Spit BID     levothyroxine  88 mcg Oral QAM AC     losartan  100 mg Oral Daily     oxybutynin ER  10 mg Oral At Bedtime     polyethylene glycol  17 g Oral Daily     senna-docusate  1 tablet Oral BID     sodium chloride (PF)  3 mL Intracatheter Q8H

## 2022-02-09 NOTE — PROGRESS NOTES
Orthopedic Surgery  Gennaro Walton  2022  Admit Date:  2022  POD: 1 Day Post-Op   Procedure(s):  OPEN REDUCTION INTERNAL FIXATION RIGHT DISTAL FEMUR FRACTURE.    Sleepy t/o exam    Vital Sign Ranges  Temperature Temp  Av.3  F (36.8  C)  Min: 98  F (36.7  C)  Max: 99  F (37.2  C)   Blood pressure Systolic (24hrs), Av , Min:123 , Max:166        Diastolic (24hrs), Av, Min:55, Max:96      Pulse Pulse  Av.8  Min: 60  Max: 89   Respirations Resp  Avg: 15.2  Min: 10  Max: 18   Pulse oximetry SpO2  Av.4 %  Min: 88 %  Max: 100 %       Dressing is clean, dry, and intact. KI in place.   Ace wrap on right LE  Bilateral calves are soft, non-tender.  Right lower extremity is NVI.  Sensation intact bilateral lower extremities  Patient able to wiggle toes   +Dp pulse    Labs:  Recent Labs   Lab Test 22  0539 22  1240   WBC 6.4 8.5 7.7     Recent Labs   Lab Test 22  0750 22  0539 22   HGB 9.7* 10.3* 10.8*     Recent Labs   Lab Test 20  2030 19  1414   INR 1.03 1.02     Recent Labs   Lab Test 22  0539 22  1240    222 313       1. PLAN:   Continue ASA 81mg bid for DVT prophylaxis.     Mobilize with PT/OT    WBAT in KI.     Continue current pain regiment.   Dressings: Per orders: keep intact until POD#2     2. Disposition   Anticipate d/c to TCU likely when medically cleared and progressing in PT.    Kelly Dunn PA-C

## 2022-02-09 NOTE — PLAN OF CARE
POD1 ORIF R femur, AOX1 (self), was lethargic and combative most of AM, but improved during the Afternoon. CESAR Cortez placed this AM, patent. Pain controlled by tylenol, c/o headache, cold compress on forehead. ACE dressing removed today, island dressing on with TEDs. Unable to get up with PT this AM due to lethargy.

## 2022-02-09 NOTE — PLAN OF CARE
Patient arrived to the unit @1620 . A&O to self disoriented to time, place and situation baseline advance dementia. Up with assist of 2. VSS exp BP scheduled BP medications was given and BP rechecked @ 2010  130/70, able to wean 02 off with good sat 93% . Patient unable to rate pain,PAINAD scale used for pain assessment. Scheduled acetaminophen  and ice was given with effective results. PIV saline locked. Tolerated applesauce's well and oath meal.Takes medications whole with applesauce. Dressing CDI. Unable to rate CMS due to patient conditions. Knee immobilizer in placed.  Incontinent of bladder  brief wet X1, pure wick in placed. PVR check 520ml  @2310,  Patient straight cath with minium output  500 ml.  Repo  every 2hours and resting comfortably in bed. Will continue to monitor.

## 2022-02-10 LAB
ANION GAP SERPL CALCULATED.3IONS-SCNC: 4 MMOL/L (ref 3–14)
BUN SERPL-MCNC: 12 MG/DL (ref 7–30)
CALCIUM SERPL-MCNC: 8.2 MG/DL (ref 8.5–10.1)
CHLORIDE BLD-SCNC: 101 MMOL/L (ref 94–109)
CO2 SERPL-SCNC: 27 MMOL/L (ref 20–32)
CREAT SERPL-MCNC: 0.55 MG/DL (ref 0.52–1.04)
GFR SERPL CREATININE-BSD FRML MDRD: 82 ML/MIN/1.73M2
GLUCOSE BLD-MCNC: 99 MG/DL (ref 70–99)
GLUCOSE BLDC GLUCOMTR-MCNC: 91 MG/DL (ref 70–99)
HGB BLD-MCNC: 8.4 G/DL (ref 11.7–15.7)
POTASSIUM BLD-SCNC: 3.5 MMOL/L (ref 3.4–5.3)
SODIUM SERPL-SCNC: 132 MMOL/L (ref 133–144)

## 2022-02-10 PROCEDURE — 99232 SBSQ HOSP IP/OBS MODERATE 35: CPT | Performed by: STUDENT IN AN ORGANIZED HEALTH CARE EDUCATION/TRAINING PROGRAM

## 2022-02-10 PROCEDURE — 36415 COLL VENOUS BLD VENIPUNCTURE: CPT | Performed by: STUDENT IN AN ORGANIZED HEALTH CARE EDUCATION/TRAINING PROGRAM

## 2022-02-10 PROCEDURE — 120N000001 HC R&B MED SURG/OB

## 2022-02-10 PROCEDURE — 250N000013 HC RX MED GY IP 250 OP 250 PS 637: Performed by: STUDENT IN AN ORGANIZED HEALTH CARE EDUCATION/TRAINING PROGRAM

## 2022-02-10 PROCEDURE — 250N000013 HC RX MED GY IP 250 OP 250 PS 637: Performed by: PHYSICIAN ASSISTANT

## 2022-02-10 PROCEDURE — 80048 BASIC METABOLIC PNL TOTAL CA: CPT | Performed by: STUDENT IN AN ORGANIZED HEALTH CARE EDUCATION/TRAINING PROGRAM

## 2022-02-10 PROCEDURE — 85018 HEMOGLOBIN: CPT | Performed by: PHYSICIAN ASSISTANT

## 2022-02-10 RX ORDER — OXYCODONE HYDROCHLORIDE 5 MG/1
2.5 TABLET ORAL EVERY 4 HOURS PRN
Qty: 20 TABLET | Refills: 0 | Status: ON HOLD | OUTPATIENT
Start: 2022-02-10 | End: 2022-04-16

## 2022-02-10 RX ADMIN — SENNOSIDES AND DOCUSATE SODIUM 1 TABLET: 50; 8.6 TABLET ORAL at 09:14

## 2022-02-10 RX ADMIN — OXYBUTYNIN CHLORIDE 10 MG: 10 TABLET, EXTENDED RELEASE ORAL at 21:07

## 2022-02-10 RX ADMIN — AMLODIPINE BESYLATE 5 MG: 5 TABLET ORAL at 09:14

## 2022-02-10 RX ADMIN — POLYETHYLENE GLYCOL 3350 17 G: 17 POWDER, FOR SOLUTION ORAL at 09:15

## 2022-02-10 RX ADMIN — CHLORHEXIDINE GLUCONATE 15 ML: 1.2 SOLUTION ORAL at 21:07

## 2022-02-10 RX ADMIN — ASPIRIN 81 MG: 81 TABLET, COATED ORAL at 09:14

## 2022-02-10 RX ADMIN — CHLORHEXIDINE GLUCONATE 15 ML: 1.2 SOLUTION ORAL at 09:14

## 2022-02-10 RX ADMIN — ACETAMINOPHEN 975 MG: 325 TABLET, FILM COATED ORAL at 06:32

## 2022-02-10 RX ADMIN — ASPIRIN 81 MG: 81 TABLET, COATED ORAL at 21:06

## 2022-02-10 RX ADMIN — SENNOSIDES AND DOCUSATE SODIUM 1 TABLET: 50; 8.6 TABLET ORAL at 21:07

## 2022-02-10 RX ADMIN — ACETAMINOPHEN 975 MG: 325 TABLET, FILM COATED ORAL at 21:06

## 2022-02-10 RX ADMIN — ACETAMINOPHEN 975 MG: 325 TABLET, FILM COATED ORAL at 14:27

## 2022-02-10 RX ADMIN — LOSARTAN POTASSIUM 100 MG: 100 TABLET, FILM COATED ORAL at 09:14

## 2022-02-10 RX ADMIN — LEVOTHYROXINE SODIUM 88 MCG: 88 TABLET ORAL at 06:33

## 2022-02-10 ASSESSMENT — ACTIVITIES OF DAILY LIVING (ADL)
ADLS_ACUITY_SCORE: 19
ADLS_ACUITY_SCORE: 18
ADLS_ACUITY_SCORE: 19
ADLS_ACUITY_SCORE: 18
ADLS_ACUITY_SCORE: 19
ADLS_ACUITY_SCORE: 20
ADLS_ACUITY_SCORE: 18
ADLS_ACUITY_SCORE: 18
ADLS_ACUITY_SCORE: 19
ADLS_ACUITY_SCORE: 19
ADLS_ACUITY_SCORE: 18
ADLS_ACUITY_SCORE: 19
ADLS_ACUITY_SCORE: 18
ADLS_ACUITY_SCORE: 20
ADLS_ACUITY_SCORE: 18
ADLS_ACUITY_SCORE: 19
ADLS_ACUITY_SCORE: 19

## 2022-02-10 NOTE — PLAN OF CARE
Pt slept most of this shift; repo q2. Cortez cath patent and draining well. Able to verbalize pain but can be inconsistent; managed with Scheduled Tylenol and cold packs. Took meds with no issues. Dressing to R leg CDI.

## 2022-02-10 NOTE — PLAN OF CARE
Alert to self. VSS. Diminished lung sounds. Denies SOB. On RA, sating at 92%. Tolerating regular diet. Denies nausea and vomiting. Right knee dressing intact. Assist of 2 with a walker. PT was unable to work with pt today, will plan for tomorrow morning. Cortez intact; patent. Pain was managed with scheduled tylenol. TCU discharge pending.

## 2022-02-10 NOTE — CONSULTS
"CLINICAL NUTRITION SERVICES  -  ASSESSMENT NOTE    Future/Additional Recommendations:   Continue regular diet. Add supplements as needed - ordered Ensure with lunch and PRN.    Malnutrition:   % Weight Loss:  Weight loss does not meet criteria for malnutrition   % Intake:  Unable to fully assess  Subcutaneous Fat Loss:  Suspect chronic  Muscle Loss:  Suspect chronic  Fluid Retention:  None noted    Malnutrition Diagnosis: Unable to determine due to lack of information.      REASON FOR ASSESSMENT  Gennaro Walton is a 89 year old female seen by Registered Dietitian for Nutrition Admission Risk Screen Received -   Have you recently lost weight without trying in the last 6 months ? - \"unsure\"  Have you been eating poorly due to a decreased appetite ?- \"yes\"    NUTRITION HISTORY  - Information obtained from chart review. Pt seen in room. She is soundly sleeping.   - Admitted after a fall, found to have acute impacted distal femoral metaphyseal fracture.     Seen by SOLEDAD in November 2020 -->     \"- Resides in [place] where meals are provided for her TID  - Eats </=50% meals at baseline, slowly declining in PO as she ages  - Daughter has been unable to visit pt while in her [place], but denies recent visual wt loss or fat/muscle losses   - Pt feels she usually weighs between 100-109 lbs   - Does not utilize protein supplements regularly   - Suspect patient chronically consumes <75% estimated needs daily \"    CURRENT NUTRITION ORDERS  Diet Order:     Regular     Current Intake/Tolerance:  Tolerated 50% of one meal yesterday. Otherwise no documentation of intakes over admission. Was NPO 2/8 for procedure.     She is receiving adequate trays TID when able.     NUTRITION FOCUSED PHYSICAL ASSESSMENT FOR DIAGNOSING MALNUTRITION)  Yes           Observed:    Muscle wasting (refer to documentation in Malnutrition section) and Subcutaneous fat loss (refer to documentation in Malnutrition section)   Pt is very frail. Suspect much of her fat " "and muscle loses have occurred over the past several years with age, and are not acute changes.     Obtained from Chart/Interdisciplinary Team:  2/8 - Right distal femur ORIF   Trace 1+ edema  No BM this admission.     ANTHROPOMETRICS  Height: 5' 0\"  Weight: 87 lbs 1.31 oz (39.5 kg)   Body mass index is 17.01 kg/m .  Weight Status:  Underweight BMI <18.5  IBW: 45.5 kg   % IBW: 87%  Weight History: 5% loss in the past 3 months.   Wt Readings from Last 10 Encounters:   02/08/22 39.5 kg (87 lb 1.3 oz)   11/03/21 41.7 kg (92 lb)   12/28/20 37.6 kg (83 lb)   12/21/20 36.3 kg (80 lb)   12/15/20 40.8 kg (90 lb)   12/08/20 41.7 kg (92 lb)   12/04/20 45.4 kg (100 lb)   12/02/20 45.4 kg (100 lb)   12/01/20 45.4 kg (100 lb)   11/25/20 45.4 kg (100 lb)       LABS  Labs reviewed  Na 132 (L)    MEDICATIONS  Medications reviewed  Miralax daily / Senokot BID   LR @ 75 mL/hr     ASSESSED NUTRITION NEEDS PER APPROVED PRACTICE GUIDELINES:  Dosing Weight 40 kg  Estimated Energy Needs: 0129-8330 kcals (25-30 Kcal/Kg)  Justification: maintenance  Estimated Protein Needs: 48-60 grams protein (1.2-1.5 g pro/Kg)  Justification: post op  Estimated Fluid Needs: 1 mL/kcal   Justification: maintenance    MALNUTRITION:  % Weight Loss:  Weight loss does not meet criteria for malnutrition   % Intake:  Unable to fully assess  Subcutaneous Fat Loss:  Suspect chronic  Muscle Loss:  Suspect chronic  Fluid Retention:  None noted    Malnutrition Diagnosis: Unable to determine due to lack of information.     NUTRITION DIAGNOSIS:  Predicted inadequate nutrient intake (energy/protein) related to post op ORIF on 2/8 with increased nutrient needs.        NUTRITION INTERVENTIONS  Recommendations / Nutrition Prescription  Regular diet   Supplements available as needed - ordered Ensure w/ lunch and PRN    Implementation  Nutrition education: Per Provider order if indicated     Nutrition Goals  Intake of >50% nutritionally adequate meals TID.     MONITORING AND " EVALUATION:  Progress towards goals will be monitored and evaluated per protocol and Practice Guidelines    Kathy Og RD, LD  [facility] [place], [facility], [facility], [facility]  Pager: [phone]  Weekend Pager: [phone]

## 2022-02-10 NOTE — PROGRESS NOTES
"Orthopedic Surgery  Gennaro Walton  2/10/2022  Admit Date:  2022  POD 2 Days Post-Op  S/P Procedure(s):  OPEN REDUCTION INTERNAL FIXATION RIGHT DISTAL FEMUR FRACTURE.    Patient repeatedly saying \"help me\" in bed  No needs known at this time, patient does not verbalize any urgent needs  Baseline chronic dementia    Vital Sign Ranges  Temperature Temp  Av.1  F (36.7  C)  Min: 97.8  F (36.6  C)  Max: 98.5  F (36.9  C)   Blood pressure Systolic (24hrs), Av , Min:111 , Max:136        Diastolic (24hrs), Av, Min:47, Max:58      Pulse Pulse  Av.3  Min: 54  Max: 79   Respirations Resp  Av.3  Min: 16  Max: 20   Pulse oximetry SpO2  Av.2 %  Min: 91 %  Max: 94 %       Dressing is clean, dry, and intact.  KI in place  Minimal erythema of the surrounding skin.   Bilateral calves are soft, non-tender.  Bilateral lower extremity is NVI.  Sensation intact bilateral lower extremities  +Dp pulse      Labs:  Recent Labs   Lab Test 02/10/22  0804 22  0539 22   POTASSIUM 3.5 3.7 3.6     Recent Labs   Lab Test 02/10/22  0804 22  0750 22  0539   HGB 8.4* 9.7* 10.3*     Recent Labs   Lab Test 20  2030 19  1414   INR 1.03 1.02     Recent Labs   Lab Test 22  0539 22  1240    222 313       A/P  1. S/p right distal femur fracture ORIF   Continue ASA for DVT prophylaxis.     Mobilize with PT/OT    WBAT with KI in place   Leave dressing intact.     Continue current pain regiment.    2. Disposition   Anticipate d/c to TCU based on placement.    Tonja Hall PA-C  "

## 2022-02-10 NOTE — CONSULTS
Care Management Initial Consult    General Information  Assessment completed with:  ,         Primary Care Provider verified and updated as needed:     Readmission within the last 30 days:           Advance Care Planning:            Communication Assessment  Patient's communication style: spoken language (English or Bilingual)    Hearing Difficulty or Deaf: yes   Wear Glasses or Blind: yes    Cognitive  Cognitive/Neuro/Behavioral: .WDL except  Level of Consciousness: confused  Arousal Level: opens eyes spontaneously  Orientation: disoriented x 4  Mood/Behavior: calm,cooperative  Best Language: 0 - No aphasia  Speech: slow,clear    Living Environment:   People in home:       Current living Arrangements:        Able to return to prior arrangements:         Family/Social Support:  Care provided by:    Provides care for:                  Description of Support System:           Current Resources:   Patient receiving home care services:       Community Resources:    Equipment currently used at home: walker, rolling  Supplies currently used at home:      Employment/Financial:  Employment Status:          Financial Concerns:             Lifestyle & Psychosocial Needs:  Social Determinants of Health     Tobacco Use: Low Risk      Smoking Tobacco Use: Never Smoker     Smokeless Tobacco Use: Never Used   Alcohol Use: Not on file   Financial Resource Strain: Not on file   Food Insecurity: Not on file   Transportation Needs: Not on file   Physical Activity: Not on file   Stress: Not on file   Social Connections: Not on file   Intimate Partner Violence: Not on file   Depression: Not on file   Housing Stability: Not on file       Functional Status:  Prior to admission patient needed assistance:              Mental Health Status:          Chemical Dependency Status:                Values/Beliefs:  Spiritual, Cultural Beliefs, [employer] Practices, Values that affect care:                 Additional Information:  Per care management  consult, chart was reviewed.Patient was admitted on 2/7/22 with a mechanical fall and found to have acute impacted distal femoral metaphyseal fracture and has an anticipated discharge date of 2/12/22. Writer met with patient and daughter at bedside. Writer discussed patient's baseline. Patient lives at [facility] and has been living there since 2018. Daughter stated patient uses a wheelchair but was using a walker before that. Daughter reports that patient gets transfer assist (to bed, chair, bathroom, Ect. ), medication management. Toileting, laundry and meals. Daughter expressed that she is the only family and support for patient beside resident staff. Daughter feels TCU will be needed for discharge and prefers [facility]. Writer did express that due to bed capacity around SNF/Hospitals due to COVID, we go with the first available bed, daughter is understanding of this. Daughter would like to be kept updated regarding discharge plans. Writer called [facility], no current openings and are unsure when they will have discharges. Writer will continue to follow.       Addendum: Spoke with Jose at [facility] who confirmed they could take patient when she is medically ready (Friday/Saturday) and has a private room of $58.13 a night. Writer met with patient/daughter who confirmed private room and cost. Writer discussed transportation options and cost of a wheelchair and stretcher and she is in agreement for transport to be arranged at discharge. Writer confirmed bed with [facility].     Heather Alcantar, KENNETH, [facility]   Social Work   [facility]

## 2022-02-10 NOTE — PROGRESS NOTES
[facility]    Medicine Progress Note - Hospitalist Service    Date of Admission:  2/7/2022    Assessment & Plan        Gennaro Walton is a markedly pleasant 89 year old woman with past medical history that is most notable for advanced chronic dementia, and prior bilateral hip ORIF, among others; who presents with mechanical fall and is found to have acute impacted distal femoral metaphyseal fracture.     Mechanical fall causing acute impacted distal femoral metaphyseal fracture: s/p ORIF 02/08  - hx of prior left hip ORIF in 2019, and right hip ORIF and nailing in 2020. More recently, she fell 11/2021 and broke her right foot which was casted. Overall, due to multiple falls and fractures and progressive, severe dementia, her daughter notes she is essentially wheelchair bound at this point  - after discussion with family they elected to proceed with surgery 02/08  - Pain management and dvt ppx per surgery  - PT could not evaluated yesterday due to lethargy they will reattempt today as patient more awake     Chronic dementia: Noting that her fall preceding her left hip fracture surgery in 2019 was also complicated by traumatic SAH.  - strict delirium precautions. Patient very lethargic first 24 hours post operative and did not get out of bed.  - encourage OOB today and avoid oversedation with pain meds    Urinary retention  Patient retaining urine first day post operative and galindo was placed. Will perform voiding trial today as long as patient is more awake  - continue PTA oxybutinin     Acute hyponatremia  132 again today, monitor     CKD Stage 3:   -stable monitor     Hypertension:   - Resume home Norvasc, Cozaar with old parameters     Hypothyroid:   - Resume home Synthroid     Breast cancer  - Status post prior left mastectomy. Noted     Rule Out COVID-19 infection  -negative COVID-19 PCR test result  -no current indication for precautions       Diet: Advance Diet as Tolerated: Regular  Diet Adult    DVT Prophylaxis: per surgery, currently 81mg asa  Cortez Catheter: PRESENT, indication: Retention  Central Lines: None  Cardiac Monitoring: None  Code Status: No CPR- Do NOT Intubate      Disposition Plan   Expected Discharge: 02/11/2022     Anticipated discharge location:  Awaiting care coordination huddle  Delays:            The patient's care was discussed with the bedside nurse and patient    Kamila Zuleta,   Hospitalist Service  [facility]  Securely message with the Vocera Web Console (learn more here)  Text page via [employer] Paging/Directory         Clinically Significant Risk Factors Present on Admission                  ______________________________________________________________________    Interval History   Patient resting in bed. She does wake up and open eyes for me. Denies pain and reports feeling comfortable. Much more awake than yesterday.    Data reviewed today: I reviewed all medications, new labs and imaging results over the last 24 hours.     Physical Exam   Vital Signs: Temp: 97.8  F (36.6  C) Temp src: Oral BP: 113/47 Pulse: 57   Resp: 18 SpO2: 94 % O2 Device: None (Room air)    Weight: 87 lbs 1.31 oz     Constitutional: Resting NAD, thin and frail  Respiratory: Clear to auscultation bilaterally, no crackles or wheezing  Cardiovascular: Regular rate and rhythm, normal S1 and S2, and no murmur noted, no edema  GI: Normal bowel sounds not tender  Skin/Integumen: No rashes, no cyanosis, no edema, onychomycosis on toe nails  MSK: R immobilizer in place, R shoulder joint deformity, L elbow deformity noted    Data   Recent Labs   Lab 02/10/22  0804 02/10/22  0544 02/09/22  0750 02/09/22  0601 02/08/22  0539 02/07/22 2012   WBC  --   --   --   --  6.4 8.5   HGB 8.4*  --  9.7*  --  10.3* 10.8*   MCV  --   --   --   --  102* 105*   PLT  --   --   --   --  199 222   *  --   --   --  133 132*   POTASSIUM 3.5  --   --   --  3.7 3.6   CHLORIDE 101  --   --    --  103 98   CO2 27  --   --   --  26 28   BUN 12  --   --   --  10 13   CR 0.55  --   --   --  0.45* 0.53   ANIONGAP 4  --   --   --  4 6   LORETA 8.2*  --   --   --  8.2* 8.5   GLC 99 91  --  85 92 138*   ALBUMIN  --   --   --   --   --  3.6   PROTTOTAL  --   --   --   --   --  6.7*   BILITOTAL  --   --   --   --   --  0.3   ALKPHOS  --   --   --   --   --  114   ALT  --   --   --   --   --  15   AST  --   --   --   --   --  13     No results found for this or any previous visit (from the past 24 hour(s)).  Medications     lactated ringers 75 mL/hr at 02/08/22 1752       acetaminophen  975 mg Oral Q8H FREDERIC     amLODIPine  5 mg Oral Daily     aspirin  81 mg Oral BID     chlorhexidine  15 mL Swish & Spit BID     levothyroxine  88 mcg Oral QAM AC     losartan  100 mg Oral Daily     oxybutynin ER  10 mg Oral At Bedtime     polyethylene glycol  17 g Oral Daily     senna-docusate  1 tablet Oral BID     sodium chloride (PF)  3 mL Intracatheter Q8H

## 2022-02-11 ENCOUNTER — APPOINTMENT (OUTPATIENT)
Dept: PHYSICAL THERAPY | Facility: CLINIC | Age: 87
DRG: 481 | End: 2022-02-11
Attending: PHYSICIAN ASSISTANT
Payer: MEDICARE

## 2022-02-11 VITALS
OXYGEN SATURATION: 94 % | SYSTOLIC BLOOD PRESSURE: 145 MMHG | HEART RATE: 70 BPM | HEIGHT: 60 IN | BODY MASS INDEX: 18.48 KG/M2 | DIASTOLIC BLOOD PRESSURE: 57 MMHG | WEIGHT: 94.14 LBS | TEMPERATURE: 98 F | RESPIRATION RATE: 18 BRPM

## 2022-02-11 PROCEDURE — 250N000013 HC RX MED GY IP 250 OP 250 PS 637: Performed by: STUDENT IN AN ORGANIZED HEALTH CARE EDUCATION/TRAINING PROGRAM

## 2022-02-11 PROCEDURE — 97530 THERAPEUTIC ACTIVITIES: CPT | Mod: GP | Performed by: PHYSICAL THERAPIST

## 2022-02-11 PROCEDURE — 99239 HOSP IP/OBS DSCHRG MGMT >30: CPT | Performed by: STUDENT IN AN ORGANIZED HEALTH CARE EDUCATION/TRAINING PROGRAM

## 2022-02-11 PROCEDURE — 250N000013 HC RX MED GY IP 250 OP 250 PS 637: Performed by: PHYSICIAN ASSISTANT

## 2022-02-11 PROCEDURE — 97161 PT EVAL LOW COMPLEX 20 MIN: CPT | Mod: GP | Performed by: PHYSICAL THERAPIST

## 2022-02-11 RX ORDER — SODIUM FLUORIDE 0.9 MG/ML
60 MOUTHWASH DENTAL AT BEDTIME
Refills: 0 | Status: ON HOLD | DISCHARGE
Start: 2022-02-11 | End: 2022-04-16

## 2022-02-11 RX ADMIN — POLYETHYLENE GLYCOL 3350 17 G: 17 POWDER, FOR SOLUTION ORAL at 10:50

## 2022-02-11 RX ADMIN — ACETAMINOPHEN 650 MG: 325 TABLET, FILM COATED ORAL at 14:13

## 2022-02-11 RX ADMIN — ASPIRIN 81 MG: 81 TABLET, COATED ORAL at 10:51

## 2022-02-11 RX ADMIN — ACETAMINOPHEN 975 MG: 325 TABLET, FILM COATED ORAL at 06:49

## 2022-02-11 RX ADMIN — SENNOSIDES AND DOCUSATE SODIUM 1 TABLET: 50; 8.6 TABLET ORAL at 10:51

## 2022-02-11 RX ADMIN — LEVOTHYROXINE SODIUM 88 MCG: 88 TABLET ORAL at 06:50

## 2022-02-11 RX ADMIN — CHLORHEXIDINE GLUCONATE 15 ML: 1.2 SOLUTION ORAL at 10:50

## 2022-02-11 ASSESSMENT — ACTIVITIES OF DAILY LIVING (ADL)
ADLS_ACUITY_SCORE: 21
ADLS_ACUITY_SCORE: 21
ADLS_ACUITY_SCORE: 19
ADLS_ACUITY_SCORE: 21
ADLS_ACUITY_SCORE: 19
ADLS_ACUITY_SCORE: 21
ADLS_ACUITY_SCORE: 21
ADLS_ACUITY_SCORE: 19
ADLS_ACUITY_SCORE: 21
ADLS_ACUITY_SCORE: 21
ADLS_ACUITY_SCORE: 19
ADLS_ACUITY_SCORE: 21
ADLS_ACUITY_SCORE: 21
ADLS_ACUITY_SCORE: 19
ADLS_ACUITY_SCORE: 21
ADLS_ACUITY_SCORE: 21

## 2022-02-11 ASSESSMENT — MIFFLIN-ST. JEOR: SCORE: 728.5

## 2022-02-11 NOTE — DISCHARGE SUMMARY
[facility]  Hospitalist Discharge Summary      Date of Admission:  2/7/2022  Date of Discharge:  2/11/2022  Discharging Provider: Kamila Zuleta DO  Discharge Service: Hospitalist Service    Discharge Diagnoses   See below    Follow-ups Needed After Discharge   Follow-up Appointments     Follow Up and recommended labs and tests      Follow up with Nursing home physician.  No follow up labs or test are   needed.         Follow-up and recommended labs and tests       Your orthopedic surgeon was Dr. Med Yousif at [facility] ([phone]).    Please call [facility] [phone] to schedule a follow up appointment for   the week of 2/21/22           Discharge Disposition   Discharged to nursing home  Condition at discharge: Stable    Hospital Course   Gennaro Walton is a markedly pleasant 89 year old woman with past medical history that is most notable for advanced chronic dementia, and prior bilateral hip ORIF, among others; who presents with mechanical fall and is found to have acute impacted distal femoral metaphyseal fracture. Patient underwent surgery and will discharge to nursing facility for further rehab. She developed acute urinary retention in the hospital that required placement of galindo catheter. This was removed on day of discharge however patient unable to void and replaced prior to discharge. Will need voiding trial on discharge.    Detailed problems below     Mechanical fall causing acute impacted distal femoral metaphyseal fracture: s/p ORIF 02/08  - hx of prior left hip ORIF in 2019, and right hip ORIF and nailing in 2020. More recently, she fell 11/2021 and broke her right foot which was casted. Overall, due to multiple falls and fractures and progressive, severe dementia, her daughter notes she is essentially wheelchair bound at this point  - after discussion with family they elected to proceed with surgery 02/08  - will discharge with asa for dvt  ppx     Chronic dementia: Noting that her fall preceding her left hip fracture surgery in 2019 was also complicated by traumatic SAH.  - strict delirium precautions. Patient very lethargic first 24 hours post operative and did not get out of bed.  - encourage OOB today and avoid oversedation with pain meds     Urinary retention  Patient retaining urine first day post operative and galindo was placed.  - continue PTA oxybutinin     Hyponatremia  -stable ~132     CKD Stage 3:   -stable monitor     Hypertension:   - Resume home Norvasc, Cozaar with old parameters     Hypothyroid:   - Resume home Synthroid     Breast cancer  - Status post prior left mastectomy. Noted     Rule Out COVID-19 infection  -negative COVID-19 PCR test result  -no current indication for precautions    Consultations This Hospital Stay   ORTHOPEDIC SURGERY IP CONSULT  PHYSICAL THERAPY ADULT IP CONSULT  OCCUPATIONAL THERAPY ADULT IP CONSULT  CARE MANAGEMENT / SOCIAL WORK IP CONSULT  PHYSICAL THERAPY ADULT IP CONSULT  OCCUPATIONAL THERAPY ADULT IP CONSULT    Code Status   No CPR- Do NOT Intubate    Time Spent on this Encounter   IKamila DO, personally saw the patient today and spent greater than 30 minutes discharging this patient.       Kamila Zuleta DO  [facility]TH[facility] SPINE  [address] [address]  Phone: [phone]  Fax: [phone]  ______________________________________________________________________    Physical Exam   Vital Signs: Temp: 98  F (36.7  C) Temp src: Oral BP: 102/49 Pulse: 72   Resp: 18 SpO2: 93 % O2 Device: None (Room air)    Weight: 94 lbs 2.18 oz     Constitutional: Resting NAD, thin and frail  Respiratory: Clear to auscultation bilaterally, no crackles or wheezing  Cardiovascular: Regular rate and rhythm, normal S1 and S2, and no murmur noted, no edema  GI: Normal bowel sounds not tender  Skin/Integumen: No rashes, no cyanosis, no edema, onychomycosis on toe  nails  MSK: R immobilizer in place, R shoulder joint deformity, L elbow deformity noted          Primary Care Physician   Mady Hagen    Discharge Orders      Follow-up and recommended labs and tests     Your orthopedic surgeon was Dr. Med Yousif at [facility] ([phone]).    Please call [facility] [phone] to schedule a follow up appointment for the week of 2/21/22     Weight bearing as tolerated    Weight bearing as tolerated on your operative extremity.  Must have knee immobilizer in place     Wound care and dressings    Instructions to care for your wound at home: daily dressing changes and keep wound clean and dry.     Discharge Instructions    Knee immobilizer on when weightbearing only.  May remove when resting.     General info for SNF    Length of Stay Estimate: Short Term Care: Estimated # of Days <30  Condition at Discharge: Stable  Level of care:skilled   Rehabilitation Potential: Fair  Admission H&P remains valid and up-to-date: Yes  Recent Chemotherapy: N/A  Use Nursing Home Standing Orders: Yes     Mantoux instructions    Give two-step Mantoux (PPD) Per Facility Policy Yes     Follow Up and recommended labs and tests    Follow up with Nursing home physician.  No follow up labs or test are needed.     Reason for your hospital stay    You presented after a fall and were found to have a hip fracture. You will discharge to a TCU for further rehab and care.     Bladder scan    X 2 for post void residual     Activity - Up with assistive device     Activity - Up with nursing assistance     Weight bearing status    WBAT with KI in place     Physical Therapy Adult Consult    Evaluate and treat as clinically indicated.    Reason:  fall and recurrent fractures     Occupational Therapy Adult Consult    Evaluate and treat as clinically indicated.    Reason:  recurrent falls     Fall precautions     Walker DME    DME Documentation: Describe the reason for need to support medical necessity:  Impaired gait status post knee surgery. I, the undersigned, certify that the above prescribed supplies are medically necessary for this patient and is both reasonable and necessary in reference to accepted standards of medical practice in the treatment of this patient's condition and is not prescribed as a convenience.     Diet    Follow this diet upon discharge: Orders Placed This Encounter      Snacks/Supplements Adult: Ensure Enlive; With Meals      Advance Diet as Tolerated: Regular Diet Adult       Significant Results and Procedures   Most Recent 3 CBC's:Recent Labs   Lab Test 02/10/22  0804 02/09/22  0750 02/08/22  0539 02/07/22 2012 12/14/20 0615 11/30/20  1240   WBC  --   --  6.4 8.5  --  7.7   HGB 8.4* 9.7* 10.3* 10.8*   < > 13.3   MCV  --   --  102* 105*  --  101*   PLT  --   --  199 222  --  313    < > = values in this interval not displayed.     Most Recent 3 BMP's:Recent Labs   Lab Test 02/10/22  0804 02/10/22  0544 02/09/22  0601 02/08/22  0539 02/07/22 2012   *  --   --  133 132*   POTASSIUM 3.5  --   --  3.7 3.6   CHLORIDE 101  --   --  103 98   CO2 27  --   --  26 28   BUN 12  --   --  10 13   CR 0.55  --   --  0.45* 0.53   ANIONGAP 4  --   --  4 6   LORETA 8.2*  --   --  8.2* 8.5   GLC 99 91 85 92 138*     Most Recent 2 LFT's:Recent Labs   Lab Test 02/07/22 2012 11/13/20  1215   AST 13 16   ALT 15 14   ALKPHOS 114 78   BILITOTAL 0.3 0.5   ,   Results for orders placed or performed during the hospital encounter of 02/07/22   XR Pelvis and Hip Right 1 View    Narrative    EXAM: XR PELVIS AND HIP RIGHT 1 VIEW  LOCATION: [facility]  DATE/TIME: 2/7/2022 8:38 PM    INDICATION: Right hip pain. Fall.  COMPARISON: None available      Impression    IMPRESSION: Anatomic alignment right hip. No acute displaced right hip fracture. Healed nailed right proximal femur fracture deformity. No evidence for hardware failure. Chronic healed nailed left proximal femur fracture deformity.  Chronic left superior   and inferior pubic rami fracture deformities. Diffuse profound bone demineralization. Mild bilateral hip osteoarthritis. Degenerative change lower lumbar spine.   XR Knee Right 3 Views    Narrative    EXAM: XR KNEE RIGHT 3 VIEWS  LOCATION: [facility]  DATE/TIME: 2/7/2022 8:38 PM    INDICATION: Fall. Knee pain.  COMPARISON: None.      Impression    IMPRESSION: Impacted distal femoral metaphyseal fracture. Intra-articular extension of the fracture into the trochlear articular surface as seen on the tangential patellar view with approximately 1.2 cm fracture gap. No visible proximal tibia or fibula   fracture. No patella fracture. Degenerative osteoarthritis at the patellofemoral and medial compartments of the right knee. No sizable knee joint effusion. Arterial calcification.    NOTE: ABNORMAL REPORT    THE DICTATION ABOVE DESCRIBES AN ABNORMALITY FOR WHICH FOLLOW-UP IS NEEDED.    XR Surgery JUAN Fluoro L/T 5 Min w Stills    Narrative    SURGERY C-ARM FLUORO LESS THAN 5 MINUTES WITH STILLS 2/8/2022 1:40 PM     COMPARISON: X-ray from 2/7/2022.    HISTORY: ORIF right distal femur.    NUMBER OF IMAGES ACQUIRED: 4    VIEWS: Multiple    FLUOROSCOPY TIME: .5 minutes.      Impression    IMPRESSION: ORIF distal femur with lateral sideplate and screws with  fracture in good position.    ABDI VARNER MD         SYSTEM ID:  [id number]       Discharge Medications   Current Discharge Medication List      START taking these medications    Details   aspirin (ASA) 81 MG EC tablet Take 1 tablet (81 mg) by mouth 2 times daily  Qty: 60 tablet, Refills: 0    Associated Diagnoses: Other closed fracture of right femur, unspecified portion of femur, initial encounter (H)         CONTINUE these medications which have CHANGED    Details   oxyCODONE (ROXICODONE) 5 MG tablet Take 0.5 tablets (2.5 mg) by mouth every 4 hours as needed for moderate to severe pain  Qty: 20 tablet, Refills: 0     Associated Diagnoses: Other closed fracture of right femur, unspecified portion of femur, initial encounter (H)      sodium fluoride dental rinse (PREVIDENT) 0.2 % SOLN solution Apply 60 mLs to affected area At Bedtime Hold until follow up with PCP  Refills: 0    Associated Diagnoses: Debility         CONTINUE these medications which have NOT CHANGED    Details   !! acetaminophen (TYLENOL) 500 MG tablet Take 1,000 mg by mouth daily as needed for mild pain      !! acetaminophen (TYLENOL) 500 MG tablet Take 2 tablets (1,000 mg) by mouth 3 times daily    Associated Diagnoses: Acute post-operative pain      amLODIPine (NORVASC) 5 MG tablet Take 5 mg by mouth daily Hold for SBP <120      calcium carbonate (TUMS) 500 MG chewable tablet Take 1 chew tab by mouth daily       carboxymethylcellulose PF (REFRESH PLUS) 0.5 % ophthalmic solution Place 2 drops into both eyes 4 times daily      chlorhexidine (CHLORHEXIDINE) 0.12 % solution Swish and spit 15 mLs in mouth 2 times daily      levothyroxine (SYNTHROID/LEVOTHROID) 88 MCG tablet Take 88 mcg by mouth daily      Lidocaine (LIDOCARE) 4 % Patch Place 1 patch onto the skin every morning 1 patch on each shoulder  To prevent lidocaine toxicity, patient should be patch free for 12 hrs daily.      losartan (COZAAR) 50 MG tablet Take 100 mg by mouth daily       Menthol, Topical Analgesic, (BIOFREEZE) 4 % GEL Externally apply topically every 4 hours as needed (R hip and other painful areas)      oxybutynin ER (DITROPAN-XL) 10 MG 24 hr tablet Take 1 tablet (10 mg) by mouth At Bedtime    Comments: If urinating without difficulty then start August 2nd.  Associated Diagnoses: Urinary frequency      polyethylene glycol (MIRALAX) 17 g packet Take 17 g by mouth daily       senna-docusate (SENOKOT-S/PERICOLACE) 8.6-50 MG tablet Take 2 tablets by mouth daily as needed for constipation       vitamin D3 (CHOLECALCIFEROL) 2000 units tablet Take 1 tablet by mouth daily       !! - Potential  duplicate medications found. Please discuss with provider.        Allergies   Allergies   Allergen Reactions     Sulfacetamide Hives     Tramadol      Lethargic per patient's daughter Caron.

## 2022-02-11 NOTE — PLAN OF CARE
Pt pulled IV out this shift. Per charge nurse, OK to leave it out. Pt slept on and off this shift. Took meds with no issues. No pain or any discomfort observed from pt this shift. Repo Q2. Cortez cath patent and draining well. Awaiting TCU placement.

## 2022-02-11 NOTE — PROGRESS NOTES
02/11/22 1000   Quick Adds   Type of Visit Initial PT Evaluation       Present no   Living Environment   People in home facility resident   Current Living Arrangements assisted living   Home Accessibility no concerns   Transportation Anticipated agency   Living Environment Comments Pt lives in an [facility] in [place]. No concerns regarding stairs. Pt will have assist as needed upon discharge.   Self-Care   Usual Activity Tolerance moderate   Current Activity Tolerance poor   Regular Exercise No   Equipment Currently Used at Home walker, rolling   Activity/Exercise/Self-Care Comment Pt is a poor historian, follow-up will be needed to determine to accuracy of responses. Pt uses FWW at baseline.   Disability/Function   Hearing Difficulty or Deaf yes   Patient's preferred means of communication English speaker with hearing loss, no speech problems.   Describe hearing loss bilateral hearing loss   Use of hearing assistive devices bilateral hearing aids   Wear Glasses or Blind yes   Vision Management glasses   Concentrating, Remembering or Making Decisions Difficulty yes   Concentration Management history of dementia   Difficulty Communicating yes   Communication difficulty understanding   General Information   Onset of Illness/Injury or Date of Surgery 02/11/22   Referring Physician Lv Gray MD   Patient/Family Therapy Goals Statement (PT) Pt did not state   Pertinent History of Current Problem (include personal factors and/or comorbidities that impact the POC) Per Chart: Gennaro Walton is a markedly pleasant 89 year old woman with past medical history that is most notable for advanced chronic dementia, and prior bilateral hip ORIF, among others; who presents with mechanical fall and is found to have acute impacted distal femoral metaphyseal fracture.   Existing Precautions/Restrictions fall   Weight-Bearing Status - LLE full weight-bearing   Weight-Bearing Status - RLE weight-bearing  as tolerated  (with KI donned)   General Observations Pt supine in bed upon PT arrival   Cognition   Orientation Status (Cognition) disoriented to;person;place;situation;time   Cognitive Status Comments Pt has dementia at baseline. Disoriented x 4.   Pain Assessment   Patient Currently in Pain No   Integumentary/Edema   Integumentary/Edema Comments Incision not observed, covered with bandage and knee immobilizer   Posture    Posture Forward head position;Protracted shoulders   Range of Motion (ROM)   ROM Comment R knee immobilizer limiting ROM, L LE WFL   Strength   Strength Comments L hip 3/5, R hip NT   Bed Mobility   Comment (Bed Mobility) Supine to sit Min A x1, sit to supine Mod A x2, dependent for scooting up in bed   Transfers   Transfer Safety Comments Sit to stand Mod A x2 w/ FWW   Gait/Stairs (Locomotion)   Comment (Gait/Stairs) NT due to weakness and pain   Balance   Balance Comments Sitting balance fair, standing balance poor   Sensory Examination   Sensory Perception patient reports no sensory changes   Clinical Impression   Criteria for Skilled Therapeutic Intervention yes, treatment indicated   PT Diagnosis (PT) Impaired gait   Influenced by the following impairments Decreased activity tolerance; decreased balance; decreased strength   Functional limitations due to impairments Impaired functional mobility   Clinical Presentation Stable/Uncomplicated   Clinical Presentation Rationale Clinical Judgement   Clinical Decision Making (Complexity) low complexity   Therapy Frequency (PT) 3x/week   Predicted Duration of Therapy Intervention (days/wks) 5 days   Planned Therapy Interventions (PT) balance training;bed mobility training;gait training;patient/family education;strengthening;transfer training   Risk & Benefits of therapy have been explained evaluation/treatment results reviewed;care plan/treatment goals reviewed;risks/benefits reviewed;current/potential barriers reviewed;participants voiced agreement  with care plan;participants included;patient   PT Discharge Planning    PT Discharge Recommendation (DC Rec) Transitional Care Facility;home with home care physical therapy;home with assist   PT Rationale for DC Rec Pt currently below baseline, pt requires Mod A x2 for transfers. At this time, pt presenting with deficts in activity tolerance, balance and strength. Recommend skilled PT at TCU to increased strength and improve activity tolerance. If pt were to return home, pt would require 24/7 A x 2 with all functional mobility and ADLs, along with HHPT.   PT Brief overview of current status  Bed mobility Mod A x2, transfers Mod A x2   Total Evaluation Time   Total Evaluation Time (Minutes) 10

## 2022-02-11 NOTE — PLAN OF CARE
Occupational Therapy: Orders received. Chart reviewed and discussed with care team.? Occupational Therapy not indicated due to pt requiring TCU at discharge, therapy needs to be met by PT while IP.? Defer discharge recommendations to PT and care team.? Will complete orders.

## 2022-02-11 NOTE — PROGRESS NOTES
Care Management Discharge Note    Discharge Date: 02/11/2022     Discharge Disposition: Transitional Care- [facility]    Discharge Services: None  Discharge DME: None  Discharge Transportation: agency    Private pay costs discussed: Not applicable    PAS Confirmation Code:    Patient/family educated on Medicare website which has current facility and service quality ratings: yes    Education Provided on the Discharge Plan:    Persons Notified of Discharge Plans: Hospitalist, Charge, HUC  Patient/Family in Agreement with the Plan: yes    Handoff Referral Completed: Yes    Additional Information:  Patient is set to discharge to [facility] today 2/11 via w/c [facility] at 1630.   -----------------------------------------------------------------------------------------------------    D: Per DHS regulation, SW completed and submitted PAS-RR to [place] [employer] Direct Connect via the Senior [employer] Line.  PAS-RR confirmation # is : [id number]    I: SW spoke with pt and they are aware a PAS-RR has been submitted.  SW reviewed with pt that they may be contacted for a follow up appointment within 10 days of hospital discharge if their SNF stay is < 30 days.  Contact information for [facility] Line was also provided.    A: pt verbalized understanding.    P: Further questions may be directed to [facility] Line at #[phone], option #4 for PAS-RR staff.    SW will continue to follow.    Mary Harris, MSW, LGSW

## 2022-02-12 DIAGNOSIS — Z71.89 OTHER SPECIFIED COUNSELING: ICD-10-CM

## 2022-02-12 NOTE — PROGRESS NOTES
Patient discharged via EMS to [facility]. All lines removed, except galindo.  Paperwork sent with daughter earlier in the day.

## 2022-02-12 NOTE — PLAN OF CARE
Pt pleasantly alert to self. VSS, CMS intact, pain minimal, controlled w/ Tylenol. Cortez pulled this am, unable to void, so Cortez replaced per orders this evening. Immobilizer to R knee. Dressing CDI, changed today, stables intact/ approximated. Plan to transfer to [facility] via stretcher tonight at 2000.

## 2022-02-14 ENCOUNTER — PATIENT OUTREACH (OUTPATIENT)
Dept: CARE COORDINATION | Facility: CLINIC | Age: 87
End: 2022-02-14
Payer: MEDICARE

## 2022-02-14 NOTE — PROGRESS NOTES
Clinic Care Coordination Contact    Background: Care Coordination referral placed from S discharge report for reason of patient meeting criteria for a TCM outreach call by [facility] team.    Assessment: Upon chart review, [facility] Team member will cancel/close the referral for TCM outreach due to reason below:    Patient is not established within [facility]. Upon chart review, [facility] Team member noted patient discharged to TCU    Plan: Care Coordination referral for TCM outreach canceled.    Virgen Figueroa  Community Health Worker  [facility] [place], [facility]  [phone]

## 2022-02-15 NOTE — PLAN OF CARE
Physical Therapy Discharge Summary    Reason for therapy discharge:    Discharged to transitional care facility.    Progress towards therapy goal(s). See goals on Care Plan in [facility] electronic health record for goal details.  Goals partially met.  Barriers to achieving goals:   discharge from facility.    Therapy recommendation(s):    Continued therapy is recommended.  Rationale/Recommendations:  to improve functional mobility and independence..

## 2022-02-25 ENCOUNTER — LAB REQUISITION (OUTPATIENT)
Dept: LAB | Facility: CLINIC | Age: 87
End: 2022-02-25
Payer: MEDICARE

## 2022-02-25 DIAGNOSIS — I10 ESSENTIAL (PRIMARY) HYPERTENSION: ICD-10-CM

## 2022-02-28 LAB
ANION GAP SERPL CALCULATED.3IONS-SCNC: 13 MMOL/L (ref 5–18)
BUN SERPL-MCNC: 12 MG/DL (ref 8–28)
CALCIUM SERPL-MCNC: 8.8 MG/DL (ref 8.5–10.5)
CHLORIDE BLD-SCNC: 97 MMOL/L (ref 98–107)
CO2 SERPL-SCNC: 21 MMOL/L (ref 22–31)
CREAT SERPL-MCNC: 0.57 MG/DL (ref 0.6–1.1)
GFR SERPL CREATININE-BSD FRML MDRD: 82 ML/MIN/1.73M2
GLUCOSE BLD-MCNC: 82 MG/DL (ref 70–125)
POTASSIUM BLD-SCNC: 4.4 MMOL/L (ref 3.5–5)
SODIUM SERPL-SCNC: 131 MMOL/L (ref 136–145)

## 2022-02-28 PROCEDURE — 36415 COLL VENOUS BLD VENIPUNCTURE: CPT

## 2022-02-28 PROCEDURE — 80048 BASIC METABOLIC PNL TOTAL CA: CPT

## 2022-02-28 PROCEDURE — P9604 ONE-WAY ALLOW PRORATED TRIP: HCPCS

## 2022-03-17 ENCOUNTER — LAB REQUISITION (OUTPATIENT)
Dept: LAB | Facility: CLINIC | Age: 87
End: 2022-03-17
Payer: MEDICARE

## 2022-03-17 DIAGNOSIS — I10 ESSENTIAL (PRIMARY) HYPERTENSION: ICD-10-CM

## 2022-03-17 DIAGNOSIS — D64.9 ANEMIA, UNSPECIFIED: ICD-10-CM

## 2022-03-21 LAB
ANION GAP SERPL CALCULATED.3IONS-SCNC: 11 MMOL/L (ref 5–18)
BUN SERPL-MCNC: 10 MG/DL (ref 8–28)
CALCIUM SERPL-MCNC: 8.6 MG/DL (ref 8.5–10.5)
CHLORIDE BLD-SCNC: 99 MMOL/L (ref 98–107)
CO2 SERPL-SCNC: 22 MMOL/L (ref 22–31)
CREAT SERPL-MCNC: 0.58 MG/DL (ref 0.6–1.1)
GFR SERPL CREATININE-BSD FRML MDRD: 81 ML/MIN/1.73M2
GLUCOSE BLD-MCNC: 73 MG/DL (ref 70–125)
HGB BLD-MCNC: 10.2 G/DL (ref 11.7–15.7)
POTASSIUM BLD-SCNC: 4.3 MMOL/L (ref 3.5–5)
SODIUM SERPL-SCNC: 132 MMOL/L (ref 136–145)

## 2022-03-21 PROCEDURE — P9603 ONE-WAY ALLOW PRORATED MILES: HCPCS | Performed by: FAMILY MEDICINE

## 2022-03-21 PROCEDURE — 36415 COLL VENOUS BLD VENIPUNCTURE: CPT | Performed by: FAMILY MEDICINE

## 2022-03-21 PROCEDURE — 80048 BASIC METABOLIC PNL TOTAL CA: CPT | Performed by: FAMILY MEDICINE

## 2022-03-21 PROCEDURE — 85018 HEMOGLOBIN: CPT | Performed by: FAMILY MEDICINE

## 2022-04-04 ENCOUNTER — HOSPITAL ENCOUNTER (EMERGENCY)
Facility: CLINIC | Age: 87
Discharge: HOME | End: 2022-04-04
Attending: EMERGENCY MEDICINE | Admitting: EMERGENCY MEDICINE
Payer: MEDICARE

## 2022-04-04 VITALS
HEART RATE: 57 BPM | RESPIRATION RATE: 16 BRPM | SYSTOLIC BLOOD PRESSURE: 127 MMHG | TEMPERATURE: 97.8 F | OXYGEN SATURATION: 94 % | DIASTOLIC BLOOD PRESSURE: 111 MMHG

## 2022-04-04 DIAGNOSIS — S51.811A FOREARM LACERATION, RIGHT, INITIAL ENCOUNTER: ICD-10-CM

## 2022-04-04 PROCEDURE — 99283 EMERGENCY DEPT VISIT LOW MDM: CPT

## 2022-04-04 PROCEDURE — 12004 RPR S/N/AX/GEN/TRK7.6-12.5CM: CPT

## 2022-04-04 ASSESSMENT — ENCOUNTER SYMPTOMS: WOUND: 1

## 2022-04-04 NOTE — ED NOTES
Bed: ED25  Expected date:   Expected time:   Means of arrival:   Comments:  Tova 2 89 F skin tear on arm can't ambulate ETA 5159

## 2022-04-04 NOTE — ED PROVIDER NOTES
History   Chief Complaint:  Wound Check (skin tear right upper forearm)       The history is provided by the patient and a relative.      Gennaro Walton is a 89 year old female with history of breast cancer, hypertension, and osteoporosis who presents with laceration. Earlier today, the patient was sliding out of her wheelchair when she caught her right forearm on her wheelchair. Due to this, she obtained a laceration to her arm and while in the ED, she describes the pain as a burning sensation. She has never had a similar injury before. She denies any head injury. She has still been able to eat and drink okay recently.       Review of Systems   Skin: Positive for wound (right forearm lac).   All other systems reviewed and are negative.      Allergies:  Sulfacetamide  Tramadol    Medications:  Amlodipine  Aspirin 81 mg  Levothyroxine   Losartan   Oxybutynin   Oxycodone   Miralax   Senna-docusate     Past Medical History:     Breast cancer  CKD, stage 3  Cognitive impairment  Compression fracture of T12 vertebra   Hypertension   Hypothyroidism   Osteoporosis   Traumatic subarachnoid hemorrhage   Acute blood loss anemia  Delirium   UTI   Closed right hip fracture   Acute respiratory failure with hypoxia  Pressure injury of back, stage 2  Onychomycosis      Past Surgical History:    Left mastectomy  Cataract surgery   ORIF right distal femur   ORIF left intertrochanteric femur fracture   Right ORIF right hip nailing     Family History:    Father: CAD    Social History:  The patient presents to the ED with her daughter. She lives at [facility].       Physical Exam     Patient Vitals for the past 24 hrs:   BP Temp Temp src Pulse Resp SpO2   04/04/22 1724 (!) 153/68 97.8  F (36.6  C) Temporal 57 16 94 %       Physical Exam    MSK:  Normal movement through the elbow, wrist, and fingers without tendonous deficit.    SKIN:  Warm and dry with strong radial pulse and normal capillary refill. There is a 8.5 cm laceration to  the right dorsal forearm, deep to the dermis without active bleeding.     NEURO:  5/5 strength through the fingers/wrist/elbow.  Normal sensation through the radial/ulnar/median nerve distributions.    PSYCH:  Normal affect      Emergency Department Course       Procedures      Laceration Repair        LACERATION:  A simple clean 8.5 cm laceration.      LOCATION:  Right dorsal forearm       FUNCTION:  Distally sensation, circulation, motor and tendon function are intact.      ANESTHESIA:  Local using 0.5 bupivacaine with epinephrine total of 8 mLs      PREPARATION:  Irrigation and Scrubbing with Normal Saline and Shur Clens      DEBRIDEMENT:  no debridement      CLOSURE:  Wound was closed with One Layer.  Skin closed with 9 x 4.0 Ethylon using interrupted sutures.        Emergency Department Course:             Reviewed:  I reviewed nursing notes, vitals and past medical history    Assessments:  1730 I obtained history and examined the patient as noted above.   1900 I rechecked the patient and performed the laceration repair as detailed above.     Disposition:  The patient was discharged to home.     Impression & Plan     CMS Diagnoses: None    Medical Decision Making:  This delightful 89-year-old, wheelchair-bound, presents to us after suffering a deep skin tear to her right arm.  This was brought together with sutures as above.  Otherwise, there is no other signs of neurovascular damage and I feel she is safe for discharge home with a dressing in place and suture removal plans for 10 days from now.  Her daughter had questions answered and is comfortable with the plan for discharge.  They will return to us for any other emergent concerns.    Diagnosis:    ICD-10-CM    1. Forearm laceration, right, initial encounter  S51.811A          Scribe Disclosure:  I, Tonja Lou, am serving as a scribe at 5:26 PM on 4/4/2022 to document services personally performed by Trierweiler, Chad A, MD based on my observations and the  provider's statements to me.              Trierweiler, Chad A, MD  04/05/22 6250

## 2022-04-14 ENCOUNTER — APPOINTMENT (OUTPATIENT)
Dept: GENERAL RADIOLOGY | Facility: CLINIC | Age: 87
End: 2022-04-14
Attending: EMERGENCY MEDICINE
Payer: MEDICARE

## 2022-04-14 ENCOUNTER — HOSPITAL ENCOUNTER (OUTPATIENT)
Facility: CLINIC | Age: 87
Setting detail: OBSERVATION
Discharge: NURSING, REHAB OR LONG-TERM CARE | End: 2022-04-16
Attending: EMERGENCY MEDICINE | Admitting: HOSPITALIST
Payer: MEDICARE

## 2022-04-14 ENCOUNTER — APPOINTMENT (OUTPATIENT)
Dept: ULTRASOUND IMAGING | Facility: CLINIC | Age: 87
End: 2022-04-14
Attending: EMERGENCY MEDICINE
Payer: MEDICARE

## 2022-04-14 DIAGNOSIS — R41.89 COGNITIVE IMPAIRMENT: Primary | ICD-10-CM

## 2022-04-14 DIAGNOSIS — R53.1 GENERAL WEAKNESS: ICD-10-CM

## 2022-04-14 DIAGNOSIS — N39.0 URINARY TRACT INFECTION WITHOUT HEMATURIA, SITE UNSPECIFIED: ICD-10-CM

## 2022-04-14 DIAGNOSIS — R29.898 WEAKNESS OF BOTH LOWER EXTREMITIES: ICD-10-CM

## 2022-04-14 LAB
ALBUMIN UR-MCNC: NEGATIVE MG/DL
ANION GAP SERPL CALCULATED.3IONS-SCNC: 5 MMOL/L (ref 3–14)
APPEARANCE UR: ABNORMAL
BACTERIA #/AREA URNS HPF: ABNORMAL /HPF
BASOPHILS # BLD AUTO: 0 10E3/UL (ref 0–0.2)
BASOPHILS NFR BLD AUTO: 0 %
BILIRUB UR QL STRIP: NEGATIVE
BUN SERPL-MCNC: 11 MG/DL (ref 7–30)
CALCIUM SERPL-MCNC: 8.6 MG/DL (ref 8.5–10.1)
CHLORIDE BLD-SCNC: 100 MMOL/L (ref 94–109)
CO2 SERPL-SCNC: 26 MMOL/L (ref 20–32)
COLOR UR AUTO: ABNORMAL
CREAT SERPL-MCNC: 0.56 MG/DL (ref 0.52–1.04)
EOSINOPHIL # BLD AUTO: 0.3 10E3/UL (ref 0–0.7)
EOSINOPHIL NFR BLD AUTO: 4 %
ERYTHROCYTE [DISTWIDTH] IN BLOOD BY AUTOMATED COUNT: 12.8 % (ref 10–15)
GFR SERPL CREATININE-BSD FRML MDRD: 82 ML/MIN/1.73M2
GLUCOSE BLD-MCNC: 94 MG/DL (ref 70–99)
GLUCOSE UR STRIP-MCNC: NEGATIVE MG/DL
HCT VFR BLD AUTO: 32.8 % (ref 35–47)
HGB BLD-MCNC: 11 G/DL (ref 11.7–15.7)
HGB UR QL STRIP: NEGATIVE
IMM GRANULOCYTES # BLD: 0 10E3/UL
IMM GRANULOCYTES NFR BLD: 1 %
KETONES UR STRIP-MCNC: NEGATIVE MG/DL
LEUKOCYTE ESTERASE UR QL STRIP: ABNORMAL
LYMPHOCYTES # BLD AUTO: 1.1 10E3/UL (ref 0.8–5.3)
LYMPHOCYTES NFR BLD AUTO: 15 %
MCH RBC QN AUTO: 32.9 PG (ref 26.5–33)
MCHC RBC AUTO-ENTMCNC: 33.5 G/DL (ref 31.5–36.5)
MCV RBC AUTO: 98 FL (ref 78–100)
MONOCYTES # BLD AUTO: 0.6 10E3/UL (ref 0–1.3)
MONOCYTES NFR BLD AUTO: 8 %
NEUTROPHILS # BLD AUTO: 5.3 10E3/UL (ref 1.6–8.3)
NEUTROPHILS NFR BLD AUTO: 72 %
NITRATE UR QL: POSITIVE
NRBC # BLD AUTO: 0 10E3/UL
NRBC BLD AUTO-RTO: 0 /100
NT-PROBNP SERPL-MCNC: 1181 PG/ML (ref 0–1800)
PH UR STRIP: 7 [PH] (ref 5–7)
PLATELET # BLD AUTO: 259 10E3/UL (ref 150–450)
POTASSIUM BLD-SCNC: 4 MMOL/L (ref 3.4–5.3)
RBC # BLD AUTO: 3.34 10E6/UL (ref 3.8–5.2)
RBC URINE: 3 /HPF
SARS-COV-2 RNA RESP QL NAA+PROBE: NEGATIVE
SODIUM SERPL-SCNC: 131 MMOL/L (ref 133–144)
SP GR UR STRIP: 1.01 (ref 1–1.03)
SQUAMOUS EPITHELIAL: 1 /HPF
TSH SERPL DL<=0.005 MIU/L-ACNC: 3.06 MU/L (ref 0.4–4)
UROBILINOGEN UR STRIP-MCNC: NORMAL MG/DL
WBC # BLD AUTO: 7.4 10E3/UL (ref 4–11)
WBC URINE: 59 /HPF

## 2022-04-14 PROCEDURE — 80048 BASIC METABOLIC PNL TOTAL CA: CPT | Performed by: EMERGENCY MEDICINE

## 2022-04-14 PROCEDURE — 93970 EXTREMITY STUDY: CPT

## 2022-04-14 PROCEDURE — 81003 URINALYSIS AUTO W/O SCOPE: CPT | Performed by: EMERGENCY MEDICINE

## 2022-04-14 PROCEDURE — 250N000013 HC RX MED GY IP 250 OP 250 PS 637: Performed by: EMERGENCY MEDICINE

## 2022-04-14 PROCEDURE — 87086 URINE CULTURE/COLONY COUNT: CPT | Performed by: EMERGENCY MEDICINE

## 2022-04-14 PROCEDURE — U0003 INFECTIOUS AGENT DETECTION BY NUCLEIC ACID (DNA OR RNA); SEVERE ACUTE RESPIRATORY SYNDROME CORONAVIRUS 2 (SARS-COV-2) (CORONAVIRUS DISEASE [COVID-19]), AMPLIFIED PROBE TECHNIQUE, MAKING USE OF HIGH THROUGHPUT TECHNOLOGIES AS DESCRIBED BY CMS-2020-01-R: HCPCS | Performed by: EMERGENCY MEDICINE

## 2022-04-14 PROCEDURE — 84443 ASSAY THYROID STIM HORMONE: CPT | Performed by: EMERGENCY MEDICINE

## 2022-04-14 PROCEDURE — 99285 EMERGENCY DEPT VISIT HI MDM: CPT | Mod: 25

## 2022-04-14 PROCEDURE — 83880 ASSAY OF NATRIURETIC PEPTIDE: CPT | Performed by: EMERGENCY MEDICINE

## 2022-04-14 PROCEDURE — 82040 ASSAY OF SERUM ALBUMIN: CPT | Performed by: HOSPITALIST

## 2022-04-14 PROCEDURE — 250N000011 HC RX IP 250 OP 636: Performed by: EMERGENCY MEDICINE

## 2022-04-14 PROCEDURE — 96374 THER/PROPH/DIAG INJ IV PUSH: CPT

## 2022-04-14 PROCEDURE — 99220 PR INITIAL OBSERVATION CARE,LEVEL III: CPT | Performed by: HOSPITALIST

## 2022-04-14 PROCEDURE — 36415 COLL VENOUS BLD VENIPUNCTURE: CPT | Performed by: EMERGENCY MEDICINE

## 2022-04-14 PROCEDURE — 71046 X-RAY EXAM CHEST 2 VIEWS: CPT

## 2022-04-14 PROCEDURE — C9803 HOPD COVID-19 SPEC COLLECT: HCPCS

## 2022-04-14 PROCEDURE — 85025 COMPLETE CBC W/AUTO DIFF WBC: CPT | Performed by: EMERGENCY MEDICINE

## 2022-04-14 PROCEDURE — G0378 HOSPITAL OBSERVATION PER HR: HCPCS

## 2022-04-14 RX ORDER — ACETAMINOPHEN 500 MG
1000 TABLET ORAL ONCE
Status: COMPLETED | OUTPATIENT
Start: 2022-04-14 | End: 2022-04-14

## 2022-04-14 RX ORDER — CEFTRIAXONE 1 G/1
1 INJECTION, POWDER, FOR SOLUTION INTRAMUSCULAR; INTRAVENOUS ONCE
Status: COMPLETED | OUTPATIENT
Start: 2022-04-14 | End: 2022-04-14

## 2022-04-14 RX ADMIN — ACETAMINOPHEN 1000 MG: 500 TABLET, FILM COATED ORAL at 22:49

## 2022-04-14 RX ADMIN — CEFTRIAXONE SODIUM 1 G: 1 INJECTION, POWDER, FOR SOLUTION INTRAMUSCULAR; INTRAVENOUS at 22:45

## 2022-04-14 ASSESSMENT — ENCOUNTER SYMPTOMS
VOMITING: 0
COUGH: 0
HEADACHES: 0
SHORTNESS OF BREATH: 0
FEVER: 0
COLOR CHANGE: 1
ABDOMINAL PAIN: 0

## 2022-04-14 NOTE — ED NOTES
Bed: ED13  Expected date: 4/14/22  Expected time: 5:28 PM  Means of arrival: Ambulance  Comments:  Bob 89f leg swelling eTA 1730

## 2022-04-14 NOTE — ED PROVIDER NOTES
History   Chief Complaint:  Fatigue and Joint Swelling       The history is provided by a relative (daughter). The history is limited by the condition of the patient (limited responsiveness).      Gennaro Walton is a 89 year old female with history of hypertension and GERD who presents with leg swelling. The patient's daughter states the patient's legs have been increasingly swollen since earlier today. She called the patient's PCP who told her to come to the ED for concern of DVT. She denies any new chest pain, shortness of breath, vomiting, fevers, cough, or headaches. The patient does not use tobacco or alcohol. Her daughter notes she has been seen in the ED various times in the last year for multiple falls, resulting in a broken left knee and left foot. Despite this she states the patient's assisted living facility has been able to meet [facility]'s needs. The daughter additionally expresses concern about a new yellow color in her lower legs. Patient denies any abdominal pain or pain in her legs. Between 1100 and 1300 the patient was sitting up in her wheelchair for lunch but has been lying down the rest of the day. Daughter states the patient is at baseline since surgery.    Review of Systems   Constitutional: Negative for fever.   Respiratory: Negative for cough and shortness of breath.    Cardiovascular: Positive for leg swelling. Negative for chest pain.   Gastrointestinal: Negative for abdominal pain and vomiting.   Skin: Positive for color change (yellow, lower legs).   Neurological: Negative for headaches.   All other systems reviewed and are negative.    Allergies:  Sulfacetamide  Tramadol    Medications:  Ditropan  Tylenol  Miralax  Aspirin    Past Medical History:     Breast cancer  CKD stage III  Compression fracture of T12 vertebra  Hypertension  Osteoporosis  Onychomycosis    Dementia  Vitamin D deficiency  Hypothyroidism  GERD    Past Surgical History:    Left mastectomy  Cataract surgery  Right  "distal femur ORIF  Right hip ORIF   Thyroidectomy  Appendectomy  Breast lumpectomy    Family History:    Father: CAD, Hypertension  Mother: Brain cancer, Osteoporosis  Brother: CAD  Sister: CAD, Hypertension     Social History:  The patient presents with her daughter. She lives in an assisted living facility.    Physical Exam     Patient Vitals for the past 24 hrs:   BP Temp Temp src Pulse Resp SpO2 Height Weight   04/14/22 2200 (!) 151/104 -- -- (!) 49 17 -- -- --   04/14/22 2100 (!) 146/68 -- -- 51 16 93 % -- --   04/14/22 2000 (!) 142/65 -- -- (!) 49 20 94 % -- --   04/1935 (!) 146/61 -- -- 50 19 93 % -- --   04/14/22 1830 -- -- -- 55 22 93 % -- --   04/14/22 1757 -- -- -- -- 20 -- -- --   04/14/22 1756 -- -- -- -- -- -- -- 38.6 kg (85 lb)   04/14/22 1742 (!) 156/71 99.1  F (37.3  C) Oral 57 -- 92 % 1.676 m (5' 6\") --       Physical Exam  Constitutional: Well developed, appears stated age, nontox appearance  Head: Atraumatic.   Mouth/Throat: Oropharynx is clear and moist.   Neck:  no stridor  Eyes: no scleral icterus  Cardiovascular: Regular bradycardia, 2+ bilat radial pulses  Pulmonary/Chest: nml resp effort, Clear BS bilat  Abdominal: ND, soft, NT, no rebound or guarding   Ext: Warm, well perfused, bilateral lower extremity edema  Neurological: Mildly somnolent, easily arousable, symmetric facies, moves ext x4  Skin: Skin is warm and dry.   Psychiatric: Behavior is normal. Thought content normal.   Nursing note and vitals reviewed.    Emergency Department Course     Imaging:  US Lower Extremity Venous Duplex Bilateral   Final Result   IMPRESSION:   1.  No deep venous thrombosis in the bilateral lower extremities.      XR Chest 2 Views    (Results Pending)     Report per radiology    Laboratory:  Labs Ordered and Resulted from Time of ED Arrival to Time of ED Departure   BASIC METABOLIC PANEL - Abnormal       Result Value    Sodium 131 (*)     Potassium 4.0      Chloride 100      Carbon Dioxide (CO2) 26   "    Anion Gap 5      Urea Nitrogen 11      Creatinine 0.56      Calcium 8.6      Glucose 94      GFR Estimate 82     ROUTINE UA WITH MICROSCOPIC REFLEX TO CULTURE - Abnormal    Color Urine Light Yellow      Appearance Urine Slightly Cloudy (*)     Glucose Urine Negative      Bilirubin Urine Negative      Ketones Urine Negative      Specific Gravity Urine 1.008      Blood Urine Negative      pH Urine 7.0      Protein Albumin Urine Negative      Urobilinogen Urine Normal      Nitrite Urine Positive (*)     Leukocyte Esterase Urine Large (*)     Bacteria Urine Few (*)     RBC Urine 3 (*)     WBC Urine 59 (*)     Squamous Epithelials Urine 1     CBC WITH PLATELETS AND DIFFERENTIAL - Abnormal    WBC Count 7.4      RBC Count 3.34 (*)     Hemoglobin 11.0 (*)     Hematocrit 32.8 (*)     MCV 98      MCH 32.9      MCHC 33.5      RDW 12.8      Platelet Count 259      % Neutrophils 72      % Lymphocytes 15      % Monocytes 8      % Eosinophils 4      % Basophils 0      % Immature Granulocytes 1      NRBCs per 100 WBC 0      Absolute Neutrophils 5.3      Absolute Lymphocytes 1.1      Absolute Monocytes 0.6      Absolute Eosinophils 0.3      Absolute Basophils 0.0      Absolute Immature Granulocytes 0.0      Absolute NRBCs 0.0     NT PROBNP INPATIENT - Normal    N terminal Pro BNP Inpatient 1,181     TSH WITH FREE T4 REFLEX - Normal    TSH 3.06     COVID-19 VIRUS (CORONAVIRUS) BY PCR   URINE CULTURE        Procedures  None    Emergency Department Course:  Reviewed:  I reviewed nursing notes, vitals, past medical history and Care Everywhere    Assessments:  1800 I obtained history and examined the patient as noted above.   2220 I rechecked the patient and explained findings.    Consults:  2234 I spoke with Dr. Varela the hospitalist.    Interventions:  Medications   cefTRIAXone (ROCEPHIN) 1 g vial to attach to  mL bag for ADULTS or NS 50 mL bag for PEDS (has no administration in time range)   acetaminophen (TYLENOL) tablet  1,000 mg (has no administration in time range)        Disposition:  The patient was admitted to the hospital under the care of Dr. Varela.     Impression & Plan     CMS Diagnoses: None    Medical Decision Makin year old female presenting w/ generalized weakness, lower extremity edema     DDx includes dependent edema, CHF, hypoproteinemia, urinary tract infection, infection NOS.  Labs significant for somewhat indeterminate BNP, UA consistent with infection, mild anemia, mild hyponatremia at baseline.  Imaging sig for no acute DVT.  Chest x-ray ordered and pending upon admission to evaluate for pulmonary edema although the patient has not reportedly had any shortness of breath orthopnea.  Given UTI and generalized weakness, ceftriaxone was ordered after review of previous culture results and the patient was admitted to the hospitalist service for further evaluation and management.  The patient and her daughter counseled on all results, disposition and diagnosis.  They are understanding and agreeable to plan. Patient admitted in stabe condition.        Diagnosis:    ICD-10-CM    1. Urinary tract infection without hematuria, site unspecified  N39.0    2. General weakness  R53.1    3. Weakness of both lower extremities  R29.898        Discharge Medications:  New Prescriptions    No medications on file       Scribe Disclosure:  Naomie JUNIOR, am serving as a scribe at 5:48 PM on 2022 to document services personally performed by Carmine Matos MD based on my observations and the provider's statements to me.     Carmine Matos MD  [phone]

## 2022-04-14 NOTE — ED TRIAGE NOTES
Presents via EMS from memory care unit. Daughter and staff report increasing fatigue and bilateral ankle swelling.

## 2022-04-15 ENCOUNTER — APPOINTMENT (OUTPATIENT)
Dept: CARDIOLOGY | Facility: CLINIC | Age: 87
End: 2022-04-15
Attending: HOSPITALIST
Payer: MEDICARE

## 2022-04-15 ENCOUNTER — APPOINTMENT (OUTPATIENT)
Dept: PHYSICAL THERAPY | Facility: CLINIC | Age: 87
End: 2022-04-15
Attending: HOSPITALIST
Payer: MEDICARE

## 2022-04-15 LAB
ALBUMIN SERPL-MCNC: 3 G/DL (ref 3.4–5)
ANION GAP SERPL CALCULATED.3IONS-SCNC: 4 MMOL/L (ref 3–14)
BUN SERPL-MCNC: 9 MG/DL (ref 7–30)
CALCIUM SERPL-MCNC: 8.4 MG/DL (ref 8.5–10.1)
CHLORIDE BLD-SCNC: 103 MMOL/L (ref 94–109)
CO2 SERPL-SCNC: 25 MMOL/L (ref 20–32)
CREAT SERPL-MCNC: 0.6 MG/DL (ref 0.52–1.04)
GFR SERPL CREATININE-BSD FRML MDRD: 81 ML/MIN/1.73M2
GLUCOSE BLD-MCNC: 78 MG/DL (ref 70–99)
LVEF ECHO: NORMAL
POTASSIUM BLD-SCNC: 3.7 MMOL/L (ref 3.4–5.3)
SODIUM SERPL-SCNC: 132 MMOL/L (ref 133–144)

## 2022-04-15 PROCEDURE — G0378 HOSPITAL OBSERVATION PER HR: HCPCS

## 2022-04-15 PROCEDURE — 96376 TX/PRO/DX INJ SAME DRUG ADON: CPT | Mod: 59

## 2022-04-15 PROCEDURE — 36415 COLL VENOUS BLD VENIPUNCTURE: CPT | Performed by: HOSPITALIST

## 2022-04-15 PROCEDURE — 99225 PR SUBSEQUENT OBSERVATION CARE,LEVEL II: CPT | Performed by: INTERNAL MEDICINE

## 2022-04-15 PROCEDURE — 97161 PT EVAL LOW COMPLEX 20 MIN: CPT | Mod: GP | Performed by: PHYSICAL THERAPIST

## 2022-04-15 PROCEDURE — 250N000013 HC RX MED GY IP 250 OP 250 PS 637: Performed by: HOSPITALIST

## 2022-04-15 PROCEDURE — 93005 ELECTROCARDIOGRAM TRACING: CPT

## 2022-04-15 PROCEDURE — 999N000208 ECHOCARDIOGRAM COMPLETE

## 2022-04-15 PROCEDURE — 93306 TTE W/DOPPLER COMPLETE: CPT | Mod: 26 | Performed by: INTERNAL MEDICINE

## 2022-04-15 PROCEDURE — 255N000002 HC RX 255 OP 636: Performed by: HOSPITALIST

## 2022-04-15 PROCEDURE — 250N000011 HC RX IP 250 OP 636: Performed by: HOSPITALIST

## 2022-04-15 PROCEDURE — 80048 BASIC METABOLIC PNL TOTAL CA: CPT | Performed by: HOSPITALIST

## 2022-04-15 PROCEDURE — 97530 THERAPEUTIC ACTIVITIES: CPT | Mod: GP | Performed by: PHYSICAL THERAPIST

## 2022-04-15 PROCEDURE — 93010 ELECTROCARDIOGRAM REPORT: CPT | Performed by: INTERNAL MEDICINE

## 2022-04-15 RX ORDER — ONDANSETRON 2 MG/ML
4 INJECTION INTRAMUSCULAR; INTRAVENOUS EVERY 6 HOURS PRN
Status: DISCONTINUED | OUTPATIENT
Start: 2022-04-15 | End: 2022-04-16 | Stop reason: HOSPADM

## 2022-04-15 RX ORDER — ASPIRIN 81 MG/1
81 TABLET ORAL DAILY
Status: DISCONTINUED | OUTPATIENT
Start: 2022-04-15 | End: 2022-04-16 | Stop reason: HOSPADM

## 2022-04-15 RX ORDER — LEVOTHYROXINE SODIUM 88 UG/1
88 TABLET ORAL DAILY
Status: DISCONTINUED | OUTPATIENT
Start: 2022-04-15 | End: 2022-04-16 | Stop reason: HOSPADM

## 2022-04-15 RX ORDER — ACETAMINOPHEN 325 MG/1
650 TABLET ORAL EVERY 6 HOURS PRN
Status: DISCONTINUED | OUTPATIENT
Start: 2022-04-15 | End: 2022-04-16 | Stop reason: HOSPADM

## 2022-04-15 RX ORDER — CARBOXYMETHYLCELLULOSE SODIUM 5 MG/ML
2 SOLUTION/ DROPS OPHTHALMIC 4 TIMES DAILY
Status: DISCONTINUED | OUTPATIENT
Start: 2022-04-15 | End: 2022-04-16 | Stop reason: HOSPADM

## 2022-04-15 RX ORDER — NALOXONE HYDROCHLORIDE 0.4 MG/ML
0.4 INJECTION, SOLUTION INTRAMUSCULAR; INTRAVENOUS; SUBCUTANEOUS
Status: DISCONTINUED | OUTPATIENT
Start: 2022-04-15 | End: 2022-04-16 | Stop reason: HOSPADM

## 2022-04-15 RX ORDER — FUROSEMIDE 20 MG
20 TABLET ORAL DAILY
Status: DISCONTINUED | OUTPATIENT
Start: 2022-04-15 | End: 2022-04-16 | Stop reason: HOSPADM

## 2022-04-15 RX ORDER — OXYBUTYNIN CHLORIDE 10 MG/1
10 TABLET, EXTENDED RELEASE ORAL AT BEDTIME
Status: DISCONTINUED | OUTPATIENT
Start: 2022-04-15 | End: 2022-04-16 | Stop reason: HOSPADM

## 2022-04-15 RX ORDER — AMLODIPINE BESYLATE 5 MG/1
5 TABLET ORAL DAILY
Status: DISCONTINUED | OUTPATIENT
Start: 2022-04-15 | End: 2022-04-16 | Stop reason: HOSPADM

## 2022-04-15 RX ORDER — NALOXONE HYDROCHLORIDE 0.4 MG/ML
0.2 INJECTION, SOLUTION INTRAMUSCULAR; INTRAVENOUS; SUBCUTANEOUS
Status: DISCONTINUED | OUTPATIENT
Start: 2022-04-15 | End: 2022-04-16 | Stop reason: HOSPADM

## 2022-04-15 RX ORDER — LOSARTAN POTASSIUM 100 MG/1
100 TABLET ORAL DAILY
Status: DISCONTINUED | OUTPATIENT
Start: 2022-04-15 | End: 2022-04-16 | Stop reason: HOSPADM

## 2022-04-15 RX ORDER — ONDANSETRON 4 MG/1
4 TABLET, ORALLY DISINTEGRATING ORAL EVERY 6 HOURS PRN
Status: DISCONTINUED | OUTPATIENT
Start: 2022-04-15 | End: 2022-04-16 | Stop reason: HOSPADM

## 2022-04-15 RX ORDER — CEFTRIAXONE 1 G/1
1 INJECTION, POWDER, FOR SOLUTION INTRAMUSCULAR; INTRAVENOUS EVERY 24 HOURS
Status: DISCONTINUED | OUTPATIENT
Start: 2022-04-15 | End: 2022-04-16 | Stop reason: HOSPADM

## 2022-04-15 RX ORDER — AMOXICILLIN 250 MG
2 CAPSULE ORAL DAILY PRN
Status: DISCONTINUED | OUTPATIENT
Start: 2022-04-15 | End: 2022-04-16 | Stop reason: HOSPADM

## 2022-04-15 RX ORDER — ACETAMINOPHEN 650 MG/1
650 SUPPOSITORY RECTAL EVERY 6 HOURS PRN
Status: DISCONTINUED | OUTPATIENT
Start: 2022-04-15 | End: 2022-04-16 | Stop reason: HOSPADM

## 2022-04-15 RX ADMIN — FUROSEMIDE 20 MG: 20 TABLET ORAL at 01:51

## 2022-04-15 RX ADMIN — HUMAN ALBUMIN MICROSPHERES AND PERFLUTREN 9 ML: 10; .22 INJECTION, SOLUTION INTRAVENOUS at 12:50

## 2022-04-15 RX ADMIN — CARBOXYMETHYLCELLULOSE SODIUM 2 DROP: 5 SOLUTION/ DROPS OPHTHALMIC at 20:26

## 2022-04-15 RX ADMIN — OXYBUTYNIN CHLORIDE 10 MG: 10 TABLET, EXTENDED RELEASE ORAL at 23:14

## 2022-04-15 RX ADMIN — CEFTRIAXONE SODIUM 1 G: 1 INJECTION, POWDER, FOR SOLUTION INTRAMUSCULAR; INTRAVENOUS at 20:26

## 2022-04-15 RX ADMIN — ASPIRIN 81 MG: 81 TABLET, COATED ORAL at 08:10

## 2022-04-15 RX ADMIN — ACETAMINOPHEN 650 MG: 325 TABLET ORAL at 01:51

## 2022-04-15 RX ADMIN — OXYBUTYNIN CHLORIDE 10 MG: 10 TABLET, EXTENDED RELEASE ORAL at 01:51

## 2022-04-15 RX ADMIN — FUROSEMIDE 20 MG: 20 TABLET ORAL at 08:10

## 2022-04-15 RX ADMIN — CARBOXYMETHYLCELLULOSE SODIUM 2 DROP: 5 SOLUTION/ DROPS OPHTHALMIC at 16:18

## 2022-04-15 RX ADMIN — CARBOXYMETHYLCELLULOSE SODIUM 2 DROP: 5 SOLUTION/ DROPS OPHTHALMIC at 08:11

## 2022-04-15 RX ADMIN — LOSARTAN POTASSIUM 100 MG: 100 TABLET, FILM COATED ORAL at 08:10

## 2022-04-15 RX ADMIN — AMLODIPINE BESYLATE 5 MG: 5 TABLET ORAL at 08:10

## 2022-04-15 RX ADMIN — LEVOTHYROXINE SODIUM 88 MCG: 88 TABLET ORAL at 08:10

## 2022-04-15 RX ADMIN — CARBOXYMETHYLCELLULOSE SODIUM 2 DROP: 5 SOLUTION/ DROPS OPHTHALMIC at 11:37

## 2022-04-15 NOTE — PROGRESS NOTES
04/15/22 1000   Quick Adds   Type of Visit Initial PT Evaluation       Present no   Living Environment   People in Home facility resident   Current Living Arrangements assisted living   Home Accessibility no concerns   Transportation Anticipated other (see comments)  (Pt did not state)   Living Environment Comments Pt lives in an [place]. No concerns regarding stairs. Pt has access to 24/7 assist as needed.   Self-Care   Usual Activity Tolerance moderate   Current Activity Tolerance poor   Regular Exercise No   Equipment Currently Used at Home walker, rolling   Fall history within last six months yes   Number of times patient has fallen within last six months   (Pt did not state)   Activity/Exercise/Self-Care Comment Pt requires assist at baseline with all ADLs. Pt uses a FWW at baseline for functional mobility.   General Information   Onset of Illness/Injury or Date of Surgery 04/15/22   Referring Physician Isaias Varela MD   Patient/Family Therapy Goals Statement (PT) Pt did not state   Pertinent History of Current Problem (include personal factors and/or comorbidities that impact the POC) Per Chart: Gennaro Walton is a 89 year old female with a past medical history of chronic hyponatremia, urinary retention, CKD stage III, hypothyroidism, hypertension, dementia presents from her facility for acute cystitis   Existing Precautions/Restrictions fall   Weight-Bearing Status - LLE full weight-bearing   Weight-Bearing Status - RLE full weight-bearing   General Observations Pt is a poor historian, follow-up will be needed to determine accuracy of responses.   Cognition   Orientation Status (Cognition) disoriented to;person;place;situation;time   Pain Assessment   Patient Currently in Pain No   Integumentary/Edema   Integumentary/Edema no deficits were identifed   Posture    Posture Forward head position;Protracted shoulders   Range of Motion (ROM)   Range of Motion ROM is WFL;ROM  deficits secondary to weakness   Strength (Manual Muscle Testing)   Strength (Manual Muscle Testing) Deficits observed during functional mobility   Strength Comments Bilateral Hip Flexion: 2+/5   Bed Mobility   Comment, (Bed Mobility) Supine>sit w/ mod A x 1   Transfers   Comment, (Transfers) Sit>stand w/ FWW and mod A x 1   Gait/Stairs (Locomotion)   [place] Level (Gait) minimum assist (75% patient effort)   Assistive Device (Gait) walker, front-wheeled   Distance in Feet (Required for LE Total Joints) 10'  (10' eval)   Comment, (Gait/Stairs) Pt ambulated ~10' w/ FWW and min A x 1 for safety. Pt unsteady throughout, needing assist from PT and FWW to maintain balance.   Balance   Balance Comments Pt able to sit at EOB unsupported without LOB. Pt requiring assist with ambulation to maintain balance.   Sensory Examination   Sensory Perception patient reports no sensory changes   Clinical Impression   Criteria for Skilled Therapeutic Intervention Yes, treatment indicated   PT Diagnosis (PT) Impaired gait   Influenced by the following impairments Decreased activity tolerance; decreased balance; decreased strength   Functional limitations due to impairments Impaired functional mobility   Clinical Presentation (PT Evaluation Complexity) Stable/Uncomplicated   Clinical Presentation Rationale Clinical Judgement   Clinical Decision Making (Complexity) low complexity   Planned Therapy Interventions (PT) balance training;bed mobility training;gait training;patient/family education;strengthening;transfer training   Risk & Benefits of therapy have been explained evaluation/treatment results reviewed;care plan/treatment goals reviewed;risks/benefits reviewed;current/potential barriers reviewed;participants voiced agreement with care plan;participants included;patient   PT Discharge Planning   PT Discharge Recommendation (DC Rec) home with home care physical therapy;home with assist   PT Rationale for DC Rec Pt is below  baseline. Pt requiring assist with all functional mobility. Pt presents with deficits in activity tolerance, balance, and strength. Due to these deficits, pt would benefit from continued skilled PT services via HHPT to address deficits and improve IND with safety and functional mobility.   PT Brief overview of current status Supine>sit w/ mod A x 1; sit>stand w/ fWW and mod A x 1; gait w/ FWW and min A x1   Total Evaluation Time   Total Evaluation Time (Minutes) 10   Physical Therapy Goals   PT Frequency 5x/week   PT Predicted Duration/Target Date for Goal Attainment 04/20/22   PT Goals Bed Mobility;Transfers;Gait   PT: Bed Mobility Supervision/stand-by assist;Supine to/from sit   PT: Transfers Supervision/stand-by assist;Sit to/from stand;Assistive device   PT: Gait Supervision/stand-by assist;Assistive device;25 feet

## 2022-04-15 NOTE — PROGRESS NOTES
Observation goals  PRIOR TO DISCHARGE       Comments:   -diagnostic tests and consults completed and resulted: Not met. ECHO done, awaiting for results./PT/OT/Nutrition ordered.      -vital signs normal or at patient baseline: Met     Nurse to notify provider when observation goals have been met and patient is ready for discharge.

## 2022-04-15 NOTE — PLAN OF CARE
Goal Outcome Evaluation:    Orientation: x1  Observation Goals (met & not met): not met yet  Activity Level: bedrest  Fall Risk: yes  Behavior & Aggression Tool Color: green  Pain Management: tylenol  ABNL VS/O2: bradycardic, EKG done overnight  ABNL Lab/BG: UTI   Diet: Regular  Bowel/Bladder: Purewick in place  Drains/Devices: None  Tests/Procedures for next shift:   Anticipated DC date: TBD  Other Important Info: scattered bruising, intact sutures on R. Forearm, on tele

## 2022-04-15 NOTE — CONSULTS
CLINICAL NUTRITION SERVICES - BRIEF NOTE    Consult received for Provider Order - malnourished    Please note a full nutrition assessment will be deferred at this time as patient is currently OBSERVATION status.     Per chart review, patient lives in [facility] where meals are provided to residents. PMH significant for chronic hyponatremia, urinary retention, CKD stage III, hypothyroidism, HTN and dementia. Admitted 2/2 acute cystitis.     Spoke with patient and daughter at bedside this afternoon. Daughter states patient doesn't eat very much at baseline and has been like this for quite a while though it hasn't been concerning to any of them. We discussed that decreased appetite and PO intake is a normal part of the aging process. Patient does have baseline fat and muscle wasting due to advanced age. Chronically low BMI noted.     Wt Readings from Last 10 Encounters:   04/15/22 40.8 kg (90 lb)   02/11/22 42.7 kg (94 lb 2.2 oz)   11/03/21 41.7 kg (92 lb)   12/28/20 37.6 kg (83 lb)   12/21/20 36.3 kg (80 lb)   12/15/20 40.8 kg (90 lb)   12/08/20 41.7 kg (92 lb)   12/04/20 45.4 kg (100 lb)   12/02/20 45.4 kg (100 lb)   12/01/20 45.4 kg (100 lb)     Diet: Regular  Patient was eating her lunch tray during visit this afternoon which was quite large for what she would be able to eat per daughter's report - consisted of pot roast, mashed potatoes and gravy, carrots and chicken noodle soup. Had eaten 1/2 of chicken noodle soup and a few bites of pot roast during visit, still working on taking some bites.     Interventions:  Encourage small, frequent meals with chronically low PO intake   Add Gelatein supplement once daily at 2 PM     We will not continue to follow at this time. Patient may request outpatient RD appointment upon discharge as desired though patient is at baseline nutrition status and repletion unlikely in setting of advanced age. Continue to encourage PO intake as tolerated and appetite allows. Please page any any  needs over admission.     Mansi Puentes, SOLEDAD, LD  TPN/[facility]/Short Stay RD Pager: [phone]

## 2022-04-15 NOTE — PLAN OF CARE
Occupational Therapy: Orders received. Chart reviewed and discussed with care team.? Speech Language Pathology not indicated due to pt with baseline dementia, has 24/7 assist per chart. .? Defer discharge recommendations to care team and PT.? Will complete orders.

## 2022-04-15 NOTE — PROGRESS NOTES
Observation goals  PRIOR TO DISCHARGE       Comments:   -diagnostic tests and consults completed and resulted: Not met. ECHO/PT/OT/Nutrition ordered.     -vital signs normal or at patient baseline: Met    Nurse to notify provider when observation goals have been met and patient is ready for discharge.

## 2022-04-15 NOTE — ED NOTES
[facility]  ED Nurse Handoff Report    ED Chief complaint: Fatigue and Joint Swelling      ED Diagnosis:   Final diagnoses:   Urinary tract infection without hematuria, site unspecified   General weakness   Weakness of both lower extremities       Code Status: hospitalist to address        Allergies:   Allergies   Allergen Reactions     Sulfacetamide Hives     Tramadol      Lethargic per patient's daughter Caron.        Patient Story:    Presents via EMS from memory care unit. Daughter and staff report increasing fatigue and bilateral ankle swelling.          Focused Assessment:  Alert to person. Arrived with intact sutures uncovered right forearm, right upper arm bruise, right elbow boggy edema. Trace bilateral lower extremity non-pitting edema. Sinus bradycardic; otherwise stable vital signs. Wears depends. Purewick in place. 18gauge saline lock right forearm.    Treatments and/or interventions provided: labs, urine, BLE US, [id number], rocephinIV  Patient's response to treatments and/or interventions: stable    To be done/followed up on inpatient unit:  admission orders, social work consult for higher level of care from assisted living as evidenced by daughter reporting increased falls with injuries.     Does this patient have any cognitive concerns?: Baseline dementia    Activity level - Baseline/Home:  Wheelchair  Activity Level - Current:   bedrest    Patient's Preferred language: English   Needed?: No    Isolation: None  Infection: Not Applicable  Patient tested for COVID 19 prior to admission: YES  Bariatric?: No    Vital Signs:   Vitals:    04/14/22 2000 04/14/22 2100 04/14/22 2200 04/14/22 2315   BP: (!) 142/65 (!) 146/68 (!) 151/104    Pulse: (!) 49 51 (!) 49    Resp: 20 16 17 18   Temp:       TempSrc:       SpO2: 94% 93%  93%   Weight:       Height:         Labs Ordered and Resulted from Time of ED Arrival to Time of ED Departure   BASIC METABOLIC PANEL - Abnormal        Result Value    Sodium 131 (*)     Potassium 4.0      Chloride 100      Carbon Dioxide (CO2) 26      Anion Gap 5      Urea Nitrogen 11      Creatinine 0.56      Calcium 8.6      Glucose 94      GFR Estimate 82     ROUTINE UA WITH MICROSCOPIC REFLEX TO CULTURE - Abnormal    Color Urine Light Yellow      Appearance Urine Slightly Cloudy (*)     Glucose Urine Negative      Bilirubin Urine Negative      Ketones Urine Negative      Specific Gravity Urine 1.008      Blood Urine Negative      pH Urine 7.0      Protein Albumin Urine Negative      Urobilinogen Urine Normal      Nitrite Urine Positive (*)     Leukocyte Esterase Urine Large (*)     Bacteria Urine Few (*)     RBC Urine 3 (*)     WBC Urine 59 (*)     Squamous Epithelials Urine 1     CBC WITH PLATELETS AND DIFFERENTIAL - Abnormal    WBC Count 7.4      RBC Count 3.34 (*)     Hemoglobin 11.0 (*)     Hematocrit 32.8 (*)     MCV 98      MCH 32.9      MCHC 33.5      RDW 12.8      Platelet Count 259      % Neutrophils 72      % Lymphocytes 15      % Monocytes 8      % Eosinophils 4      % Basophils 0      % Immature Granulocytes 1      NRBCs per 100 WBC 0      Absolute Neutrophils 5.3      Absolute Lymphocytes 1.1      Absolute Monocytes 0.6      Absolute Eosinophils 0.3      Absolute Basophils 0.0      Absolute Immature Granulocytes 0.0      Absolute NRBCs 0.0     NT PROBNP INPATIENT - Normal    N terminal Pro BNP Inpatient 1,181     TSH WITH FREE T4 REFLEX - Normal    TSH 3.06     COVID-19 VIRUS (CORONAVIRUS) BY PCR   URINE CULTURE       Cardiac Rhythm:Cardiac Rhythm: Sinus bradycardia    Was the PSS-3 completed:   Yes  What interventions are required if any?               Family Comments: daughter was in ER  OBS brochure/video discussed/provided to patient/family: Yes              Name of person given brochure if not patient: daughter              Relationship to patient: yes    For the majority of the shift this patient's behavior was Green.   Behavioral  interventions performed were none.    ED NURSE PHONE NUMBER: *[id number]

## 2022-04-15 NOTE — PROGRESS NOTES
RECEIVING UNIT ED HANDOFF REVIEW    ED Nurse Handoff Report was reviewed by: Marilou Noble RN on April 15, 2022 at 12:20 AM

## 2022-04-15 NOTE — PROGRESS NOTES
[facility]    Hospitalist Progress Note    Brief Summary:  Gennaro Walton is a 89 year old female with a past medical history of chronic hyponatremia, urinary retention, CKD stage III, hypothyroidism, hypertension, dementia presents from her facility for generalized weakness and fatigue and found to have UTI and Possible CHF     Assessment & Plan          Acute cystitis  Patient with generalized fatigue found to have positive UA.  -Urine culture still pending   -started on Ceftriaxone will continue with that.     Generalized weakness   Possible Acute on Chronic Diastolic CHF   Bilateral lower extremity edema  Patient with worsening lower extremity edema for the last 1 to 2 days.  Checks x-ray consistent with fluid overload as per radiology read  BNP within normal limit, started on oral Lasix 20 mg PO bid on admission for possible CHF, she is on room air, LE edema improve, LE edema can be secondary to malnutrition and been on amlodipine. Echo was ordered on admission as well, will follow   -continue P.o. Lasix 20 mg bid for today.      Asymptomatic Sinus bradycardia  Blood pressure is stable. Patient is not on any av node blockers.  -monitor on tele.     Malnutrition  Patient thin and frail with visible muscle wasting.  Patient's daughter reports that she has a poor appetite.  -Nutrition consult, continue supplement     Dementia  Oriented to person only  -Maintain sleep-wake cycle  -Melatonin as needed, remain at her baseline.      History of urinary retention  -Continue PTA oxybutynin     Chronic hyponatremia  Stable  -Monitor     CKD stage III  Stable.  -Avoid nephrotoxic medications     Hypertension  - Norvasc, Cozaar at home.  Blood pressure well control this morning, will hold amlodipine.      Hypothyroidism  -Synthroid     History of breast cancer  Status post left mastectomy  In remission           DVT Prophylaxis: Pneumatic Compression Devices  Code Status: Full Code    Disposition:  Expected discharge in 1 days once stable    Daniel Crisostomo MD  Text Page  (7am - 6pm)    Interval History   Patient seen and evaluated in the room today, she was eating her breakfast.  Awake and alert.  Unable to provide much history given history of dementia at baseline.  Patient denies any pain or shortness of breath    No other significant event overnight    -Data reviewed today: I reviewed all new labs and imaging results over the last 24 hours. I personally reviewed no images or EKG's today.    Physical Exam   Temp: 97.2  F (36.2  C) Temp src: Axillary BP: 116/58 Pulse: 52   Resp: 16 SpO2: 93 % O2 Device: None (Room air)    Vitals:    04/14/22 1756 04/15/22 0804   Weight: 38.6 kg (85 lb) 40.8 kg (90 lb)     Vital Signs with Ranges  Temp:  [97.1  F (36.2  C)-99.1  F (37.3  C)] 97.2  F (36.2  C)  Pulse:  [47-57] 52  Resp:  [16-22] 16  BP: ()/() 116/58  SpO2:  [92 %-94 %] 93 %  I/O last 3 completed shifts:  In: -   Out: 250 [Urine:250]    Constitutional: awake, alert, cooperative, no apparent distress, and appears stated age  Eyes: Lids and lashes normal, pupils equal, round and reactive to light, extra ocular muscles intact, sclera clear, conjunctiva normal  Respiratory: No increased work of breathing, diminished air entry bilaterally, occasional crackles posteriorly  Cardiovascular: Normal apical impulse, regular rate and rhythm, normal S1 and S2, no S3 or S4, and no murmur noted  GI: No scars, normal bowel sounds, soft, non-distended, non-tender, no masses palpated, no hepatosplenomegally  Musculoskeletal: 1+ lower extremity pitting edema present  Neurologic: No focal deficit    Medications       amLODIPine  5 mg Oral Daily     aspirin  81 mg Oral Daily     carboxymethylcellulose PF  2 drop Both Eyes 4x Daily     cefTRIAXone  1 g Intravenous Q24H     furosemide  20 mg Oral Daily     levothyroxine  88 mcg Oral Daily     losartan  100 mg Oral Daily     oxybutynin ER  10 mg Oral At Bedtime       Data    Recent Labs   Lab 04/15/22  0650 04/14/22  1812   WBC  --  7.4   HGB  --  11.0*   MCV  --  98   PLT  --  259   * 131*   POTASSIUM 3.7 4.0   CHLORIDE 103 100   CO2 25 26   BUN 9 11   CR 0.60 0.56   ANIONGAP 4 5   LORETA 8.4* 8.6   GLC 78 94   ALBUMIN  --  3.0*       Recent Results (from the past 24 hour(s))   US Lower Extremity Venous Duplex Bilateral    Narrative    EXAM: US LOWER EXTREMITY VENOUS DUPLEX BILATERAL  LOCATION: [facility]  DATE/TIME: 4/14/2022 7:04 PM    INDICATION: bilat LE edema  COMPARISON: None.  TECHNIQUE: Venous Duplex ultrasound of bilateral lower extremities with and without compression, augmentation and duplex. Color flow and spectral Doppler with waveform analysis performed.    FINDINGS: Exam includes the common femoral, femoral, popliteal veins as well as segmentally visualized deep calf veins and greater saphenous vein.     RIGHT: No deep vein thrombosis. No superficial thrombophlebitis. No popliteal cyst.    LEFT: No deep vein thrombosis. No superficial thrombophlebitis. No popliteal cyst.      Impression    IMPRESSION:  1.  No deep venous thrombosis in the bilateral lower extremities.   XR Chest 2 Views    Narrative    EXAM: XR CHEST 2 VW  LOCATION: [facility]  DATE/TIME: 4/14/2022 10:30 PM    INDICATION: Fatigue. Evaluate for pulmonary edema.  COMPARISON: 11/26/2020      Impression    IMPRESSION: Cardiac enlargement. Pulmonary vascular congestion compatible with volume overload/CHF, increased since prior. Mild interstitial opacities in lower lungs likely relating to edema. Associated linear atelectasis. Minimal pleural fluid   collections posteriorly. Severe thoracic kyphosis. Postoperative changes projecting over the right hilar region posteriorly. Marked degenerative changes both shoulders.

## 2022-04-15 NOTE — PROGRESS NOTES
Orientation: Alert to self only.  Observation Goals (met & not met): not met yet  Activity Level: A1/GB/Walker.Up to the chair today.  Fall Risk: yes  Behavior & Aggression Tool Color:Yellow  Pain Management: None.No s/sx verbalized/noted  ABNL VS/O2: bradycardic(Tele, d/c'd d/t pt pulling off tele box).   ABNL Lab/BG: UTI(IV Rocephin @24hr), Na132, ECHO today(EF 50-55%),urine cx pending.   Diet: Regular  Bowel/Bladder: Purewick in place w/ good urine output  Drains/Devices: R PIV SL  Tests/Procedures for next shift:None  Anticipated DC date: TBD  Other Important Info: scattered bruising, intact sutures on R. Forearm, protective mepilex to sacrum and upper back.

## 2022-04-15 NOTE — PROGRESS NOTES
Observation goals  PRIOR TO DISCHARGE       Comments:   -diagnostic tests and consults completed and resulted: Not met. ECHO done, refer to results./PT/OT/Nutrition ordered.      -vital signs normal or at patient baseline: Met     Nurse to notify provider when observation goals have been met and patient is ready for discharge.

## 2022-04-15 NOTE — PHARMACY-ADMISSION MEDICATION HISTORY
Pharmacy Medication History  Admission medication history interview status for the 4/14/2022  admission is complete. See EPIC admission navigator for prior to admission medications     Location of Interview: Outside patient room but on unit  Medication history sources: MAR (Med list from [facility])    Significant changes made to the medication list:  None    Medication reconciliation completed by provider prior to medication history? No    Time spent in this activity: 15 min     Prior to Admission medications    Medication Sig Last Dose Taking? Auth Provider   acetaminophen (TYLENOL) 500 MG tablet Take 1,000 mg by mouth daily as needed for mild pain  Yes Unknown, Entered By History   acetaminophen (TYLENOL) 500 MG tablet Take 2 tablets (1,000 mg) by mouth 3 times daily  Yes Savage Menjivar APRN CNP   amLODIPine (NORVASC) 5 MG tablet Take 5 mg by mouth daily Hold for SBP <120  Yes Unknown, Entered By History   aspirin (ASA) 81 MG EC tablet Take 1 tablet (81 mg) by mouth 2 times daily  Yes Tonja Hall PA-C   calcium carbonate (TUMS) 500 MG chewable tablet Take 1 chew tab by mouth daily   Yes Reported, Patient   carboxymethylcellulose PF (REFRESH PLUS) 0.5 % ophthalmic solution Place 2 drops into both eyes 4 times daily  Yes Unknown, Entered By History   levothyroxine (SYNTHROID/LEVOTHROID) 88 MCG tablet Take 88 mcg by mouth daily  Yes Unknown, Entered By History   Lidocaine (LIDOCARE) 4 % Patch Place 1 patch onto the skin every morning 1 patch on each shoulder  To prevent lidocaine toxicity, patient should be patch free for 12 hrs daily.  Yes Unknown, Entered By History   losartan (COZAAR) 50 MG tablet Take 100 mg by mouth daily   Yes Unknown, Entered By History   Menthol, Topical Analgesic, (BIOFREEZE) 4 % GEL Externally apply topically every 4 hours as needed (R hip and other painful areas)  Yes Unknown, Entered By History   oxybutynin ER (DITROPAN-XL) 10 MG 24 hr tablet Take 1 tablet (10 mg)  by mouth At Bedtime  Yes Fei Marshall MD   oxyCODONE (ROXICODONE) 5 MG tablet Take 0.5 tablets (2.5 mg) by mouth every 4 hours as needed for moderate to severe pain  Yes Tonja Hall PA-C   polyethylene glycol (MIRALAX) 17 g packet Take 17 g by mouth daily  Yes Reported, Patient   senna-docusate (SENOKOT-S/PERICOLACE) 8.6-50 MG tablet Take 2 tablets by mouth daily as needed for constipation   Yes Reported, Patient   sodium fluoride dental rinse (PREVIDENT) 0.2 % SOLN solution Apply 60 mLs to affected area At Bedtime Hold until follow up with PCP  Yes Kamila Zuleta,    vitamin D3 (CHOLECALCIFEROL) 2000 units tablet Take 1 tablet by mouth daily  Yes Unknown, Entered By History       The information provided in this note is only as accurate as the sources available at the time of update(s)

## 2022-04-15 NOTE — H&P
[facility]    History and Physical  Hospitalist     Date of Admission:  4/14/2022    Assessment & Plan   Gennaro Walton is a 89 year old female with a past medical history of chronic hyponatremia, urinary retention, CKD stage III, hypothyroidism, hypertension, dementia presents from her facility for acute cystitis    Acute cystitis  Patient with generalized fatigue found to have positive UA.  -Follow-up urine culture  -Continue with ceftriaxone  -PT, OT    Bilateral lower extremity edema  Patient with worsening lower extremity edema for the last 1 to 2 days.  Checks x-ray consistent with fluid overload.  BNP mildly elevated.  Patient with likely mild CHF.  Given the patient is quite elderly and frail we will start with low-dose Lasix.  -Echo  -P.o. Lasix 20 mg/day    Sinus bradycardia  Blood pressure is stable. Patient is not on any av node blockers.  -monitor on tele.    Malnutrition  Patient thin and frail with visible muscle wasting.  Patient's daughter reports that she has a poor appetite.  -Follow-up albumin  -Nutrition consult    Dementia  Oriented to person only  -Maintain sleep-wake cycle  -Melatonin as needed    History of urinary retention  -Continue PTA oxybutynin    Chronic hyponatremia  Stable  -Monitor    CKD stage III  Stable.  -Avoid nephrotoxic medications    Hypertension  - Norvasc, Cozaar    Hypothyroidism  -Synthroid    History of breast cancer  Status post left mastectomy  In remission    Code Status   DNR / DNI  DVT ppx: scds  Disposition Plan   Expected discharge in less than 2 days to prior living arrangement once uti is treated.     Entered: Isaias Varela MD 04/14/2022, 10:33 PM       Isaias Varela MD, MD    Primary Care Physician   Mady Hagen    Chief Complaint   Generalized fatigue    History obtained from the daughter and the medical chart    History of Present Illness   Gennaro Walton is a 89 year old female with a past medical history  of dementia presents from her assisted living facility for generalized fatigue.  History is somewhat limited due to the patient's dementia.  Her daughter went to visit her on April 14 and on she noticed the patient appeared more fatigued than normal and that she was having worsening lower extremity swelling.  She talked to one of the nurses who indicated that this had been going on for at least 2 days.  The facility provider recommended the patient come to the hospital.  In the ER the patient was found to have a positive UA as well as chest x-ray consistent with fluid overload.      Past Medical History    I have reviewed this patient's medical history and updated it with pertinent information if needed.   Past Medical History:   Diagnosis Date     Breast cancer (H)      CKD (chronic kidney disease) stage 3, GFR 30-59 ml/min (H)      Cognitive impairment      Compression fracture of T12 vertebra (H) 2020     Hypertension      Hypothyroid      Osteoporosis        Past Surgical History   I have reviewed this patient's surgical history and updated it with pertinent information if needed.  Past Surgical History:   Procedure Laterality Date     ------------OTHER-------------      Left mastectomy     ------------OTHER-------------      Cataract surgery     OPEN REDUCTION INTERNAL FIXATION FEMUR DISTAL Right 2/8/2022    Procedure: OPEN REDUCTION INTERNAL FIXATION RIGHT DISTAL FEMUR FRACTURE.;  Surgeon: Med Yousif MD;  Location:  OR     OPEN REDUCTION INTERNAL FIXATION FEMUR MIDSHAFT Left 7/26/2019    Procedure: LEFT INTERTROCHANTERIC FEMUR FRACTURE OPEN REDUCTION AND INTERNAL FIXATION;  Surgeon: Chris Oseguera MD;  Location:  OR     OPEN REDUCTION INTERNAL FIXATION HIP NAILING Right 2/1/2020    Procedure: OPEN REDUCTION INTERNAL FIXATION RIGHT INTERTROCHANTERIC FEMUR FRACTURE;  Surgeon: Catalino Casper MD;  Location:  OR       Prior to Admission Medications   Prior to Admission Medications    Prescriptions Last Dose Informant Patient Reported? Taking?   Lidocaine (LIDOCARE) 4 % Patch  Nursing Home Yes No   Sig: Place 1 patch onto the skin every morning 1 patch on each shoulder  To prevent lidocaine toxicity, patient should be patch free for 12 hrs daily.   Menthol, Topical Analgesic, (BIOFREEZE) 4 % GEL  Nursing Home Yes No   Sig: Externally apply topically every 4 hours as needed (R hip and other painful areas)   acetaminophen (TYLENOL) 500 MG tablet  Nursing Home No No   Sig: Take 2 tablets (1,000 mg) by mouth 3 times daily   acetaminophen (TYLENOL) 500 MG tablet  Nursing Home Yes No   Sig: Take 1,000 mg by mouth daily as needed for mild pain   amLODIPine (NORVASC) 5 MG tablet  Nursing Home Yes No   Sig: Take 5 mg by mouth daily Hold for SBP <120   aspirin (ASA) 81 MG EC tablet   No No   Sig: Take 1 tablet (81 mg) by mouth 2 times daily   calcium carbonate (TUMS) 500 MG chewable tablet  Nursing Home Yes No   Sig: Take 1 chew tab by mouth daily    carboxymethylcellulose PF (REFRESH PLUS) 0.5 % ophthalmic solution  Nursing Home Yes No   Sig: Place 2 drops into both eyes 4 times daily   chlorhexidine (CHLORHEXIDINE) 0.12 % solution  Nursing Home Yes No   Sig: Swish and spit 15 mLs in mouth 2 times daily   levothyroxine (SYNTHROID/LEVOTHROID) 88 MCG tablet  Nursing Home Yes No   Sig: Take 88 mcg by mouth daily   losartan (COZAAR) 50 MG tablet  Nursing Home Yes No   Sig: Take 100 mg by mouth daily    oxyCODONE (ROXICODONE) 5 MG tablet   No No   Sig: Take 0.5 tablets (2.5 mg) by mouth every 4 hours as needed for moderate to severe pain   oxybutynin ER (DITROPAN-XL) 10 MG 24 hr tablet  Nursing Home No No   Sig: Take 1 tablet (10 mg) by mouth At Bedtime   polyethylene glycol (MIRALAX) 17 g packet  retirement Yes No   Sig: Take 17 g by mouth daily    senna-docusate (SENOKOT-S/PERICOLACE) 8.6-50 MG tablet  Nursing Home Yes No   Sig: Take 2 tablets by mouth daily as needed for constipation    sodium fluoride  dental rinse (PREVIDENT) 0.2 % SOLN solution   No No   Sig: Apply 60 mLs to affected area At Bedtime Hold until follow up with PCP   vitamin D3 (CHOLECALCIFEROL) 2000 units tablet  Nursing Home Yes No   Sig: Take 1 tablet by mouth daily      Facility-Administered Medications: None     Allergies   Allergies   Allergen Reactions     Sulfacetamide Hives     Tramadol      Lethargic per patient's daughter Caron.        Social History   I have reviewed this patient's social history and updated it with pertinent information if needed. Gennaro Walton  reports that she has never smoked. She has never used smokeless tobacco. She reports that she does not drink alcohol and does not use drugs.    Family History   I have reviewed this patient's family history and updated it with pertinent information if needed.   Family History   Problem Relation Age of Onset     Coronary Artery Disease Father        Review of Systems   Unable to assess due to dementia    Physical Exam   Temp: 99.1  F (37.3  C) Temp src: Oral BP: (!) 151/104 Pulse: (!) 49   Resp: 17 SpO2: 93 % O2 Device: None (Room air)    Vital Signs with Ranges  Temp:  [99.1  F (37.3  C)] 99.1  F (37.3  C)  Pulse:  [49-57] 49  Resp:  [16-22] 17  BP: (142-156)/() 151/104  SpO2:  [92 %-94 %] 93 %  85 lbs 0 oz  Physical Exam  Vitals reviewed.   Constitutional:       Comments: Thin elderly frail appearing lady   HENT:      Head: Normocephalic and atraumatic.      Mouth/Throat:      Mouth: Mucous membranes are moist.      Pharynx: Oropharynx is clear.   Eyes:      Extraocular Movements: Extraocular movements intact.      Conjunctiva/sclera: Conjunctivae normal.      Pupils: Pupils are equal, round, and reactive to light.   Cardiovascular:      Rate and Rhythm: Normal rate and regular rhythm.      Pulses: Normal pulses.   Pulmonary:      Effort: Pulmonary effort is normal.      Breath sounds: Normal breath sounds. No wheezing, rhonchi or rales.   Abdominal:      General:  Abdomen is flat. Bowel sounds are normal.      Palpations: Abdomen is soft.      Tenderness: There is no abdominal tenderness. There is no guarding.      Hernia: No hernia is present.   Musculoskeletal:         General: Swelling present. Normal range of motion.      Cervical back: Normal range of motion and neck supple.      Comments: 1+ pitting edema in bilateral lower extremities.   Skin:     General: Skin is warm and dry.   Neurological:      General: No focal deficit present.      Mental Status: She is alert. Mental status is at baseline. She is disoriented.      Cranial Nerves: No cranial nerve deficit.

## 2022-04-16 VITALS
DIASTOLIC BLOOD PRESSURE: 58 MMHG | SYSTOLIC BLOOD PRESSURE: 118 MMHG | OXYGEN SATURATION: 93 % | HEIGHT: 66 IN | TEMPERATURE: 97.5 F | RESPIRATION RATE: 16 BRPM | HEART RATE: 62 BPM | WEIGHT: 85.6 LBS | BODY MASS INDEX: 13.76 KG/M2

## 2022-04-16 LAB
ANION GAP SERPL CALCULATED.3IONS-SCNC: 7 MMOL/L (ref 3–14)
BACTERIA UR CULT: NORMAL
BUN SERPL-MCNC: 7 MG/DL (ref 7–30)
CALCIUM SERPL-MCNC: 8.2 MG/DL (ref 8.5–10.1)
CHLORIDE BLD-SCNC: 103 MMOL/L (ref 94–109)
CO2 SERPL-SCNC: 25 MMOL/L (ref 20–32)
CREAT SERPL-MCNC: 0.5 MG/DL (ref 0.52–1.04)
GFR SERPL CREATININE-BSD FRML MDRD: 84 ML/MIN/1.73M2
GLUCOSE BLD-MCNC: 73 MG/DL (ref 70–99)
HOLD SPECIMEN: NORMAL
POTASSIUM BLD-SCNC: 3.7 MMOL/L (ref 3.4–5.3)
SODIUM SERPL-SCNC: 135 MMOL/L (ref 133–144)

## 2022-04-16 PROCEDURE — G0378 HOSPITAL OBSERVATION PER HR: HCPCS

## 2022-04-16 PROCEDURE — 80048 BASIC METABOLIC PNL TOTAL CA: CPT | Performed by: INTERNAL MEDICINE

## 2022-04-16 PROCEDURE — 36415 COLL VENOUS BLD VENIPUNCTURE: CPT | Performed by: INTERNAL MEDICINE

## 2022-04-16 PROCEDURE — 99217 PR OBSERVATION CARE DISCHARGE: CPT | Performed by: INTERNAL MEDICINE

## 2022-04-16 PROCEDURE — 250N000013 HC RX MED GY IP 250 OP 250 PS 637: Performed by: HOSPITALIST

## 2022-04-16 RX ORDER — CEFPODOXIME PROXETIL 100 MG/1
100 TABLET, FILM COATED ORAL 2 TIMES DAILY
Qty: 6 TABLET | Refills: 0 | Status: SHIPPED | OUTPATIENT
Start: 2022-04-16 | End: 2022-04-19

## 2022-04-16 RX ADMIN — ACETAMINOPHEN 650 MG: 325 TABLET ORAL at 09:04

## 2022-04-16 RX ADMIN — FUROSEMIDE 20 MG: 20 TABLET ORAL at 08:20

## 2022-04-16 RX ADMIN — LOSARTAN POTASSIUM 100 MG: 100 TABLET, FILM COATED ORAL at 08:20

## 2022-04-16 RX ADMIN — LEVOTHYROXINE SODIUM 88 MCG: 88 TABLET ORAL at 08:20

## 2022-04-16 RX ADMIN — CARBOXYMETHYLCELLULOSE SODIUM 2 DROP: 5 SOLUTION/ DROPS OPHTHALMIC at 16:19

## 2022-04-16 RX ADMIN — CARBOXYMETHYLCELLULOSE SODIUM 2 DROP: 5 SOLUTION/ DROPS OPHTHALMIC at 11:31

## 2022-04-16 RX ADMIN — ASPIRIN 81 MG: 81 TABLET, COATED ORAL at 08:20

## 2022-04-16 RX ADMIN — CARBOXYMETHYLCELLULOSE SODIUM 2 DROP: 5 SOLUTION/ DROPS OPHTHALMIC at 08:20

## 2022-04-16 NOTE — PROGRESS NOTES
Observation goals  PRIOR TO DISCHARGE       Comments:   -diagnostic tests and consults completed and resulted: Met     -vital signs normal or at patient baseline:Met     Nurse to notify provider when observation goals have been met and patient is ready for discharge.

## 2022-04-16 NOTE — PLAN OF CARE
Pt is discharging back to [facility] at this time w/ all pt's belonging. AVS and education given. Meds and Paper work given to [facility] . [facility] transport via stretcher.     Plan of Care Reviewed With: patient, daughter

## 2022-04-16 NOTE — PROGRESS NOTES
Orientation: Alert to self.    Observation Goals (met & not met): not met yet    Activity Level: A1/GB/Walker    Fall Risk: yes    Behavior & Aggression Tool Color: yellow     Pain Management: denies, no nonverbal indicators of pain     ABNL VS/O2: vss on RA, ex bradycardic    ABNL Lab/BG: UT on IV Rocephin @24hr, Na132    Diet: Regular    Bowel/Bladder: incontinent of bladder, purewick in place    Drains/Devices: R PIV SL    Tests/Procedures for next shift: None    Anticipated DC date: TBD    Other Important Info: scattered bruising, intact sutures on R. Forearm, protective mepilex to sacrum and upper back.

## 2022-04-16 NOTE — CONSULTS
Care Management Discharge Note    Discharge Date: 04/16/2022       Discharge Disposition:  Home-Return to her Assisted Living Facility-[facility]. ()  (clarified with daughter, she is NOT in Memory Care).    Discharge Services:  Home Care-Referral sent to [facility] (PT)    Discharge DME:  [facility] (patient has one)    Discharge Transportation: [facility] [employer] set up for 1700.    Private pay costs discussed: Transportation Costs (discussed with daughter)    PAS Confirmation Code:  N/A  Patient/family educated on Medicare website which has current facility and service quality ratings:  yes    Education Provided on the Discharge Plan:  yes  Persons Notified of Discharge Plans: Patient, daughter Caron, bedside nurse Devante Kumar, [facility], [facility].  Patient/Family in Agreement with the Plan:  yes    Handoff Referral Completed: Yes, Outpatient Care Coordination Referral    Additional Information:  Writer was informed that patient is medically cleared to discharge today. She will return back to her Assisted Living Facility ([facility]). Spoke with Lubna and she confirmed patient can return this evening. Orders faxed to ([phone]). [employer]  Is set up for 1700 today. Writer spoke with Caron, patient's daughter. Informed her of the discharge plan. She was in agreement. Home Care PT was also ordered. Referral sent via e-mail to [facility]. No further discharge needs.      Slime Madrigal, RN  Care Coordinator  [phone]

## 2022-04-16 NOTE — PROGRESS NOTES
Observation goals  PRIOR TO DISCHARGE       Comments:   -diagnostic tests and consults completed and resulted: Partially met      -vital signs normal or at patient baseline: Partially met, slightly elevated BP.      Nurse to notify provider when observation goals have been met and patient is ready for discharge.

## 2022-04-16 NOTE — DISCHARGE SUMMARY
[facility]    Discharge Summary  Hospitalist    Date of Admission:  4/14/2022  Date of Discharge:  4/16/2022  Discharging Provider: Daniel Crisostomo MD  Date of Service (when I saw the patient): 04/16/22    Discharge Diagnoses   Please refer below     History of Present Illness   Gennaro Walton is an 89 year old female who presented with weakness and fatigued.    Hospital Course   Gennaro Walton is a 89 year old female with a past medical history of chronic hyponatremia, urinary retention, CKD stage III, hypothyroidism, hypertension, dementia presents from her facility for generalized weakness and fatigue and found to have UTI and Possible CHF         Final Discharge Diagnosis and Hospital Course.         Acute cystitis  Patient with generalized fatigue found to have positive UA.  -Urine culture grow >100,000 colonies of multiple organism, likely contamination as well.   -She responded very well to IV Ceftriaxone and switch to oral Vantin to complete the course      Generalized weakness   Possible Acute on Chronic Diastolic CHF   Bilateral lower extremity edema  Patient with worsening lower extremity edema for the last 1 to 2 days.  Checks x-ray consistent with fluid overload as per radiology read, but BNP within normal limit, started on oral Lasix 20 mg PO bid on admission for possible CHF, she is on room air, LE edema now resolved, , LE edema can be secondary to malnutrition and been on amlodipine. Echo was ordered on admission shows EF of 50-55%m mild lateral wall hypokinesis, RV normal, no significant valvular abnormality,  IVC diameter >2.1 cm, collapsing <50% with sniff suggest RA pressure estimate at 15 mmHg. She was continue with lasix 20 mg PO while in hospital and responded well, she has no LE edema on discharge and lung clear.   Resume Losartan and Amlodipine on discharge, lasix discontinued.        Asymptomatic Sinus bradycardia  Blood pressure is stable. Patient is not on any av  node blockers.  -monitor on tele.     Malnutrition  Patient thin and frail with visible muscle wasting.  Patient's daughter reports that she has a poor appetite.  -Nutrition consult, continue supplement     Dementia  Oriented to person only  -Maintain sleep-wake cycle  -Melatonin as needed, remain at her baseline.      History of urinary retention  -Continue PTA oxybutynin     Chronic hyponatremia  Stable  -Monitor     CKD stage III  Stable.  -Avoid nephrotoxic medications     Hypertension  - Norvasc, Cozaar at home.       Hypothyroidism  -Synthroid     History of breast cancer  Status post left mastectomy  In remission      Discharge back to [facility] with home health PT in stable condition     Discussed with the patient daughter in detail and updated her on patient condition and answer all her questions.      Daniel Crisostomo MD, MD    Significant Results and Procedures       Pending Results   These results will be followed up by PCP  Unresulted Labs Ordered in the Past 30 Days of this Admission     No orders found from 3/15/2022 to 4/15/2022.          Code Status   Full Code but was DNR/DNI on previous admission.        Primary Care Physician   Mady Hagen    Physical Exam   Temp: 97.5  F (36.4  C) Temp src: Axillary BP: 118/58 Pulse: 62   Resp: 16 SpO2: 93 % O2 Device: None (Room air)    Vitals:    04/14/22 1756 04/15/22 0804 04/16/22 0624   Weight: 38.6 kg (85 lb) 40.8 kg (90 lb) 38.8 kg (85 lb 9.6 oz)     Vital Signs with Ranges  Temp:  [97.2  F (36.2  C)-98  F (36.7  C)] 97.5  F (36.4  C)  Pulse:  [50-62] 62  Resp:  [16] 16  BP: (114-146)/(56-72) 118/58  SpO2:  [93 %-95 %] 93 %  I/O last 3 completed shifts:  In: 600 [P.O.:600]  Out: 2350 [Urine:2350]    Constitutional: awake, alert, cooperative, no apparent distress, and appears stated age  Eyes: Lids and lashes normal, pupils equal, round and reactive to light, extra ocular muscles intact, sclera clear, conjunctiva normal  Respiratory: No increased work of  breathing, good air exchange, clear to auscultation bilaterally, no crackles or wheezing  Cardiovascular: Normal apical impulse, regular rate and rhythm, normal S1 and S2, no S3 or S4, and no murmur noted  GI: No scars, normal bowel sounds, soft, non-distended, non-tender, no masses palpated, no hepatosplenomegally  Musculoskeletal: no lower extremity pitting edema present  Neurologic: no focal deficit.     Discharge Disposition   Discharged to assisted living  Condition at discharge: Stable    Consultations This Hospital Stay   PHYSICAL THERAPY ADULT IP CONSULT  OCCUPATIONAL THERAPY ADULT IP CONSULT  NUTRITION SERVICES ADULT IP CONSULT  CARE MANAGEMENT / SOCIAL WORK IP CONSULT  CARE MANAGEMENT / SOCIAL WORK IP CONSULT    Time Spent on this Encounter   I, Daniel Crisostomo MD, personally saw the patient today and spent greater than 30 minutes discharging this patient.    Discharge Orders      Reason for your hospital stay    UTI     Follow-up and recommended labs and tests     Follow up with primary care provider, Mady Hagen, within 7 days for hospital follow- up.  No follow up labs or test are needed.     Activity    Your activity upon discharge: activity as tolerated     Diet    Follow this diet upon discharge: Orders Placed This Encounter      Snacks/Supplements Adult: Gelatein Plus; Between Meals      Regular Diet Adult     Discharge Medications   Current Discharge Medication List      START taking these medications    Details   cefpodoxime (VANTIN) 100 MG tablet Take 1 tablet (100 mg) by mouth 2 times daily for 3 days  Qty: 6 tablet, Refills: 0    Associated Diagnoses: Urinary tract infection without hematuria, site unspecified         CONTINUE these medications which have NOT CHANGED    Details   !! acetaminophen (TYLENOL) 500 MG tablet Take 1,000 mg by mouth daily as needed for mild pain      !! acetaminophen (TYLENOL) 500 MG tablet Take 2 tablets (1,000 mg) by mouth 3 times daily    Associated Diagnoses:  Acute post-operative pain      amLODIPine (NORVASC) 5 MG tablet Take 5 mg by mouth daily Hold for SBP <120      aspirin (ASA) 81 MG EC tablet Take 1 tablet (81 mg) by mouth 2 times daily  Qty: 60 tablet, Refills: 0    Associated Diagnoses: Other closed fracture of right femur, unspecified portion of femur, initial encounter (H)      calcium carbonate (TUMS) 500 MG chewable tablet Take 1 chew tab by mouth daily       carboxymethylcellulose PF (REFRESH PLUS) 0.5 % ophthalmic solution Place 2 drops into both eyes 4 times daily      levothyroxine (SYNTHROID/LEVOTHROID) 88 MCG tablet Take 88 mcg by mouth daily      Lidocaine (LIDOCARE) 4 % Patch Place 1 patch onto the skin every morning 1 patch on each shoulder  To prevent lidocaine toxicity, patient should be patch free for 12 hrs daily.      losartan (COZAAR) 50 MG tablet Take 100 mg by mouth daily       Menthol, Topical Analgesic, (BIOFREEZE) 4 % GEL Externally apply topically every 4 hours as needed (R hip and other painful areas)      oxybutynin ER (DITROPAN-XL) 10 MG 24 hr tablet Take 1 tablet (10 mg) by mouth At Bedtime    Comments: If urinating without difficulty then start August 2nd.  Associated Diagnoses: Urinary frequency      polyethylene glycol (MIRALAX) 17 g packet Take 17 g by mouth daily      senna-docusate (SENOKOT-S/PERICOLACE) 8.6-50 MG tablet Take 2 tablets by mouth daily as needed for constipation       vitamin D3 (CHOLECALCIFEROL) 2000 units tablet Take 1 tablet by mouth daily       !! - Potential duplicate medications found. Please discuss with provider.      STOP taking these medications       oxyCODONE (ROXICODONE) 5 MG tablet Comments:   Reason for Stopping:         sodium fluoride dental rinse (PREVIDENT) 0.2 % SOLN solution Comments:   Reason for Stopping:             Allergies   Allergies   Allergen Reactions     Sulfacetamide Hives     Tramadol      Lethargic per patient's daughter Caron.      Data   Most Recent 3 CBC's:Recent Labs   Lab  Test 04/14/22  1812 03/21/22  0631 02/10/22  0804 02/09/22  0750 02/08/22  0539 02/07/22 2012   WBC 7.4  --   --   --  6.4 8.5   HGB 11.0* 10.2* 8.4*   < > 10.3* 10.8*   MCV 98  --   --   --  102* 105*     --   --   --  199 222    < > = values in this interval not displayed.      Most Recent 3 BMP's:  Recent Labs   Lab Test 04/16/22  0638 04/15/22  0650 04/14/22 1812    132* 131*   POTASSIUM 3.7 3.7 4.0   CHLORIDE 103 103 100   CO2 25 25 26   BUN 7 9 11   CR 0.50* 0.60 0.56   ANIONGAP 7 4 5   LORETA 8.2* 8.4* 8.6   GLC 73 78 94     Most Recent 2 LFT's:  Recent Labs   Lab Test 02/07/22 2012 11/13/20  1215   AST 13 16   ALT 15 14   ALKPHOS 114 78   BILITOTAL 0.3 0.5     Most Recent INR's and Anticoagulation Dosing History:  Anticoagulation Dose History     Recent Dosing and Labs Latest Ref Rng & Units 7/25/2019 1/7/2020    INR 0.86 - 1.14 1.02 1.03        Most Recent 3 Troponin's:  Recent Labs   Lab Test 11/13/20  1215 01/07/20  2030 07/27/19  0322   TROPI <0.015 <0.015 0.037     Most Recent Cholesterol Panel:No lab results found.  Most Recent 6 Bacteria Isolates From Any Culture (See EPIC Reports for Culture Details):  Recent Labs   Lab Test 02/01/20  2114 08/07/19  1500   CULT <10,000 colonies/mL  mixed urogenital jamari  Susceptibility testing not routinely done   50,000 to 100,000 colonies/mL  Escherichia coli  *     Most Recent TSH, T4 and A1c Labs:  Recent Labs   Lab Test 04/14/22  1812   TSH 3.06     Results for orders placed or performed during the hospital encounter of 04/14/22   US Lower Extremity Venous Duplex Bilateral    Narrative    EXAM: US LOWER EXTREMITY VENOUS DUPLEX BILATERAL  LOCATION: [facility]  DATE/TIME: 4/14/2022 7:04 PM    INDICATION: bilat LE edema  COMPARISON: None.  TECHNIQUE: Venous Duplex ultrasound of bilateral lower extremities with and without compression, augmentation and duplex. Color flow and spectral Doppler with waveform analysis  performed.    FINDINGS: Exam includes the common femoral, femoral, popliteal veins as well as segmentally visualized deep calf veins and greater saphenous vein.     RIGHT: No deep vein thrombosis. No superficial thrombophlebitis. No popliteal cyst.    LEFT: No deep vein thrombosis. No superficial thrombophlebitis. No popliteal cyst.      Impression    IMPRESSION:  1.  No deep venous thrombosis in the bilateral lower extremities.   XR Chest 2 Views    Narrative    EXAM: XR CHEST 2 VW  LOCATION: [facility]  DATE/TIME: 2022 10:30 PM    INDICATION: Fatigue. Evaluate for pulmonary edema.  COMPARISON: 2020      Impression    IMPRESSION: Cardiac enlargement. Pulmonary vascular congestion compatible with volume overload/CHF, increased since prior. Mild interstitial opacities in lower lungs likely relating to edema. Associated linear atelectasis. Minimal pleural fluid   collections posteriorly. Severe thoracic kyphosis. Postoperative changes projecting over the right hilar region posteriorly. Marked degenerative changes both shoulders.   Echocardiogram Complete     Value    LVEF  50-55%    Narrative    [id number]  [id number]  [id number]^QUYEN^SHAYNE^ROXANA     [facility]  Echocardiography Laboratory  [address] [place] [address]     Name: MEME NAVARRO  MRN: [id number]  : 1923  Study Date: 04/15/2022 12:11 PM  Age: 89 yrs  Gender: Female  Patient Location: [facility]  Reason For Study: CHF  Ordering Physician: SHAYNE NAPIER  Referring Physician: Audi Earl  Performed By: Phoenix Castelan     BSA: 1.4 m2  Height: 66 in  Weight: 90 lb  HR: 65  BP: 118/59 mmHg  ______________________________________________________________________________  Procedure  Complete Portable Echo Adult. Optison (NDC #[id number]) given intravenously.  ______________________________________________________________________________  Interpretation Summary     Left  ventricular systolic function is low normal.  The visual ejection fraction is 50-55%.  Mid and baal lateral wall hypokinesis.  The study was technically difficult. There is no comparison study available.  ______________________________________________________________________________  Left Ventricle  The left ventricle is normal in size. Left ventricular systolic function is  low normal. The visual ejection fraction is 50-55%. Left ventricular diastolic  function is indeterminate. Mid and baal lateral wall hypokinesis.     Right Ventricle  The right ventricle is normal size. The right ventricular systolic function is  normal.     Atria  The left atrium is severely dilated. Right atrium not well visualized.     Mitral Valve  The mitral valve is not well visualized. The mitral valve leaflets are mildly  thickened. There is mild mitral annular calcification. Evaluation of  regurgitation is inadequate.     Tricuspid Valve  The tricuspid valve is not well visualized. No tricuspid regurgitation. Right  ventricular systolic pressure could not be approximated due to inadequate  tricuspid regurgitation. IVC diameter >2.1 cm collapsing <50% with sniff  suggests a high RA pressure estimated at 15 mmHg or greater.     Aortic Valve  The aortic valve is not well visualized. There is mild to moderate (1-2+)  aortic regurgitation. Mild valvular aortic stenosis. The mean AoV pressure  gradient is 5.8 mmHg.     Pulmonic Valve  The pulmonic valve is not well visualized.     Vessels  Borderline aortic root dilatation.     Pericardium  There is no pericardial effusion.     Rhythm  The rhythm was sinus bradycardia.  ______________________________________________________________________________  MMode/2D Measurements & Calculations  IVSd: 1.2 cm     LVIDd: 4.7 cm  LVIDs: 3.3 cm  LVPWd: 0.97 cm  FS: 31.2 %  LV mass(C)d: 181.4 grams  LV mass(C)dI: 127.3 grams/m2  Ao root diam: 3.7 cm  LA dimension: 4.5 cm  asc Aorta Diam: 3.6 cm  LA/Ao:  1.2  LVOT diam: 2.0 cm  LVOT area: 3.2 cm2  LA Volume (BP): 102.0 ml  LA Volume Index (BP): 71.8 ml/m2  RWT: 0.41     Doppler Measurements & Calculations  MV E max luis a: 120.0 cm/sec  MV max P.8 mmHg  MV mean P.8 mmHg  MV V2 VTI: 16.9 cm  MVA(VTI): 3.2 cm2  MV dec slope: 770.2 cm/sec2  Ao V2 max: 164.8 cm/sec  Ao max P.0 mmHg  Ao V2 mean: 111.8 cm/sec  Ao mean P.8 mmHg  Ao V2 VTI: 33.5 cm  STAR(I,D): 1.6 cm2  STAR(V,D): 1.6 cm2  AI P1/2t: 540.6 msec  LV V1 max P.7 mmHg  LV V1 max: 82.5 cm/sec  LV V1 VTI: 17.0 cm  SV(LVOT): 54.6 ml  SI(LVOT): 38.3 ml/m2  AV Luis A Ratio (DI): 0.50  STAR Index (cm2/m2): 1.1     ______________________________________________________________________________  Report approved by: Kamar Foley 04/15/2022 02:33 PM             Most Recent 3 CBC's:Recent Labs   Lab Test 22  1812 22  0631 02/10/22  0804 22  0750 22  0539 22   WBC 7.4  --   --   --  6.4 8.5   HGB 11.0* 10.2* 8.4*   < > 10.3* 10.8*   MCV 98  --   --   --  102* 105*     --   --   --  199 222    < > = values in this interval not displayed.     Most Recent 3 BMP's:Recent Labs   Lab Test 22  0638 04/15/22  0650 22  1812    132* 131*   POTASSIUM 3.7 3.7 4.0   CHLORIDE 103 103 100   CO2 25 25 26   BUN 7 9 11   CR 0.50* 0.60 0.56   ANIONGAP 7 4 5   LORETA 8.2* 8.4* 8.6   GLC 73 78 94     Most Recent 2 LFT's:Recent Labs   Lab Test 22  1215   AST 13 16   ALT 15 14   ALKPHOS 114 78   BILITOTAL 0.3 0.5

## 2022-04-16 NOTE — PROGRESS NOTES
Observation goals  PRIOR TO DISCHARGE       Comments:   -diagnostic tests and consults completed and resulted: Not met.    -vital signs normal or at patient baseline: Met     Nurse to notify provider when observation goals have been met and patient is ready for discharge.

## 2022-04-17 NOTE — PROGRESS NOTES
SW:  D:  Patient discharged yesterday and a referral was made to [facility] for physical therapy.  They were unable to staff the referral.  Patient lives at [facility] so referral was made to [facility].  Spoke with Catherine funez [facility] who states that patient is open to home PT currently.  Catherine is asking for the discharge orders to be faxed to [phone].      KENNETH White, [facility]    [phone]  [facility]

## 2022-04-18 ENCOUNTER — PATIENT OUTREACH (OUTPATIENT)
Dept: CARE COORDINATION | Facility: CLINIC | Age: 87
End: 2022-04-18
Payer: MEDICARE

## 2022-04-18 DIAGNOSIS — Z71.89 OTHER SPECIFIED COUNSELING: ICD-10-CM

## 2022-04-18 NOTE — PROGRESS NOTES
Clinic Care Coordination Contact    Background: Care Coordination referral placed from [facility] discharge report for reason of patient meeting criteria for a TCM outreach call by [facility] team.    Assessment: Upon chart review, [facility] Team member will cancel/close the referral for TCM outreach due to reason below:    Patient is not established within [facility]. Upon chart review, CCRC Team member noted patient discharged to TCU    Plan: Care Coordination referral for TCM outreach canceled.    Veronica Vines  [facility], [facility]

## 2022-04-19 LAB
ATRIAL RATE - MUSE: 45 BPM
DIASTOLIC BLOOD PRESSURE - MUSE: NORMAL MMHG
INTERPRETATION ECG - MUSE: NORMAL
P AXIS - MUSE: 41 DEGREES
PR INTERVAL - MUSE: 196 MS
QRS DURATION - MUSE: 136 MS
QT - MUSE: 442 MS
QTC - MUSE: 382 MS
R AXIS - MUSE: -38 DEGREES
SYSTOLIC BLOOD PRESSURE - MUSE: NORMAL MMHG
T AXIS - MUSE: 109 DEGREES
VENTRICULAR RATE- MUSE: 45 BPM

## 2022-05-21 ENCOUNTER — HEALTH MAINTENANCE LETTER (OUTPATIENT)
Age: 87
End: 2022-05-21

## 2022-06-02 NOTE — PLAN OF CARE
Pt unable to tolerate WC transport arranged by PRIT[facility]. Able to reschedule for stretcher transport at 2000, writer confirmed w/ Sharon Long okay to still accept patient at this time. Caron Hardin, aware.    Soft and Bite-Sized Diet/Consistent Carbohydrate Diabetic Diets/Other Diet Instructions

## 2022-08-29 ENCOUNTER — APPOINTMENT (OUTPATIENT)
Dept: GENERAL RADIOLOGY | Facility: CLINIC | Age: 87
End: 2022-08-29
Attending: EMERGENCY MEDICINE
Payer: OTHER MISCELLANEOUS

## 2022-08-29 ENCOUNTER — HOSPITAL ENCOUNTER (EMERGENCY)
Facility: CLINIC | Age: 87
Discharge: HOME | End: 2022-08-29
Attending: EMERGENCY MEDICINE | Admitting: EMERGENCY MEDICINE
Payer: OTHER MISCELLANEOUS

## 2022-08-29 VITALS
OXYGEN SATURATION: 93 % | BODY MASS INDEX: 15.3 KG/M2 | SYSTOLIC BLOOD PRESSURE: 148 MMHG | HEART RATE: 58 BPM | TEMPERATURE: 97.5 F | DIASTOLIC BLOOD PRESSURE: 67 MMHG | WEIGHT: 94.8 LBS | RESPIRATION RATE: 16 BRPM

## 2022-08-29 DIAGNOSIS — S52.021A CLOSED FRACTURE OF OLECRANON PROCESS OF RIGHT ULNA, INITIAL ENCOUNTER: ICD-10-CM

## 2022-08-29 PROCEDURE — 24670 CLTX ULNAR FX PROX W/O MNPJ: CPT | Mod: RT

## 2022-08-29 PROCEDURE — 250N000013 HC RX MED GY IP 250 OP 250 PS 637: Performed by: EMERGENCY MEDICINE

## 2022-08-29 PROCEDURE — 99284 EMERGENCY DEPT VISIT MOD MDM: CPT | Mod: 25

## 2022-08-29 PROCEDURE — 73090 X-RAY EXAM OF FOREARM: CPT | Mod: RT

## 2022-08-29 PROCEDURE — 73060 X-RAY EXAM OF HUMERUS: CPT | Mod: RT

## 2022-08-29 RX ORDER — MORPHINE SULFATE 10 MG/5ML
2 SOLUTION ORAL ONCE
Status: COMPLETED | OUTPATIENT
Start: 2022-08-29 | End: 2022-08-29

## 2022-08-29 RX ADMIN — MORPHINE SULFATE 2 MG: 10 SOLUTION ORAL at 22:16

## 2022-08-29 ASSESSMENT — ENCOUNTER SYMPTOMS
ARTHRALGIAS: 1
JOINT SWELLING: 1
COLOR CHANGE: 1

## 2022-08-29 ASSESSMENT — ACTIVITIES OF DAILY LIVING (ADL)
ADLS_ACUITY_SCORE: 35
ADLS_ACUITY_SCORE: 35

## 2022-08-30 NOTE — ED TRIAGE NOTES
Patient BIBA from assisted living. Patient had a fall this morning at 7am and was x-rayed at her home facility but the x-rays are not back yet and provider and POA wanted her sent here. Pain and swelling to right elbow and bruise to left side of head. Baseline dementia.      Triage Assessment     Row Name 08/29/22 [id number]       Triage Assessment (Adult)    Airway WDL WDL       Respiratory WDL    Respiratory WDL WDL       Skin Circulation/Temperature WDL    Skin Circulation/Temperature WDL --  Swelling and pain to right elbow       Cardiac WDL    Cardiac WDL WDL       Peripheral/Neurovascular WDL    Peripheral Neurovascular WDL WDL       Cognitive/Neuro/Behavioral WDL    Cognitive/Neuro/Behavioral WDL --  baseline dementia

## 2022-08-30 NOTE — DISCHARGE INSTRUCTIONS
We have discussed that dialysis fracture of the right olecranon process of the ulna.  We have placed a splint on.  Please follow-up with orthopedics to transition this to a cast.  We have chosen not to do head imaging though she did hit her head as we have discussed we are not thinking of intervening on any finding in this imaging study and therefore have not recommended it.

## 2022-08-30 NOTE — ED PROVIDER NOTES
History   Chief Complaint:  Right Arm Pain     History provided by: Patient's daughter.      Gennaro Walton is a 89 year old female with history of hypertension, CKD stage 3, GERD, and aortic and mitral valve regurgitation who presents with right elbow pain and associated swelling as well as left temple ecchymosis from an unwitnessed fall this morning. Patient's mother was called 12 hours ago and told about the fall. Patient's daughter suspects she fell trying to get out of bed. She is unable to walk because she broke her hip in January. Patient's daughter states she is acting the way she normally acts. Patient's daughter states she too tylenol at 1900 and 1/2 the minimum dose of morphine at 1800 (2.5 and 3.5 hours ago respectively).     Review of Systems   Musculoskeletal: Positive for arthralgias (Right elbow) and joint swelling (Right elbow).   Skin: Positive for color change (Ecchymosis to left temple).   All other systems reviewed and are negative.    Allergies:  Sulfacetamide  Tramadol  Opiod analgesics  Sulfa    Medications:  Amlodipine  Aspirin 81 mg  Levothyroxine  Losartan  Oxybutynin  Senna-docusate  Vitamin D3  Nitroglycerin    Past Medical History:     Compression fracture of lumbar vertebra  Comminuted left intertroch hip fract  Proximal left humerus fracture  Benign essential hypertension   Osteoporosis  Traumatic SAH  Acute blood loss anemia  Delirium  Closed nondisplaced intertrochanteric fracture of left femur with routine healing  S/P ORIF fracture  Closed fracture of proximal end of left humerus  Essential hypertension  CKD, stage 3  Urge incontinence of urine  UTI  Cognitive impairment  Closed right hip fracture  Closed fracture of right femur   Pressure injury of back, stage 2  Tear of right rotator cuff  Chronic pain of both shoulders  Onychomycosis  Compression fracture of T12 vertebra  Vitamin D deficiency  Dermatophytosis of nail  Mastodynia  Nocturia  Aortic regurgitation  Mitral  regurgitation  GERD  Pure hypercholesterolemia  Diverticulum of esophagus  Malignant neoplasm of breast   Malignant neoplasm of bronchus and lung  Peripheral vascular disease  Hypothyroid    Past Surgical History:    Left mastectomy  Cataract surgery  ORIF femur distal right  ORIF femur midshaft left  ORIF hip nailing right  Thyroidectomy  Appendectomy  Colonoscopy  Excision of tonsil tags  Debridement of nails 1-5     Family History:    Father- coronary artery disease, hypertension  Mother- osteoporosis    Social History:  PCP: aMdy Hagen   Patient presents with daughter  Arrived by car    Physical Exam     Patient Vitals for the past 24 hrs:   BP Temp Temp src Pulse Resp SpO2 Weight   08/29/22 2320 (!) 150/65 -- -- -- -- 98 % --   08/29/22 2300 (!) 160/70 -- -- -- -- -- --   08/29/22 2250 (!) 140/70 -- -- -- -- 92 % --   08/29/22 2240 (!) 140/70 -- -- -- -- 93 % --   08/29/22 2230 (!) 154/71 -- -- 57 -- 94 % --   08/29/22 2220 (!) 141/69 -- -- 57 -- 93 % --   08/29/22 2056 (!) 143/70 97.5  F (36.4  C) Temporal 58 16 94 % 43 kg (94 lb 12.8 oz)       Physical Exam  Vitals and nursing note reviewed.   Constitutional:       Comments: Elderly very frail appearing   HENT:      Head: Normocephalic.      Comments: There is a slight area of ecchymosis around the left orbit and left frontal region of the scalp.  No laceration.     Right Ear: There is impacted cerumen.      Left Ear: There is impacted cerumen.      Nose: Nose normal.   Eyes:      Pupils: Pupils are equal, round, and reactive to light.   Cardiovascular:      Rate and Rhythm: Normal rate.   Pulmonary:      Effort: Pulmonary effort is normal.   Abdominal:      General: Abdomen is flat.      Palpations: Abdomen is soft.   Musculoskeletal:      Comments: Diffuse swelling of the right elbow including effusion of the right elbow joint.  Limited flexion extension due to pain.  No open wound.   Skin:     General: Skin is warm.      Capillary Refill: Capillary  refill takes less than 2 seconds.   Neurological:      General: No focal deficit present.      Mental Status: She is alert. Mental status is at baseline.   Psychiatric:         Mood and Affect: Mood normal.           Emergency Department Course     Imaging:  Radius/Ulna XR, PA & LAT, right   Final Result   IMPRESSION: Mildly distracted fracture of the olecranon. No other fracture or dislocation. The bones are demineralized. Arthritis in the shoulder and wrist.      Humerus XR, G/E 2 views, right   Final Result   IMPRESSION: Mildly distracted fracture of the olecranon. No other fracture or dislocation. The bones are demineralized. Arthritis in the shoulder and wrist.        Report per radiology    Emergency Department Course:     Reviewed:  I reviewed nursing notes, vitals, past medical history and Care Everywhere    Assessments:  2122 I obtained history and examined the patient as noted above.   2140 I rechecked the patient and explained findings.     Interventions:  Medications   morphine solution 2 mg (2 mg Oral Given 8/29/22 2216)     Disposition:  The patient was discharged to home.     Impression & Plan     Medical Decision Making:  Patient presents after mechanical fall at risk for these due to age 89.  Patient did hit head and does meet criteria for head imaging but care was discussed with family due to age of 89 investigations in the subdural seems like an unfruitful labor as unlikely intervention would be recommended and family agree would not want this.  Is a concern for elbow fracture x-rays confirm injury.  Patient was placed in a splint for comfort and follow-up with orthopedics for splinting and care care was discussed with the family and felt safe for discharge and was discharged in stable condition.    Diagnosis:    ICD-10-CM    1. Closed fracture of olecranon process of right ulna, initial encounter  S52.021A        Scribe Disclosure:  Meggan JUNIOR, am serving as a scribe at 9:22 PM on 8/29/2022  to document services personally performed by Edilson Casillas MD based on my observations and the provider's statements to me.          Edilson Casillas MD  09/03/22 4721

## 2022-09-18 ENCOUNTER — HEALTH MAINTENANCE LETTER (OUTPATIENT)
Age: 87
End: 2022-09-18

## 2023-01-01 ENCOUNTER — MEDICAL CORRESPONDENCE (OUTPATIENT)
Dept: HEALTH INFORMATION MANAGEMENT | Facility: CLINIC | Age: 88
End: 2023-01-01

## 2023-01-01 ENCOUNTER — TRANSFERRED RECORDS (OUTPATIENT)
Dept: HEALTH INFORMATION MANAGEMENT | Facility: CLINIC | Age: 88
End: 2023-01-01

## 2023-01-01 ENCOUNTER — HOSPITAL ENCOUNTER (EMERGENCY)
Facility: CLINIC | Age: 88
Discharge: HOME | End: 2023-03-21
Attending: EMERGENCY MEDICINE | Admitting: EMERGENCY MEDICINE
Payer: OTHER MISCELLANEOUS

## 2023-01-01 ENCOUNTER — APPOINTMENT (OUTPATIENT)
Dept: GENERAL RADIOLOGY | Facility: CLINIC | Age: 88
End: 2023-01-01
Attending: EMERGENCY MEDICINE
Payer: OTHER MISCELLANEOUS

## 2023-01-01 ENCOUNTER — HEALTH MAINTENANCE LETTER (OUTPATIENT)
Age: 88
End: 2023-01-01

## 2023-01-01 VITALS
SYSTOLIC BLOOD PRESSURE: 143 MMHG | HEART RATE: 52 BPM | DIASTOLIC BLOOD PRESSURE: 79 MMHG | OXYGEN SATURATION: 90 % | RESPIRATION RATE: 20 BRPM | WEIGHT: 95 LBS | TEMPERATURE: 97.1 F | BODY MASS INDEX: 15.33 KG/M2

## 2023-01-01 DIAGNOSIS — S20.219A CONTUSION OF CHEST WALL, UNSPECIFIED LATERALITY, INITIAL ENCOUNTER: ICD-10-CM

## 2023-01-01 PROCEDURE — 71111 X-RAY EXAM RIBS/CHEST4/> VWS: CPT

## 2023-01-01 PROCEDURE — 71120 X-RAY EXAM BREASTBONE 2/>VWS: CPT

## 2023-01-01 PROCEDURE — 99284 EMERGENCY DEPT VISIT MOD MDM: CPT

## 2023-01-01 ASSESSMENT — ACTIVITIES OF DAILY LIVING (ADL)
ADLS_ACUITY_SCORE: 35

## 2023-03-20 NOTE — ED TRIAGE NOTES
89F hospice pt brought into ED via EMS to have XR performed. Daughter noted large bruise across chest today and requested it be worked up. Pt has dementia and is at baseline. Non verbal but shakes head yes and no.     Triage Assessment     Row Name 03/20/23 7135       Triage Assessment (Adult)    Airway WDL WDL       Respiratory WDL    Respiratory WDL WDL       Skin Circulation/Temperature WDL    Skin Circulation/Temperature WDL all  large bruise across chest       Cardiac WDL    Cardiac WDL WDL       Peripheral/Neurovascular WDL    Peripheral Neurovascular WDL WDL       Cognitive/Neuro/Behavioral WDL    Cognitive/Neuro/Behavioral WDL WDL

## 2023-03-21 NOTE — ED PROVIDER NOTES
History     Chief Complaint:  Bruise     The history is provided by a relative (daughter). History limited by: Dementia.      Gennaro Walton is a 89 year old female with a history of hypertension, hypercholesterolemia, CKD, breast cancer, and dementia who presents via EMS from [facility] with pain across her chest. The patient's routine caregiver went on vacation a few days ago, returned this morning, and upon returning noted that the patient has worsened pain across her chest. Daughter was called and observed a large bruise across her chest, which is new. EMS was called and transported her to ED at this time for appropriate workup. Patient has dementia and is at baseline. Unable to provide history herself. Is wheelchair bound, no staff reports of recent falls. Patient is not anticoagulated.    Independent Historian:   Daughter - They report as above    Review of External Notes: Reviewed the hospital notes from April 2022.    ROS:  Review of Systems   Unable to perform ROS: Dementia     Allergies:  Sulfacetamide  Tramadol     Medications:    Amlodipine  Aspirin 81 mg  Calcium carbonate  Carboxymethylcellulose  Levothyroxine  Lidocaine  Losartan  Menthol  Oxybutynin  Polyethylene glycol  Senna docusate  Cholecalciferol    Past Medical History:    Breast cancer  Stage 3 CKD  Acute blood loss anemia  Subarachnoid hemorrhage  GERD  Dementia  Onychomycosis  Mastodynia  Age-related osteoporosis  Varicose veins of both lower extremities  Pure hypercholesterolemia  Diverticulum of esophagus, acquired  Peripheral vascular disease  Cognitive impairment  Vitamin D deficiency  Pneumonia  Hypertension  Hypothyroidism  Aortic regurgitation  Mitral regurgitation  Compression fracture of T12 vertebra    Past Surgical History:    Left mastectomy  Cataract surgery  ORIF distal femur, right  ORIF midshaft femur, left  ORIF hip nailing, right  Thyroidectomy, partial  Appendectomy  Colonoscopy  Cell tumor of chest  removal  Excision of Zenker's diverticulum  Debridement of nails    Family History:    CAD  Vaginal cancer  Hypertension  Brain cancer  Myocardial infarction  Heart condition  Osteoporosis    Social History:  The patient presented with a family member.  The patient arrived via EMS.   PCP: Mady Hagen     Physical Exam     Patient Vitals for the past 24 hrs:   BP Temp Temp src Pulse Resp SpO2 Weight   03/20/23 1925 -- -- -- 58 18 92 % --   03/20/23 1920 -- -- -- 57 11 91 % --   03/20/23 1841 124/74 97.1  F (36.2  C) Temporal 62 18 96 % 43.1 kg (95 lb)      Physical Exam  Nursing note and vitals reviewed.  Constitutional:  Alert.  Appears comfortable. Somnolent but arousable.  HENT:    No evidence of scalp or facial trauma.  Eyes:    Conjunctivae are normal.      Right eye exhibits no discharge. Left eye exhibits no discharge.   Cardiovascular:  Normal rate, regular rhythm.      Normal heart sounds.     No murmur heard.  Pulmonary/Chest:  Breath sounds clear. No respiratory distress.     No stridor.   Musculoskeletal: Old yellowish-colored bruising across the chest from right to left. She has a prosthesis where the left breast was. Tenderness across whole anterior chest. No crepitus. No bruising on her arms or legs.  Neurological:   Alert and appropriate. No focal weakness.  Skin:    Skin is warm and dry. No rash noted. No diaphoresis.   Psychiatric:   Behavior is normal. Judgment and thought content normal.     Emergency Department Course     Imaging:  XR Sternum 2 Views   Final Result   IMPRESSION: Negative sternum. No displaced fracture. Diffuse osteopenia.      XR Ribs & Chest Bilateral G/E 4 Views   Final Result   IMPRESSION: Postoperative changes of the right hemithorax. Chronic cardiomegaly. Mediastinal surgical clips. Increased interstitial markings suggestive of congestion and edema. Small bilateral pleural effusions and bibasilar atelectasis. Evaluation for    an acute rib fracture is limited by  positioning and osseous demineralization. There are questionable mildly displaced fracture deformities of mid lateral right-sided ribs of uncertain chronicity. CT suggested as indicated. Degenerative changes of the    shoulders.         Report per radiology    Emergency Department Course & Assessments:     Assessments:  1909 I obtained history and examined the patient as noted above.  2231 I rechecked the patient and explained findings. I believe that they are safe for discharge at this time.    Independent Interpretation (X-rays, CTs, rhythm strip):  I reviewed the x-rays of the ribs chest and sternum and could not see any obvious fractures but they were very difficult to interpret due to her thin bones    Disposition:  The patient was discharged to home.     Impression & Plan      Medical Decision Making:  Patient comes in with old bruises all the way across her chest and some tenderness.  X-rays of the ribs chest and sternum were obtained after I discussed this with the daughter and she wanted them done.  There was skin just and may be seen on the x-ray according to radiology, maybe 1 or 2 rib fractures on the right but unsure of the age.  No pneumothorax.  No sternal fracture.  The daughter just wanted to have an idea of how long the pain is going to be there.  The daughter is comfortable with her going back to the care center.  She will be transferred back by EMS with follow-up as needed.    Ice, Tylenol as needed.  Be gentle with moving her.  Follow-up in the clinic as needed.    Diagnosis:    ICD-10-CM    1. Contusion of chest wall, unspecified laterality, initial encounter  S20.219A          Scribe Disclosure:  I, Concepcion Hanks, am serving as a scribe at 7:08 PM on 3/20/2023 to document services personally performed by Mandie Cruz MD based on my observations and the provider's statements to me.     3/20/2023   Mandie Cruz MD Powell, Tracy Alan, MD  03/20/23 1659

## 2023-05-04 NOTE — LETTER
8/19/2019        RE: Gennaro Walton  [address]  [place] [address]        [facility]  [place] Medical Record Number:  [id number]  [facility] of [facility] where encounter took place:  [facility] [[id number]]  Chief Complaint   Patient presents with     RECHECK       HPI:    Gennaro Walton  is a 89 year old (9/8/1923), who is being seen today for an episodic care visit.  HPI information obtained from: facility chart records, facility staff, patient report and [facility] chart review.     Met with Mrs. Walton today for follow up.  Mrs. Walton has mod-advanced cognitive impairment, but does appear more awake/alert today.  She continues to endorse pain, reports worse in RUE, but vague about report.  She otherwise has no complaints.      Past Medical and Surgical History reviewed in Epic today.    MEDICATIONS:  Current Outpatient Medications   Medication Sig Dispense Refill     acetaminophen (TYLENOL) 500 MG tablet Take 2 tablets (1,000 mg) by mouth 4 times daily       amLODIPine (NORVASC) 5 MG tablet Take 2.5 mg by mouth daily       calcium carbonate (TUMS) 500 MG chewable tablet Take 1 chew tab by mouth 2 times daily       levothyroxine (SYNTHROID/LEVOTHROID) 88 MCG tablet Take 88 mcg by mouth daily       Lidocaine (LIDOCARE) 4 % Patch Place 2 patches onto the skin every 24 hours To prevent lidocaine toxicity, patient should be patch free for 12 hrs daily.  1 on Left hip, 1 on Left shoulder.       losartan (COZAAR) 50 MG tablet Take 50 mg by mouth daily       Menthol, Topical Analgesic, (BIOFREEZE) 4 % GEL Apply 1 Application topically 3 times daily as needed (Pain) Apply to painful areas, all except face, TID PRN.       oxybutynin ER (DITROPAN-XL) 10 MG 24 hr tablet Take 1 tablet (10 mg) by mouth At Bedtime       polyethylene glycol (MIRALAX/GLYCOLAX) packet Take 17 g by mouth daily       traMADol (ULTRAM) 50 MG tablet Tramadol 12.5 mg's at bedtime  "scheduled for pain. -Can take an additional 12.5 mg's 2 hrs after if still awake and pain. 30 tablet 0     vitamin D3 (CHOLECALCIFEROL) 2000 units tablet Take 1 tablet by mouth daily       REVIEW OF SYSTEMS:  Limited secondary to cognitive impairment but today 7 point ROS done including, light headedness/dizziness, fever/chills, pain, Resp, CV, GI, and  and is negative other than noted in HPI.    Objective:  BP (!) 178/66   Pulse 60   Temp 98.5  F (36.9  C)   Resp 18   Ht 1.6 m (5' 3\")   Wt 46.3 kg (102 lb)   SpO2 97%   BMI 18.07 kg/m        Exam:  GENERAL APPEARANCE:  Elderly frail female resting in bed, NAD, non-toxic.   ENT:  Mouth and oropharynx normal, moist mucous membranes.   RESP:  Lungs are CTA. Regular relaxed breathing effort.  No cough.   CV: S1/S2 no murmur or rubs.  Regular rhythm and rate.  No generalized edema.   ABDOMEN:   Protuberant but, soft, non-tender with active bowel sounds.  No guarding, rigidity, or rebound tenderness.  EXTREMITIES:  No lower extremity edema, no calf tenderness.   PSYCH: Alert to self only, not place, date, situation.  Suspect some degree of cognitive/memory impairment.  Stable/unchanged.   WOUND: Left hip has 3 surgical incisions, not assessed today.   SKIN: Large ecchymosis present on medial upper Left thigh not assessed today.     Labs:   I have personally reviewed labs, which are in facility or EMR chart.     ASSESSMENT/PLAN:  Closed nondisplaced intertrochanteric fracture of left femur with routine healing, subsequent encounter  S/P ORIF (open reduction internal fixation) Left femur fracture, 26 July  Closed fracture of proximal end of left humerus with routine healing, unspecified fracture morphology, subsequent encounter  Acute pain  Mrs. Walton reports pain in her LUE, but vague and does not rate, but does not appear in distress right now.  RN's report she will yell \"Ow\" at times and c/o pain at times, but only with movement.  Also RN's report pain " reports move around, sometimes LLE, sometimes LUE, sometimes stomach.    Daughter/POA made it clear she want's minimal analgesics and we have done that, and we have noted an increase in awake/alert, and she does not appear in distress.  -Continues with NWB LUE with use of sling.   -Continues with 30% weight bearing on LLE.   -Continues QID Acetaminophen and at bedtime Tramadol.   --Ortho f/u in Sept.     Traumatic SAH (subarachnoid hemorrhage)   Monitoring clinically, as noted above, ongoing mod-adv cognitive/memory impairment, but more awake/alert with decrease in analgesics.   -Repeat imaging and f/u around Aug 25th.   -Avoid all NSAIDS for now.     Delirium  Cognitive impairment  As noted above.   -No changes, continue to clinically monitor.     Debility  High degree of debility, and right now is heavy assist of two to get out of bed, is not ambulating.  With significant cognitive impairment, she can not 30% weight bear, and she intermittently forget's about her LUE being broken, thus with LUE and LLE as NWB she has significant debility and will likely remain that way for months if not life long.   -Continues with PT/OT.   -Would benefit from WC, thus filled out F2F for one.     Orders written by provider at facility  -As noted above.     Electronically signed by:  RITA Adhikari CNP       MEDICAL NECESSITY STATEMENT FOR DME    Demographic Information on [facility]:    [facility]  Gender: female  : 1923  [address]  [place] [address]  [phone] (home) none (work)    Medical Record: [id number]  Social Security Number: [id number]  Primary Care Provider: Mady Hagen  Insurance: Payor: MEDICARE / Plan: MEDICARE / Product Type: Medicare /       Closed nondisplaced intertrochanteric fracture of left femur with routine healing, subsequent encounter [2.266D]  S/P ORIF Left femur fracture,  [Z96.7]  Closed fracture of proximal end of left humerus with routine  healing, unspecified fracture morphology, subsequent encounter [S42.202D]  Debility [R53.81]    DME:  WHEELCHAIR: 18x18 manual with seat/back cushion and extended foot rests bilaterally.    Standard foot rest: No, extended bilateral foot rests.    Elevating leg rest: No   Dose patient use oxygen: No   Able to propel w/c: No, NWB/use of LUE, but yes some one can propel for her.    Mobility related ADL that are affected in the home; dressing, toileting, grooming, hygiene.    The wheelchair is suitable and necessary for use in the patient's home and the  Home/rooms can accommodate the wheelchair.     Reason why a cane or walker will not meet the patient's needs. Can not ambulate due to LLE 30% weight bearing only, and with LUE NWB status can not use a walker and with cognitive impairment and debility does not have endurance for use of cane.    The patient has expressed willingness to use the wheelchair in the home and    Does have the physical and cognitive ability to maneuver the equipment or has a    Caregiver who is available, willing, and able to provide assistance in the home    With the wheelchair: Yes.     VITAL SIGNS:  ASSESSMENT/PLAN:  -See above.     MEDICAL NECESSITY STATEMENT: Pt would benefit from wheelchair to assist with ADL's noted above, along with increase in quality of life, mobility and safety due to ongoing 30% weight bearing LLE, NWB LUE, and cognitive impairment make her mostly bed bound.  That combined with debility, patient is unsafe to ambulate with walker or cane and is a high fall risk.  Due to extensive nature of limitations, there is a possibility this will be life long.     Closed nondisplaced intertrochanteric fracture of left femur with routine healing, subsequent encounter [S72.145D]  S/P ORIF Left femur fracture, 26 July [Z96.7]  Closed fracture of proximal end of left humerus with routine healing, unspecified fracture morphology, subsequent encounter [S42.202D]  Debility  [R53.81]    ELECTRONICALLY SIGNED BY PECOS CERTIFIED PROVIDER:  RITA Adhikari CNP   NPI: [id number]  [facility]  [address] [place] [address], [address]  [place] [address]                Sincerely,        RITA Adhikari CNP     Include Pregnancy/Lactation Warning?: No

## 2023-05-17 NOTE — ED TRIAGE NOTES
Patient fell at nursing home today and complains of right hip pain.   
The patient is a 50y Female complaining of lower leg pain/injury.

## 2023-12-21 NOTE — PLAN OF CARE
OT: Order received, chart reviewed. Pt very lethargic and unable to participate in OT eval and intervention at this time.    [As Noted in HPI] : as noted in HPI [Negative] : Integumentary

## (undated) DEVICE — BNDG ELASTIC 6" DBL LENGTH UNSTERILE 6611-16

## (undated) DEVICE — DRAPE IOBAN INCISE 23X17" 6650EZ

## (undated) DEVICE — NDL 22GA 1" 305155

## (undated) DEVICE — STPL SKIN 35W ROTATING HEAD PRW35

## (undated) DEVICE — LINEN TOWEL PACK X5 5464

## (undated) DEVICE — PREP CHLORAPREP 26ML TINTED ORANGE  260815

## (undated) DEVICE — MANIFOLD NEPTUNE 4 PORT 700-20

## (undated) DEVICE — KIT PATIENT CARE HANA TABLE PROFX SUPINE 6855

## (undated) DEVICE — GLOVE PROTEXIS POWDER FREE 8.5 ORTHOPEDIC 2D73ET85

## (undated) DEVICE — IMP SCR SYN CORTEX 4.5X38MM ST TI 414.838: Type: IMPLANTABLE DEVICE | Site: LEG | Status: NON-FUNCTIONAL

## (undated) DEVICE — TOURNIQUET CUFF 18" STERILE

## (undated) DEVICE — GLOVE PROTEXIS W/NEU-THERA 8.5  2D73TE85

## (undated) DEVICE — SOL NACL 0.9% IRRIG 1000ML BOTTLE 2F7124

## (undated) DEVICE — DRAPE CONVERTORS U-DRAPE 60X72" 8476

## (undated) DEVICE — SU VICRYL 0 CP-1 27" J467H

## (undated) DEVICE — DRSG ADAPTIC 3X8" 6113

## (undated) DEVICE — ROD SYN REAMER BALL TIP 2.5X950MM  351.706S

## (undated) DEVICE — DRSG GAUZE 4X4" 3033

## (undated) DEVICE — WIRE GUIDE 3.2X400MM  357.399

## (undated) DEVICE — PACK TOTAL HIP W/U DRAPE SOP15HUFSC

## (undated) DEVICE — PACK TOTAL KNEE SOP15TKFSD

## (undated) DEVICE — GUIDE WIRE 2.5X200MM 310.243

## (undated) DEVICE — GLOVE PROTEXIS BLUE W/NEU-THERA 7.0  2D73EB70

## (undated) DEVICE — GLOVE PROTEXIS W/NEU-THERA 7.0  2D73TE70

## (undated) DEVICE — GLOVE PROTEXIS BLUE W/NEU-THERA 8.5  2D73EB85

## (undated) DEVICE — SUCTION TIP YANKAUER MEDI-VAC K82

## (undated) DEVICE — SU VICRYL 2-0 CT-2 27" UND J269H

## (undated) DEVICE — DRILL BIT QUICK COUPLING 3 FLUTE 4.2MMX145MM NDL  POINT

## (undated) DEVICE — SOL WATER IRRIG 1000ML BOTTLE 2F7114

## (undated) DEVICE — DRILL BIT 3.2X145MM  310.31

## (undated) DEVICE — CAST PADDING 6" STERILE 9046S

## (undated) DEVICE — SUCTION MANIFOLD NEPTUNE SGL

## (undated) DEVICE — ESU GROUND PAD UNIVERSAL W/O CORD

## (undated) DEVICE — GLOVE PROTEXIS POWDER FREE 7.0 ORTHOPEDIC 2D73ET70

## (undated) DEVICE — SU VICRYL 2-0 X-1 27" UND J459H

## (undated) DEVICE — SU VICRYL 0 CT-1 27" J340H

## (undated) DEVICE — SU VICRYL 2-0 CP-1 27" UND J266H

## (undated) DEVICE — DRAPE C-ARM 60X42" 1013

## (undated) DEVICE — IMP SCR SYN 5.0 TI LOCK T25 STARDRIVE 40MM 04.005.530S: Type: IMPLANTABLE DEVICE | Site: FEMUR | Status: NON-FUNCTIONAL

## (undated) DEVICE — DRAPE SHEET REV FOLD 3/4 9349

## (undated) DEVICE — SYR 10ML FINGER CONTROL W/O NDL 309695

## (undated) DEVICE — GLOVE PROTEXIS POWDER FREE 6.5 ORTHOPEDIC 2D73ET65

## (undated) DEVICE — DRAPE C-ARMOR 5 SIDED 5523

## (undated) DEVICE — SPONGE LAP 18X18" X8435

## (undated) DEVICE — SU VICRYL 2-0 CT-1 27" UND J259H

## (undated) DEVICE — IMM KNEE 20" 0814-2660

## (undated) DEVICE — DRSG TEGADERM 6X8" 1628

## (undated) DEVICE — SU VICRYL 0 UR-6 27" J603H

## (undated) DEVICE — ESU GROUND PAD ADULT W/CORD E7507

## (undated) DEVICE — DRILL BIT QUICK COUPLING 3 FLUTE 4.2MMX330/100MM CALIBRATE

## (undated) DEVICE — PACK HIP NAILING SOP15HNSB

## (undated) RX ORDER — HYDROMORPHONE HYDROCHLORIDE 1 MG/ML
INJECTION, SOLUTION INTRAMUSCULAR; INTRAVENOUS; SUBCUTANEOUS
Status: DISPENSED
Start: 2019-07-26

## (undated) RX ORDER — PROPOFOL 10 MG/ML
INJECTION, EMULSION INTRAVENOUS
Status: DISPENSED
Start: 2020-02-01

## (undated) RX ORDER — FENTANYL CITRATE 50 UG/ML
INJECTION, SOLUTION INTRAMUSCULAR; INTRAVENOUS
Status: DISPENSED
Start: 2019-07-26

## (undated) RX ORDER — GLYCOPYRROLATE 0.2 MG/ML
INJECTION, SOLUTION INTRAMUSCULAR; INTRAVENOUS
Status: DISPENSED
Start: 2019-07-26

## (undated) RX ORDER — PROPOFOL 10 MG/ML
INJECTION, EMULSION INTRAVENOUS
Status: DISPENSED
Start: 2019-07-26

## (undated) RX ORDER — ONDANSETRON 2 MG/ML
INJECTION INTRAMUSCULAR; INTRAVENOUS
Status: DISPENSED
Start: 2019-07-26

## (undated) RX ORDER — CEFAZOLIN SODIUM 1 G/3ML
INJECTION, POWDER, FOR SOLUTION INTRAMUSCULAR; INTRAVENOUS
Status: DISPENSED
Start: 2019-07-26

## (undated) RX ORDER — BUPIVACAINE HYDROCHLORIDE AND EPINEPHRINE 2.5; 5 MG/ML; UG/ML
INJECTION, SOLUTION EPIDURAL; INFILTRATION; INTRACAUDAL; PERINEURAL
Status: DISPENSED
Start: 2020-02-01

## (undated) RX ORDER — BUPIVACAINE HYDROCHLORIDE 5 MG/ML
INJECTION, SOLUTION EPIDURAL; INTRACAUDAL
Status: DISPENSED
Start: 2022-02-08

## (undated) RX ORDER — NEOSTIGMINE METHYLSULFATE 1 MG/ML
VIAL (ML) INJECTION
Status: DISPENSED
Start: 2022-02-08

## (undated) RX ORDER — GLYCOPYRROLATE 0.2 MG/ML
INJECTION, SOLUTION INTRAMUSCULAR; INTRAVENOUS
Status: DISPENSED
Start: 2022-02-08

## (undated) RX ORDER — FENTANYL CITRATE 50 UG/ML
INJECTION, SOLUTION INTRAMUSCULAR; INTRAVENOUS
Status: DISPENSED
Start: 2022-02-08

## (undated) RX ORDER — LIDOCAINE HYDROCHLORIDE 20 MG/ML
INJECTION, SOLUTION EPIDURAL; INFILTRATION; INTRACAUDAL; PERINEURAL
Status: DISPENSED
Start: 2019-07-26

## (undated) RX ORDER — GLYCOPYRROLATE 0.2 MG/ML
INJECTION, SOLUTION INTRAMUSCULAR; INTRAVENOUS
Status: DISPENSED
Start: 2020-02-01

## (undated) RX ORDER — CEFAZOLIN SODIUM 1 G/3ML
INJECTION, POWDER, FOR SOLUTION INTRAMUSCULAR; INTRAVENOUS
Status: DISPENSED
Start: 2022-02-08

## (undated) RX ORDER — DEXAMETHASONE SODIUM PHOSPHATE 4 MG/ML
INJECTION, SOLUTION INTRA-ARTICULAR; INTRALESIONAL; INTRAMUSCULAR; INTRAVENOUS; SOFT TISSUE
Status: DISPENSED
Start: 2020-02-01

## (undated) RX ORDER — NEOSTIGMINE METHYLSULFATE 1 MG/ML
VIAL (ML) INJECTION
Status: DISPENSED
Start: 2019-07-26

## (undated) RX ORDER — ONDANSETRON 2 MG/ML
INJECTION INTRAMUSCULAR; INTRAVENOUS
Status: DISPENSED
Start: 2020-02-01

## (undated) RX ORDER — LIDOCAINE HYDROCHLORIDE 20 MG/ML
INJECTION, SOLUTION EPIDURAL; INFILTRATION; INTRACAUDAL; PERINEURAL
Status: DISPENSED
Start: 2022-02-08

## (undated) RX ORDER — NEOSTIGMINE METHYLSULFATE 1 MG/ML
VIAL (ML) INJECTION
Status: DISPENSED
Start: 2020-02-01

## (undated) RX ORDER — CEFAZOLIN SODIUM 2 G/100ML
INJECTION, SOLUTION INTRAVENOUS
Status: DISPENSED
Start: 2020-02-01

## (undated) RX ORDER — ONDANSETRON 2 MG/ML
INJECTION INTRAMUSCULAR; INTRAVENOUS
Status: DISPENSED
Start: 2022-02-08

## (undated) RX ORDER — TRANEXAMIC ACID 10 MG/ML
INJECTION, SOLUTION INTRAVENOUS
Status: DISPENSED
Start: 2022-02-08

## (undated) RX ORDER — DEXAMETHASONE SODIUM PHOSPHATE 4 MG/ML
INJECTION, SOLUTION INTRA-ARTICULAR; INTRALESIONAL; INTRAMUSCULAR; INTRAVENOUS; SOFT TISSUE
Status: DISPENSED
Start: 2019-07-26

## (undated) RX ORDER — LIDOCAINE HYDROCHLORIDE 20 MG/ML
INJECTION, SOLUTION EPIDURAL; INFILTRATION; INTRACAUDAL; PERINEURAL
Status: DISPENSED
Start: 2020-02-01

## (undated) RX ORDER — FENTANYL CITRATE 50 UG/ML
INJECTION, SOLUTION INTRAMUSCULAR; INTRAVENOUS
Status: DISPENSED
Start: 2020-02-01